# Patient Record
Sex: MALE | Race: WHITE | NOT HISPANIC OR LATINO | Employment: OTHER | URBAN - METROPOLITAN AREA
[De-identification: names, ages, dates, MRNs, and addresses within clinical notes are randomized per-mention and may not be internally consistent; named-entity substitution may affect disease eponyms.]

---

## 2017-10-22 ENCOUNTER — TELEPHONE (OUTPATIENT)
Dept: PULMONOLOGY | Facility: HOSPITAL | Age: 72
End: 2017-10-22

## 2017-10-27 RX ORDER — CLONAZEPAM 0.5 MG/1
TABLET ORAL
Qty: 60 TABLET | Refills: 0 | OUTPATIENT
Start: 2017-10-27

## 2017-11-15 ENCOUNTER — OFFICE VISIT (OUTPATIENT)
Dept: SLEEP MEDICINE | Facility: HOSPITAL | Age: 72
End: 2017-11-15
Attending: INTERNAL MEDICINE

## 2017-11-15 DIAGNOSIS — G47.31 CSA (CENTRAL SLEEP APNEA): ICD-10-CM

## 2017-11-15 DIAGNOSIS — G47.52 REM BEHAVIORAL DISORDER: ICD-10-CM

## 2017-11-15 DIAGNOSIS — G47.33 OSA TREATED WITH BIPAP: Primary | ICD-10-CM

## 2017-11-15 PROCEDURE — G0463 HOSPITAL OUTPT CLINIC VISIT: HCPCS

## 2017-11-15 PROCEDURE — 99214 OFFICE O/P EST MOD 30 MIN: CPT | Performed by: INTERNAL MEDICINE

## 2017-11-15 RX ORDER — CLONAZEPAM 0.5 MG/1
0.5 TABLET ORAL NIGHTLY
Qty: 60 TABLET | Refills: 5 | COMMUNITY
Start: 2017-11-15 | End: 2017-11-15 | Stop reason: SDUPTHER

## 2017-11-15 RX ORDER — CLONAZEPAM 0.5 MG/1
0.5 TABLET ORAL NIGHTLY
Qty: 60 TABLET | Refills: 5 | Status: SHIPPED | OUTPATIENT
Start: 2017-11-15 | End: 2017-11-21 | Stop reason: SDUPTHER

## 2017-12-02 PROBLEM — G47.33 OSA TREATED WITH BIPAP: Status: ACTIVE | Noted: 2017-12-02

## 2017-12-02 PROBLEM — G47.52 REM BEHAVIORAL DISORDER: Status: ACTIVE | Noted: 2017-12-02

## 2017-12-02 PROBLEM — G47.31 CSA (CENTRAL SLEEP APNEA): Status: ACTIVE | Noted: 2017-12-02

## 2017-12-05 RX ORDER — CLONAZEPAM 0.5 MG/1
TABLET ORAL
Qty: 60 TABLET | Refills: 0 | Status: SHIPPED | OUTPATIENT
Start: 2017-12-05 | End: 2018-06-05 | Stop reason: SDUPTHER

## 2018-05-16 ENCOUNTER — APPOINTMENT (OUTPATIENT)
Dept: SLEEP MEDICINE | Facility: HOSPITAL | Age: 73
End: 2018-05-16
Attending: INTERNAL MEDICINE

## 2018-06-05 RX ORDER — CLONAZEPAM 0.5 MG/1
TABLET ORAL
Qty: 60 TABLET | Refills: 2 | Status: SHIPPED | OUTPATIENT
Start: 2018-06-05 | End: 2018-09-08 | Stop reason: SDUPTHER

## 2018-06-28 ENCOUNTER — OFFICE VISIT (OUTPATIENT)
Dept: SLEEP MEDICINE | Facility: HOSPITAL | Age: 73
End: 2018-06-28
Attending: INTERNAL MEDICINE

## 2018-06-28 DIAGNOSIS — G47.31 CSA (CENTRAL SLEEP APNEA): ICD-10-CM

## 2018-06-28 DIAGNOSIS — G47.52 REM BEHAVIORAL DISORDER: ICD-10-CM

## 2018-06-28 DIAGNOSIS — G47.33 OSA TREATED WITH BIPAP: Primary | ICD-10-CM

## 2018-06-28 DIAGNOSIS — G47.14 HYPERSOMNIA DUE TO MEDICAL CONDITION: ICD-10-CM

## 2018-06-28 PROCEDURE — G0463 HOSPITAL OUTPT CLINIC VISIT: HCPCS

## 2018-06-28 PROCEDURE — 99214 OFFICE O/P EST MOD 30 MIN: CPT | Performed by: INTERNAL MEDICINE

## 2018-06-28 RX ORDER — MODAFINIL 200 MG/1
200 TABLET ORAL DAILY
Qty: 30 TABLET | Refills: 2 | Status: SHIPPED | OUTPATIENT
Start: 2018-06-28 | End: 2018-07-25 | Stop reason: SDUPTHER

## 2018-06-28 RX ORDER — MODAFINIL 200 MG/1
200 TABLET ORAL DAILY
Qty: 30 TABLET | Refills: 2 | Status: SHIPPED | OUTPATIENT
Start: 2018-06-28 | End: 2018-06-28

## 2018-07-01 PROBLEM — G47.14 HYPERSOMNIA DUE TO MEDICAL CONDITION: Status: ACTIVE | Noted: 2018-07-01

## 2018-07-16 ENCOUNTER — TELEPHONE (OUTPATIENT)
Dept: SLEEP MEDICINE | Facility: HOSPITAL | Age: 73
End: 2018-07-16

## 2018-07-25 RX ORDER — MODAFINIL 200 MG/1
200 TABLET ORAL DAILY
Qty: 30 TABLET | Refills: 2 | Status: ON HOLD | OUTPATIENT
Start: 2018-07-25 | End: 2022-08-11

## 2018-09-08 RX ORDER — CLONAZEPAM 0.5 MG/1
TABLET ORAL
Qty: 60 TABLET | Refills: 5 | Status: SHIPPED | OUTPATIENT
Start: 2018-09-08 | End: 2019-03-29

## 2018-09-27 ENCOUNTER — APPOINTMENT (OUTPATIENT)
Dept: SLEEP MEDICINE | Facility: HOSPITAL | Age: 73
End: 2018-09-27
Attending: INTERNAL MEDICINE

## 2018-12-03 ENCOUNTER — TELEPHONE (OUTPATIENT)
Dept: SLEEP MEDICINE | Facility: HOSPITAL | Age: 73
End: 2018-12-03

## 2018-12-03 NOTE — TELEPHONE ENCOUNTER
I received a fax from Dr Chris Messer stating that the patient would need to come off his clonazepam for REM behavior sleep disorder in order to get his oxycodone APAP 10/325 per [employer] PA. Pain pill with anti-anxiety meds does not meet the Service Benefit Plan criteria. In order to get his pain meds, it is ok for him to discontinue clonazepam gradually. However, if REM behavior becomes a problem, it will need to be addressed.

## 2019-02-02 ENCOUNTER — TELEPHONE (OUTPATIENT)
Dept: SLEEP MEDICINE | Facility: HOSPITAL | Age: 74
End: 2019-02-02

## 2019-02-02 NOTE — TELEPHONE ENCOUNTER
The patient had an ED visit in September 2018.  He went to a rehab facility.  There, he required oxygen at night.  I am being asked to write the oxygen order.  I will find out of oxygen saturation performed on PAP therapy or not?

## 2019-03-29 RX ORDER — CLONAZEPAM 0.5 MG/1
TABLET ORAL
Qty: 60 TABLET | Refills: 5 | Status: SHIPPED | OUTPATIENT
Start: 2019-03-29 | End: 2019-10-23 | Stop reason: SDUPTHER

## 2019-10-10 ENCOUNTER — TELEPHONE (OUTPATIENT)
Dept: SLEEP MEDICINE | Facility: HOSPITAL | Age: 74
End: 2019-10-10

## 2019-10-10 NOTE — TELEPHONE ENCOUNTER
Patient called stating he had been to ER recently and was told he needed to be on oxygen, patient called hear requesting Dr Fowler send an order for portable oxygen to DME. He has not been seen by Dr Fowler since 6/28/18. Scheduled appiontment 10/17/19

## 2019-10-17 ENCOUNTER — OFFICE VISIT (OUTPATIENT)
Dept: SLEEP MEDICINE | Facility: HOSPITAL | Age: 74
End: 2019-10-17

## 2019-10-17 VITALS — WEIGHT: 250 LBS | BODY MASS INDEX: 33.86 KG/M2 | HEIGHT: 72 IN

## 2019-10-17 DIAGNOSIS — G47.33 OSA TREATED WITH BIPAP: Primary | ICD-10-CM

## 2019-10-17 DIAGNOSIS — G47.52 REM BEHAVIORAL DISORDER: ICD-10-CM

## 2019-10-17 DIAGNOSIS — G47.31 CSA (CENTRAL SLEEP APNEA): ICD-10-CM

## 2019-10-17 DIAGNOSIS — G47.14 HYPERSOMNIA DUE TO MEDICAL CONDITION: ICD-10-CM

## 2019-10-17 PROCEDURE — G0463 HOSPITAL OUTPT CLINIC VISIT: HCPCS

## 2019-10-17 PROCEDURE — 99214 OFFICE O/P EST MOD 30 MIN: CPT | Performed by: INTERNAL MEDICINE

## 2019-10-17 NOTE — PROGRESS NOTES
"Follow Up Sleep Disorders Center Note     Chief Complaint:  HALIMA     Primary Care Physician: Eisenmenger, James Robert, MD    Interval History:   I last saw the patient in June 2018.  The patient had been hospitalized and in rehab.  The patient condition has declined.  The patient reports several weeks ago he was hospitalized and placed on oxygen 24/7.  When the oxygen was delivered, he was not given an adapter for the oxygen to be bled into his BiPAP auto SV.  He has been using oxygen only.  The patient goes to bed at 10 PM and awakens at 9:30 AM.  He will use the restroom.  He does have complaints of hypersomnolence and did not fill out his [place] Sleepiness Scale.    Review of Systems:    A complete review of systems was done and all were negative with the exception of nasal congestion and postnasal drip, GANNON, wheezing, occasional chest pain, cough, and ONEL    Social History:    Social History     Socioeconomic History   • Marital status:      Spouse name: Not on file   • Number of children: Not on file   • Years of education: Not on file   • Highest education level: Not on file   Tobacco Use   • Smoking status: Never Smoker   • Smokeless tobacco: Never Used   Substance and Sexual Activity   • Alcohol use: No   • Drug use: No       Allergies:  Neosporin [neomycin-bacitracin zn-polymyx]     Medication Review:  Reviewed.      Vital Signs:    Vitals:    10/17/19 1100   Weight: 113 kg (250 lb)   Height: 182.9 cm (72\")     Body mass index is 33.91 kg/m².    Physical Exam:    Constitutional:  Well developed 74 y.o. male that appears in no apparent distress.  Awake & oriented times 3.  Normal mood with normal recent and remote memory and normal judgement.  Eyes:  Conjunctivae normal.  Oropharynx:  moist mucous membranes without exudate and a large tongue and class III MP airway and posterior pharyngeal region not well seen  Neck: Obese trachea midline  Respiratory: Effort labored with ambulation  Musculoskeletal: " The patient is ambulating with a rolling walker     Results Review:  DME is [facility] and he uses a full facemask.  Downloads between 7/11 and 10/8/2019 compliance 84%.  Average usage is 5 hours and 51 minutes.  Average AHI is abnormal at 29.6 with a leak of 25 minutes.  BiPAP auto SV pressure is averaging EPAP 11.4 and average pressure support for 90% of the time EPAP pressure 12.8 and pressure support 7.6 and percent of night and periodic breathing 26% and average breath rate 12.6 and average minute ventilation 8.2 and BiPAP auto SV settings: Max pressure 20 and minimum EPAP 10 and maximum 15 minimum pressure support 0 and maximum pressure support 10 and breath rate auto.       Impression:   Obstructive sleep apnea as well as central sleep apnea, i.e. complex sleep apnea treated with BiPAP auto SV.  The patient does have a large leak with an abnormal AHI.  However, average tidal volume appears to be adequate.  26% of his night's is in periodic breathing.  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  Hypersomnolence secondary to the above  Chronic hypoxic respiratory failure treated with supplemental oxygen 3L NC 24/7    Plan:  Good sleep hygiene measures should be maintained.  Weight loss would be beneficial in this patient who is obese by BMI.  The patient is benefiting from the treatment being provided.     The patient will continue BiPAP auto SV.  I did give him an adapter to bleed and his oxygen to his BiPAP auto SV.  He is to start using it again.    The patient will call for any problems and will follow up in 3 months.      Drew Fowler MD  Sleep Medicine  10/17/19  11:27 AM

## 2019-10-23 RX ORDER — CLONAZEPAM 0.5 MG/1
TABLET ORAL
Qty: 60 TABLET | Refills: 5 | Status: ON HOLD | OUTPATIENT
Start: 2019-10-23 | End: 2022-08-11

## 2019-10-28 PROBLEM — R06.3 PERIODIC BREATHING: Status: ACTIVE | Noted: 2019-10-28

## 2020-01-16 ENCOUNTER — APPOINTMENT (OUTPATIENT)
Dept: SLEEP MEDICINE | Facility: HOSPITAL | Age: 75
End: 2020-01-16

## 2022-08-11 ENCOUNTER — APPOINTMENT (OUTPATIENT)
Dept: CARDIOLOGY | Facility: HOSPITAL | Age: 77
End: 2022-08-11

## 2022-08-11 ENCOUNTER — APPOINTMENT (OUTPATIENT)
Dept: GENERAL RADIOLOGY | Facility: HOSPITAL | Age: 77
End: 2022-08-11

## 2022-08-11 ENCOUNTER — APPOINTMENT (OUTPATIENT)
Dept: CT IMAGING | Facility: HOSPITAL | Age: 77
End: 2022-08-11

## 2022-08-11 ENCOUNTER — HOSPITAL ENCOUNTER (INPATIENT)
Facility: HOSPITAL | Age: 77
LOS: 8 days | Discharge: NURSING, REHAB OR LONG-TERM CARE | End: 2022-08-19
Attending: EMERGENCY MEDICINE | Admitting: HOSPITALIST

## 2022-08-11 DIAGNOSIS — N18.9 CHRONIC KIDNEY DISEASE, UNSPECIFIED CKD STAGE: ICD-10-CM

## 2022-08-11 DIAGNOSIS — E11.9 TYPE 2 DIABETES MELLITUS WITHOUT COMPLICATION, UNSPECIFIED WHETHER LONG TERM INSULIN USE: ICD-10-CM

## 2022-08-11 DIAGNOSIS — R77.8 ELEVATED TROPONIN LEVEL NOT DUE TO ACUTE CORONARY SYNDROME: ICD-10-CM

## 2022-08-11 DIAGNOSIS — L03.115 CELLULITIS OF RIGHT LOWER EXTREMITY: Primary | ICD-10-CM

## 2022-08-11 DIAGNOSIS — A41.9 SEPSIS, DUE TO UNSPECIFIED ORGANISM, UNSPECIFIED WHETHER ACUTE ORGAN DYSFUNCTION PRESENT: ICD-10-CM

## 2022-08-11 PROBLEM — R09.89 PULMONARY VASCULAR CONGESTION: Status: ACTIVE | Noted: 2022-08-11

## 2022-08-11 PROBLEM — R79.89 ELEVATED TROPONIN: Status: ACTIVE | Noted: 2022-08-11

## 2022-08-11 PROBLEM — N18.32 TYPE 2 DIABETES MELLITUS WITH STAGE 3B CHRONIC KIDNEY DISEASE: Status: ACTIVE | Noted: 2022-08-11

## 2022-08-11 PROBLEM — I50.31 DIASTOLIC CHF, ACUTE (HCC): Status: ACTIVE | Noted: 2022-08-11

## 2022-08-11 PROBLEM — I10 HTN (HYPERTENSION): Status: ACTIVE | Noted: 2022-08-11

## 2022-08-11 PROBLEM — E11.22 TYPE 2 DIABETES MELLITUS WITH STAGE 3B CHRONIC KIDNEY DISEASE (HCC): Status: ACTIVE | Noted: 2022-08-11

## 2022-08-11 PROBLEM — I45.10 RBBB: Status: ACTIVE | Noted: 2022-08-11

## 2022-08-11 LAB
ALBUMIN SERPL-MCNC: 4.1 G/DL (ref 3.5–5.2)
ALBUMIN/GLOB SERPL: 1.7 G/DL
ALP SERPL-CCNC: 61 U/L (ref 39–117)
ALT SERPL W P-5'-P-CCNC: 11 U/L (ref 1–41)
ANION GAP SERPL CALCULATED.3IONS-SCNC: 11 MMOL/L (ref 5–15)
AORTIC DIMENSIONLESS INDEX: 0.9 (DI)
ARTERIAL PATENCY WRIST A: ABNORMAL
ASCENDING AORTA: 3.2 CM
AST SERPL-CCNC: 18 U/L (ref 1–40)
ATMOSPHERIC PRESS: 749.4 MMHG
BACTERIA BLD CULT: ABNORMAL
BASE EXCESS BLDA CALC-SCNC: 1.9 MMOL/L (ref 0–2)
BASOPHILS # BLD AUTO: 0.02 10*3/MM3 (ref 0–0.2)
BASOPHILS NFR BLD AUTO: 0.2 % (ref 0–1.5)
BDY SITE: ABNORMAL
BH CV ECHO MEAS - ACS: 2.17 CM
BH CV ECHO MEAS - AO MAX PG: 11.1 MMHG
BH CV ECHO MEAS - AO MEAN PG: 5.2 MMHG
BH CV ECHO MEAS - AO ROOT DIAM: 3.6 CM
BH CV ECHO MEAS - AO V2 MAX: 167 CM/SEC
BH CV ECHO MEAS - AO V2 VTI: 31.8 CM
BH CV ECHO MEAS - AVA(I,D): 4.9 CM2
BH CV ECHO MEAS - EDV(CUBED): 153.6 ML
BH CV ECHO MEAS - EDV(MOD-SP2): 98 ML
BH CV ECHO MEAS - EDV(MOD-SP4): 109 ML
BH CV ECHO MEAS - EF(MOD-BP): 58.3 %
BH CV ECHO MEAS - EF(MOD-SP2): 62.2 %
BH CV ECHO MEAS - EF(MOD-SP4): 53.2 %
BH CV ECHO MEAS - ESV(CUBED): 78 ML
BH CV ECHO MEAS - ESV(MOD-SP2): 37 ML
BH CV ECHO MEAS - ESV(MOD-SP4): 51 ML
BH CV ECHO MEAS - FS: 20.2 %
BH CV ECHO MEAS - IVS/LVPW: 0.96 CM
BH CV ECHO MEAS - IVSD: 1.25 CM
BH CV ECHO MEAS - LV MASS(C)D: 283.5 GRAMS
BH CV ECHO MEAS - LV MAX PG: 6.7 MMHG
BH CV ECHO MEAS - LV MEAN PG: 3 MMHG
BH CV ECHO MEAS - LV V1 MAX: 129.8 CM/SEC
BH CV ECHO MEAS - LV V1 VTI: 27.6 CM
BH CV ECHO MEAS - LVIDD: 5.4 CM
BH CV ECHO MEAS - LVIDS: 4.3 CM
BH CV ECHO MEAS - LVOT AREA: 5.6 CM2
BH CV ECHO MEAS - LVOT DIAM: 2.7 CM
BH CV ECHO MEAS - LVPWD: 1.3 CM
BH CV ECHO MEAS - MV A DUR: 0.15 SEC
BH CV ECHO MEAS - MV A MAX VEL: 85.5 CM/SEC
BH CV ECHO MEAS - MV DEC SLOPE: 827.2 CM/SEC2
BH CV ECHO MEAS - MV DEC TIME: 0.16 MSEC
BH CV ECHO MEAS - MV E MAX VEL: 87.5 CM/SEC
BH CV ECHO MEAS - MV E/A: 1.02
BH CV ECHO MEAS - MV MAX PG: 5.9 MMHG
BH CV ECHO MEAS - MV MEAN PG: 2.7 MMHG
BH CV ECHO MEAS - MV P1/2T: 49 MSEC
BH CV ECHO MEAS - MV V2 VTI: 33.7 CM
BH CV ECHO MEAS - MVA(P1/2T): 4.5 CM2
BH CV ECHO MEAS - MVA(VTI): 4.6 CM2
BH CV ECHO MEAS - PA ACC TIME: 0.1 SEC
BH CV ECHO MEAS - PA PR(ACCEL): 33.8 MMHG
BH CV ECHO MEAS - PA V2 MAX: 81.4 CM/SEC
BH CV ECHO MEAS - PULM A REVS DUR: 0.14 SEC
BH CV ECHO MEAS - PULM A REVS VEL: 27.3 CM/SEC
BH CV ECHO MEAS - PULM DIAS VEL: 42.1 CM/SEC
BH CV ECHO MEAS - PULM S/D: 0.69
BH CV ECHO MEAS - PULM SYS VEL: 28.8 CM/SEC
BH CV ECHO MEAS - RV MAX PG: 2.2 MMHG
BH CV ECHO MEAS - RV V1 MAX: 74.1 CM/SEC
BH CV ECHO MEAS - RV V1 VTI: 17.2 CM
BH CV ECHO MEAS - SV(LVOT): 154.4 ML
BH CV ECHO MEAS - SV(MOD-SP2): 61 ML
BH CV ECHO MEAS - SV(MOD-SP4): 58 ML
BH CV ECHO MEAS - TAPSE (>1.6): 2.17 CM
BH CV LOWER VASCULAR LEFT COMMON FEMORAL AUGMENT: NORMAL
BH CV LOWER VASCULAR LEFT COMMON FEMORAL COMPETENT: NORMAL
BH CV LOWER VASCULAR LEFT COMMON FEMORAL COMPRESS: NORMAL
BH CV LOWER VASCULAR LEFT COMMON FEMORAL PHASIC: NORMAL
BH CV LOWER VASCULAR LEFT COMMON FEMORAL SPONT: NORMAL
BH CV LOWER VASCULAR LEFT DISTAL FEMORAL COMPRESS: NORMAL
BH CV LOWER VASCULAR LEFT GASTRONEMIUS COMPRESS: NORMAL
BH CV LOWER VASCULAR LEFT GREATER SAPH AK COMPRESS: NORMAL
BH CV LOWER VASCULAR LEFT GREATER SAPH BK COMPRESS: NORMAL
BH CV LOWER VASCULAR LEFT LESSER SAPH COMPRESS: NORMAL
BH CV LOWER VASCULAR LEFT MID FEMORAL AUGMENT: NORMAL
BH CV LOWER VASCULAR LEFT MID FEMORAL COMPETENT: NORMAL
BH CV LOWER VASCULAR LEFT MID FEMORAL COMPRESS: NORMAL
BH CV LOWER VASCULAR LEFT MID FEMORAL PHASIC: NORMAL
BH CV LOWER VASCULAR LEFT MID FEMORAL SPONT: NORMAL
BH CV LOWER VASCULAR LEFT PERONEAL COMPRESS: NORMAL
BH CV LOWER VASCULAR LEFT POPLITEAL AUGMENT: NORMAL
BH CV LOWER VASCULAR LEFT POPLITEAL COMPETENT: NORMAL
BH CV LOWER VASCULAR LEFT POPLITEAL COMPRESS: NORMAL
BH CV LOWER VASCULAR LEFT POPLITEAL PHASIC: NORMAL
BH CV LOWER VASCULAR LEFT POPLITEAL SPONT: NORMAL
BH CV LOWER VASCULAR LEFT POSTERIOR TIBIAL COMPRESS: NORMAL
BH CV LOWER VASCULAR LEFT PROFUNDA FEMORAL COMPRESS: NORMAL
BH CV LOWER VASCULAR LEFT PROXIMAL FEMORAL COMPRESS: NORMAL
BH CV LOWER VASCULAR LEFT SAPHENOFEMORAL JUNCTION COMPRESS: NORMAL
BH CV LOWER VASCULAR RIGHT COMMON FEMORAL AUGMENT: NORMAL
BH CV LOWER VASCULAR RIGHT COMMON FEMORAL COMPETENT: NORMAL
BH CV LOWER VASCULAR RIGHT COMMON FEMORAL COMPRESS: NORMAL
BH CV LOWER VASCULAR RIGHT COMMON FEMORAL PHASIC: NORMAL
BH CV LOWER VASCULAR RIGHT COMMON FEMORAL SPONT: NORMAL
BH CV LOWER VASCULAR RIGHT DISTAL FEMORAL COMPRESS: NORMAL
BH CV LOWER VASCULAR RIGHT GASTRONEMIUS COMPRESS: NORMAL
BH CV LOWER VASCULAR RIGHT GREATER SAPH AK COMPRESS: NORMAL
BH CV LOWER VASCULAR RIGHT GREATER SAPH BK COMPRESS: NORMAL
BH CV LOWER VASCULAR RIGHT LESSER SAPH COMPRESS: NORMAL
BH CV LOWER VASCULAR RIGHT MID FEMORAL AUGMENT: NORMAL
BH CV LOWER VASCULAR RIGHT MID FEMORAL COMPETENT: NORMAL
BH CV LOWER VASCULAR RIGHT MID FEMORAL COMPRESS: NORMAL
BH CV LOWER VASCULAR RIGHT MID FEMORAL PHASIC: NORMAL
BH CV LOWER VASCULAR RIGHT MID FEMORAL SPONT: NORMAL
BH CV LOWER VASCULAR RIGHT PERONEAL COMPRESS: NORMAL
BH CV LOWER VASCULAR RIGHT POPLITEAL AUGMENT: NORMAL
BH CV LOWER VASCULAR RIGHT POPLITEAL COMPETENT: NORMAL
BH CV LOWER VASCULAR RIGHT POPLITEAL COMPRESS: NORMAL
BH CV LOWER VASCULAR RIGHT POPLITEAL PHASIC: NORMAL
BH CV LOWER VASCULAR RIGHT POPLITEAL SPONT: NORMAL
BH CV LOWER VASCULAR RIGHT POSTERIOR TIBIAL COMPRESS: NORMAL
BH CV LOWER VASCULAR RIGHT PROFUNDA FEMORAL COMPRESS: NORMAL
BH CV LOWER VASCULAR RIGHT PROXIMAL FEMORAL COMPRESS: NORMAL
BH CV LOWER VASCULAR RIGHT SAPHENOFEMORAL JUNCTION COMPRESS: NORMAL
BILIRUB SERPL-MCNC: 0.4 MG/DL (ref 0–1.2)
BILIRUB UR QL STRIP: NEGATIVE
BUN SERPL-MCNC: 42 MG/DL (ref 8–23)
BUN/CREAT SERPL: 24 (ref 7–25)
CALCIUM SPEC-SCNC: 9.6 MG/DL (ref 8.6–10.5)
CHLORIDE SERPL-SCNC: 101 MMOL/L (ref 98–107)
CK SERPL-CCNC: 497 U/L (ref 20–200)
CLARITY UR: CLEAR
CO2 SERPL-SCNC: 28 MMOL/L (ref 22–29)
COLOR UR: YELLOW
CREAT SERPL-MCNC: 1.75 MG/DL (ref 0.76–1.27)
CRP SERPL-MCNC: 2.79 MG/DL (ref 0–0.5)
D-LACTATE SERPL-SCNC: 2.2 MMOL/L (ref 0.5–2)
DEPRECATED RDW RBC AUTO: 48.7 FL (ref 37–54)
EGFRCR SERPLBLD CKD-EPI 2021: 39.4 ML/MIN/1.73
EOSINOPHIL # BLD AUTO: 0.08 10*3/MM3 (ref 0–0.4)
EOSINOPHIL NFR BLD AUTO: 1 % (ref 0.3–6.2)
ERYTHROCYTE [DISTWIDTH] IN BLOOD BY AUTOMATED COUNT: 14.8 % (ref 12.3–15.4)
ERYTHROCYTE [SEDIMENTATION RATE] IN BLOOD: 24 MM/HR (ref 0–20)
GAS FLOW AIRWAY: 1 LPM
GLOBULIN UR ELPH-MCNC: 2.4 GM/DL
GLUCOSE BLDC GLUCOMTR-MCNC: 113 MG/DL (ref 70–130)
GLUCOSE BLDC GLUCOMTR-MCNC: 116 MG/DL (ref 70–130)
GLUCOSE BLDC GLUCOMTR-MCNC: 132 MG/DL (ref 70–130)
GLUCOSE SERPL-MCNC: 137 MG/DL (ref 65–99)
GLUCOSE UR STRIP-MCNC: NEGATIVE MG/DL
HBA1C MFR BLD: 6 % (ref 4.8–5.6)
HCO3 BLDA-SCNC: 28.6 MMOL/L (ref 22–28)
HCT VFR BLD AUTO: 36.2 % (ref 37.5–51)
HGB BLD-MCNC: 12.1 G/DL (ref 13–17.7)
HGB UR QL STRIP.AUTO: NEGATIVE
IMM GRANULOCYTES # BLD AUTO: 0.04 10*3/MM3 (ref 0–0.05)
IMM GRANULOCYTES NFR BLD AUTO: 0.5 % (ref 0–0.5)
KETONES UR QL STRIP: NEGATIVE
LEFT ATRIUM VOLUME INDEX: 28 ML/M2
LEUKOCYTE ESTERASE UR QL STRIP.AUTO: NEGATIVE
LYMPHOCYTES # BLD AUTO: 0.84 10*3/MM3 (ref 0.7–3.1)
LYMPHOCYTES NFR BLD AUTO: 10 % (ref 19.6–45.3)
MAXIMAL PREDICTED HEART RATE: 142 BPM
MAXIMAL PREDICTED HEART RATE: 142 BPM
MCH RBC QN AUTO: 30.2 PG (ref 26.6–33)
MCHC RBC AUTO-ENTMCNC: 33.4 G/DL (ref 31.5–35.7)
MCV RBC AUTO: 90.3 FL (ref 79–97)
MODALITY: ABNORMAL
MONOCYTES # BLD AUTO: 0.17 10*3/MM3 (ref 0.1–0.9)
MONOCYTES NFR BLD AUTO: 2 % (ref 5–12)
NEUTROPHILS NFR BLD AUTO: 7.25 10*3/MM3 (ref 1.7–7)
NEUTROPHILS NFR BLD AUTO: 86.3 % (ref 42.7–76)
NITRITE UR QL STRIP: NEGATIVE
NRBC BLD AUTO-RTO: 0 /100 WBC (ref 0–0.2)
NT-PROBNP SERPL-MCNC: 1298 PG/ML (ref 0–1800)
PCO2 BLDA: 54.5 MM HG (ref 35–45)
PH BLDA: 7.33 PH UNITS (ref 7.35–7.45)
PH UR STRIP.AUTO: 5.5 [PH] (ref 5–8)
PLATELET # BLD AUTO: 213 10*3/MM3 (ref 140–450)
PMV BLD AUTO: 9.5 FL (ref 6–12)
PO2 BLDA: 64.1 MM HG (ref 80–100)
POTASSIUM SERPL-SCNC: 5 MMOL/L (ref 3.5–5.2)
PROCALCITONIN SERPL-MCNC: 3.39 NG/ML (ref 0–0.25)
PROT SERPL-MCNC: 6.5 G/DL (ref 6–8.5)
PROT UR QL STRIP: NEGATIVE
QT INTERVAL: 405 MS
RBC # BLD AUTO: 4.01 10*6/MM3 (ref 4.14–5.8)
SAO2 % BLDCOA: 90 % (ref 92–99)
SARS-COV-2 RNA PNL SPEC NAA+PROBE: NOT DETECTED
SINUS: 3.3 CM
SODIUM SERPL-SCNC: 140 MMOL/L (ref 136–145)
SP GR UR STRIP: 1.02 (ref 1–1.03)
STJ: 2.7 CM
STRESS TARGET HR: 121 BPM
STRESS TARGET HR: 121 BPM
TOTAL RATE: 18 BREATHS/MINUTE
TROPONIN T SERPL-MCNC: 0.13 NG/ML (ref 0–0.03)
URATE SERPL-MCNC: 10 MG/DL (ref 3.4–7)
UROBILINOGEN UR QL STRIP: NORMAL
WBC NRBC COR # BLD: 8.4 10*3/MM3 (ref 3.4–10.8)

## 2022-08-11 PROCEDURE — 93306 TTE W/DOPPLER COMPLETE: CPT | Performed by: INTERNAL MEDICINE

## 2022-08-11 PROCEDURE — 36600 WITHDRAWAL OF ARTERIAL BLOOD: CPT

## 2022-08-11 PROCEDURE — 80053 COMPREHEN METABOLIC PANEL: CPT | Performed by: EMERGENCY MEDICINE

## 2022-08-11 PROCEDURE — 93010 ELECTROCARDIOGRAM REPORT: CPT | Performed by: INTERNAL MEDICINE

## 2022-08-11 PROCEDURE — 25010000002 ONDANSETRON PER 1 MG: Performed by: EMERGENCY MEDICINE

## 2022-08-11 PROCEDURE — 94660 CPAP INITIATION&MGMT: CPT

## 2022-08-11 PROCEDURE — 82803 BLOOD GASES ANY COMBINATION: CPT

## 2022-08-11 PROCEDURE — 93306 TTE W/DOPPLER COMPLETE: CPT

## 2022-08-11 PROCEDURE — 93005 ELECTROCARDIOGRAM TRACING: CPT | Performed by: EMERGENCY MEDICINE

## 2022-08-11 PROCEDURE — 25010000002 PIPERACILLIN SOD-TAZOBACTAM PER 1 G: Performed by: EMERGENCY MEDICINE

## 2022-08-11 PROCEDURE — 87635 SARS-COV-2 COVID-19 AMP PRB: CPT | Performed by: EMERGENCY MEDICINE

## 2022-08-11 PROCEDURE — 86140 C-REACTIVE PROTEIN: CPT | Performed by: EMERGENCY MEDICINE

## 2022-08-11 PROCEDURE — 84484 ASSAY OF TROPONIN QUANT: CPT | Performed by: EMERGENCY MEDICINE

## 2022-08-11 PROCEDURE — 25010000002 MORPHINE PER 10 MG: Performed by: EMERGENCY MEDICINE

## 2022-08-11 PROCEDURE — 87040 BLOOD CULTURE FOR BACTERIA: CPT | Performed by: EMERGENCY MEDICINE

## 2022-08-11 PROCEDURE — 73590 X-RAY EXAM OF LOWER LEG: CPT

## 2022-08-11 PROCEDURE — 82550 ASSAY OF CK (CPK): CPT | Performed by: EMERGENCY MEDICINE

## 2022-08-11 PROCEDURE — 81003 URINALYSIS AUTO W/O SCOPE: CPT | Performed by: HOSPITALIST

## 2022-08-11 PROCEDURE — 71045 X-RAY EXAM CHEST 1 VIEW: CPT

## 2022-08-11 PROCEDURE — 83036 HEMOGLOBIN GLYCOSYLATED A1C: CPT | Performed by: NURSE PRACTITIONER

## 2022-08-11 PROCEDURE — 85652 RBC SED RATE AUTOMATED: CPT | Performed by: EMERGENCY MEDICINE

## 2022-08-11 PROCEDURE — 25010000002 ENOXAPARIN PER 10 MG: Performed by: NURSE PRACTITIONER

## 2022-08-11 PROCEDURE — 87205 SMEAR GRAM STAIN: CPT | Performed by: NURSE PRACTITIONER

## 2022-08-11 PROCEDURE — 87077 CULTURE AEROBIC IDENTIFY: CPT | Performed by: EMERGENCY MEDICINE

## 2022-08-11 PROCEDURE — 84550 ASSAY OF BLOOD/URIC ACID: CPT | Performed by: NURSE PRACTITIONER

## 2022-08-11 PROCEDURE — 93970 EXTREMITY STUDY: CPT

## 2022-08-11 PROCEDURE — 99223 1ST HOSP IP/OBS HIGH 75: CPT | Performed by: STUDENT IN AN ORGANIZED HEALTH CARE EDUCATION/TRAINING PROGRAM

## 2022-08-11 PROCEDURE — 92610 EVALUATE SWALLOWING FUNCTION: CPT

## 2022-08-11 PROCEDURE — 25010000002 CEFAZOLIN IN DEXTROSE 2-4 GM/100ML-% SOLUTION: Performed by: STUDENT IN AN ORGANIZED HEALTH CARE EDUCATION/TRAINING PROGRAM

## 2022-08-11 PROCEDURE — 94799 UNLISTED PULMONARY SVC/PX: CPT

## 2022-08-11 PROCEDURE — 87150 DNA/RNA AMPLIFIED PROBE: CPT | Performed by: EMERGENCY MEDICINE

## 2022-08-11 PROCEDURE — 99285 EMERGENCY DEPT VISIT HI MDM: CPT

## 2022-08-11 PROCEDURE — 82962 GLUCOSE BLOOD TEST: CPT

## 2022-08-11 PROCEDURE — 87070 CULTURE OTHR SPECIMN AEROBIC: CPT | Performed by: NURSE PRACTITIONER

## 2022-08-11 PROCEDURE — 83880 ASSAY OF NATRIURETIC PEPTIDE: CPT | Performed by: EMERGENCY MEDICINE

## 2022-08-11 PROCEDURE — 25010000002 VANCOMYCIN 10 G RECONSTITUTED SOLUTION: Performed by: EMERGENCY MEDICINE

## 2022-08-11 PROCEDURE — 83605 ASSAY OF LACTIC ACID: CPT | Performed by: EMERGENCY MEDICINE

## 2022-08-11 PROCEDURE — 25010000002 PERFLUTREN (DEFINITY) 8.476 MG IN SODIUM CHLORIDE (PF) 0.9 % 10 ML INJECTION: Performed by: NURSE PRACTITIONER

## 2022-08-11 PROCEDURE — 85025 COMPLETE CBC W/AUTO DIFF WBC: CPT | Performed by: EMERGENCY MEDICINE

## 2022-08-11 PROCEDURE — 73700 CT LOWER EXTREMITY W/O DYE: CPT

## 2022-08-11 PROCEDURE — 84145 PROCALCITONIN (PCT): CPT | Performed by: EMERGENCY MEDICINE

## 2022-08-11 PROCEDURE — 87186 SC STD MICRODIL/AGAR DIL: CPT | Performed by: EMERGENCY MEDICINE

## 2022-08-11 PROCEDURE — 99221 1ST HOSP IP/OBS SF/LOW 40: CPT | Performed by: ORTHOPAEDIC SURGERY

## 2022-08-11 RX ORDER — ENOXAPARIN SODIUM 100 MG/ML
40 INJECTION SUBCUTANEOUS EVERY 24 HOURS
Status: DISCONTINUED | OUTPATIENT
Start: 2022-08-11 | End: 2022-08-19 | Stop reason: HOSPADM

## 2022-08-11 RX ORDER — ASPIRIN 81 MG/1
81 TABLET, CHEWABLE ORAL DAILY
COMMUNITY

## 2022-08-11 RX ORDER — NITROGLYCERIN 0.4 MG/1
0.4 TABLET SUBLINGUAL
Status: DISCONTINUED | OUTPATIENT
Start: 2022-08-11 | End: 2022-08-19 | Stop reason: HOSPADM

## 2022-08-11 RX ORDER — ROSUVASTATIN CALCIUM 10 MG/1
5 TABLET, COATED ORAL NIGHTLY
COMMUNITY

## 2022-08-11 RX ORDER — ACETAMINOPHEN 325 MG/1
650 TABLET ORAL NIGHTLY
Status: DISCONTINUED | OUTPATIENT
Start: 2022-08-11 | End: 2022-08-19 | Stop reason: HOSPADM

## 2022-08-11 RX ORDER — PHENOL 1.4 %
600 AEROSOL, SPRAY (ML) MUCOUS MEMBRANE 2 TIMES DAILY WITH MEALS
COMMUNITY
End: 2022-10-11 | Stop reason: HOSPADM

## 2022-08-11 RX ORDER — NICOTINE POLACRILEX 4 MG
15 LOZENGE BUCCAL
Status: DISCONTINUED | OUTPATIENT
Start: 2022-08-11 | End: 2022-08-19 | Stop reason: HOSPADM

## 2022-08-11 RX ORDER — FAMOTIDINE 20 MG/1
40 TABLET, FILM COATED ORAL
Status: DISCONTINUED | OUTPATIENT
Start: 2022-08-11 | End: 2022-08-19 | Stop reason: HOSPADM

## 2022-08-11 RX ORDER — SODIUM CHLORIDE 0.9 % (FLUSH) 0.9 %
10 SYRINGE (ML) INJECTION EVERY 12 HOURS SCHEDULED
Status: DISCONTINUED | OUTPATIENT
Start: 2022-08-11 | End: 2022-08-19 | Stop reason: HOSPADM

## 2022-08-11 RX ORDER — ACETAMINOPHEN 160 MG/5ML
650 SOLUTION ORAL EVERY 4 HOURS PRN
Status: DISCONTINUED | OUTPATIENT
Start: 2022-08-11 | End: 2022-08-19 | Stop reason: HOSPADM

## 2022-08-11 RX ORDER — POLYETHYLENE GLYCOL 3350 17 G/17G
17 POWDER, FOR SOLUTION ORAL DAILY PRN
Status: DISCONTINUED | OUTPATIENT
Start: 2022-08-11 | End: 2022-08-19 | Stop reason: HOSPADM

## 2022-08-11 RX ORDER — TAMSULOSIN HYDROCHLORIDE 0.4 MG/1
0.4 CAPSULE ORAL NIGHTLY
Status: DISCONTINUED | OUTPATIENT
Start: 2022-08-11 | End: 2022-08-19 | Stop reason: HOSPADM

## 2022-08-11 RX ORDER — POLYETHYLENE GLYCOL 3350 17 G/17G
17 POWDER, FOR SOLUTION ORAL DAILY PRN
COMMUNITY

## 2022-08-11 RX ORDER — SERTRALINE HYDROCHLORIDE 100 MG/1
50 TABLET, FILM COATED ORAL DAILY
COMMUNITY
End: 2022-10-11 | Stop reason: HOSPADM

## 2022-08-11 RX ORDER — ONDANSETRON 2 MG/ML
4 INJECTION INTRAMUSCULAR; INTRAVENOUS ONCE
Status: COMPLETED | OUTPATIENT
Start: 2022-08-11 | End: 2022-08-11

## 2022-08-11 RX ORDER — GABAPENTIN 300 MG/1
300 CAPSULE ORAL 3 TIMES DAILY
COMMUNITY
End: 2022-08-19 | Stop reason: HOSPADM

## 2022-08-11 RX ORDER — CEFAZOLIN SODIUM 2 G/100ML
2 INJECTION, SOLUTION INTRAVENOUS EVERY 8 HOURS
Status: DISCONTINUED | OUTPATIENT
Start: 2022-08-11 | End: 2022-08-12 | Stop reason: SDUPTHER

## 2022-08-11 RX ORDER — ONDANSETRON 2 MG/ML
4 INJECTION INTRAMUSCULAR; INTRAVENOUS EVERY 6 HOURS PRN
Status: DISCONTINUED | OUTPATIENT
Start: 2022-08-11 | End: 2022-08-19 | Stop reason: HOSPADM

## 2022-08-11 RX ORDER — SODIUM CHLORIDE 0.9 % (FLUSH) 0.9 %
10 SYRINGE (ML) INJECTION AS NEEDED
Status: DISCONTINUED | OUTPATIENT
Start: 2022-08-11 | End: 2022-08-19 | Stop reason: HOSPADM

## 2022-08-11 RX ORDER — ACETAMINOPHEN 500 MG
1000 TABLET ORAL ONCE
Status: COMPLETED | OUTPATIENT
Start: 2022-08-11 | End: 2022-08-11

## 2022-08-11 RX ORDER — OXYCODONE AND ACETAMINOPHEN 7.5; 325 MG/1; MG/1
1 TABLET ORAL EVERY 4 HOURS PRN
Status: ON HOLD | COMMUNITY
End: 2022-08-18 | Stop reason: SDUPTHER

## 2022-08-11 RX ORDER — ACETAMINOPHEN 650 MG/1
650 SUPPOSITORY RECTAL EVERY 4 HOURS PRN
Status: DISCONTINUED | OUTPATIENT
Start: 2022-08-11 | End: 2022-08-19 | Stop reason: HOSPADM

## 2022-08-11 RX ORDER — ACETAMINOPHEN 325 MG/1
650 TABLET ORAL NIGHTLY
COMMUNITY
End: 2022-10-11 | Stop reason: HOSPADM

## 2022-08-11 RX ORDER — ROSUVASTATIN CALCIUM 5 MG/1
5 TABLET, COATED ORAL NIGHTLY
Status: DISCONTINUED | OUTPATIENT
Start: 2022-08-11 | End: 2022-08-19 | Stop reason: HOSPADM

## 2022-08-11 RX ORDER — INSULIN GLARGINE 100 [IU]/ML
15 INJECTION, SOLUTION SUBCUTANEOUS DAILY
COMMUNITY
End: 2022-08-19 | Stop reason: HOSPADM

## 2022-08-11 RX ORDER — SODIUM CHLORIDE 9 MG/ML
125 INJECTION, SOLUTION INTRAVENOUS CONTINUOUS
Status: DISCONTINUED | OUTPATIENT
Start: 2022-08-11 | End: 2022-08-11

## 2022-08-11 RX ORDER — MORPHINE SULFATE 2 MG/ML
2 INJECTION, SOLUTION INTRAMUSCULAR; INTRAVENOUS
Status: DISPENSED | OUTPATIENT
Start: 2022-08-11 | End: 2022-08-18

## 2022-08-11 RX ORDER — SENNA PLUS 8.6 MG/1
1 TABLET ORAL DAILY
Status: DISCONTINUED | OUTPATIENT
Start: 2022-08-11 | End: 2022-08-19 | Stop reason: HOSPADM

## 2022-08-11 RX ORDER — NITROGLYCERIN 0.4 MG/1
0.4 TABLET SUBLINGUAL
COMMUNITY
End: 2022-10-11 | Stop reason: HOSPADM

## 2022-08-11 RX ORDER — GABAPENTIN 300 MG/1
300 CAPSULE ORAL 3 TIMES DAILY
Status: DISCONTINUED | OUTPATIENT
Start: 2022-08-11 | End: 2022-08-17

## 2022-08-11 RX ORDER — MORPHINE SULFATE 2 MG/ML
4 INJECTION, SOLUTION INTRAMUSCULAR; INTRAVENOUS ONCE
Status: COMPLETED | OUTPATIENT
Start: 2022-08-11 | End: 2022-08-11

## 2022-08-11 RX ORDER — OXYCODONE AND ACETAMINOPHEN 7.5; 325 MG/1; MG/1
1 TABLET ORAL EVERY 4 HOURS PRN
Status: DISCONTINUED | OUTPATIENT
Start: 2022-08-11 | End: 2022-08-19 | Stop reason: HOSPADM

## 2022-08-11 RX ORDER — DEXTROSE MONOHYDRATE 25 G/50ML
25 INJECTION, SOLUTION INTRAVENOUS
Status: DISCONTINUED | OUTPATIENT
Start: 2022-08-11 | End: 2022-08-19 | Stop reason: HOSPADM

## 2022-08-11 RX ORDER — NALOXONE HCL 0.4 MG/ML
0.4 VIAL (ML) INJECTION
Status: DISCONTINUED | OUTPATIENT
Start: 2022-08-11 | End: 2022-08-19 | Stop reason: HOSPADM

## 2022-08-11 RX ORDER — CARVEDILOL 6.25 MG/1
6.25 TABLET ORAL 2 TIMES DAILY WITH MEALS
Status: DISCONTINUED | OUTPATIENT
Start: 2022-08-11 | End: 2022-08-15

## 2022-08-11 RX ORDER — ASPIRIN 81 MG/1
81 TABLET, CHEWABLE ORAL DAILY
Status: DISCONTINUED | OUTPATIENT
Start: 2022-08-11 | End: 2022-08-19 | Stop reason: HOSPADM

## 2022-08-11 RX ORDER — CLINDAMYCIN PHOSPHATE 600 MG/50ML
600 INJECTION INTRAVENOUS ONCE
Status: COMPLETED | OUTPATIENT
Start: 2022-08-11 | End: 2022-08-11

## 2022-08-11 RX ORDER — SODIUM CHLORIDE 9 MG/ML
75 INJECTION, SOLUTION INTRAVENOUS CONTINUOUS
Status: DISCONTINUED | OUTPATIENT
Start: 2022-08-11 | End: 2022-08-11

## 2022-08-11 RX ORDER — ACETAMINOPHEN 325 MG/1
650 TABLET ORAL EVERY 4 HOURS PRN
Status: DISCONTINUED | OUTPATIENT
Start: 2022-08-11 | End: 2022-08-19 | Stop reason: HOSPADM

## 2022-08-11 RX ORDER — INSULIN LISPRO 100 [IU]/ML
0-14 INJECTION, SOLUTION INTRAVENOUS; SUBCUTANEOUS
Status: DISCONTINUED | OUTPATIENT
Start: 2022-08-11 | End: 2022-08-19 | Stop reason: HOSPADM

## 2022-08-11 RX ORDER — SENNA PLUS 8.6 MG/1
1 TABLET ORAL DAILY
COMMUNITY

## 2022-08-11 RX ORDER — FERROUS SULFATE 325(65) MG
325 TABLET ORAL
Status: DISCONTINUED | OUTPATIENT
Start: 2022-08-12 | End: 2022-08-19 | Stop reason: HOSPADM

## 2022-08-11 RX ORDER — CARVEDILOL 6.25 MG/1
6.25 TABLET ORAL 2 TIMES DAILY WITH MEALS
COMMUNITY
End: 2022-08-19 | Stop reason: HOSPADM

## 2022-08-11 RX ORDER — DOXYCYCLINE HYCLATE 50 MG/1
324 CAPSULE, GELATIN COATED ORAL
COMMUNITY
End: 2022-10-11 | Stop reason: HOSPADM

## 2022-08-11 RX ADMIN — ROSUVASTATIN CALCIUM 5 MG: 5 TABLET, FILM COATED ORAL at 22:22

## 2022-08-11 RX ADMIN — GABAPENTIN 300 MG: 300 CAPSULE ORAL at 17:12

## 2022-08-11 RX ADMIN — CALCIUM CARBONATE-VITAMIN D TAB 500 MG-200 UNIT 1 TABLET: 500-200 TAB at 22:21

## 2022-08-11 RX ADMIN — Medication 10 ML: at 11:16

## 2022-08-11 RX ADMIN — ACETAMINOPHEN 1000 MG: 500 TABLET, FILM COATED ORAL at 05:50

## 2022-08-11 RX ADMIN — FAMOTIDINE 40 MG: 20 TABLET ORAL at 17:11

## 2022-08-11 RX ADMIN — TAZOBACTAM SODIUM AND PIPERACILLIN SODIUM 3.38 G: 375; 3 INJECTION, SOLUTION INTRAVENOUS at 05:41

## 2022-08-11 RX ADMIN — SENNOSIDES 1 TABLET: 8.6 TABLET, FILM COATED ORAL at 17:11

## 2022-08-11 RX ADMIN — CEFAZOLIN SODIUM 2 G: 2 INJECTION, SOLUTION INTRAVENOUS at 17:11

## 2022-08-11 RX ADMIN — SODIUM CHLORIDE 125 ML/HR: 9 INJECTION, SOLUTION INTRAVENOUS at 11:16

## 2022-08-11 RX ADMIN — TAMSULOSIN HYDROCHLORIDE 0.4 MG: 0.4 CAPSULE ORAL at 22:20

## 2022-08-11 RX ADMIN — CLINDAMYCIN PHOSPHATE 600 MG: 600 INJECTION, SOLUTION INTRAVENOUS at 06:12

## 2022-08-11 RX ADMIN — SODIUM CHLORIDE 75 ML/HR: 9 INJECTION, SOLUTION INTRAVENOUS at 13:53

## 2022-08-11 RX ADMIN — VANCOMYCIN HYDROCHLORIDE 2500 MG: 500 INJECTION, POWDER, LYOPHILIZED, FOR SOLUTION INTRAVENOUS at 06:50

## 2022-08-11 RX ADMIN — PERFLUTREN 2 ML: 6.52 INJECTION, SUSPENSION INTRAVENOUS at 15:39

## 2022-08-11 RX ADMIN — MORPHINE SULFATE 4 MG: 2 INJECTION, SOLUTION INTRAMUSCULAR; INTRAVENOUS at 06:13

## 2022-08-11 RX ADMIN — GABAPENTIN 300 MG: 300 CAPSULE ORAL at 22:20

## 2022-08-11 RX ADMIN — ENOXAPARIN SODIUM 40 MG: 100 INJECTION SUBCUTANEOUS at 13:35

## 2022-08-11 RX ADMIN — ACETAMINOPHEN 650 MG: 325 TABLET, FILM COATED ORAL at 22:20

## 2022-08-11 RX ADMIN — ONDANSETRON 4 MG: 2 INJECTION INTRAMUSCULAR; INTRAVENOUS at 06:12

## 2022-08-11 RX ADMIN — CARVEDILOL 6.25 MG: 6.25 TABLET, FILM COATED ORAL at 17:11

## 2022-08-11 RX ADMIN — Medication 10 ML: at 22:21

## 2022-08-11 RX ADMIN — SODIUM CHLORIDE, POTASSIUM CHLORIDE, SODIUM LACTATE AND CALCIUM CHLORIDE 3720 ML: 600; 310; 30; 20 INJECTION, SOLUTION INTRAVENOUS at 05:25

## 2022-08-11 RX ADMIN — ASPIRIN 81 MG: 81 TABLET, CHEWABLE ORAL at 17:11

## 2022-08-11 RX ADMIN — SERTRALINE 75 MG: 25 TABLET, FILM COATED ORAL at 17:11

## 2022-08-11 NOTE — THERAPY EVALUATION
"Acute Care - Speech Language Pathology   Swallow Initial Evaluation [facility]     Patient Name: Yakov Dubon  : 1945  MRN: [id number]  Today's Date: 2022               Admit Date: 2022    Visit Dx:     ICD-10-CM ICD-9-CM   1. Cellulitis of right lower extremity  L03.115 682.6   2. Elevated troponin level not due to acute coronary syndrome  R77.8 790.6   3. Sepsis, due to unspecified organism, unspecified whether acute organ dysfunction present ([facility])  A41.9 038.9     995.91   4. Type 2 diabetes mellitus without complication, unspecified whether long term insulin use ([facility])  E11.9 250.00   5. 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 condition   • Periodic breathing   • Cellulitis of right lower extremity   • HTN (hypertension)   • Type 2 diabetes mellitus with stage 3b chronic kidney disease ([facility])   • Elevated troponin   • RBBB   • Pulmonary vascular congestion     Past Medical History:   Diagnosis Date   • Anxiety    • Back pain    • Depression    • Diabetes mellitus ([facility])     \"BORDERLINE\"   • High cholesterol    • Hypertension    • Kidney disease, chronic, stage III (GFR 30-59 ml/min) ([facility])    • Reflux esophagitis    • Sleep apnea      Past Surgical History:   Procedure Laterality Date   • APPENDECTOMY     • CARPAL TUNNEL RELEASE     • CATARACT EXTRACTION     • HAMMER TOE REPAIR     • LUMBAR SPINE SURGERY     • PAIN PUMP INSERTION/REVISION Right 2016    Procedure: RT PAIN PUMP REPLACEMENT;  Surgeon: Chris Messer MD;  Location: [facility];  Service:    • TOTAL KNEE ARTHROPLASTY Left        SLP Recommendation and Plan  SLP Swallowing Diagnosis: oral dysphagia, functional pharyngeal phase (22)  SLP Diet Recommendation: puree, thin liquids (advance as toelrating) (22)  Recommended " Precautions and Strategies: upright posture during/after eating, small bites of food and sips of liquid, alternate between small bites of food and sips of liquid, other (see comments) (slow rate, intermittent supervision) (08/11/22 1345)  SLP Rec. for Method of Medication Administration: meds crushed, meds whole, with thin liquids, with pudding or applesauce, as tolerated (08/11/22 1345)     Monitor for Signs of Aspiration: yes, notify SLP if any concerns (08/11/22 1345)     Swallow Criteria for Skilled Therapeutic Interventions Met: no problems identified which require skilled intervention (08/11/22 1345)  Anticipated Discharge Disposition (SLP): skilled nursing facility (08/11/22 1345)  Rehab Potential/Prognosis, Swallowing: good, to achieve stated therapy goals (08/11/22 1345)  Therapy Frequency (Swallow): PRN (08/11/22 1345)  Predicted Duration Therapy Intervention (Days): until discharge (08/11/22 1345)                                  Plan of Care Reviewed With: patient  Outcome Evaluation: Clinical swallow eval completed. Recommend puree and thin, advance as tolerating and patient requesting/agreeable. Meds whole with thin/puree. Patient reports only eating smooth textures since recent tooth extractions. Patient declined trials of soft solids at this time. SLP to s/o, please re-consult as warranted.      SWALLOW EVALUATION (last 72 hours)     SLP Adult Swallow Evaluation     Row Name 08/11/22 1345                   Rehab Evaluation    Document Type evaluation  -OC        Subjective Information no complaints  -OC        Patient Observations alert;cooperative;agree to therapy  -OC        Patient Effort good  -OC        Symptoms Noted During/After Treatment none  -OC                  General Information    Patient Profile Reviewed yes  -OC        Pertinent History Of Current Problem Patient admitted with cellulitis of the right lower extremity. Patient reports recent tooth extraction. 3 upper and 3 lower teeth  extracted.  -OC        Current Method of Nutrition regular textures;thin liquids  -OC        Precautions/Limitations, Vision WFL  -OC        Precautions/Limitations, Hearing WFL  -OC        Prior Level of Function-Communication unknown  -OC        Prior Level of Function-Swallowing other (see comments)  reports unable to masticate solids s/p teeth extractions  -OC        Plans/Goals Discussed with patient;agreed upon  -OC        Barriers to Rehab none identified  -OC        Patient's Goals for Discharge patient did not state  -OC                  Pain    Additional Documentation Pain Scale: Numbers Pre/Post-Treatment (Group)  -OC                  Pain Scale: Numbers Pre/Post-Treatment    Pretreatment Pain Rating 0/10 - no pain  -OC        Posttreatment Pain Rating 0/10 - no pain  -OC                  Oral Motor Structure and Function    Dentition Assessment missing teeth;other (see comments)  no dentures/partials  -OC        Secretion Management WNL/WFL  -OC        Mucosal Quality moist, healthy  -OC        Volitional Swallow delayed  -OC        Volitional Cough WFL  -OC                  Oral Musculature and Cranial Nerve Assessment    Oral Motor General Assessment WFL  -OC                  Clinical Swallow Eval    Clinical Swallow Evaluation Summary Patient demonstrated no overt s/s aspiration with ice chips, thin via cup/straw, and puree. Patient declined trials of mechanical soft, reports inability to masticate since tooth extraction. Patient on phone 2x during evaluation.  -OC                  SLP Evaluation Clinical Impression    SLP Swallowing Diagnosis oral dysphagia;functional pharyngeal phase  -OC        Functional Impact no impact on function  -OC        Rehab Potential/Prognosis, Swallowing good, to achieve stated therapy goals  -OC        Swallow Criteria for Skilled Therapeutic Interventions Met no problems identified which require skilled intervention  -OC                  Recommendations    Therapy  Frequency (Swallow) PRN  -OC        Predicted Duration Therapy Intervention (Days) until discharge  -OC        SLP Diet Recommendation puree;thin liquids  advance as toelrating  -OC        Recommended Precautions and Strategies upright posture during/after eating;small bites of food and sips of liquid;alternate between small bites of food and sips of liquid;other (see comments)  slow rate, intermittent supervision  -OC        Oral Care Recommendations Oral Care BID/PRN  -OC        SLP Rec. for Method of Medication Administration meds crushed;meds whole;with thin liquids;with pudding or applesauce;as tolerated  -OC        Monitor for Signs of Aspiration yes;notify SLP if any concerns  -OC        Anticipated Discharge Disposition (SLP) [facility] nursing [facility]  -OC              User Key  (r) = Recorded By, (t) = Taken By, (c) = Cosigned By    Initials Name Effective Dates    Leora Whitley MA,REGULO-SLP 06/16/21 -                 EDUCATION  The patient has been educated in the following areas:   Dysphagia (Swallowing Impairment).              Time Calculation:    Time Calculation- SLP     Row Name 08/11/22 1502             Time Calculation- SLP    SLP Start Time 1345  -OC      SLP Received On 08/11/22  -OC              Untimed Charges    SLP Eval/Re-eval  ST Eval Oral Pharyng Swallow - 61441  -OC      [id number] Eval Oral Pharyng Swallow Minutes 60  -OC              Total Minutes    Untimed Charges Total Minutes 60  -OC       Total Minutes 60  -OC            User Key  (r) = Recorded By, (t) = Taken By, (c) = Cosigned By    Initials Name Provider Type    OC Leora Daugherty MA,REGULO-SLP Speech and Language Pathologist                Therapy Charges for Today     Code Description Service Date Service Provider Modifiers Qty    [id number]  ST EVAL ORAL PHARYNG SWALLOW 4 8/11/2022 Leora Daugherty MA,REGULO-SLP GN 1               Leora Daugherty MA, CCC-SLP  8/11/2022

## 2022-08-11 NOTE — PLAN OF CARE
Goal Outcome Evaluation:  Plan of Care Reviewed With: patient           Outcome Evaluation: Clinical swallow eval completed. Recommend puree and thin, advance as tolerating and patient requesting/agreeable. Meds whole with thin/puree. Patient reports only eating smooth textures since recent tooth extractions. Patient declined trials of soft solids at this time. SLP to s/o, please re-consult as warranted.    Patient was not wearing a face mask during this therapy encounter. Therapist used appropriate personal protective equipment including mask, eye protection and gloves.  Mask used was standard procedure mask. Appropriate PPE was worn during the entire therapy session. Hand hygiene was completed before and after therapy session. Patient is not in enhanced droplet precautions.

## 2022-08-11 NOTE — PROGRESS NOTES
[facility] Clinical Pharmacy Services: Enoxaparin Consult    Yakov BARRERA Jagdish has a pharmacy consult to dose prophylactic enoxaparin per Bernie Robbins's request.     Indication: VTE Prophylaxis  Home Anticoagulation: none     Relevant clinical data and objective history reviewed:  77 y.o. male   124 kg (272 lb 14.9 oz)   Body mass index is 37.02 kg/m².   Results from last 7 days   Lab Units 08/11/22  0524   PLATELETS 10*3/mm3 213     CrcL ~ 60    Assessment/Plan    Will start patient on 40mg subcutaneous every 24 hours, adjusted for renal function. Consult order will be discontinued but pharmacy will continue to follow.     David Drummond PharmD  Clinical Pharmacist

## 2022-08-11 NOTE — ED PROVIDER NOTES
EMERGENCY DEPARTMENT ENCOUNTER    Room Number:  13/13  Date seen:  8/11/2022  PCP: Chencho Sterling MD  Historian: Patient, EMS      HPI:  Chief Complaint: Fall, hypotension  A complete HPI/ROS/PMH/PSH/SH/FH are unobtainable due to: Patient poor historian  Context: Yakov Dubon is a 77 y.o. male who presents to the ED for evaluation of a fall at nursing facility.  It is unclear when the patient fell.  Upon EMS arrival, they report the patient was hypotensive with systolic blood pressure in the 80s.  Subsequent blood pressure demonstrated systolic in the 50s.  They also noted that his right leg was very red and warm and according to the nursing facility that was just noticed this morning.  Patient is complaining of pain in his right leg.  Patient is not a great historian.  Review of his chart reveals a history of diabetes, high cholesterol, hypertension, CKD.            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/02/2017   • Hypersomnia due to medical condition 07/01/2018   • Periodic breathing 10/28/2019     Resolved Ambulatory Problems     Diagnosis Date Noted   • Mechanical complication of nervous system device, implant, and graft 04/18/2016     Past Medical History:   Diagnosis Date   • Anxiety    • Back pain    • Depression    • Diabetes mellitus (CMS/[facility])    • High cholesterol    • Hypertension    • Kidney disease, chronic, stage III (GFR 30-59 ml/min) (CMS/HCC)    • Reflux esophagitis    • Sleep apnea          PAST SURGICAL HISTORY  Past Surgical History:   Procedure Laterality Date   • APPENDECTOMY     • CARPAL TUNNEL RELEASE     • CATARACT EXTRACTION     • HAMMER TOE REPAIR     • LUMBAR SPINE SURGERY     • PAIN PUMP INSERTION/REVISION Right 4/18/2016    Procedure: RT PAIN PUMP REPLACEMENT;  Surgeon: Chris Messer MD;  Location: [facility] OR;   Service:    • TOTAL KNEE ARTHROPLASTY Left          FAMILY HISTORY  No family history on file.      SOCIAL HISTORY  Social History     Socioeconomic History   • Marital status:    Tobacco Use   • Smoking status: Never Smoker   • Smokeless tobacco: Never Used   Substance and Sexual Activity   • Alcohol use: No   • Drug use: No         ALLERGIES  Bacitracin, Gentamycin [gentamicin], Neosporin [neomycin-bacitracin zn-polymyx], Nsaids, and Tobramycin        REVIEW OF SYSTEMS  Review of Systems   Review of all 14 systems is negative other than stated in the HPI above.      PHYSICAL EXAM  ED Triage Vitals   Temp Heart Rate Resp BP SpO2   08/11/22 0516 08/11/22 0522 08/11/22 0554 08/11/22 0531 08/11/22 0521   99.4 °F (37.4 °C) 84 15 128/71 91 %      Temp src Heart Rate Source Patient Position BP Location FiO2 (%)   08/11/22 0519 -- -- -- --   Tympanic             GENERAL: Awake and alert, appears to be in mild distress  HENT: nares patent  EYES: no scleral icterus, EOMI  CV: regular rhythm, normal rate, no murmur  RESPIRATORY: normal effort, lungs clear auscultation bilaterally, nasal cannula oxygen in place  ABDOMEN: soft, nondistended, nontender throughout  MUSCULOSKELETAL: Left lower extremity is wrapped in an Ace wrap below the knee but without left lower extremity tenderness and no erythema or warmth of the left lower extremity.  The right lower extremity was also wrapped in Ace wrap.  There was significant erythema of the right leg that spares the right foot.  There are several small bullae present on the anterior right leg, some of which are intact and others have opened and are draining clear fluid.  There is exquisite tenderness of the right anterior leg.  There is also tenderness of the right proximal thigh, proximal to the area of erythema.  There is no crepitus noted the right lower extremity soft tissues.  There is mild swelling of the right lower extremity.  NEURO: alert, moves all extremities, follows  commands  PSYCH:  calm, cooperative  SKIN: warm, dry, bright red well demarcated erythema of the right lower extremity from the knee down to the ankle but sparing the foot.  The erythematous region is warm to touch.  There are several small bullae present on the anterior right leg.  Negative Nikolsky sign.    Vital signs and nursing notes reviewed.          LAB RESULTS  Recent Results (from the past 24 hour(s))   Lactic Acid, Plasma    Collection Time: 08/11/22  5:24 AM    Specimen: Blood   Result Value Ref Range    Lactate 2.2 (C) 0.5 - 2.0 mmol/L   Comprehensive Metabolic Panel    Collection Time: 08/11/22  5:24 AM    Specimen: Blood   Result Value Ref Range    Glucose 137 (H) 65 - 99 mg/dL    BUN 42 (H) 8 - 23 mg/dL    Creatinine 1.75 (H) 0.76 - 1.27 mg/dL    Sodium 140 136 - 145 mmol/L    Potassium 5.0 3.5 - 5.2 mmol/L    Chloride 101 98 - 107 mmol/L    CO2 28.0 22.0 - 29.0 mmol/L    Calcium 9.6 8.6 - 10.5 mg/dL    Total Protein 6.5 6.0 - 8.5 g/dL    Albumin 4.10 3.50 - 5.20 g/dL    ALT (SGPT) 11 1 - 41 U/L    AST (SGOT) 18 1 - 40 U/L    Alkaline Phosphatase 61 39 - 117 U/L    Total Bilirubin 0.4 0.0 - 1.2 mg/dL    Globulin 2.4 gm/dL    A/G Ratio 1.7 g/dL    BUN/Creatinine Ratio 24.0 7.0 - 25.0    Anion Gap 11.0 5.0 - 15.0 mmol/L    eGFR 39.4 (L) >60.0 mL/min/1.73   BNP    Collection Time: 08/11/22  5:24 AM    Specimen: Blood   Result Value Ref Range    proBNP 1,298.0 0.0 - 1,800.0 pg/mL   Troponin    Collection Time: 08/11/22  5:24 AM    Specimen: Blood   Result Value Ref Range    Troponin T 0.131 (C) 0.000 - 0.030 ng/mL   Procalcitonin    Collection Time: 08/11/22  5:24 AM    Specimen: Blood   Result Value Ref Range    Procalcitonin 3.39 (H) 0.00 - 0.25 ng/mL   Sedimentation Rate    Collection Time: 08/11/22  5:24 AM    Specimen: Blood   Result Value Ref Range    Sed Rate 24 (H) 0 - 20 mm/hr   C-reactive Protein    Collection Time: 08/11/22  5:24 AM    Specimen: Blood   Result Value Ref Range    C-Reactive  Protein 2.79 (H) 0.00 - 0.50 mg/dL   CK    Collection Time: 08/11/22  5:24 AM    Specimen: Blood   Result Value Ref Range    Creatine Kinase 497 (H) 20 - 200 U/L   CBC Auto Differential    Collection Time: 08/11/22  5:24 AM    Specimen: Blood   Result Value Ref Range    WBC 8.40 3.40 - 10.80 10*3/mm3    RBC 4.01 (L) 4.14 - 5.80 10*6/mm3    Hemoglobin 12.1 (L) 13.0 - 17.7 g/dL    Hematocrit 36.2 (L) 37.5 - 51.0 %    MCV 90.3 79.0 - 97.0 fL    MCH 30.2 26.6 - 33.0 pg    MCHC 33.4 31.5 - 35.7 g/dL    RDW 14.8 12.3 - 15.4 %    RDW-SD 48.7 37.0 - 54.0 fl    MPV 9.5 6.0 - 12.0 fL    Platelets 213 140 - 450 10*3/mm3    Neutrophil % 86.3 (H) 42.7 - 76.0 %    Lymphocyte % 10.0 (L) 19.6 - 45.3 %    Monocyte % 2.0 (L) 5.0 - 12.0 %    Eosinophil % 1.0 0.3 - 6.2 %    Basophil % 0.2 0.0 - 1.5 %    Immature Grans % 0.5 0.0 - 0.5 %    Neutrophils, Absolute 7.25 (H) 1.70 - 7.00 10*3/mm3    Lymphocytes, Absolute 0.84 0.70 - 3.10 10*3/mm3    Monocytes, Absolute 0.17 0.10 - 0.90 10*3/mm3    Eosinophils, Absolute 0.08 0.00 - 0.40 10*3/mm3    Basophils, Absolute 0.02 0.00 - 0.20 10*3/mm3    Immature Grans, Absolute 0.04 0.00 - 0.05 10*3/mm3    nRBC 0.0 0.0 - 0.2 /100 WBC   COVID-19, SONNY IN-HOUSE CEPHEID/YAIMA NP SWAB IN TRANSPORT MEDIA 8-12 HR TAT - Swab, Nasopharynx    Collection Time: 08/11/22  5:43 AM    Specimen: Nasopharynx; Swab   Result Value Ref Range    COVID19 Not Detected Not Detected - Ref. Range   ECG 12 Lead    Collection Time: 08/11/22  6:08 AM   Result Value Ref Range    QT Interval 405 ms       Ordered the above labs and reviewed the results.        RADIOLOGY  XR Tibia Fibula 2 View Right    Result Date: 8/11/2022  2 VIEWS RIGHT TIBIA AND FIBULA  HISTORY: Right leg swelling  COMPARISON: None available.  FINDINGS: No acute osseous abnormalities are seen. The patient has advanced degenerative changes involving the right knee. There is diffuse edema of the right calf, as well as a skin blister which is seen along the  anteromedial aspect of the proximal calf. No definite soft tissue gas is seen. There is a suprapatellar effusion, poorly assessed.       1. Diffuse edema of the right calf. 2. No aggressive osseous abnormalities are seen.  This report was finalized on 8/11/2022 6:01 AM by Dr. Karin Weathers M.D.      XR Chest 1 View    Result Date: 8/11/2022  SINGLE VIEW OF THE CHEST  HISTORY: Sepsis  COMPARISON: None available.  FINDINGS: Cardiomegaly is present. There is tortuosity and ectasia of the thoracic aorta. There is vascular congestion. There is nonspecific bibasilar consolidation. Bilateral pleural effusions are suspected.       1. Cardiomegaly with vascular congestion. 2. Nonspecific bibasilar consolidation.  This report was finalized on 8/11/2022 5:59 AM by Dr. Karin Weathers M.D.        Ordered the above noted radiological studies. Reviewed by me in PACS.            PROCEDURES  Critical Care  Performed by: Stefan Crowley MD  Authorized by: Stefan Crowley MD     Critical care provider statement:     Critical care time (minutes):  30    Critical care time was exclusive of:  Separately billable procedures and treating other patients    Critical care was necessary to treat or prevent imminent or life-threatening deterioration of the following conditions:  Sepsis    Critical care was time spent personally by me on the following activities:  Discussions with consultants, evaluation of patient's response to treatment, examination of patient, obtaining history from patient or surrogate, ordering and review of radiographic studies, ordering and review of laboratory studies, ordering and performing treatments and interventions, re-evaluation of patient's condition and development of treatment plan with patient or surrogate                  MEDICATIONS GIVEN IN ER  Medications   sodium chloride 0.9 % flush 10 mL (has no administration in time range)   vancomycin 2500 mg/500 mL 0.9% NS IVPB ([facility]) (2,500 mg  Intravenous New Bag 8/11/22 0650)   piperacillin-tazobactam (ZOSYN) 3.375 g in iso-osmotic dextrose 50 ml (premix) (0 g Intravenous Stopped 8/11/22 0612)   clindamycin (CLEOCIN) 600 mg in dextrose 5% 50 mL IVPB (premix) (0 mg Intravenous Stopped 8/11/22 0648)   lactated ringers bolus 3,720 mL (3,720 mL Intravenous New Bag 8/11/22 0525)   acetaminophen (TYLENOL) tablet 1,000 mg (1,000 mg Oral Given 8/11/22 0550)   morphine injection 4 mg (4 mg Intravenous Given 8/11/22 0613)   ondansetron (ZOFRAN) injection 4 mg (4 mg Intravenous Given 8/11/22 0612)                   MEDICAL DECISION MAKING, PROGRESS, and CONSULTS    All labs have been independently reviewed by me.  All radiology studies have been reviewed by me and discussed with radiologist dictating the report.   EKG's independently viewed and interpreted by me.  Discussion below represents my analysis of pertinent findings related to patient's condition, differential diagnosis, treatment plan and final disposition.      Differential diagnosis includes but is not limited to:  Cellulitis  Erysipelas  Necrotizing skin or soft tissue infection  Osteomyelitis  Sepsis  DVT      ED Course as of 08/11/22 0709   Thu Aug 11, 2022   0523 Patient presents after reported fall.  His right lower extremity is extremely erythematous, tender to touch, with some bullae present, some of which have opened.  He also has tenderness of the right proximal thigh without erythema present.  Patient was hypotensive with EMS.  He is febrile to 102.  I have ordered broad-spectrum antibiotics including vancomycin, Zosyn, clindamycin.  I am concerned for necrotizing skin and soft tissue infection or necrotizing fasciitis of the right lower extremity. [JR]   0556 WBC: 8.40 [JR]   0612 Lactate(!!): 2.2 [JR]   0612 Sed Rate(!): 24 [JR]   0612 Hemoglobin(!): 12.1 [JR]   0617 Creatinine(!): 1.75  Chronic kidney disease [JR]   0617 C-Reactive Protein(!): 2.79 [JR]   0617 Creatine Kinase(!): 497 [JR]    0618 Procalcitonin(!): 3.39 [JR]   0622 EKG          EKG time: 608  Rhythm/Rate: Sinus rhythm, 74  P waves and [place]: Normal  QRS, axis: Nonspecific IVCD  ST and T waves: T wave inversion lead III    Interpreted Contemporaneously by me, independently viewed  Similar compared to prior 4/11/2016       [JR]   0634 Discussed with Dr. Rodrigue Gonzalez, pulmonary critical care medicine.  He do not feel that patient needed the intensive care unit at this time based on his vital signs.  They are available if the patient should decompensate. [JR]   0635 Troponin T(!!): 0.131 [JR]   0635 Elevated troponin is likely secondary to his illness rather than type I NSTEMI. [JR]   0651 Discussed with CECILE Mejia for A, who agrees to admit on behalf of Dr. Sanches. [JR]   0656 COVID19: Not Detected [JR]   0703 Orthopedic surgeon on-call has not yet called back.  Patient has a ready bed and I have transition care to the hospitalist team.  He remains hemodynamically stable at this time.  CT of the lower extremities to assess for soft tissue gas or fascial thickening is still pending at this time. [JR]      ED Course User Index  [JR] Stefan Crowley MD              I wore an N95 mask, face shield, and gloves during this patient encounter.  Patient also wearing a surgical mask.  Hand hygeine performed before and after seeing the patient.    DIAGNOSIS  Final diagnoses:   Cellulitis of right lower extremity   Elevated troponin level not due to acute coronary syndrome   Sepsis, due to unspecified organism, unspecified whether acute organ dysfunction present (HCC)   Type 2 diabetes mellitus without complication, unspecified whether long term insulin use (HCC)   Chronic kidney disease, unspecified CKD stage         DISPOSITION  ADMIT            Latest Documented Vital Signs:  As of 07:09 EDT  BP- 142/54 HR- 78 Temp- 99.2 °F (37.3 °C) (Tympanic) O2 sat- 96%        --    Please note that portions of this were completed with a voice  recognition program.          Stefan Crowley MD  08/11/22 0711

## 2022-08-11 NOTE — H&P
"    Patient Name:  Yakov Dubon  YOB: 1945  MRN:  [id number]  Admit Date:  8/11/2022  Patient Care Team:  Chencho Sterling MD as PCP - General (Internal Medicine)  Reasor, Chris Polo MD as Consulting Physician (Pain Medicine)      Subjective   History Present Illness     Chief Complaint   Patient presents with   • Hypotension   • Fall       Mr. Dubon is a 77 y.o. with a history of PSA, CKD, DM2, HTN, HLD, obesity, pain pump that presents with right lower extremity swelling redness and pain from nursing facility.  Patient is a poor historian and can contribute very little to history.  He denies fever, chills, chest pain shortness of breath nausea vomiting or diarrhea.  According to EMS he was hypotensive and had a fall at the nursing facility.  Upon arrival to the ER his blood pressure was stable. He has a right lower quadrant pain pump with Dilaudid last filled 7/27.        History of Present Illness  Review of Systems   Unable to perform ROS: Dementia   Constitutional: Positive for activity change and fatigue.   Respiratory: Negative for shortness of breath.    Cardiovascular: Positive for leg swelling. Negative for chest pain.   Gastrointestinal: Negative for abdominal pain.   Skin: Positive for color change and rash.        Personal History     Past Medical History:   Diagnosis Date   • Anxiety    • Back pain    • Depression    • Diabetes mellitus (CMS/HCC)     \"BORDERLINE\"   • High cholesterol    • Hypertension    • Kidney disease, chronic, stage III (GFR 30-59 ml/min) (CMS/HCC)    • Reflux esophagitis    • Sleep apnea      Past Surgical History:   Procedure Laterality Date   • APPENDECTOMY     • CARPAL TUNNEL RELEASE     • CATARACT EXTRACTION     • HAMMER TOE REPAIR     • LUMBAR SPINE SURGERY     • PAIN PUMP INSERTION/REVISION Right 4/18/2016    Procedure: RT PAIN PUMP REPLACEMENT;  Surgeon: Chris Messer MD;  Location: [facility] OR;  Service:    • TOTAL KNEE ARTHROPLASTY Left  "     No family history on file.  Social History     Tobacco Use   • Smoking status: Never Smoker   • Smokeless tobacco: Never Used   Substance Use Topics   • Alcohol use: No   • Drug use: No     No current facility-administered medications on file prior to encounter.     Current Outpatient Medications on File Prior to Encounter   Medication Sig Dispense Refill   • acetaminophen (TYLENOL) 325 MG tablet Take 650 mg by mouth Every Night.     • aspirin 81 MG chewable tablet Chew 81 mg Daily.     • calcium carbonate (OS-SERJIO) 600 MG tablet Take 600 mg by mouth 2 (Two) Times a Day With Meals.     • carvedilol (COREG) 6.25 MG tablet Take 6.25 mg by mouth 2 (Two) Times a Day With Meals.     • ferrous gluconate (FERGON) 324 MG tablet Take 324 mg by mouth Daily With Breakfast.     • gabapentin (NEURONTIN) 300 MG capsule Take 300 mg by mouth 3 (Three) Times a Day.     • HYDROmorphone HCl (DILAUDID-HP IJ) Inject  as directed. PAIN PUMP     • insulin glargine (LANTUS, SEMGLEE) 100 UNIT/ML injection Inject 15 Units under the skin into the appropriate area as directed Daily.     • Multiple Vitamins-Minerals (OCUVITE PO) Take  by mouth daily.     • oxyCODONE-acetaminophen (PERCOCET) 7.5-325 MG per tablet Take 1 tablet by mouth Every 4 (Four) Hours As Needed.     • polyethylene glycol (MIRALAX) 17 g packet Take 17 g by mouth Daily As Needed.     • rosuvastatin (CRESTOR) 10 MG tablet Take 5 mg by mouth Every Night.     • senna (senna) 8.6 MG tablet Take 1 tablet by mouth Daily.     • sertraline (ZOLOFT) 100 MG tablet Take 75 mg by mouth Daily.     • tamsulosin (FLOMAX) 0.4 MG capsule 24 hr capsule Take 1 capsule by mouth every night.     • [DISCONTINUED] clonazePAM (KlonoPIN) 0.5 MG tablet Take 1 or 2 tablets by mouth at bedtime for REM Behavior Disorder. 60 tablet 5   • nitroglycerin (NITROSTAT) 0.4 MG SL tablet Place 0.4 mg under the tongue Every 5 (Five) Minutes As Needed for Chest Pain. Take no more than 3 doses in 15 minutes.      • [DISCONTINUED] amoxicillin (AMOXIL) 500 MG capsule Take 1,000 mg by mouth as needed (DENTAL PROCEDURES).     • [DISCONTINUED] atorvastatin (LIPITOR) 80 MG tablet Take 80 mg by mouth daily.     • [DISCONTINUED] Bisacodyl (GENTLE LAXATIVE PO) Take 5 mg by mouth as needed.     • [DISCONTINUED] citalopram (CeleXA) 40 MG tablet Take 40 mg by mouth every night.     • [DISCONTINUED] Coenzyme Q10 (CO Q 10 PO) Take 100 mg by mouth daily.     • [DISCONTINUED] cyclobenzaprine (FLEXERIL) 10 MG tablet Take 10 mg by mouth 3 (three) times a day as needed for muscle spasms.     • [DISCONTINUED] lisinopril (PRINIVIL,ZESTRIL) 10 MG tablet Take 10 mg by mouth daily.     • [DISCONTINUED] modafinil (PROVIGIL) 200 MG tablet Take 1 tablet by mouth Daily. 30 tablet 2   • [DISCONTINUED] omeprazole (PriLOSEC) 40 MG capsule Take 40 mg by mouth every night.     • [DISCONTINUED] Testosterone 20.25 MG/ACT (1.62%) gel Place  on the skin. INSTRUCTED TO BRING IN CONTAINER TO VERIFY DOSAGE       Allergies   Allergen Reactions   • Bacitracin Unknown - High Severity   • Gentamycin [Gentamicin] Unknown - High Severity   • Neosporin [Neomycin-Bacitracin Zn-Polymyx] Other (See Comments)     ALLERGY TESTED   • Nsaids Unknown - High Severity   • Tobramycin Unknown - High Severity       Objective    Objective     Vital Signs  Temp:  [98.3 °F (36.8 °C)-102 °F (38.9 °C)] 98.3 °F (36.8 °C)  Heart Rate:  [61-84] 61  Resp:  [15-18] 18  BP: (125-173)/(54-94) 148/74  SpO2:  [91 %-99 %] 92 %  on  Flow (L/min):  [3] 3;   Device (Oxygen Therapy): nasal cannula  Body mass index is 37.02 kg/m².    Physical Exam  Vitals and nursing note reviewed.   Constitutional:       General: He is not in acute distress.     Appearance: He is well-developed. He is obese. He is ill-appearing.   HENT:      Head: Normocephalic and atraumatic.   Eyes:      Conjunctiva/sclera: Conjunctivae normal.   Neck:      Trachea: No tracheal deviation.   Cardiovascular:      Rate and Rhythm:  Normal rate and regular rhythm.   Pulmonary:      Effort: Pulmonary effort is normal. No respiratory distress.      Breath sounds: Normal breath sounds. No wheezing.      Comments: diminished    Abdominal:      General: Bowel sounds are normal. There is no distension.      Palpations: Abdomen is soft. There is no mass.      Tenderness: There is no abdominal tenderness. There is no guarding or rebound.      Comments: Soft round protuberant    Musculoskeletal:         General: Normal range of motion.      Cervical back: Normal range of motion.      Right lower leg: Edema present.      Left lower leg: Edema present.   Skin:     General: Skin is warm and dry.      Comments: RLE severe erythema up to medial thigh with warmth tenderness and clear fluid drainage       Neurological:      General: No focal deficit present.      Mental Status: He is alert. Mental status is at baseline. He is disoriented.   Psychiatric:         Mood and Affect: Mood normal.         Results Review:  I reviewed the patient's new clinical results.  I reviewed the patient's new imaging results and agree with the interpretation.  I reviewed the patient's other test results and agree with the interpretation  I personally viewed and interpreted the patient's EKG/Telemetry data  Discussed with ED provider.    Lab Results (last 24 hours)     Procedure Component Value Units Date/Time    Lactic Acid, Plasma [[id number]]  (Abnormal) Collected: 08/11/22 0524    Specimen: Blood Updated: 08/11/22 0610     Lactate 2.2 mmol/L     Blood Culture - Blood, Arm, Right [[id number]] Collected: 08/11/22 0524    Specimen: Blood from Arm, Right Updated: 08/11/22 0541    CBC & Differential [[id number]]  (Abnormal) Collected: 08/11/22 0524    Specimen: Blood Updated: 08/11/22 0553    Narrative:      The following orders were created for panel order CBC & Differential.  Procedure                               Abnormality         Status                     ---------                                -----------         ------                     CBC Auto Differential[[id number]]        Abnormal            Final result                 Please view results for these tests on the individual orders.    Comprehensive Metabolic Panel [[id number]]  (Abnormal) Collected: 08/11/22 0524    Specimen: Blood Updated: 08/11/22 0613     Glucose 137 mg/dL      BUN 42 mg/dL      Creatinine 1.75 mg/dL      Sodium 140 mmol/L      Potassium 5.0 mmol/L      Chloride 101 mmol/L      CO2 28.0 mmol/L      Calcium 9.6 mg/dL      Total Protein 6.5 g/dL      Albumin 4.10 g/dL      ALT (SGPT) 11 U/L      AST (SGOT) 18 U/L      Alkaline Phosphatase 61 U/L      Total Bilirubin 0.4 mg/dL      Globulin 2.4 gm/dL      A/G Ratio 1.7 g/dL      BUN/Creatinine Ratio 24.0     Anion Gap 11.0 mmol/L      eGFR 39.4 mL/min/1.73      Comment: National Kidney Foundation and American Society of Nephrology (ASN) Task Force recommended calculation based on the Chronic Kidney Disease Epidemiology Collaboration (CKD-EPI) equation refit without adjustment for race.       Narrative:      GFR Normal >60  Chronic Kidney Disease <60  Kidney Failure <15      BNP [[id number]]  (Normal) Collected: 08/11/22 0524    Specimen: Blood Updated: 08/11/22 0618     proBNP 1,298.0 pg/mL     Narrative:      Among patients with dyspnea, NT-proBNP is highly sensitive for the detection of acute congestive heart failure. In addition NT-proBNP of <300 pg/ml effectively rules out acute congestive heart failure with 99% negative predictive value.    Results may be falsely decreased if patient taking Biotin.      Troponin [[id number]]  (Abnormal) Collected: 08/11/22 0524    Specimen: Blood Updated: 08/11/22 0635     Troponin T 0.131 ng/mL     Narrative:      Troponin T Reference Range:  <= 0.03 ng/mL-   Negative for AMI  >0.03 ng/mL-     Abnormal for myocardial necrosis.  Clinicians would have to utilize clinical acumen, EKG, Troponin and serial changes to determine if it  "is an Acute Myocardial Infarction or myocardial injury due to an underlying chronic condition.       Results may be falsely decreased if patient taking Biotin.      Procalcitonin [[id number]]  (Abnormal) Collected: 08/11/22 0524    Specimen: Blood Updated: 08/11/22 0618     Procalcitonin 3.39 ng/mL     Narrative:      As a Marker for Sepsis (Non-Neonates):    1. <0.5 ng/mL represents a low risk of severe sepsis and/or septic shock.  2. >2 ng/mL represents a high risk of severe sepsis and/or septic shock.    As a Marker for Lower Respiratory Tract Infections that require antibiotic therapy:    PCT on Admission    Antibiotic Therapy       6-12 Hrs later    >0.5                Strongly Recommended  >0.25 - <0.5        Recommended   0.1 - 0.25          Discouraged              Remeasure/reassess PCT  <0.1                Strongly Discouraged     Remeasure/reassess PCT    As 28 day mortality risk marker: \"Change in Procalcitonin Result\" (>80% or <=80%) if Day 0 (or Day 1) and Day 4 values are available. Refer to http://www.[email or web address]-pct-calculator.com    Change in PCT <=80%  A decrease of PCT levels below or equal to 80% defines a positive change in PCT test result representing a higher risk for 28-day all-cause mortality of patients diagnosed with severe sepsis for septic shock.    Change in PCT >80%  A decrease of PCT levels of more than 80% defines a negative change in PCT result representing a lower risk for 28-day all-cause mortality of patients diagnosed with severe sepsis or septic shock.       Sedimentation Rate [[id number]]  (Abnormal) Collected: 08/11/22 0524    Specimen: Blood Updated: 08/11/22 0610     Sed Rate 24 mm/hr     C-reactive Protein [[id number]]  (Abnormal) Collected: 08/11/22 0524    Specimen: Blood Updated: 08/11/22 0613     C-Reactive Protein 2.79 mg/dL     CK [[id number]]  (Abnormal) Collected: 08/11/22 0524    Specimen: Blood Updated: 08/11/22 0613     Creatine Kinase 497 U/L     CBC Auto Differential " [[id number]]  (Abnormal) Collected: 08/11/22 0524    Specimen: Blood Updated: 08/11/22 0553     WBC 8.40 10*3/mm3      RBC 4.01 10*6/mm3      Hemoglobin 12.1 g/dL      Hematocrit 36.2 %      MCV 90.3 fL      MCH 30.2 pg      MCHC 33.4 g/dL      RDW 14.8 %      RDW-SD 48.7 fl      MPV 9.5 fL      Platelets 213 10*3/mm3      Neutrophil % 86.3 %      Lymphocyte % 10.0 %      Monocyte % 2.0 %      Eosinophil % 1.0 %      Basophil % 0.2 %      Immature Grans % 0.5 %      Neutrophils, Absolute 7.25 10*3/mm3      Lymphocytes, Absolute 0.84 10*3/mm3      Monocytes, Absolute 0.17 10*3/mm3      Eosinophils, Absolute 0.08 10*3/mm3      Basophils, Absolute 0.02 10*3/mm3      Immature Grans, Absolute 0.04 10*3/mm3      nRBC 0.0 /100 WBC     Hemoglobin A1c [[id number]]  (Abnormal) Collected: 08/11/22 0524    Specimen: Blood Updated: 08/11/22 1120     Hemoglobin A1C 6.00 %     Narrative:      Hemoglobin A1C Ranges:    Increased Risk for Diabetes  5.7% to 6.4%  Diabetes                     >= 6.5%  Diabetic Goal                < 7.0%    Uric Acid [[id number]] Collected: 08/11/22 0524    Specimen: Blood Updated: 08/11/22 1333    Blood Culture - Blood, Arm, Left [[id number]] Collected: 08/11/22 0539    Specimen: Blood from Arm, Left Updated: 08/11/22 0548    COVID PRE-OP / PRE-PROCEDURE SCREENING ORDER (NO ISOLATION) - Swab, Nasopharynx [[id number]]  (Normal) Collected: 08/11/22 0543    Specimen: Swab from Nasopharynx Updated: 08/11/22 0624    Narrative:      The following orders were created for panel order COVID PRE-OP / PRE-PROCEDURE SCREENING ORDER (NO ISOLATION) - Swab, Nasopharynx.  Procedure                               Abnormality         Status                     ---------                               -----------         ------                     COVID-19, SONNY IN-HOUSE...[[id number]]  Normal              Final result                 Please view results for these tests on the individual orders.    COVID-19, SONNY IN-HOUSE  CEPHEID/YAIMA NP SWAB IN TRANSPORT MEDIA 8-12 HR TAT - Swab, Nasopharynx [[id number]]  (Normal) Collected: 08/11/22 0543    Specimen: Swab from Nasopharynx Updated: 08/11/22 0624     COVID19 Not Detected    Narrative:      Fact sheet for providers: https://www.fda.gov/media/306252/download    Fact sheet for patients: https://www.fda.gov/media/166335/download    Test performed by PCR.    POC Glucose Once [[id number]]  (Normal) Collected: 08/11/22 1122    Specimen: Blood Updated: 08/11/22 1127     Glucose 116 mg/dL      Comment: Meter: [id number] : [id number] Scott SEYMOUR       Wound Culture - Wound, Leg, Right [[id number]] Collected: 08/11/22 1126    Specimen: Wound from Leg, Right Updated: 08/11/22 1126    Urinalysis With Culture If Indicated - Urine, Clean Catch [[id number]]  (Normal) Collected: 08/11/22 1131    Specimen: Urine, Clean Catch Updated: 08/11/22 1210     Color, UA Yellow     Appearance, UA Clear     pH, UA 5.5     Specific Gravity, UA 1.020     Glucose, UA Negative     Ketones, UA Negative     Bilirubin, UA Negative     Blood, UA Negative     Protein, UA Negative     Leuk Esterase, UA Negative     Nitrite, UA Negative     Urobilinogen, UA 0.2 E.U./dL    Narrative:      In absence of clinical symptoms, the presence of pyuria, bacteria, and/or nitrites on the urinalysis result does not correlate with infection.  Urine microscopic not indicated.          Imaging Results (Last 24 Hours)     Procedure Component Value Units Date/Time    CT Lower Extremity Bilateral Without Contrast [[id number]] Collected: 08/11/22 0856     Updated: 08/11/22 0857    Narrative:      BILATERAL LOWER EXTREMITY CT WITHOUT CONTRAST     HISTORY: Right leg cellulitis. Evaluate for abscess or soft tissue gas.     TECHNIQUE: Axial CT of both lower legs from above the knees to the feet  along with multiplanar reformatted images is provided. No IV contrast  was used. No previous imaging for correlation.     Radiation dose reduction  techniques were utilized, including automated  exposure control and exposure modulation based on body size.     FINDINGS: There are several blisters at the level of the dermis along  the proximal aspect of the right lower leg anteriorly. Diffuse  subcutaneous edema is observed in the leg, most pronounced around the  ankle and over the dorsum of the foot. There is no soft tissue gas or  focal fluid collection. Musculature is diffusely atrophic. No joint  effusion. There is arthritic change in the foot and at the knee.     On the left, there is subcutaneous edema along the distal lower leg,  ankle and over the dorsum of the foot which is asymmetrically less  pronounced than that observed on the right. The musculature is diffusely  atrophic as at the opposite leg. There is no focal fluid collection or  joint effusion. Arthritic changes are observed in the foot and ankle.       Impression:      Nonspecific soft tissue edema in both lower legs  asymmetrically more prominent on the right. There are several blisters  at the level of the dermis of the right lower leg proximally. However,  no soft tissue gas or abscess collection is present.       XR Tibia Fibula 2 View Right [[id number]] Collected: 08/11/22 0559     Updated: 08/11/22 0604    Narrative:      2 VIEWS RIGHT TIBIA AND FIBULA     HISTORY: Right leg swelling     COMPARISON: None available.     FINDINGS:  No acute osseous abnormalities are seen. The patient has advanced  degenerative changes involving the right knee. There is diffuse edema of  the right calf, as well as a skin blister which is seen along the  anteromedial aspect of the proximal calf. No definite soft tissue gas is  seen. There is a suprapatellar effusion, poorly assessed.       Impression:         1. Diffuse edema of the right calf.  2. No aggressive osseous abnormalities are seen.     This report was finalized on 8/11/2022 6:01 AM by Dr. Karin Weathers M.D.       XR Chest 1 View [[id number]]  Collected: 08/11/22 0558     Updated: 08/11/22 0602    Narrative:      SINGLE VIEW OF THE CHEST     HISTORY: Sepsis     COMPARISON: None available.     FINDINGS:  Cardiomegaly is present. There is tortuosity and ectasia of the thoracic  aorta. There is vascular congestion. There is nonspecific bibasilar  consolidation. Bilateral pleural effusions are suspected.       Impression:         1. Cardiomegaly with vascular congestion.  2. Nonspecific bibasilar consolidation.     This report was finalized on 8/11/2022 5:59 AM by Dr. Karin Weathers M.D.                 ECG 12 Lead   Preliminary Result   HEART RATE= 74  bpm   RR Interval= 884  ms   NE Interval= 192  ms   P Horizontal Axis= 11  deg   P Front Axis= -12  deg   QRSD Interval= 120  ms   QT Interval= 405  ms   QRS Axis= -32  deg   T Wave Axis= 0  deg   - ABNORMAL ECG -   Sinus rhythm   Ventricular premature complex   IVCD, consider RBBB   Electronically Signed By:    Date and Time of Study: 2022-08-11 06:08:32           Assessment/Plan     Active Hospital Problems    Diagnosis  POA   • **Cellulitis of right lower extremity [L03.115]  Yes   • HTN (hypertension) [I10]  Yes   • Type 2 diabetes mellitus with stage 3b chronic kidney disease (HCC) [E11.22, N18.32]  Yes   • Elevated troponin [R77.8]  Yes   • RBBB [I45.10]  Yes   • Pulmonary vascular congestion [R09.89]  Yes   • CSA (central sleep apnea) [G47.31]  Yes   • HALIMA (obstructive sleep apnea) [G47.33]  Yes      Resolved Hospital Problems   No resolved problems to display.       Mr. Dubon is a 77 y.o. admitted with sepsis and RLE cellulitis.   Bullae present some open, concern for necrotizing infection, erysipelas, Osteomyelitis.  CT noncontrasted bilateral with soft tissue edema blisters of RLE but no abscess collection.      RLE cellulitis   Consult ID for assistance. Check BLE duplex.  He was given clindamycin, Zosyn and vancomycin in the ER. Await wound culture and blood cultures.  Repeat lactic pending.   Continue IV fluids.  Supportive care with analgesics and antiemetics.     CKD3b   Creatinine 2021 was 1.6 and currently 1.75. close to baseline so monitor.     Acute on chronic CHF   Vascular congestion on CXR and volume overloaded on exam. Echocardiogram [facility] difficult study 2019 reviewed. Check Echocardiogram. No diuretics listed on home meds. Add iv lasix 40mg x 1    DM2   Correctional insulin, check A1c, hold lantus with decreased po intake and resume based on glucose trends    · I discussed the patient's findings and my recommendations with patient, nursing staff and ED provider.    VTE Prophylaxis - Lovenox 40 mg SC daily.  Code Status - Full code.       CECILE Guzman  [facility]  08/11/22  13:41 EDT

## 2022-08-11 NOTE — CONSULTS
Orthopedic Consult      Patient: Yakov Dubon    Date of Admission: 8/11/2022  5:14 AM    YOB: 1945    Medical Record Number: [id number]    Consulting Physician: Joshua Taylor MD    Chief Complaints: Right leg cellulitis    History of Present Illness: 77 y.o. male admitted to [facility] to services of Joshua Taylor MD with right leg cellulitis.  The patient is a poor historian.  He cannot tell me when it started or provide any valuable information with respect to the history of present illness.  The medical history is obtained through the medical record.    Allergies:   Allergies   Allergen Reactions   • Bacitracin Unknown - High Severity   • Gentamycin [Gentamicin] Unknown - High Severity   • Neosporin [Neomycin-Bacitracin Zn-Polymyx] Other (See Comments)     ALLERGY TESTED   • Nsaids Unknown - High Severity   • Tobramycin Unknown - High Severity       Home Medications:    Current Facility-Administered Medications:   •  acetaminophen (TYLENOL) tablet 650 mg, 650 mg, Oral, Q4H PRN **OR** acetaminophen (TYLENOL) 160 MG/5ML solution 650 mg, 650 mg, Oral, Q4H PRN **OR** acetaminophen (TYLENOL) suppository 650 mg, 650 mg, Rectal, Q4H PRN, Bernie Robbins APRN  •  acetaminophen (TYLENOL) tablet 650 mg, 650 mg, Oral, Nightly, Bernie Robbins APRN  •  aspirin chewable tablet 81 mg, 81 mg, Oral, Daily, Bernie Robbins APRN, 81 mg at 08/11/22 1711  •  calcium-vitamin D 500-200 MG-UNIT tablet 1 tablet, 1 tablet, Oral, BID, Bernie Robbins APRN  •  carvedilol (COREG) tablet 6.25 mg, 6.25 mg, Oral, BID With Meals, Bernie Robbins APRN, 6.25 mg at 08/11/22 1711  •  ceFAZolin in dextrose (ANCEF) IVPB solution 2 g, 2 g, Intravenous, Q8H, Paul Alfonso, DO, 2 g at 08/11/22 1711  •  dextrose (D50W) (25 g/50 mL) IV injection 25 g, 25 g, Intravenous, Q15 Min PRN, Robbins, Bernie W, APRN  •  dextrose (GLUTOSE) oral gel 15 g, 15 g, Oral, Q15 Min PRN, Bernie Robbins,  APRN  •  Enoxaparin Sodium (LOVENOX) syringe 40 mg, 40 mg, Subcutaneous, Q24H, Bernie Robbins APRN, 40 mg at 08/11/22 1335  •  famotidine (PEPCID) tablet 40 mg, 40 mg, Oral, BID AC, Bernie Robbins APRN, 40 mg at 08/11/22 1711  •  [START ON 8/12/2022] ferrous sulfate tablet 325 mg, 325 mg, Oral, Daily With Breakfast, Bernie Robbins APRN  •  gabapentin (NEURONTIN) capsule 300 mg, 300 mg, Oral, TID, Bernie Robbins APRN, 300 mg at 08/11/22 1712  •  glucagon (human recombinant) (GLUCAGEN DIAGNOSTIC) injection 1 mg, 1 mg, Intramuscular, Q15 Min PRN, Bernie Robbins APRN  •  insulin lispro (ADMELOG) injection 0-14 Units, 0-14 Units, Subcutaneous, TID AC, Bernie Robbins APRN  •  morphine injection 2 mg, 2 mg, Intravenous, Q3H PRN **AND** naloxone (NARCAN) injection 0.4 mg, 0.4 mg, Intravenous, Q5 Min PRN, Bernie Robbins APRKELIN  •  nitroglycerin (NITROSTAT) SL tablet 0.4 mg, 0.4 mg, Sublingual, Q5 Min PRN, Bernie Robbins, APRN  •  ondansetron (ZOFRAN) injection 4 mg, 4 mg, Intravenous, Q6H PRN, Bernie Robbins APRN  •  oxyCODONE-acetaminophen (PERCOCET) 7.5-325 MG per tablet 1 tablet, 1 tablet, Oral, Q4H PRN, Bernie Robbins, APRN  •  Patient Supplied Pain Pump, , Intrathecal, Continuous, Joshua Taylor MD, Currently Infusing at 08/11/22 1712  •  polyethylene glycol (MIRALAX) packet 17 g, 17 g, Oral, Daily PRN, Bernie Robbins, APRN  •  rosuvastatin (CRESTOR) tablet 5 mg, 5 mg, Oral, Nightly, Bernie Robbins APRKELIN  •  senna (SENOKOT) tablet 1 tablet, 1 tablet, Oral, Daily, Bernie Robbins APRN, 1 tablet at 08/11/22 1711  •  sertraline (ZOLOFT) tablet 75 mg, 75 mg, Oral, Daily, Bernie Robbins APRN, 75 mg at 08/11/22 1711  •  [COMPLETED] Insert peripheral IV, , , Once **AND** sodium chloride 0.9 % flush 10 mL, 10 mL, Intravenous, PRN, Stefan Crowley MD  •  sodium chloride 0.9 % flush 10 mL, 10 mL, Intravenous, Q12H, Bernie Robbins, APRN, 10  "mL at 08/11/22 1116  •  sodium chloride 0.9 % flush 10 mL, 10 mL, Intravenous, PRN, Bernie Robbins, CECILE  •  tamsulosin (FLOMAX) 24 hr capsule 0.4 mg, 0.4 mg, Oral, Nightly, Bernie Robbins, CECILE    Current Medications:  Scheduled Meds:acetaminophen, 650 mg, Oral, Nightly  aspirin, 81 mg, Oral, Daily  calcium-vitamin D, 1 tablet, Oral, BID  carvedilol, 6.25 mg, Oral, BID With Meals  ceFAZolin, 2 g, Intravenous, Q8H  enoxaparin, 40 mg, Subcutaneous, Q24H  famotidine, 40 mg, Oral, BID AC  [START ON 8/12/2022] ferrous sulfate, 325 mg, Oral, Daily With Breakfast  gabapentin, 300 mg, Oral, TID  insulin lispro, 0-14 Units, Subcutaneous, TID AC  rosuvastatin, 5 mg, Oral, Nightly  senna, 1 tablet, Oral, Daily  sertraline, 75 mg, Oral, Daily  sodium chloride, 10 mL, Intravenous, Q12H  tamsulosin, 0.4 mg, Oral, Nightly      Continuous Infusions:pain,       PRN Meds:.•  acetaminophen **OR** acetaminophen **OR** acetaminophen  •  dextrose  •  dextrose  •  glucagon (human recombinant)  •  Morphine **AND** naloxone  •  nitroglycerin  •  ondansetron  •  oxyCODONE-acetaminophen  •  polyethylene glycol  •  [COMPLETED] Insert peripheral IV **AND** sodium chloride  •  sodium chloride    Past Medical History:   Diagnosis Date   • Anxiety    • Back pain    • Depression    • Diabetes mellitus ([facility])     \"BORDERLINE\"   • High cholesterol    • Hypertension    • Kidney disease, chronic, stage III (GFR 30-59 ml/min) (Formerly [facility])    • Reflux esophagitis    • Sleep apnea        Past Surgical History:   Procedure Laterality Date   • APPENDECTOMY     • CARPAL TUNNEL RELEASE     • CATARACT EXTRACTION     • HAMMER TOE REPAIR     • LUMBAR SPINE SURGERY     • PAIN PUMP INSERTION/REVISION Right 4/18/2016    Procedure: RT PAIN PUMP REPLACEMENT;  Surgeon: Chris Messer MD;  Location: [facility] OR;  Service:    • TOTAL KNEE ARTHROPLASTY Left        Social History     Occupational History   • Not on file   Tobacco Use   • Smoking status: Never " "Smoker   • Smokeless tobacco: Never Used   Substance and Sexual Activity   • Alcohol use: No   • Drug use: No   • Sexual activity: Not on file      Social History     Social History Narrative   • Not on file       History reviewed. No pertinent family history.    Review of Systems:     Unable to obtain due to altered mental status    Physical Exam: 77 y.o. male    Vitals:    08/11/22 0706 08/11/22 0834 08/11/22 1250 08/11/22 1538   BP:  144/68 148/74 148/74   BP Location:  Right arm Left arm    Patient Position:  Lying Lying    Pulse: 78 78 61    Resp:  18 18    Temp:  98.4 °F (36.9 °C) 98.3 °F (36.8 °C)    TempSrc:  Oral Oral    SpO2: 96% 91% 92%    Weight:    124 kg (273 lb 5.9 oz)   Height:    182 cm (71.65\")     General:  Awake but somnolent. No acute distress.  He is confused and unable to provide any valuable information with respect to the history present illness.    Head/Neck:  Normocephalic, atraumatic.  Conjunctivae and sclerae clear.  Hearing is adequate for the exam.  Neck is supple with normal ROM.    Psych: Affect blunted.    CV:  Regular rate and rhythm.  Hemodynamically stable.    Lungs:  Good chest expansion, breathing unlabored.    Abdomen:  Soft.  Nontender, nondistended.    Extremities: Right lower extremity:  He has erythema and increased warmth extending from just below his knee to the dorsum of his hindfoot.  He has several large blisters along the anterior aspect of the skin of his calf.  His calf is edematous and firm.  He is diffusely tender along the calf.  I do not feel any fluid collections or abscesses.  He is nontender at the knee.  No knee effusion.  Soft tissues around his ankle are swollen but his ankle is only mildly tender.  He can plantarflex and dorsiflex his ankle and toes weakly and this is well-tolerated.  He has diminished sensation in his foot which is sounds like is his baseline.  He has brisk capillary refill.    Left lower extremity: He has bandages on his left calf.  " These were not removed.  His left calf is less swollen than the right.  He can weakly plantarflex and dorsiflex ankle and toes and it does not seem to cause him any pain.  He has brisk capillary refill in his toes.    Diagnostic Tests:    Admission on 08/11/2022   Component Date Value Ref Range Status   • Lactate 08/11/2022 2.2 (A) 0.5 - 2.0 mmol/L Final   • Glucose 08/11/2022 137 (A) 65 - 99 mg/dL Final   • BUN 08/11/2022 42 (A) 8 - 23 mg/dL Final   • Creatinine 08/11/2022 1.75 (A) 0.76 - 1.27 mg/dL Final   • Sodium 08/11/2022 140  136 - 145 mmol/L Final   • Potassium 08/11/2022 5.0  3.5 - 5.2 mmol/L Final   • Chloride 08/11/2022 101  98 - 107 mmol/L Final   • CO2 08/11/2022 28.0  22.0 - 29.0 mmol/L Final   • Calcium 08/11/2022 9.6  8.6 - 10.5 mg/dL Final   • Total Protein 08/11/2022 6.5  6.0 - 8.5 g/dL Final   • Albumin 08/11/2022 4.10  3.50 - 5.20 g/dL Final   • ALT (SGPT) 08/11/2022 11  1 - 41 U/L Final   • AST (SGOT) 08/11/2022 18  1 - 40 U/L Final   • Alkaline Phosphatase 08/11/2022 61  39 - 117 U/L Final   • Total Bilirubin 08/11/2022 0.4  0.0 - 1.2 mg/dL Final   • Globulin 08/11/2022 2.4  gm/dL Final   • A/G Ratio 08/11/2022 1.7  g/dL Final   • BUN/Creatinine Ratio 08/11/2022 24.0  7.0 - 25.0 Final   • Anion Gap 08/11/2022 11.0  5.0 - 15.0 mmol/L Final   • eGFR 08/11/2022 39.4 (A) >60.0 mL/min/1.73 Final    National Kidney Foundation and American Society of Nephrology (ASN) Task Force recommended calculation based on the Chronic Kidney Disease Epidemiology Collaboration (CKD-EPI) equation refit without adjustment for race.   • proBNP 08/11/2022 1,298.0  0.0 - 1,800.0 pg/mL Final   • Troponin T 08/11/2022 0.131 (A) 0.000 - 0.030 ng/mL Final   • Procalcitonin 08/11/2022 3.39 (A) 0.00 - 0.25 ng/mL Final   • Sed Rate 08/11/2022 24 (A) 0 - 20 mm/hr Final   • C-Reactive Protein 08/11/2022 2.79 (A) 0.00 - 0.50 mg/dL Final   • Creatine Kinase 08/11/2022 497 (A) 20 - 200 U/L Final   • QT Interval 08/11/2022 405  ms  Preliminary   • WBC 08/11/2022 8.40  3.40 - 10.80 10*3/mm3 Final   • RBC 08/11/2022 4.01 (A) 4.14 - 5.80 10*6/mm3 Final   • Hemoglobin 08/11/2022 12.1 (A) 13.0 - 17.7 g/dL Final   • Hematocrit 08/11/2022 36.2 (A) 37.5 - 51.0 % Final   • MCV 08/11/2022 90.3  79.0 - 97.0 fL Final   • MCH 08/11/2022 30.2  26.6 - 33.0 pg Final   • MCHC 08/11/2022 33.4  31.5 - 35.7 g/dL Final   • RDW 08/11/2022 14.8  12.3 - 15.4 % Final   • RDW-SD 08/11/2022 48.7  37.0 - 54.0 fl Final   • MPV 08/11/2022 9.5  6.0 - 12.0 fL Final   • Platelets 08/11/2022 213  140 - 450 10*3/mm3 Final   • Neutrophil % 08/11/2022 86.3 (A) 42.7 - 76.0 % Final   • Lymphocyte % 08/11/2022 10.0 (A) 19.6 - 45.3 % Final   • Monocyte % 08/11/2022 2.0 (A) 5.0 - 12.0 % Final   • Eosinophil % 08/11/2022 1.0  0.3 - 6.2 % Final   • Basophil % 08/11/2022 0.2  0.0 - 1.5 % Final   • Immature Grans % 08/11/2022 0.5  0.0 - 0.5 % Final   • Neutrophils, Absolute 08/11/2022 7.25 (A) 1.70 - 7.00 10*3/mm3 Final   • Lymphocytes, Absolute 08/11/2022 0.84  0.70 - 3.10 10*3/mm3 Final   • Monocytes, Absolute 08/11/2022 0.17  0.10 - 0.90 10*3/mm3 Final   • Eosinophils, Absolute 08/11/2022 0.08  0.00 - 0.40 10*3/mm3 Final   • Basophils, Absolute 08/11/2022 0.02  0.00 - 0.20 10*3/mm3 Final   • Immature Grans, Absolute 08/11/2022 0.04  0.00 - 0.05 10*3/mm3 Final   • nRBC 08/11/2022 0.0  0.0 - 0.2 /100 WBC Final   • COVID19 08/11/2022 Not Detected  Not Detected - Ref. Range Final   • Color, UA 08/11/2022 Yellow  Yellow, Straw Final   • Appearance, UA 08/11/2022 Clear  Clear Final   • pH, UA 08/11/2022 5.5  5.0 - 8.0 Final   • Specific Gravity, UA 08/11/2022 1.020  1.005 - 1.030 Final   • Glucose, UA 08/11/2022 Negative  Negative Final   • Ketones, UA 08/11/2022 Negative  Negative Final   • Bilirubin, UA 08/11/2022 Negative  Negative Final   • Blood, UA 08/11/2022 Negative  Negative Final   • Protein, UA 08/11/2022 Negative  Negative Final   • Leuk Esterase, UA 08/11/2022 Negative  Negative  Final   • Nitrite, UA 08/11/2022 Negative  Negative Final   • Urobilinogen, UA 08/11/2022 0.2 E.U./dL  0.2 - 1.0 E.U./dL Final   • Gram Stain 08/11/2022 No WBCs or organisms seen   Preliminary   • Hemoglobin A1C 08/11/2022 6.00 (A) 4.80 - 5.60 % Final   • Glucose 08/11/2022 116  70 - 130 mg/dL Final    Meter: [id number] : [id number] Scott Colon DAWN   • Target HR (85%) 08/11/2022 121  bpm Final   • Max. Pred. HR (100%) 08/11/2022 142  bpm Final   • Sinus 08/11/2022 3.3  cm Final   • STJ 08/11/2022 2.7  cm Final   • Ascending aorta 08/11/2022 3.2  cm Final   • Dimensionless Index 08/11/2022 0.90  (DI) Final   • LA ESV Index (BP) 08/11/2022 28.0  ml/m2 Final   • ACS 08/11/2022 2.17  cm Final   • Ao root diam 08/11/2022 3.6  cm Final   • Ao pk hiro 08/11/2022 167.0  cm/sec Final   • Ao V2 VTI 08/11/2022 31.8  cm Final   • JOANA(I,D) 08/11/2022 4.9  cm2 Final   • EDV(cubed) 08/11/2022 153.6  ml Final   • EDV(MOD-sp2) 08/11/2022 98.0  ml Final   • EDV(MOD-sp4) 08/11/2022 109.0  ml Final   • EF(MOD-bp) 08/11/2022 58.3  % Final   • EF(MOD-sp2) 08/11/2022 62.2  % Final   • EF(MOD-sp4) 08/11/2022 53.2  % Final   • ESV(cubed) 08/11/2022 78.0  ml Final   • ESV(MOD-sp2) 08/11/2022 37.0  ml Final   • ESV(MOD-sp4) 08/11/2022 51.0  ml Final   • IVS/LVPW 08/11/2022 0.96  cm Final   • LV mass(C)d 08/11/2022 283.5  grams Final   • LV V1 max PG 08/11/2022 6.7  mmHg Final   • LV V1 mean PG 08/11/2022 3.0  mmHg Final   • LV V1 max 08/11/2022 129.8  cm/sec Final   • LVPWd 08/11/2022 1.30  cm Final   • MV dec slope 08/11/2022 827.2  cm/sec2 Final   • MV dec time 08/11/2022 0.16  msec Final   • MV P1/2t 08/11/2022 49.0  msec Final   • MV V2 VTI 08/11/2022 33.7  cm Final   • MVA(VTI) 08/11/2022 4.6  cm2 Final   • PA acc time 08/11/2022 0.10  sec Final   • PA pr(Accel) 08/11/2022 33.8  mmHg Final   • PA V2 max 08/11/2022 81.4  cm/sec Final   • Pulm A Revs Hiro 08/11/2022 27.3  cm/sec Final   • RV V1 max PG 08/11/2022 2.20  mmHg Final   • RV  V1 max 08/11/2022 74.1  cm/sec Final   • RV V1 VTI 08/11/2022 17.2  cm Final   • SV(LVOT) 08/11/2022 154.4  ml Final   • SV(MOD-sp2) 08/11/2022 61.0  ml Final   • SV(MOD-sp4) 08/11/2022 58.0  ml Final   • Ao max PG 08/11/2022 11.1  mmHg Final   • Ao mean PG 08/11/2022 5.2  mmHg Final   • FS 08/11/2022 20.2  % Final   • IVSd 08/11/2022 1.25  cm Final   • LV V1 VTI 08/11/2022 27.6  cm Final   • LVIDd 08/11/2022 5.4  cm Final   • LVIDs 08/11/2022 4.3  cm Final   • LVOT area 08/11/2022 5.6  cm2 Final   • LVOT diam 08/11/2022 2.7  cm Final   • MV E/A 08/11/2022 1.02   Final   • MV max PG 08/11/2022 5.9  mmHg Final   • MV mean PG 08/11/2022 2.7  mmHg Final   • MVA(P1/2t) 08/11/2022 4.5  cm2 Final   • Pulm S/D 08/11/2022 0.69   Final   • MV A dur 08/11/2022 0.15  sec Final   • MV A max hiro 08/11/2022 85.5  cm/sec Final   • MV E max hiro 08/11/2022 87.5  cm/sec Final   • Pulm A Revs Dur 08/11/2022 0.14  sec Final   • Pulm Burroughs Hiro 08/11/2022 42.1  cm/sec Final   • Pulm Sys Hiro 08/11/2022 28.8  cm/sec Final   • TAPSE (>1.6) 08/11/2022 2.17  cm Final   • Uric Acid 08/11/2022 10.0 (A) 3.4 - 7.0 mg/dL Final   • Glucose 08/11/2022 113  70 - 130 mg/dL Final    Meter: [id number] : [id number] Mray SEYMOUR     Lab Results (last 24 hours)     Procedure Component Value Units Date/Time    POC Glucose Once [[id number]]  (Normal) Collected: 08/11/22 1633    Specimen: Blood Updated: 08/11/22 1634     Glucose 113 mg/dL      Comment: Meter: [id number] : [id number] Delmycarmen LopezJessica NA       Wound Culture - Wound, Leg, Right [[id number]] Collected: 08/11/22 1126    Specimen: Wound from Leg, Right Updated: 08/11/22 1355     Gram Stain No WBCs or organisms seen    Uric Acid [[id number]]  (Abnormal) Collected: 08/11/22 0524    Specimen: Blood Updated: 08/11/22 1352     Uric Acid 10.0 mg/dL     Urinalysis With Culture If Indicated - Urine, Clean Catch [[id number]]  (Normal) Collected: 08/11/22 1131    Specimen: Urine, Clean Catch Updated:  08/11/22 1210     Color, UA Yellow     Appearance, UA Clear     pH, UA 5.5     Specific Gravity, UA 1.020     Glucose, UA Negative     Ketones, UA Negative     Bilirubin, UA Negative     Blood, UA Negative     Protein, UA Negative     Leuk Esterase, UA Negative     Nitrite, UA Negative     Urobilinogen, UA 0.2 E.U./dL    Narrative:      In absence of clinical symptoms, the presence of pyuria, bacteria, and/or nitrites on the urinalysis result does not correlate with infection.  Urine microscopic not indicated.    POC Glucose Once [[id number]]  (Normal) Collected: 08/11/22 1122    Specimen: Blood Updated: 08/11/22 1127     Glucose 116 mg/dL      Comment: Meter: [id number] : [id number] [facility]       Hemoglobin A1c [[id number]]  (Abnormal) Collected: 08/11/22 0524    Specimen: Blood Updated: 08/11/22 1120     Hemoglobin A1C 6.00 %     Narrative:      Hemoglobin A1C Ranges:    Increased Risk for Diabetes  5.7% to 6.4%  Diabetes                     >= 6.5%  Diabetic Goal                < 7.0%    Troponin [[id number]]  (Abnormal) Collected: 08/11/22 0524    Specimen: Blood Updated: 08/11/22 0635     Troponin T 0.131 ng/mL     Narrative:      Troponin T Reference Range:  <= 0.03 ng/mL-   Negative for AMI  >0.03 ng/mL-     Abnormal for myocardial necrosis.  Clinicians would have to utilize clinical acumen, EKG, Troponin and serial changes to determine if it is an Acute Myocardial Infarction or myocardial injury due to an underlying chronic condition.       Results may be falsely decreased if patient taking Biotin.      COVID PRE-OP / PRE-PROCEDURE SCREENING ORDER (NO ISOLATION) - Swab, Nasopharynx [[id number]]  (Normal) Collected: 08/11/22 0543    Specimen: Swab from Nasopharynx Updated: 08/11/22 0624    Narrative:      The following orders were created for panel order COVID PRE-OP / PRE-PROCEDURE SCREENING ORDER (NO ISOLATION) - Swab, Nasopharynx.  Procedure                               Abnormality         Status  "                    ---------                               -----------         ------                     COVID-19,[facility]HOU[facility]...[[id number]]  Normal              Final result                 Please view results for these tests on the individual orders.    COVID-19,[facility] CEPHEID/YAIMA NP SWAB IN TRANSPORT MEDIA 8-12 HR TAT - Swab, Nasopharynx [[id number]]  (Normal) Collected: 08/11/22 0543    Specimen: Swab from Nasopharynx Updated: 08/11/22 0624     COVID19 Not Detected    Narrative:      Fact sheet for providers: https://www.fda.gov/media/845561/download    Fact sheet for patients: https://www.fda.gov/media/940596/download    Test performed by PCR.    BNP [[id number]]  (Normal) Collected: 08/11/22 0524    Specimen: Blood Updated: 08/11/22 0618     proBNP 1,298.0 pg/mL     Narrative:      Among patients with dyspnea, NT-proBNP is highly sensitive for the detection of acute congestive heart failure. In addition NT-proBNP of <300 pg/ml effectively rules out acute congestive heart failure with 99% negative predictive value.    Results may be falsely decreased if patient taking Biotin.      Procalcitonin [[id number]]  (Abnormal) Collected: 08/11/22 0524    Specimen: Blood Updated: 08/11/22 0618     Procalcitonin 3.39 ng/mL     Narrative:      As a Marker for Sepsis (Non-Neonates):    1. <0.5 ng/mL represents a low risk of severe sepsis and/or septic shock.  2. >2 ng/mL represents a high risk of severe sepsis and/or septic shock.    As a Marker for Lower Respiratory Tract Infections that require antibiotic therapy:    PCT on Admission    Antibiotic Therapy       6-12 Hrs later    >0.5                Strongly Recommended  >0.25 - <0.5        Recommended   0.1 - 0.25          Discouraged              Remeasure/reassess PCT  <0.1                Strongly Discouraged     Remeasure/reassess PCT    As 28 day mortality risk marker: \"Change in Procalcitonin Result\" (>80% or <=80%) if Day 0 (or Day 1) and Day 4 values " are available. Refer to http://www.[facility]-pct-calculator.com    Change in PCT <=80%  A decrease of PCT levels below or equal to 80% defines a positive change in PCT test result representing a higher risk for 28-day all-cause mortality of patients diagnosed with severe sepsis for septic shock.    Change in PCT >80%  A decrease of PCT levels of more than 80% defines a negative change in PCT result representing a lower risk for 28-day all-cause mortality of patients diagnosed with severe sepsis or septic shock.       Comprehensive Metabolic Panel [[id number]]  (Abnormal) Collected: 08/11/22 0524    Specimen: Blood Updated: 08/11/22 0613     Glucose 137 mg/dL      BUN 42 mg/dL      Creatinine 1.75 mg/dL      Sodium 140 mmol/L      Potassium 5.0 mmol/L      Chloride 101 mmol/L      CO2 28.0 mmol/L      Calcium 9.6 mg/dL      Total Protein 6.5 g/dL      Albumin 4.10 g/dL      ALT (SGPT) 11 U/L      AST (SGOT) 18 U/L      Alkaline Phosphatase 61 U/L      Total Bilirubin 0.4 mg/dL      Globulin 2.4 gm/dL      A/G Ratio 1.7 g/dL      BUN/Creatinine Ratio 24.0     Anion Gap 11.0 mmol/L      eGFR 39.4 mL/min/1.73      Comment: National Kidney Foundation and American Society of Nephrology (ASN) Task Force recommended calculation based on the Chronic Kidney Disease Epidemiology Collaboration (CKD-EPI) equation refit without adjustment for race.       Narrative:      GFR Normal >60  Chronic Kidney Disease <60  Kidney Failure <15      CK [[id number]]  (Abnormal) Collected: 08/11/22 0524    Specimen: Blood Updated: 08/11/22 0613     Creatine Kinase 497 U/L     C-reactive Protein [[id number]]  (Abnormal) Collected: 08/11/22 0524    Specimen: Blood Updated: 08/11/22 0613     C-Reactive Protein 2.79 mg/dL     Lactic Acid, Plasma [[id number]]  (Abnormal) Collected: 08/11/22 0524    Specimen: Blood Updated: 08/11/22 0610     Lactate 2.2 mmol/L     Sedimentation Rate [[id number]]  (Abnormal) Collected: 08/11/22 0524    Specimen: Blood  Updated: 08/11/22 0610     Sed Rate 24 mm/hr     CBC & Differential [[id number]]  (Abnormal) Collected: 08/11/22 0524    Specimen: Blood Updated: 08/11/22 0553    Narrative:      The following orders were created for panel order CBC & Differential.  Procedure                               Abnormality         Status                     ---------                               -----------         ------                     CBC Auto Differential[[id number]]        Abnormal            Final result                 Please view results for these tests on the individual orders.    CBC Auto Differential [[id number]]  (Abnormal) Collected: 08/11/22 0524    Specimen: Blood Updated: 08/11/22 0553     WBC 8.40 10*3/mm3      RBC 4.01 10*6/mm3      Hemoglobin 12.1 g/dL      Hematocrit 36.2 %      MCV 90.3 fL      MCH 30.2 pg      MCHC 33.4 g/dL      RDW 14.8 %      RDW-SD 48.7 fl      MPV 9.5 fL      Platelets 213 10*3/mm3      Neutrophil % 86.3 %      Lymphocyte % 10.0 %      Monocyte % 2.0 %      Eosinophil % 1.0 %      Basophil % 0.2 %      Immature Grans % 0.5 %      Neutrophils, Absolute 7.25 10*3/mm3      Lymphocytes, Absolute 0.84 10*3/mm3      Monocytes, Absolute 0.17 10*3/mm3      Eosinophils, Absolute 0.08 10*3/mm3      Basophils, Absolute 0.02 10*3/mm3      Immature Grans, Absolute 0.04 10*3/mm3      nRBC 0.0 /100 WBC     Blood Culture - Blood, Arm, Left [[id number]] Collected: 08/11/22 0539    Specimen: Blood from Arm, Left Updated: 08/11/22 0548    Blood Culture - Blood, Arm, Right [[id number]] Collected: 08/11/22 0524    Specimen: Blood from Arm, Right Updated: 08/11/22 0541        Imaging: AP and lateral views of the right tibia and fibula are reviewed on the [employer]aging system along with the associated report.  He has severe arthritis in his knee.  No acute abnormalities in the leg.  No lesions or masses.    CT bilateral lower extremities are reviewed along with the report.  No abscess or drainable fluid  collection.    Assessment: Right lower extremity cellulitis    Plan: He does not appear to have any drainable abscess or septic arthritis.  No surgical intervention warranted at this time.  His antibiotics are being managed by infectious disease.  I will sign off for now.  Please call if anything changes.    Date: 8/11/2022    Sanchez Byrnes MD    CC: Joshua Taylor MD

## 2022-08-11 NOTE — NURSING NOTE
Wound/ostomy - was asked to see patient by patient's nurse. Patient presented with cellulitis and sepsis to E following a fall. Leg is quite erythematous and edematous, there is 1 intact fluid filled bullae and 1 unroofed bullae below knee. Leg is to be evaluated for thrombus, ID has evaluated, per the notes no provider is requesting any type of compression be applied at this time. For now management should include to keep bullae (roofed and unroofed) protected and moist, this can be achieved with vaseline gauze and a light wrapping with kerlix to manage any weeping. Leg needs to be assessable for ongoing assesment. If compression is needed this could be accomplished with kerlix and ace wraps.

## 2022-08-11 NOTE — PLAN OF CARE
Goal Outcome Evaluation:        Patient is Aox2-3. Very sleepy but can be easily aroused. Patient does have a poa who I have been talking to names Mitzy. Patient currently has a pca pump that is embedded into skin in RLQ. Patient LL ext is red and warm consistent with cellulitis. Patient denies any pain, but with movement patient does complain of back pain. Pt does have bradycardic events when sleeping.

## 2022-08-11 NOTE — CONSULTS
"Referring Provider: Riaz Culver*  Reason for Consultation: Cellulitis  Chief Complaint   Patient presents with   • Hypotension   • Fall         Subjective   History of present illness: Patient is a 77-year-old male with a past medical history of diabetes and CKD who presents after reported fall at his nursing facility.  ID consulted for right lower extremity cellulitis.    Patient is a poor historian.  Does not remember what brought him to the hospital.  States he does have pain in his right lower extremity.  He states that is been going on \"a long time\".  He denies any fevers or chills.  Denies any nausea, vomiting, or diarrhea.    He was brought in by EMS after being found to be hypotensive after a fall at his nursing facility. On presentation patient was noted to have a fever of 102 with no leukocytosis.  Procalcitonin was elevated at 3.3 and lactate was also elevated at 2.2.  Inflammatory markers were up and he was started on empiric coverage for possible necrotizing infection of the right lower extremity.  Imaging showed soft tissue swelling but no evidence of abscess or deeper seeded infection.    Past Medical History:   Diagnosis Date   • Anxiety    • Back pain    • Depression    • Diabetes mellitus (CMS/HCC)     \"BORDERLINE\"   • High cholesterol    • Hypertension    • Kidney disease, chronic, stage III (GFR 30-59 ml/min) (CMS/HCC)    • Reflux esophagitis    • Sleep apnea        Past Surgical History:   Procedure Laterality Date   • APPENDECTOMY     • CARPAL TUNNEL RELEASE     • CATARACT EXTRACTION     • HAMMER TOE REPAIR     • LUMBAR SPINE SURGERY     • PAIN PUMP INSERTION/REVISION Right 4/18/2016    Procedure: RT PAIN PUMP REPLACEMENT;  Surgeon: Chris Messer MD;  Location: [facility];  Service:    • TOTAL KNEE ARTHROPLASTY Left        family history is not on file.     reports that he has never smoked. He has never used smokeless tobacco. He reports that he does not drink alcohol and " does not use drugs.     Allergies   Allergen Reactions   • Bacitracin Unknown - High Severity   • Gentamycin [Gentamicin] Unknown - High Severity   • Neosporin [Neomycin-Bacitracin Zn-Polymyx] Other (See Comments)     ALLERGY TESTED   • Nsaids Unknown - High Severity   • Tobramycin Unknown - High Severity       Medication:  Antibiotics:  Anti-Infectives (From admission, onward)    Ordered     Dose/Rate Route Frequency Start Stop    08/11/22 0520  vancomycin 2500 mg/500 mL 0.9% NS IVPB ([facility])        Ordering Provider: Stefan Crowley MD    20 mg/kg × 124 kg Intravenous Once 08/11/22 0522 08/11/22 0650    08/11/22 0520  piperacillin-tazobactam (ZOSYN) 3.375 g in iso-osmotic dextrose 50 ml (premix)        Ordering Provider: Stefan Crowley MD    3.375 g  over 30 Minutes Intravenous Once 08/11/22 0522 08/11/22 0612    08/11/22 0520  clindamycin (CLEOCIN) 600 mg in dextrose 5% 50 mL IVPB (premix)        Ordering Provider: Stefan Crowley MD    600 mg Intravenous Once 08/11/22 0522 08/11/22 0648          Review of Systems  Pertinent items are noted in HPI, all other systems reviewed and negative    Objective     Physical Exam:   Vital Signs   Temp:  [98.4 °F (36.9 °C)-102 °F (38.9 °C)] 98.4 °F (36.9 °C)  Heart Rate:  [68-84] 78  Resp:  [15-18] 18  BP: (125-173)/(54-94) 144/68    GENERAL: Patient is arousable however only intermittently answers questions.  Appears somewhat lethargic.  HEENT: Oropharynx is clear. Hearing is grossly normal.   EYES: PERRL. No conjunctival injection. No lid lag.   LYMPHATICS: No lymphadenopathy of the neck or inguinal regions.   HEART: Regular rate a nd regular rhythm.  Bilateral lower extremity edema however right worse than left.  LUNGS: Clear to auscultation anteriorly with normal respiratory effort.   GI: Soft, nontender, nondistended. No appreciable organomegaly.   SKIN: Significant erythema of the right lower extremity from the ankle to below the knee and some  extension up into the right medial thigh.  Both are present with some clear drainage.  Neuro: A/O x0.  Sensation intact in the lower extremities.  Moving all 4 extremities.     Results Review:   I reviewed the patient's new clinical results.  I reviewed the patient's new imaging results and agree with the interpretation.  I reviewed the patient's other test results and agree with the interpretation    Lab Results   Component Value Date    WBC 8.40 08/11/2022    HGB 12.1 (L) 08/11/2022    HCT 36.2 (L) 08/11/2022    MCV 90.3 08/11/2022     08/11/2022       No results found for: VANCOPEAK, VANCOTROUGH, VANCORANDOM    Lab Results   Component Value Date    GLUCOSE 137 (H) 08/11/2022    BUN 42 (H) 08/11/2022    CREATININE 1.75 (H) 08/11/2022    EGFRIFNONA 45 (L) 04/11/2016    BCR 24.0 08/11/2022    CO2 28.0 08/11/2022    CALCIUM 9.6 08/11/2022    ALBUMIN 4.10 08/11/2022    LABIL2 1.2 12/30/2019    AST 18 08/11/2022    ALT 11 08/11/2022         CrCl cannot be calculated (Unknown ideal weight.).      Microbiology:  8/11 blood cultures in process  8/11 COVID-negative    Radiology:  8/11 CT of the bilateral lower extremity report reviewed with nonspecific soft tissue edema in both lower extremities with more prominence on the right.  No soft tissue gas or abscess collections present however identify bulla on the right lower leg.    8/11 chest x-ray reviewed by me with evidence of cardiomegaly and bibasilar consolidation that is indeterminant.  Vascular congestion.    8/11 right lower extremity x-ray report reviewed with diffuse edema of the calf however no osseous abnormalities.    Assessment   #Sepsis   #Right lower extremity cellulitis  #Right lower extremity venous stasis  #Mechanical fall  #CKD     Patient is exam is consistent with a pretty severe cellulitis which could have been related to his mechanical fall.  This point it seems little unclear as the patient is unable to provide very useful history.  There is  no purulent drainage at this point likely suspect strep as the causative organism given the acuity and doubt a deeper necrotizing infection given the lack of evidence on imaging.  Plan to transition patient to IV cefazolin 2 g every 8 hours for now and follow-up his clinical improvement.  Blood cultures and wound culture are pending and will follow these up.  Agree with obtaining lower extremity DVT scan as well to evaluate for thrombus.    Thank you for this consult.  We will continue to follow along and tailor antibiotics as the patient's clinical course evolves.

## 2022-08-11 NOTE — CONSULTS
CONSULT NOTE    Patient Identification:  Yakov Dubon  77 y.o.  male  1945  [id number]            Requesting physician: Dr Solomon Taylor    Reason for Consultation:  Acute hypoxemic and hypercap resp failure pulmonary edema    CC: altered from nh, le infection    History of Present Illness:  Patient is a 77-year-old male with a previous medical history of diabetes sleep apnea CKD hypertension hyperlipidemia who presented emergency room from nursing home with hypotension and altered mental status.  Given IV fluids and blood pressures remained very solid since admission.  Patient was placed on 3 L nasal cannula.  He has been febrile since admission he is slow to answer questions and subsequently an ABG was checked which shows mild acute on chronic hypercapnic respiratory failure.  The patient does have a history of obstructive sleep apnea however he says he is never used CPAP at night that he is aware of.  He says just oxygen.  However upon review of his outpatient sleep studies it appears as though he has been treated with a BiPAP auto SV with an average tidal volume of 566 however had a persistent significantly elevated AHI.  He apparently then got a new machine however did not get an adapter delivered and has been only using oxygen.  He denies any chest pain denies any cough however he does cough occasionally when in the room.  He denies any hemoptysis.  He denies any shortness of breath.  He says his legs hurt.  He says both legs hurt but the right more than the left.  He is unable to tell me how long symptoms have been going on or what makes them better or worse.  He does feel as when you touch his legs it feels worse.  History is very limited as patient is a poor historian.    His lower extremity has been evaluated with a CT scan that shows no subcutaneous gas.  He has been evaluated by orthopedic surgery as well as infectious disease.    Review of Systems:  As per HPI otherwise 12 system review  "of systems performed and all else negative as able as patient is a very poor historian    Past Medical History:   Diagnosis Date   • Anxiety    • Back pain    • Depression    • Diabetes mellitus (HCC)     \"BORDERLINE\"   • High cholesterol    • Hypertension    • Kidney disease, chronic, stage III (GFR 30-59 ml/min) (HCC)    • Reflux esophagitis    • Sleep apnea        Past Surgical History:   Procedure Laterality Date   • APPENDECTOMY     • CARPAL TUNNEL RELEASE     • CATARACT EXTRACTION     • HAMMER TOE REPAIR     • LUMBAR SPINE SURGERY     • PAIN PUMP INSERTION/REVISION Right 4/18/2016    Procedure: RT PAIN PUMP REPLACEMENT;  Surgeon: Chris Messer MD;  Location: [facility] OR;  Service:    • TOTAL KNEE ARTHROPLASTY Left         Medications Prior to Admission   Medication Sig Dispense Refill Last Dose   • acetaminophen (TYLENOL) 325 MG tablet Take 650 mg by mouth Every Night.   8/10/2022 at Unknown time   • aspirin 81 MG chewable tablet Chew 81 mg Daily.   8/10/2022 at Unknown time   • calcium carbonate (OS-SERJIO) 600 MG tablet Take 600 mg by mouth 2 (Two) Times a Day With Meals.   8/10/2022 at Unknown time   • carvedilol (COREG) 6.25 MG tablet Take 6.25 mg by mouth 2 (Two) Times a Day With Meals.   8/10/2022 at Unknown time   • ferrous gluconate (FERGON) 324 MG tablet Take 324 mg by mouth Daily With Breakfast.   8/10/2022 at Unknown time   • gabapentin (NEURONTIN) 300 MG capsule Take 300 mg by mouth 3 (Three) Times a Day.   8/10/2022 at Unknown time   • HYDROmorphone HCl (DILAUDID-HP IJ) Inject  as directed. PAIN PUMP   8/11/2022 at Unknown time   • insulin glargine (LANTUS, SEMGLEE) 100 UNIT/ML injection Inject 15 Units under the skin into the appropriate area as directed Daily.   8/10/2022 at Unknown time   • Multiple Vitamins-Minerals (OCUVITE PO) Take  by mouth daily.   8/10/2022 at Unknown time   • oxyCODONE-acetaminophen (PERCOCET) 7.5-325 MG per tablet Take 1 tablet by mouth Every 4 (Four) Hours As " "Needed.   8/10/2022 at Unknown time   • polyethylene glycol (MIRALAX) 17 g packet Take 17 g by mouth Daily As Needed.   Past Week at Unknown time   • rosuvastatin (CRESTOR) 10 MG tablet Take 5 mg by mouth Every Night.   8/10/2022 at Unknown time   • senna (senna) 8.6 MG tablet Take 1 tablet by mouth Daily.   8/10/2022 at Unknown time   • sertraline (ZOLOFT) 100 MG tablet Take 75 mg by mouth Daily.   8/10/2022 at Unknown time   • tamsulosin (FLOMAX) 0.4 MG capsule 24 hr capsule Take 1 capsule by mouth every night.   8/10/2022 at Unknown time   • nitroglycerin (NITROSTAT) 0.4 MG SL tablet Place 0.4 mg under the tongue Every 5 (Five) Minutes As Needed for Chest Pain. Take no more than 3 doses in 15 minutes.   Unknown at Unknown time       Allergies   Allergen Reactions   • Bacitracin Unknown - High Severity   • Gentamycin [Gentamicin] Unknown - High Severity   • Neosporin [Neomycin-Bacitracin Zn-Polymyx] Other (See Comments)     ALLERGY TESTED   • Nsaids Unknown - High Severity   • Tobramycin Unknown - High Severity       Social History     Socioeconomic History   • Marital status:    Tobacco Use   • Smoking status: Never Smoker   • Smokeless tobacco: Never Used   Substance and Sexual Activity   • Alcohol use: No   • Drug use: No       History reviewed. No pertinent family history.    Physical Exam:  /74   Pulse 61   Temp 98.3 °F (36.8 °C) (Oral)   Resp 18   Ht 182 cm (71.65\")   Wt 124 kg (273 lb 5.9 oz)   SpO2 92%   BMI 37.43 kg/m²   Body mass index is 37.43 kg/m².   General appearance: Nontoxic however ill-appearing, conversant   Eyes: anicteric sclerae, moist conjunctivae  HENT: Atraumatic; oropharynx clear with moist mucous membranes   Neck: Trachea midline; large neck circumference  Lungs: Bilateral air entry crackles at bases, with normal respiratory effort and no intercostal retractions  CV: RRR, no rub  Abdomen: Truncal obesity nonrigid bowel sounds positive  Extremities: Bilateral pitting " edema right-sided extremely erythematous from knee down with area of bullae.  Skin: As above  Psych: Patient is alert somewhat drowsy however answers questions appropriately  Neuro speech intact moves extremities    LABS:  Results from last 7 days   Lab Units 08/11/22  0524   WBC 10*3/mm3 8.40   HEMOGLOBIN g/dL 12.1*   PLATELETS 10*3/mm3 213     Results from last 7 days   Lab Units 08/11/22  0524   SODIUM mmol/L 140   POTASSIUM mmol/L 5.0   CHLORIDE mmol/L 101   CO2 mmol/L 28.0   BUN mg/dL 42*   CREATININE mg/dL 1.75*   GLUCOSE mg/dL 137*   CALCIUM mg/dL 9.6   Estimated Creatinine Clearance: 47.1 mL/min (A) (by C-G formula based on SCr of 1.75 mg/dL (H)).    Imaging: I personally visualized the images of scans/x-rays performed within last 3 days.  Imaging Results (Most Recent)     Procedure Component Value Units Date/Time    CT Lower Extremity Bilateral Without Contrast [[id number]] Collected: 08/11/22 0856     Updated: 08/11/22 1442    Narrative:      BILATERAL LOWER EXTREMITY CT WITHOUT CONTRAST     HISTORY: Right leg cellulitis. Evaluate for abscess or soft tissue gas.     TECHNIQUE: Axial CT of both lower legs from above the knees to the feet  along with multiplanar reformatted images is provided. No IV contrast  was used. No previous imaging for correlation.     Radiation dose reduction techniques were utilized, including automated  exposure control and exposure modulation based on body size.     FINDINGS: There are several blisters at the level of the dermis along  the proximal aspect of the right lower leg anteriorly. Diffuse  subcutaneous edema is observed in the leg, most pronounced around the  ankle and over the dorsum of the foot. There is no soft tissue gas or  focal fluid collection. Musculature is diffusely atrophic. No joint  effusion. There is arthritic change in the foot and at the knee.     On the left, there is subcutaneous edema along the distal lower leg,  ankle and over the dorsum of the foot  which is asymmetrically less  pronounced than that observed on the right. The musculature is diffusely  atrophic as at the opposite leg. There is no focal fluid collection or  joint effusion. Arthritic changes are observed in the foot and ankle.       Impression:      Nonspecific soft tissue edema in both lower legs  asymmetrically more prominent on the right. There are several blisters  at the level of the dermis of the right lower leg proximally. However,  no soft tissue gas or abscess collection is present.     This report was finalized on 8/11/2022 2:38 PM by Dr. Joshua Mendoza M.D.       XR Tibia Fibula 2 View Right [[id number]] Collected: 08/11/22 0559     Updated: 08/11/22 0604    Narrative:      2 VIEWS RIGHT TIBIA AND FIBULA     HISTORY: Right leg swelling     COMPARISON: None available.     FINDINGS:  No acute osseous abnormalities are seen. The patient has advanced  degenerative changes involving the right knee. There is diffuse edema of  the right calf, as well as a skin blister which is seen along the  anteromedial aspect of the proximal calf. No definite soft tissue gas is  seen. There is a suprapatellar effusion, poorly assessed.       Impression:         1. Diffuse edema of the right calf.  2. No aggressive osseous abnormalities are seen.     This report was finalized on 8/11/2022 6:01 AM by Dr. Karin Weathers M.D.       XR Chest 1 View [[id number]] Collected: 08/11/22 0558     Updated: 08/11/22 0602    Narrative:      SINGLE VIEW OF THE CHEST     HISTORY: Sepsis     COMPARISON: None available.     FINDINGS:  Cardiomegaly is present. There is tortuosity and ectasia of the thoracic  aorta. There is vascular congestion. There is nonspecific bibasilar  consolidation. Bilateral pleural effusions are suspected.       Impression:         1. Cardiomegaly with vascular congestion.  2. Nonspecific bibasilar consolidation.     This report was finalized on 8/11/2022 5:59 AM by Dr. Karin Weathers M.D.          TTE 8/11/2022  · Left ventricular wall thickness is consistent with mild concentric hypertrophy.  · Calculated left ventricular EF = 58.3% Estimated left ventricular EF was in agreement with the calculated left ventricular EF. Left ventricular systolic function is normal.  · Left ventricular diastolic function was normal.       Assessment / Recommendations:  Acute on chronic hypercapnic respiratory failure mild  Acute hypoxemic respiratory failure  Obstructive sleep apnea and central sleep apnea and REM behavior disorder  CKD  Lower extremity cellulitis    From a pulmonary and sleep perspective  Needs BiPAP.  This will help treat acute on chronic hypercapnic neck respiratory failure as well as his central and obstructive sleep apnea.  He needs to be sent home on a BiPAP or have his BiPAP resumed at the nursing home facility.    Continue diuresis as blood pressure allows his kidney function looks to be at his baseline or even a little bit lower.    Otherwise medical care per primary and consultants      Total critical care time was 34 minutes, excluding any separately billable procedure time.  Time did not overlap with any other provider.      Rodrigue Gonzalez MD  [facility]  08/11/22  19:23 EDT

## 2022-08-11 NOTE — NURSING NOTE
Contacted [facility] and spoke with nurse who stated patient does have a pain pump on RL abdomin that is embedded into skin. The pump was refilled by pain clinic on 7/27.

## 2022-08-11 NOTE — ED TRIAGE NOTES
Pt to ER from [facility] via [facility] (incident # [id number]) with c/o fall.    Per report pt had an uniwtnessed fall. Pt found on floor covered in own excrement. NH staff unable to provide information.     EMS found pt to be in afib and initially normotensive but pt then became hypotensive with pressure 80s systolic.    Pt arrives with redness and weeping to E. Per report this appeared overnight. EMS states one blister popped prior to their arrival.      Pt masked in triage, staff in appropriate ppe.

## 2022-08-11 NOTE — ED NOTES
.  Nursing report ED to floor  Yakov Dubon  77 y.o.  male    HPI :   Chief Complaint   Patient presents with   • Hypotension   • Fall       Admitting doctor:   Riaz Sanches MD    Admitting diagnosis:   The primary encounter diagnosis was Cellulitis of right lower extremity. Diagnoses of Elevated troponin level not due to acute coronary syndrome, Sepsis, due to unspecified organism, unspecified whether acute organ dysfunction present (HCC), Type 2 diabetes mellitus without complication, unspecified whether long term insulin use (HCC), and Chronic kidney disease, unspecified CKD stage were also pertinent to this visit.    Code status:   Current Code Status     Date Active Code Status Order ID Comments User Context       Not on file    Advance Care Planning Activity          Allergies:   Bacitracin, Gentamycin [gentamicin], Neosporin [neomycin-bacitracin zn-polymyx], Nsaids, and Tobramycin    Intake and Output    Intake/Output Summary (Last 24 hours) at 8/11/2022 0719  Last data filed at 8/11/2022 0648  Gross per 24 hour   Intake 100 ml   Output --   Net 100 ml       Weight:       08/11/22  0522   Weight: 124 kg (272 lb 14.9 oz)       Most recent vitals:   Vitals:    08/11/22 0631 08/11/22 0701 08/11/22 0705 08/11/22 0706   BP: 125/62 142/54     Pulse: 68 75 76 78   Resp:       Temp:       TempSrc:       SpO2: 99% 97% 96% 96%   Weight:           Active LDAs/IV Access:   Lines, Drains & Airways     Active LDAs     Name Placement date Placement time Site Days    Peripheral IV 08/11/22 0517 Right Antecubital 08/11/22  0517  Antecubital  less than 1    Peripheral IV 08/11/22 0649 Right Hand 08/11/22  0649  Hand  less than 1    Pump Device --  --  --  --                Labs (abnormal labs have a star):   Labs Reviewed   LACTIC ACID, PLASMA - Abnormal; Notable for the following components:       Result Value    Lactate 2.2 (*)     All other components within normal limits   COMPREHENSIVE METABOLIC PANEL -  "Abnormal; Notable for the following components:    Glucose 137 (*)     BUN 42 (*)     Creatinine 1.75 (*)     eGFR 39.4 (*)     All other components within normal limits    Narrative:     GFR Normal >60  Chronic Kidney Disease <60  Kidney Failure <15     TROPONIN (IN-HOUSE) - Abnormal; Notable for the following components:    Troponin T 0.131 (*)     All other components within normal limits    Narrative:     Troponin T Reference Range:  <= 0.03 ng/mL-   Negative for AMI  >0.03 ng/mL-     Abnormal for myocardial necrosis.  Clinicians would have to utilize clinical acumen, EKG, Troponin and serial changes to determine if it is an Acute Myocardial Infarction or myocardial injury due to an underlying chronic condition.       Results may be falsely decreased if patient taking Biotin.     PROCALCITONIN - Abnormal; Notable for the following components:    Procalcitonin 3.39 (*)     All other components within normal limits    Narrative:     As a Marker for Sepsis (Non-Neonates):    1. <0.5 ng/mL represents a low risk of severe sepsis and/or septic shock.  2. >2 ng/mL represents a high risk of severe sepsis and/or septic shock.    As a Marker for Lower Respiratory Tract Infections that require antibiotic therapy:    PCT on Admission    Antibiotic Therapy       6-12 Hrs later    >0.5                Strongly Recommended  >0.25 - <0.5        Recommended   0.1 - 0.25          Discouraged              Remeasure/reassess PCT  <0.1                Strongly Discouraged     Remeasure/reassess PCT    As 28 day mortality risk marker: \"Change in Procalcitonin Result\" (>80% or <=80%) if Day 0 (or Day 1) and Day 4 values are available. Refer to http://www.[facility]-pct-calculator.com    Change in PCT <=80%  A decrease of PCT levels below or equal to 80% defines a positive change in PCT test result representing a higher risk for 28-day all-cause mortality of patients diagnosed with severe sepsis for septic shock.    Change in PCT >80%  A " decrease of PCT levels of more than 80% defines a negative change in PCT result representing a lower risk for 28-day all-cause mortality of patients diagnosed with severe sepsis or septic shock.      SEDIMENTATION RATE - Abnormal; Notable for the following components:    Sed Rate 24 (*)     All other components within normal limits   C-REACTIVE PROTEIN - Abnormal; Notable for the following components:    C-Reactive Protein 2.79 (*)     All other components within normal limits   CK - Abnormal; Notable for the following components:    Creatine Kinase 497 (*)     All other components within normal limits   CBC WITH AUTO DIFFERENTIAL - Abnormal; Notable for the following components:    RBC 4.01 (*)     Hemoglobin 12.1 (*)     Hematocrit 36.2 (*)     Neutrophil % 86.3 (*)     Lymphocyte % 10.0 (*)     Monocyte % 2.0 (*)     Neutrophils, Absolute 7.25 (*)     All other components within normal limits   COVID-NIXON Sargent IN-HOUSE CEPHEID/YAIMA, NP SWAB IN TRANSPORT MEDIA 8-12 HR TAT - Normal    Narrative:     Fact sheet for providers: https://www.fda.gov/media/342109/download    Fact sheet for patients: https://www.fda.gov/media/079629/download    Test performed by PCR.   BNP (IN-HOUSE) - Normal    Narrative:     Among patients with dyspnea, NT-proBNP is highly sensitive for the detection of acute congestive heart failure. In addition NT-proBNP of <300 pg/ml effectively rules out acute congestive heart failure with 99% negative predictive value.    Results may be falsely decreased if patient taking Biotin.     COVID PRE-OP / PRE-PROCEDURE SCREENING ORDER (NO ISOLATION)    Narrative:     The following orders were created for panel order COVID PRE-OP / PRE-PROCEDURE SCREENING ORDER (NO ISOLATION) - Swab, Nasopharynx.  Procedure                               Abnormality         Status                     ---------                               -----------         ------                     AMMY-NIXON Sargent IN-HOUSE...[[id number]]   Normal              Final result                 Please view results for these tests on the individual orders.   BLOOD CULTURE   BLOOD CULTURE   LACTIC ACID, REFLEX   CBC AND DIFFERENTIAL    Narrative:     The following orders were created for panel order CBC & Differential.  Procedure                               Abnormality         Status                     ---------                               -----------         ------                     CBC Auto Differential[[id number]]        Abnormal            Final result                 Please view results for these tests on the individual orders.       EKG:   ECG 12 Lead   Preliminary Result   HEART RATE= 74  bpm   RR Interval= 884  ms   NH Interval= 192  ms   P Horizontal Axis= 11  deg   P Front Axis= -12  deg   QRSD Interval= 120  ms   QT Interval= 405  ms   QRS Axis= -32  deg   T Wave Axis= 0  deg   - ABNORMAL ECG -   Sinus rhythm   Ventricular premature complex   IVCD, consider RBBB   Electronically Signed By:    Date and Time of Study: 2022-08-11 06:08:32          Meds given in ED:   Medications   sodium chloride 0.9 % flush 10 mL (has no administration in time range)   vancomycin 2500 mg/500 mL 0.9% NS IVPB ([facility]) (2,500 mg Intravenous New Bag 8/11/22 0650)   piperacillin-tazobactam (ZOSYN) 3.375 g in iso-osmotic dextrose 50 ml (premix) (0 g Intravenous Stopped 8/11/22 0612)   clindamycin (CLEOCIN) 600 mg in dextrose 5% 50 mL IVPB (premix) (0 mg Intravenous Stopped 8/11/22 0648)   lactated ringers bolus 3,720 mL (3,720 mL Intravenous New Bag 8/11/22 0525)   acetaminophen (TYLENOL) tablet 1,000 mg (1,000 mg Oral Given 8/11/22 0550)   morphine injection 4 mg (4 mg Intravenous Given 8/11/22 0613)   ondansetron (ZOFRAN) injection 4 mg (4 mg Intravenous Given 8/11/22 0612)       Imaging results:  XR Tibia Fibula 2 View Right    Result Date: 8/11/2022   1. Diffuse edema of the right calf. 2. No aggressive osseous abnormalities are seen.  This report was finalized on  8/11/2022 6:01 AM by Dr. Karin Weathers M.D.      XR Chest 1 View    Result Date: 8/11/2022   1. Cardiomegaly with vascular congestion. 2. Nonspecific bibasilar consolidation.  This report was finalized on 8/11/2022 5:59 AM by Dr. Karin Weathers M.D.        Ambulatory status:   - bed rest    Social issues:   Social History     Socioeconomic History   • Marital status:    Tobacco Use   • Smoking status: Never Smoker   • Smokeless tobacco: Never Used   Substance and Sexual Activity   • Alcohol use: No   • Drug use: No

## 2022-08-12 ENCOUNTER — APPOINTMENT (OUTPATIENT)
Dept: ULTRASOUND IMAGING | Facility: HOSPITAL | Age: 77
End: 2022-08-12

## 2022-08-12 PROBLEM — R79.89 ELEVATED LFTS: Status: ACTIVE | Noted: 2022-08-12

## 2022-08-12 PROBLEM — J96.22 ACUTE ON CHRONIC RESPIRATORY FAILURE WITH HYPERCAPNIA (HCC): Status: ACTIVE | Noted: 2022-08-12

## 2022-08-12 PROBLEM — J96.01 ACUTE RESPIRATORY FAILURE WITH HYPOXIA (HCC): Status: ACTIVE | Noted: 2022-08-12

## 2022-08-12 LAB
ALBUMIN SERPL-MCNC: 3.2 G/DL (ref 3.5–5.2)
ALBUMIN/GLOB SERPL: 1.2 G/DL
ALP SERPL-CCNC: 93 U/L (ref 39–117)
ALT SERPL W P-5'-P-CCNC: 150 U/L (ref 1–41)
ANION GAP SERPL CALCULATED.3IONS-SCNC: 9.4 MMOL/L (ref 5–15)
ARTERIAL PATENCY WRIST A: POSITIVE
ARTERIAL PATENCY WRIST A: POSITIVE
AST SERPL-CCNC: 170 U/L (ref 1–40)
ATMOSPHERIC PRESS: 751.3 MMHG
ATMOSPHERIC PRESS: 752.8 MMHG
BASE EXCESS BLDA CALC-SCNC: 1.2 MMOL/L (ref 0–2)
BASE EXCESS BLDA CALC-SCNC: 1.3 MMOL/L (ref 0–2)
BDY SITE: ABNORMAL
BDY SITE: ABNORMAL
BILIRUB SERPL-MCNC: 0.8 MG/DL (ref 0–1.2)
BUN SERPL-MCNC: 40 MG/DL (ref 8–23)
BUN/CREAT SERPL: 22 (ref 7–25)
CALCIUM SPEC-SCNC: 8.9 MG/DL (ref 8.6–10.5)
CHLORIDE SERPL-SCNC: 103 MMOL/L (ref 98–107)
CO2 SERPL-SCNC: 28.6 MMOL/L (ref 22–29)
CREAT SERPL-MCNC: 1.82 MG/DL (ref 0.76–1.27)
CRP SERPL-MCNC: 25.73 MG/DL (ref 0–0.5)
D-LACTATE SERPL-SCNC: 2.1 MMOL/L (ref 0.5–2)
DEPRECATED RDW RBC AUTO: 49.3 FL (ref 37–54)
EGFRCR SERPLBLD CKD-EPI 2021: 37.6 ML/MIN/1.73
ERYTHROCYTE [DISTWIDTH] IN BLOOD BY AUTOMATED COUNT: 14.7 % (ref 12.3–15.4)
GAS FLOW AIRWAY: 2 LPM
GLOBULIN UR ELPH-MCNC: 2.6 GM/DL
GLUCOSE BLDC GLUCOMTR-MCNC: 100 MG/DL (ref 70–130)
GLUCOSE BLDC GLUCOMTR-MCNC: 126 MG/DL (ref 70–130)
GLUCOSE BLDC GLUCOMTR-MCNC: 166 MG/DL (ref 70–130)
GLUCOSE BLDC GLUCOMTR-MCNC: 177 MG/DL (ref 70–130)
GLUCOSE SERPL-MCNC: 110 MG/DL (ref 65–99)
HAV IGM SERPL QL IA: NORMAL
HBV CORE IGM SERPL QL IA: NORMAL
HBV SURFACE AG SERPL QL IA: NORMAL
HCO3 BLDA-SCNC: 27.8 MMOL/L (ref 22–28)
HCO3 BLDA-SCNC: 28.9 MMOL/L (ref 22–28)
HCT VFR BLD AUTO: 33.6 % (ref 37.5–51)
HCV AB SER DONR QL: NORMAL
HGB BLD-MCNC: 11 G/DL (ref 13–17.7)
INHALED O2 CONCENTRATION: 25 %
MAGNESIUM SERPL-MCNC: 2.1 MG/DL (ref 1.6–2.4)
MCH RBC QN AUTO: 30.1 PG (ref 26.6–33)
MCHC RBC AUTO-ENTMCNC: 32.7 G/DL (ref 31.5–35.7)
MCV RBC AUTO: 92.1 FL (ref 79–97)
MODALITY: ABNORMAL
MODALITY: ABNORMAL
O2 A-A PPRESDIFF RESPIRATORY: 0.6 MMHG
PCO2 BLDA: 51.5 MM HG (ref 35–45)
PCO2 BLDA: 55.8 MM HG (ref 35–45)
PH BLDA: 7.32 PH UNITS (ref 7.35–7.45)
PH BLDA: 7.34 PH UNITS (ref 7.35–7.45)
PLATELET # BLD AUTO: 181 10*3/MM3 (ref 140–450)
PMV BLD AUTO: 9.6 FL (ref 6–12)
PO2 BLDA: 68.8 MM HG (ref 80–100)
PO2 BLDA: 76.9 MM HG (ref 80–100)
POTASSIUM SERPL-SCNC: 5.3 MMOL/L (ref 3.5–5.2)
PROCALCITONIN SERPL-MCNC: 15 NG/ML (ref 0–0.25)
PROT SERPL-MCNC: 5.8 G/DL (ref 6–8.5)
QT INTERVAL: 415 MS
RBC # BLD AUTO: 3.65 10*6/MM3 (ref 4.14–5.8)
SAO2 % BLDCOA: 91.5 % (ref 92–99)
SAO2 % BLDCOA: 94.1 % (ref 92–99)
SET MECH RESP RATE: 16
SODIUM SERPL-SCNC: 141 MMOL/L (ref 136–145)
TOTAL RATE: 16 BREATHS/MINUTE
TOTAL RATE: 18 BREATHS/MINUTE
TROPONIN T SERPL-MCNC: 0.15 NG/ML (ref 0–0.03)
VT ON VENT VENT: 674 ML
WBC NRBC COR # BLD: 8.21 10*3/MM3 (ref 3.4–10.8)

## 2022-08-12 PROCEDURE — 80074 ACUTE HEPATITIS PANEL: CPT | Performed by: HOSPITALIST

## 2022-08-12 PROCEDURE — 83735 ASSAY OF MAGNESIUM: CPT | Performed by: HOSPITALIST

## 2022-08-12 PROCEDURE — 94660 CPAP INITIATION&MGMT: CPT

## 2022-08-12 PROCEDURE — 25010000002 CEFEPIME PER 500 MG: Performed by: INTERNAL MEDICINE

## 2022-08-12 PROCEDURE — 94760 N-INVAS EAR/PLS OXIMETRY 1: CPT

## 2022-08-12 PROCEDURE — 25010000002 ENOXAPARIN PER 10 MG: Performed by: NURSE PRACTITIONER

## 2022-08-12 PROCEDURE — 94799 UNLISTED PULMONARY SVC/PX: CPT

## 2022-08-12 PROCEDURE — 36600 WITHDRAWAL OF ARTERIAL BLOOD: CPT

## 2022-08-12 PROCEDURE — 76705 ECHO EXAM OF ABDOMEN: CPT

## 2022-08-12 PROCEDURE — 84145 PROCALCITONIN (PCT): CPT | Performed by: HOSPITALIST

## 2022-08-12 PROCEDURE — 87040 BLOOD CULTURE FOR BACTERIA: CPT | Performed by: STUDENT IN AN ORGANIZED HEALTH CARE EDUCATION/TRAINING PROGRAM

## 2022-08-12 PROCEDURE — 83605 ASSAY OF LACTIC ACID: CPT | Performed by: HOSPITALIST

## 2022-08-12 PROCEDURE — 99232 SBSQ HOSP IP/OBS MODERATE 35: CPT | Performed by: STUDENT IN AN ORGANIZED HEALTH CARE EDUCATION/TRAINING PROGRAM

## 2022-08-12 PROCEDURE — 93010 ELECTROCARDIOGRAM REPORT: CPT | Performed by: INTERNAL MEDICINE

## 2022-08-12 PROCEDURE — 99222 1ST HOSP IP/OBS MODERATE 55: CPT | Performed by: INTERNAL MEDICINE

## 2022-08-12 PROCEDURE — 25010000002 CEFAZOLIN IN DEXTROSE 2-4 GM/100ML-% SOLUTION: Performed by: STUDENT IN AN ORGANIZED HEALTH CARE EDUCATION/TRAINING PROGRAM

## 2022-08-12 PROCEDURE — 80053 COMPREHEN METABOLIC PANEL: CPT | Performed by: HOSPITALIST

## 2022-08-12 PROCEDURE — 84484 ASSAY OF TROPONIN QUANT: CPT | Performed by: HOSPITALIST

## 2022-08-12 PROCEDURE — 82962 GLUCOSE BLOOD TEST: CPT

## 2022-08-12 PROCEDURE — 63710000001 INSULIN LISPRO (HUMAN) PER 5 UNITS: Performed by: NURSE PRACTITIONER

## 2022-08-12 PROCEDURE — 85027 COMPLETE CBC AUTOMATED: CPT | Performed by: NURSE PRACTITIONER

## 2022-08-12 PROCEDURE — 93005 ELECTROCARDIOGRAM TRACING: CPT | Performed by: INTERNAL MEDICINE

## 2022-08-12 PROCEDURE — 86140 C-REACTIVE PROTEIN: CPT | Performed by: HOSPITALIST

## 2022-08-12 PROCEDURE — 82803 BLOOD GASES ANY COMBINATION: CPT

## 2022-08-12 RX ADMIN — SENNOSIDES 1 TABLET: 8.6 TABLET, FILM COATED ORAL at 10:02

## 2022-08-12 RX ADMIN — CEFAZOLIN SODIUM 2 G: 2 INJECTION, SOLUTION INTRAVENOUS at 00:12

## 2022-08-12 RX ADMIN — OXYCODONE HYDROCHLORIDE AND ACETAMINOPHEN 1 TABLET: 7.5; 325 TABLET ORAL at 18:50

## 2022-08-12 RX ADMIN — CALCIUM CARBONATE-VITAMIN D TAB 500 MG-200 UNIT 1 TABLET: 500-200 TAB at 10:02

## 2022-08-12 RX ADMIN — CALCIUM CARBONATE-VITAMIN D TAB 500 MG-200 UNIT 1 TABLET: 500-200 TAB at 21:37

## 2022-08-12 RX ADMIN — GABAPENTIN 300 MG: 300 CAPSULE ORAL at 21:38

## 2022-08-12 RX ADMIN — SERTRALINE 75 MG: 25 TABLET, FILM COATED ORAL at 10:02

## 2022-08-12 RX ADMIN — CEFEPIME 2 G: 2 INJECTION, POWDER, FOR SOLUTION INTRAVENOUS at 01:25

## 2022-08-12 RX ADMIN — ROSUVASTATIN CALCIUM 5 MG: 5 TABLET, FILM COATED ORAL at 21:37

## 2022-08-12 RX ADMIN — FERROUS SULFATE TAB 325 MG (65 MG ELEMENTAL FE) 325 MG: 325 (65 FE) TAB at 10:01

## 2022-08-12 RX ADMIN — Medication 10 ML: at 10:03

## 2022-08-12 RX ADMIN — CARVEDILOL 6.25 MG: 6.25 TABLET, FILM COATED ORAL at 17:25

## 2022-08-12 RX ADMIN — INSULIN LISPRO 3 UNITS: 100 INJECTION, SOLUTION INTRAVENOUS; SUBCUTANEOUS at 13:34

## 2022-08-12 RX ADMIN — FAMOTIDINE 40 MG: 20 TABLET ORAL at 17:25

## 2022-08-12 RX ADMIN — ACETAMINOPHEN 650 MG: 325 TABLET, FILM COATED ORAL at 21:38

## 2022-08-12 RX ADMIN — GABAPENTIN 300 MG: 300 CAPSULE ORAL at 10:02

## 2022-08-12 RX ADMIN — Medication 10 ML: at 21:00

## 2022-08-12 RX ADMIN — ENOXAPARIN SODIUM 40 MG: 100 INJECTION SUBCUTANEOUS at 14:47

## 2022-08-12 RX ADMIN — TAMSULOSIN HYDROCHLORIDE 0.4 MG: 0.4 CAPSULE ORAL at 21:37

## 2022-08-12 RX ADMIN — GABAPENTIN 300 MG: 300 CAPSULE ORAL at 17:26

## 2022-08-12 RX ADMIN — ASPIRIN 81 MG: 81 TABLET, CHEWABLE ORAL at 10:01

## 2022-08-12 RX ADMIN — CARVEDILOL 6.25 MG: 6.25 TABLET, FILM COATED ORAL at 10:01

## 2022-08-12 RX ADMIN — CEFEPIME 2 G: 2 INJECTION, POWDER, FOR SOLUTION INTRAVENOUS at 21:38

## 2022-08-12 RX ADMIN — CEFEPIME 2 G: 2 INJECTION, POWDER, FOR SOLUTION INTRAVENOUS at 10:02

## 2022-08-12 RX ADMIN — FAMOTIDINE 40 MG: 20 TABLET ORAL at 10:02

## 2022-08-12 NOTE — CONSULTS
Patient Name: Yakov Dubon  :1945  77 y.o.    Date of Admission: 2022  Date of Consultation:  22  Encounter Provider: Sanjeev Tong III, MD  Place of Service: [facility] CARDIOLOGY  Referring Provider: Riaz Culver*  Patient Care Team:  Chencho Sterling MD as PCP - General (Internal Medicine)  Reasor, Chris Polo MD as Consulting Physician (Pain Medicine)      Chief complaint: RLE cellulitis    Reason for Consult: elevated troponin, peripheral edema, pulmonary edema     History of Present Illness:     Mr Dubon is a 77 year old male with history of hypertension, hyperlipidemia, diabetes, chronic kidney disease and chronic back (with Dilaudid pain pump). He has chronic diastolic heart failure with an echocardiogram performed at [facility] in  noting overall normal LV, and was also found to have early noncritical coronary artery disease on cardiac catheterization when admitted to [facility] in 2019.    He presented to [facility] ED 22 from his nursing facility with reports of right lower extremity swelling and redness, as well as a mechanical fall. He was reported to be febrile and hypotensive as well.     Currently says he did not feel good.  He says he feels really weak.  He denies any chest pain or chest discomfort.  He has not had any chest pain or chest discomfort during this hospitalization or prior to this hospitalization in the last couple of weeks.  He does not recall having any at any time other than a few years ago when he had the evaluation at [facility].    Workup was notable for severe cellulitis and soft tissue edema, treated with IV antibiotics. CRP 2.79. Lactate 2.2. Procal 3.39.    CXR noted cardiomegaly with vascular congestion.     Troponin 0.131/0.147 with creatinine 1.82. proBNP 1,298.      Echocardiogram completed upon arrival demonstrated normal LV with EF 58.3%, as well as mild concentric LVH.  Left  "ventricular diastolic function was normal, left atrial dimensions were normal, left atrial pressure appears to be normal.  No pulmonary hypertension.    Echocardiogram 8/11/22  · Left ventricular wall thickness is consistent with mild concentric hypertrophy.  · Calculated left ventricular EF = 58.3% Estimated left ventricular EF was in agreement with the calculated left ventricular EF. Left ventricular systolic function is normal.  · Left ventricular diastolic function was normal.    Previous Cardiac Testing:    Echocardiogram 12/30/19   Summary    Technically difficult echocardiogram. Poor apical window    The left ventricle is normal in size and wall thickness    Overall normal left ventricular systolic function. Cannot exclude wall    motion abnormalities    The aortic valve is trileaflet. The leaflets are thin with normal excursion.    There is no aortic stenosis or regurgitation present.    The mitral valve was adequately visualized only in the parasternal view.    Mitral valve appears normal in structure with trace regurgitation    The tricuspid valve was not adequately visualized     Cardiac Catheterization 3/14/19  Diagnostic Summary/Impression   1. Normal left ventricular systolic function with mild mitral regurgitation by   echocardiogram   2. Early noncritical coronary artery disease   LMCA: Luminal irregularity of 10% distal stenosis with no significant stenoses     LAD: 10% ostial stenosis with 20% stenosis in its proximal and mid third with   no critical stenoses.     LCx: 10% stenosis in the proximal portion of the posterior lateral marginal   branch with no critical stenoses of the circumflex system.   Large caliber codominant vessel     RCA: Codominant vessel with 10% stenosis in its proximal third with no   critical stenoses         Past Medical History:   Diagnosis Date   • Anxiety    • Back pain    • Depression    • Diabetes mellitus (HCC)     \"BORDERLINE\"   • High cholesterol    • Hypertension  "   • Kidney disease, chronic, stage III (GFR 30-59 ml/min) ([facility])    • Reflux esophagitis    • Sleep apnea        Past Surgical History:   Procedure Laterality Date   • APPENDECTOMY     • CARPAL TUNNEL RELEASE     • CATARACT EXTRACTION     • HAMMER TOE REPAIR     • LUMBAR SPINE SURGERY     • PAIN PUMP INSERTION/REVISION Right 4/18/2016    Procedure: RT PAIN PUMP REPLACEMENT;  Surgeon: Chris Messer MD;  Location: [facility];  Service:    • TOTAL KNEE ARTHROPLASTY Left          Prior to Admission medications    Medication Sig Start Date End Date Taking? Authorizing Provider   acetaminophen (TYLENOL) 325 MG tablet Take 650 mg by mouth Every Night.   Yes Liam Barrientos MD   aspirin 81 MG chewable tablet Chew 81 mg Daily.   Yes Liam Barrientos MD   calcium carbonate (OS-SERJIO) 600 MG tablet Take 600 mg by mouth 2 (Two) Times a Day With Meals.   Yes Liam Barrientos MD   carvedilol (COREG) 6.25 MG tablet Take 6.25 mg by mouth 2 (Two) Times a Day With Meals.   Yes Liam Barrientos MD   ferrous gluconate (FERGON) 324 MG tablet Take 324 mg by mouth Daily With Breakfast.   Yes Liam Barrientos MD   gabapentin (NEURONTIN) 300 MG capsule Take 300 mg by mouth 3 (Three) Times a Day.   Yes Liam Barrientos MD   HYDROmorphone HCl (DILAUDID-HP IJ) Inject  as directed. PAIN PUMP   Yes Liam Barrientos MD   insulin glargine (LANTUS, SEMGLEE) 100 UNIT/ML injection Inject 15 Units under the skin into the appropriate area as directed Daily.   Yes Liam Barrientos MD   Multiple Vitamins-Minerals (OCUVITE PO) Take  by mouth daily.   Yes Liam Barrientos MD   oxyCODONE-acetaminophen (PERCOCET) 7.5-325 MG per tablet Take 1 tablet by mouth Every 4 (Four) Hours As Needed.   Yes Liam Barrientos MD   polyethylene glycol (MIRALAX) 17 g packet Take 17 g by mouth Daily As Needed.   Yes Liam Barrientos MD   rosuvastatin (CRESTOR) 10 MG tablet Take 5 mg by mouth Every Night.   Yes  ProviderLiam MD   senna (senna) 8.6 MG tablet Take 1 tablet by mouth Daily.   Yes Liam Barrientos MD   sertraline (ZOLOFT) 100 MG tablet Take 75 mg by mouth Daily.   Yes Liam Barrientos MD   tamsulosin (FLOMAX) 0.4 MG capsule 24 hr capsule Take 1 capsule by mouth every night.   Yes Liam Barrientos MD   nitroglycerin (NITROSTAT) 0.4 MG SL tablet Place 0.4 mg under the tongue Every 5 (Five) Minutes As Needed for Chest Pain. Take no more than 3 doses in 15 minutes.    ProviderLiam MD       Allergies   Allergen Reactions   • Bacitracin Unknown - High Severity   • Gentamycin [Gentamicin] Unknown - High Severity   • Neosporin [Neomycin-Bacitracin Zn-Polymyx] Other (See Comments)     ALLERGY TESTED   • Nsaids Unknown - High Severity   • Tobramycin Unknown - High Severity       Social History     Socioeconomic History   • Marital status:    Tobacco Use   • Smoking status: Never Smoker   • Smokeless tobacco: Never Used   Substance and Sexual Activity   • Alcohol use: No   • Drug use: No       History reviewed. No pertinent family history.    REVIEW OF SYSTEMS:   All systems reviewed.  Pertinent positives identified in HPI.  All other systems are negative.      Objective:     Vitals:    08/12/22 0259 08/12/22 0306 08/12/22 0500 08/12/22 0726   BP:    151/64   BP Location:    Right arm   Patient Position:    Lying   Pulse:    57   Resp:       Temp:    97.4 °F (36.3 °C)   TempSrc:       SpO2: (!) 89% 93%  95%   Weight:   132 kg (291 lb 3.2 oz)    Height:         Body mass index is 39.88 kg/m².    Physical Exam:  General Appearance:    Ill appearing   Head:    Normocephalic, without obvious abnormality   Eyes:            Lids and lashes normal, conjunctivae and sclerae normal, no icterus, no pallor, corneas clear   Ears:    Ears appear intact with no abnormalities noted   Throat:   No oral lesions, oral mucosa moist   Neck:   No adenopathy, supple, trachea midline, no thyromegaly, no  carotid bruit, no JVD   Back:     No kyphosis present, no erythema or scars, no tenderness to palpation    Lungs:     Coarse BS,respirations regular, even and unlabored    Heart:    Regular rhythm and normal rate, normal S1 and S2, no murmur, no gallop, no rub, no click   Chest Wall:    No abnormalities observed   Abdomen:     Normal bowel sounds, no masses, no organomegaly, soft        non-tender, non-distended, no guarding   Extremities:   3+ edema, cellulitis/redness   Pulses:  Bilateral carotids brisk   Skin:  Psychiatric:   No bleeding or rash    Alert          Lab Review:     Results from last 7 days   Lab Units 08/12/22  0524   SODIUM mmol/L 141   POTASSIUM mmol/L 5.3*   CHLORIDE mmol/L 103   CO2 mmol/L 28.6   BUN mg/dL 40*   CREATININE mg/dL 1.82*   CALCIUM mg/dL 8.9   BILIRUBIN mg/dL 0.8   ALK PHOS U/L 93   ALT (SGPT) U/L 150*   AST (SGOT) U/L 170*   GLUCOSE mg/dL 110*     Results from last 7 days   Lab Units 08/12/22  0524 08/11/22  0524   CK TOTAL U/L  --  497*   TROPONIN T ng/mL 0.147* 0.131*     Results from last 7 days   Lab Units 08/12/22  0524   WBC 10*3/mm3 8.21   HEMOGLOBIN g/dL 11.0*   HEMATOCRIT % 33.6*   PLATELETS 10*3/mm3 181         Results from last 7 days   Lab Units 08/12/22  0524   MAGNESIUM mg/dL 2.1                 EKG 8/11/22    Previous EKG 4/11/16      I personally viewed and interpreted the patient's EKG/Telemetry data.      Current Facility-Administered Medications:   •  acetaminophen (TYLENOL) tablet 650 mg, 650 mg, Oral, Q4H PRN **OR** acetaminophen (TYLENOL) 160 MG/5ML solution 650 mg, 650 mg, Oral, Q4H PRN **OR** acetaminophen (TYLENOL) suppository 650 mg, 650 mg, Rectal, Q4H PRN, Bernie Robbins APRN  •  acetaminophen (TYLENOL) tablet 650 mg, 650 mg, Oral, Nightly, Bernie Robbins APRN, 650 mg at 08/11/22 2220  •  aspirin chewable tablet 81 mg, 81 mg, Oral, Daily, Bernie Robbins, CECILE, 81 mg at 08/11/22 1711  •  calcium-vitamin D 500-200 MG-UNIT tablet 1 tablet,  1 tablet, Oral, BID, Bernie Robbins APRN, 1 tablet at 08/11/22 2221  •  carvedilol (COREG) tablet 6.25 mg, 6.25 mg, Oral, BID With Meals, Bernie Robbins APRKELIN, 6.25 mg at 08/11/22 1711  •  cefepime 2 gm IVPB in 100 ml NS (VTB), 2 g, Intravenous, Q12H, Lenny Rosa MD  •  dextrose (D50W) (25 g/50 mL) IV injection 25 g, 25 g, Intravenous, Q15 Min PRN, Bernie Robbins APRN  •  dextrose (GLUTOSE) oral gel 15 g, 15 g, Oral, Q15 Min PRN, Bernie Robbins APRN  •  Enoxaparin Sodium (LOVENOX) syringe 40 mg, 40 mg, Subcutaneous, Q24H, Bernie Robbins APRN, 40 mg at 08/11/22 1335  •  famotidine (PEPCID) tablet 40 mg, 40 mg, Oral, BID AC, Bernie Robbins APRN, 40 mg at 08/11/22 1711  •  ferrous sulfate tablet 325 mg, 325 mg, Oral, Daily With Breakfast, Bernie Robbins APRN  •  gabapentin (NEURONTIN) capsule 300 mg, 300 mg, Oral, TID, Bernie Robbins APRN, 300 mg at 08/11/22 2220  •  glucagon (human recombinant) (GLUCAGEN DIAGNOSTIC) injection 1 mg, 1 mg, Intramuscular, Q15 Min PRN, Bernie Robbins APRN  •  insulin lispro (ADMELOG) injection 0-14 Units, 0-14 Units, Subcutaneous, TID AC, Bernie Robbins APRN  •  morphine injection 2 mg, 2 mg, Intravenous, Q3H PRN **AND** naloxone (NARCAN) injection 0.4 mg, 0.4 mg, Intravenous, Q5 Min PRN, Bernie Robbins APRN  •  nitroglycerin (NITROSTAT) SL tablet 0.4 mg, 0.4 mg, Sublingual, Q5 Min PRN, Benrie Robbins APRKELIN  •  ondansetron (ZOFRAN) injection 4 mg, 4 mg, Intravenous, Q6H PRN, Bernie Robbins, APRN  •  oxyCODONE-acetaminophen (PERCOCET) 7.5-325 MG per tablet 1 tablet, 1 tablet, Oral, Q4H PRN, Bernie Robbins, APRN  •  Patient Supplied Pain Pump, , Intrathecal, Continuous, Joshua Taylor MD, Currently Infusing at 08/11/22 3612  •  polyethylene glycol (MIRALAX) packet 17 g, 17 g, Oral, Daily PRN, Bernie Robbins, APRN  •  rosuvastatin (CRESTOR) tablet 5 mg, 5 mg, Oral, Nightly, Bernie Robbins  SHARON, APRN, 5 mg at 08/11/22 2222  •  senna (SENOKOT) tablet 1 tablet, 1 tablet, Oral, Daily, Bernie Robbins APRN, 1 tablet at 08/11/22 1711  •  sertraline (ZOLOFT) tablet 75 mg, 75 mg, Oral, Daily, Bernie Robbins APRN, 75 mg at 08/11/22 1711  •  [COMPLETED] Insert peripheral IV, , , Once **AND** sodium chloride 0.9 % flush 10 mL, 10 mL, Intravenous, PRN, Stefan Crowley MD  •  sodium chloride 0.9 % flush 10 mL, 10 mL, Intravenous, Q12H, Bernie Robbins APRN, 10 mL at 08/11/22 2221  •  sodium chloride 0.9 % flush 10 mL, 10 mL, Intravenous, PRN, Bernie Robbins APRN  •  tamsulosin (FLOMAX) 24 hr capsule 0.4 mg, 0.4 mg, Oral, Nightly, Bernie Robbins APRN, 0.4 mg at 08/11/22 2220    Assessment and Plan:       Active Hospital Problems    Diagnosis  POA   • **Cellulitis of right lower extremity [L03.115]  Yes   • HTN (hypertension) [I10]  Yes   • Type 2 diabetes mellitus with stage 3b chronic kidney disease (HCC) [E11.22, N18.32]  Yes   • Elevated troponin [R77.8]  Yes   • RBBB [I45.10]  Yes   • Pulmonary vascular congestion [R09.89]  Yes   • Diastolic CHF, acute (HCC) [I50.31]  Yes   • CSA (central sleep apnea) [G47.31]  Yes   • HALIMA (obstructive sleep apnea) [G47.33]  Yes      Resolved Hospital Problems   No resolved problems to display.     1.  Volume overload- concerning for acute heart failure, although NT proBNP level is normal for age, normal LV with EF 58%. Left ventricular diastolic function was normal, left atrial dimensions were normal, left atrial pressure appears to be normal.  No pulmonary hypertension.  Agree with continued diuresis, diuretics per nephrology  2.  Right lower extremity cellulitis with venous stasis- has had CT scan and venous doppler  3.  Acute on chronic respiratory failure associate with COPD and Obstructive sleep apnea  4.  Morbid obesity complicating all aspects of care  5.  Coronary arteriosclerosis-troponin above reference but no suggestion of acute  coronary syndrome.  EKG shows no acute changes, echocardiogram shows robust LV function with no wall motion abnormalities.  I will recheck an EKG today to make sure that there is no evolution.    Sanjeev Tong III, MD  08/12/22  09:34 EDT

## 2022-08-12 NOTE — PLAN OF CARE
Goal Outcome Evaluation:        Patient is Aox2-3. Patient does have moments of confusion. Leg is still weeping. Patient does complain of back and leg pain.

## 2022-08-12 NOTE — PROGRESS NOTES
"    Name: Yakov Dubon ADMIT: 2022   : 1945  PCP: Chencho Sterling MD    MRN: [id number] LOS: 1 days   AGE/SEX: 77 y.o. male  ROOM: [facility]UST     Subjective   Subjective   Feeling much better today. Awake and alert, conversational. Ate his entire lunch. No N/V/D. Does report a little \"soreness\" RUQ that is new. No F/C/NS. No SOA or CP. Voiding fine.  Main complaint is of pain in RLE--improved though since yesterday.    Review of Systems   as above    Objective   Objective   Vital Signs  Temp:  [97.4 °F (36.3 °C)-98.4 °F (36.9 °C)] 97.4 °F (36.3 °C)  Heart Rate:  [49-57] 57  Resp:  [18] 18  BP: (100-151)/(54-74) 151/64  SpO2:  [89 %-97 %] 95 %  on  Flow (L/min):  [3-4] 3;   Device (Oxygen Therapy): NPPV/NIV  Body mass index is 39.88 kg/m².   I/O last 3 completed shifts:  In: 540 [P.O.:240; I.V.:200; IV Piggyback:100]  Out: 400 [Urine:400]  No intake/output data recorded.    Physical Exam  Vitals and nursing note reviewed.   Constitutional:       General: He is not in acute distress.     Appearance: He is obese. He is ill-appearing (chronically). He is not toxic-appearing or diaphoretic.   HENT:      Head: Normocephalic.      Nose: Nose normal.      Mouth/Throat:      Mouth: Mucous membranes are moist.      Pharynx: Oropharynx is clear.   Eyes:      General: No scleral icterus.        Right eye: No discharge.         Left eye: No discharge.      Extraocular Movements: Extraocular movements intact.      Conjunctiva/sclera: Conjunctivae normal.   Cardiovascular:      Rate and Rhythm: Normal rate and regular rhythm.      Pulses: Normal pulses.   Pulmonary:      Effort: Pulmonary effort is normal. No respiratory distress.      Breath sounds: Normal breath sounds. No wheezing or rales.      Comments: Anteriorly   Abdominal:      General: Bowel sounds are normal. There is no distension.      Palpations: Abdomen is soft.      Tenderness: There is abdominal tenderness (mild in RUQ). There is no guarding or " rebound.   Musculoskeletal:         General: Swelling (BLEs all the way up to abdomen) present.      Cervical back: Neck supple. No rigidity.   Lymphadenopathy:      Cervical: No cervical adenopathy.   Skin:     General: Skin is warm and dry.      Capillary Refill: Capillary refill takes 2 to 3 seconds.      Coloration: Skin is not jaundiced.      Comments: RLE erythema very slightly improved, has not extended beyond marker lines  Bulla on right anterior thigh has sloughed   Neurological:      General: No focal deficit present.      Mental Status: He is alert and oriented to person, place, and time.      Cranial Nerves: No cranial nerve deficit.      Coordination: Coordination normal.      Comments: Much more awake and alert today   Psychiatric:         Mood and Affect: Mood normal.         Behavior: Behavior normal.         Thought Content: Thought content normal.      Comments: Very pleasant and appropriate         Results Review     I reviewed the patient's new clinical results.  Results from last 7 days   Lab Units 08/12/22  0524 08/11/22 0524   WBC 10*3/mm3 8.21 8.40   HEMOGLOBIN g/dL 11.0* 12.1*   PLATELETS 10*3/mm3 181 213     Results from last 7 days   Lab Units 08/12/22  0524 08/11/22  0524   SODIUM mmol/L 141 140   POTASSIUM mmol/L 5.3* 5.0   CHLORIDE mmol/L 103 101   CO2 mmol/L 28.6 28.0   BUN mg/dL 40* 42*   CREATININE mg/dL 1.82* 1.75*   GLUCOSE mg/dL 110* 137*   EGFR mL/min/1.73 37.6* 39.4*     Results from last 7 days   Lab Units 08/12/22  0524 08/11/22  0524   ALBUMIN g/dL 3.20* 4.10   BILIRUBIN mg/dL 0.8 0.4   ALK PHOS U/L 93 61   AST (SGOT) U/L 170* 18   ALT (SGPT) U/L 150* 11     Results from last 7 days   Lab Units 08/12/22  0524 08/11/22  0524   CALCIUM mg/dL 8.9 9.6   ALBUMIN g/dL 3.20* 4.10   MAGNESIUM mg/dL 2.1  --      Results from last 7 days   Lab Units 08/12/22  0524 08/11/22  0524   PROCALCITONIN ng/mL 15.00* 3.39*   LACTATE mmol/L 2.1* 2.2*     Hemoglobin A1C   Date/Time Value Ref  Range Status   08/11/2022 0524 6.00 (H) 4.80 - 5.60 % Final     Glucose   Date/Time Value Ref Range Status   08/12/2022 1135 166 (H) 70 - 130 mg/dL Final     Comment:     Meter: [id number] : [id number] Rafita Nath NA   08/12/2022 0620 100 70 - 130 mg/dL Final     Comment:     Meter: [id number] : [id number] Felicitas Ariza NA   08/11/2022 2133 132 (H) 70 - 130 mg/dL Final     Comment:     Meter: [id number] : [id number] Felicitas Ariza NA   08/11/2022 1633 113 70 - 130 mg/dL Final     Comment:     Meter: [id number] : [id number] Mary Lopez NA   08/11/2022 1122 116 70 - 130 mg/dL Final     Comment:     Meter: [id number] : [id number] Scott Colon NA       XR Tibia Fibula 2 View Right    Result Date: 8/11/2022   1. Diffuse edema of the right calf. 2. No aggressive osseous abnormalities are seen.  This report was finalized on 8/11/2022 6:01 AM by Dr. Karin Weathers M.D.      XR Chest 1 View    Result Date: 8/11/2022   1. Cardiomegaly with vascular congestion. 2. Nonspecific bibasilar consolidation.  This report was finalized on 8/11/2022 5:59 AM by Dr. Karin Weathers M.D.      CT Lower Extremity Bilateral Without Contrast    Result Date: 8/11/2022  Nonspecific soft tissue edema in both lower legs asymmetrically more prominent on the right. There are several blisters at the level of the dermis of the right lower leg proximally. However, no soft tissue gas or abscess collection is present.  This report was finalized on 8/11/2022 2:38 PM by Dr. Joshua Mendoza M.D.      Scheduled Medications  acetaminophen, 650 mg, Oral, Nightly  aspirin, 81 mg, Oral, Daily  calcium-vitamin D, 1 tablet, Oral, BID  carvedilol, 6.25 mg, Oral, BID With Meals  cefepime, 2 g, Intravenous, Q12H  enoxaparin, 40 mg, Subcutaneous, Q24H  famotidine, 40 mg, Oral, BID AC  ferrous sulfate, 325 mg, Oral, Daily With Breakfast  gabapentin, 300 mg, Oral, TID  insulin lispro, 0-14 Units, Subcutaneous, TID AC  rosuvastatin, 5 mg,  Oral, Nightly  senna, 1 tablet, Oral, Daily  sertraline, 75 mg, Oral, Daily  sodium chloride, 10 mL, Intravenous, Q12H  tamsulosin, 0.4 mg, Oral, Nightly    Infusions  pain,     Diet  Diet Pureed; Thin; Cardiac       Assessment/Plan     Active Hospital Problems    Diagnosis  POA   • **Cellulitis of right lower extremity [L03.115]  Yes   • Elevated LFTs [R79.89]  No   • Acute respiratory failure with hypoxia (HCC) [J96.01]  Yes   • Acute on chronic respiratory failure with hypercapnia (HCC) [J96.22]  Yes   • HTN (hypertension) [I10]  Yes   • Type 2 diabetes mellitus with stage 3b chronic kidney disease (HCC) [E11.22, N18.32]  Yes   • Elevated troponin [R77.8]  Yes   • RBBB [I45.10]  Yes   • Pulmonary vascular congestion [R09.89]  Yes   • Diastolic CHF, acute (HCC) [I50.31]  Yes   • CSA (central sleep apnea) [G47.31]  Yes   • HALIMA (obstructive sleep apnea) [G47.33]  Yes      Resolved Hospital Problems   No resolved problems to display.       76yo gentleman who presented from nursing home with lethargy, hypotension, and severe RLE redness/swelling/pain and was admitted to our service with sepsis due to RLE cellulitis.    Sepsis due to RLE cellulitis  Appreciate ID attention to pt  Blood culture growing Enterobacter, wound culture negative so far  IV Cefazolin changed to Cefepime  Repeat blood cultures  No hypotension or tachycardia, afebrile >24h  CRP and PCT up sharply today  Wound RN following  Venous duplex BLEs negative for DVT  CT BLEs negative for deep infection  Ortho has seen and signed off--no interventions indicated    Volume overload  Quite volume overloaded with peripheral edema, vasc congestion on CXR, and hypoxia  Uncertain CHF history  LVSF okay on Echo here and BNP fine  Diuresis per Renal, appreciate their attention to pt  Trend daily weights and I/Os    Elevated Trop  Card has seen, no ACS  EKG okay and no WMA on Echo  Suspect due to CKD    CKD3b  Cr around baseline  Mild hyperkalemia should improve  with diuresis  Defer to Renal    Acute on chronic hypercapnic resp failure, acute hypoxic resp failure, HALIMA  Appreciate Pulm attention to pt  BiPAP ordered, pt using overnight (not compliant with PAP at facility prior to admission)    Lethargy  Uncertain what his mental status is at baseline but seems much improved today  Has Dilaudid pain pump, no longer hypotensive but if somnolence/hypoxia recur will ask his pain mgmt MD to see for possible adjustment    DM2  A1c 6.0  Lantus on hold for now  Continue SSI  Sugars acceptable today    Elevated LFTs  Uncertain etiology--possibly congestive  No N/V/anorexia, does have some soreness in RUQ  Check viral hep panel and liver US, trend LFTs      · Lovenox 40 mg SC daily for DVT prophylaxis.  · Full code.  · Discussed with patient and nursing staff.  · Anticipate discharge back to SNU facility, timing yet to be determined.      Joshua Taylor MD  [facility]  08/12/22  13:07 EDT

## 2022-08-12 NOTE — SIGNIFICANT NOTE
08/12/22 0940   OTHER   Discipline occupational therapist   Rehab Time/Intention   Session Not Performed unable to evaluate, medical status change  (Dr. Reddy (pulmonologist) leaving pt room and states  to hold therapy today due to concern over cellulitis R LE.  RN aware.)   Recommendation   OT - Next Appointment 08/13/22

## 2022-08-12 NOTE — PROGRESS NOTES
[facility]  [phone]  Dr. Chapincito Reddy    Subjective:  LOS: 1    Chief Complaint: Hypoxia    Patient is very lethargic when I walked into the room.  His leg looks pretty bad.  He is on low-flow oxygen.  States he feels poorly.    Objective   Vital Signs past 24hrs    Temp range: Temp (24hrs), Av.1 °F (36.7 °C), Min:97.4 °F (36.3 °C), Max:98.4 °F (36.9 °C)    BP range: BP: (100-151)/(54-74) 151/64  Pulse range: Heart Rate:  [49-61] 57  Resp rate range: Resp:  [18] 18    Device (Oxygen Therapy): NPPV/NIVFlow (L/min):  [4] 4  Oxygen range:SpO2:  [89 %-97 %] 95 %      132 kg (291 lb 3.2 oz); Body mass index is 39.88 kg/m².    Intake/Output Summary (Last 24 hours) at 2022 0834  Last data filed at 2022 0552  Gross per 24 hour   Intake 440 ml   Output 400 ml   Net 40 ml       Physical Exam  Constitutional:       Appearance: He is obese.   Cardiovascular:      Rate and Rhythm: Normal rate and regular rhythm.      Heart sounds: No murmur heard.  Pulmonary:      Breath sounds: Decreased breath sounds present.   Abdominal:      General: Bowel sounds are normal.      Palpations: Abdomen is soft. There is no mass.      Tenderness: There is no abdominal tenderness.   Musculoskeletal:         General: Swelling (R>L) present.   Skin:     Findings: Erythema (RLE) present.      Comments: RLE is warm even beyond marked cellulitis   Neurological:      Mental Status: He is lethargic.       Results Review:    I have reviewed the laboratory and imaging data since the last note by [facility] physician.  My annotations are noted in assessment and plan.    Medication Review:  I have reviewed the current MAR.  My annotations are noted in assessment and plan.    pain,       Plan   PCCM Problems  Enterobacter cloacae septicemia  Lower extremity cellulitis  Acute hypoxemic respiratory failure  Obstructive sleep apnea and central sleep apnea  Narcotic pain meds  CARLOS with CKD  Elevated liver enzymes  Mechanical  fall  DM2      THESE ARE NEW MEDICAL PROBLEMS TO ME.    Plan of Treatment    Severe sepsis.  On antibiotics per ID.  No interventions per Ortho.    Continue supplemental oxygen.    Needs to resume use of ASV device.  Will order V 60 for now.  Current settings should be adequate.  Adjust backup rate to 8.    Already lethargic.  Avoid narcotic pain medications to the extent possible.    Elevated liver enzymes likely ischemic hepatitis due to sepsis.  Continue to monitor per primary team.        Chapincito Reddy MD  08/12/22  08:34 EDT    While in the room and during my examination of the patient I wore gloves, gown, mask, eye protection and followed enhanced droplet/contact isolation protocol and precautions.  I washed my hands before and after this patient encounter.    Part of this note may be an electronic transcription/translation of spoken language to printed text using the Dragon Dictation System.

## 2022-08-12 NOTE — PLAN OF CARE
Goal Outcome Evaluation:   VSS stable, bradycardia noted when sleeping. Patient remained asleep most of shift, compliant with keeping Bipap on this shift, cellulitis prominent on RLE with blisters, weeping, tight and dark red in color. Areas marked, no signs of spreading noted. Patient asleep with no requests for fluids. Offered multiple times. No s/s of distress noted. Will continue to monitor

## 2022-08-12 NOTE — CONSULTS
"  [facility] Consult Note      Patient Name: Yakov Dubon  : 1945  MRN: [id number]  Primary Care Physician:  Chencho Sterling MD  Referring Physician: Riaz Culver*  Date of admission: 2022    Subjective     Reason for Consult:  CKD    HPI:   Yakov Dubon is a 77 y.o. male with a prior history of hypertension, diabetes mellitus type 2, chronic kidney disease stage III who currently resides at nursing home.  Patient was brought to the hospital after mechanical fall.  He was found to be hypotensive and spiking fever of 102.  Patient was started on IV fluid and IV antibiotic for cellulitis of his right lower extremity.  Labs showed creatinine of 1.8 mg/dL with potassium 5.3 so we were consult help with management of kidney dysfunction and volume overload.  Patient currently on BiPAP.  Patient is a poor historian history was taken from the chart and medical staff.    Review of Systems:   Unable to obtain due to medical condition    Personal History     Past Medical History:   Diagnosis Date   • Anxiety    • Back pain    • Depression    • Diabetes mellitus (HCC)     \"BORDERLINE\"   • High cholesterol    • Hypertension    • Kidney disease, chronic, stage III (GFR 30-59 ml/min) (HCC)    • Reflux esophagitis    • Sleep apnea        Past Surgical History:   Procedure Laterality Date   • APPENDECTOMY     • CARPAL TUNNEL RELEASE     • CATARACT EXTRACTION     • HAMMER TOE REPAIR     • LUMBAR SPINE SURGERY     • PAIN PUMP INSERTION/REVISION Right 2016    Procedure: RT PAIN PUMP REPLACEMENT;  Surgeon: Chris Messer MD;  Location: [facility];  Service:    • TOTAL KNEE ARTHROPLASTY Left        Family History: family history is not on file.    Social History:  reports that he has never smoked. He has never used smokeless tobacco. He reports that he does not drink alcohol and does not use drugs.    Home Medications:  Prior to Admission medications    Medication " Sig Start Date End Date Taking? Authorizing Provider   acetaminophen (TYLENOL) 325 MG tablet Take 650 mg by mouth Every Night.   Yes Liam Barrientos MD   aspirin 81 MG chewable tablet Chew 81 mg Daily.   Yes Liam Barrientos MD   calcium carbonate (OS-SERJIO) 600 MG tablet Take 600 mg by mouth 2 (Two) Times a Day With Meals.   Yes Liam Barrientos MD   carvedilol (COREG) 6.25 MG tablet Take 6.25 mg by mouth 2 (Two) Times a Day With Meals.   Yes Liam Barrientos MD   ferrous gluconate (FERGON) 324 MG tablet Take 324 mg by mouth Daily With Breakfast.   Yes Liam Barrientos MD   gabapentin (NEURONTIN) 300 MG capsule Take 300 mg by mouth 3 (Three) Times a Day.   Yes Liam Barrientos MD   HYDROmorphone HCl (DILAUDID-HP IJ) Inject  as directed. PAIN PUMP   Yes Liam Barrientos MD   insulin glargine (LANTUS, SEMGLEE) 100 UNIT/ML injection Inject 15 Units under the skin into the appropriate area as directed Daily.   Yes Liam Barrientos MD   Multiple Vitamins-Minerals (OCUVITE PO) Take  by mouth daily.   Yes Liam Barrientos MD   oxyCODONE-acetaminophen (PERCOCET) 7.5-325 MG per tablet Take 1 tablet by mouth Every 4 (Four) Hours As Needed.   Yes Liam Barrientos MD   polyethylene glycol (MIRALAX) 17 g packet Take 17 g by mouth Daily As Needed.   Yes Liam Barrientos MD   rosuvastatin (CRESTOR) 10 MG tablet Take 5 mg by mouth Every Night.   Yes Liam Barrientos MD   senna (senna) 8.6 MG tablet Take 1 tablet by mouth Daily.   Yes Liam Barrientos MD   sertraline (ZOLOFT) 100 MG tablet Take 75 mg by mouth Daily.   Yes Liam Barrientos MD   tamsulosin (FLOMAX) 0.4 MG capsule 24 hr capsule Take 1 capsule by mouth every night.   Yes Liam Barrientos MD   nitroglycerin (NITROSTAT) 0.4 MG SL tablet Place 0.4 mg under the tongue Every 5 (Five) Minutes As Needed for Chest Pain. Take no more than 3 doses in 15 minutes.    Liam Barrientos MD        Allergies:  Allergies   Allergen Reactions   • Bacitracin Unknown - High Severity   • Gentamycin [Gentamicin] Unknown - High Severity   • Neosporin [Neomycin-Bacitracin Zn-Polymyx] Other (See Comments)     ALLERGY TESTED   • Nsaids Unknown - High Severity   • Tobramycin Unknown - High Severity       Objective     Vitals:   Temp:  [97.4 °F (36.3 °C)-98.4 °F (36.9 °C)] 97.4 °F (36.3 °C)  Heart Rate:  [49-78] 57  Resp:  [18] 18  BP: (100-151)/(54-74) 151/64  Flow (L/min):  [4] 4    Intake/Output Summary (Last 24 hours) at 8/12/2022 0827  Last data filed at 8/12/2022 0552  Gross per 24 hour   Intake 440 ml   Output 400 ml   Net 40 ml       Physical Exam:    General Appearance: Awake in no acute distress  Skin: warm and dry  HEENT: oral mucosa normal, nonicteric sclera  Neck: supple, no JVD  Lungs: CTA  Heart: RRR, normal S1 and S2  Abdomen: soft, nontender, nondistended  : no palpable bladder  Extremities: + edema, cyanosis or clubbing  Neuro: normal speech and mental status     Scheduled Meds:     acetaminophen, 650 mg, Oral, Nightly  aspirin, 81 mg, Oral, Daily  calcium-vitamin D, 1 tablet, Oral, BID  carvedilol, 6.25 mg, Oral, BID With Meals  cefepime, 2 g, Intravenous, Q12H  enoxaparin, 40 mg, Subcutaneous, Q24H  famotidine, 40 mg, Oral, BID AC  ferrous sulfate, 325 mg, Oral, Daily With Breakfast  gabapentin, 300 mg, Oral, TID  insulin lispro, 0-14 Units, Subcutaneous, TID AC  rosuvastatin, 5 mg, Oral, Nightly  senna, 1 tablet, Oral, Daily  sertraline, 75 mg, Oral, Daily  sodium chloride, 10 mL, Intravenous, Q12H  tamsulosin, 0.4 mg, Oral, Nightly      IV Meds:   pain,         Results Reviewed:   I have personally reviewed the results from the time of this admission to 8/12/2022 08:27 EDT     Lab Results   Component Value Date    GLUCOSE 110 (H) 08/12/2022    CALCIUM 8.9 08/12/2022     08/12/2022    K 5.3 (H) 08/12/2022    CO2 28.6 08/12/2022     08/12/2022    BUN 40 (H) 08/12/2022    CREATININE  1.82 (H) 08/12/2022    EGFRIFNONA 45 (L) 04/11/2016    BCR 22.0 08/12/2022    ANIONGAP 9.4 08/12/2022      Lab Results   Component Value Date    MG 2.1 08/12/2022    ALBUMIN 3.20 (L) 08/12/2022           Assessment / Plan     ASSESSMENT:    1.  Chronic kidney disease stage IIIb with baseline creatinine around 1.7 mg/dL.  Kidney function appears to be stable at baseline.  Volume status is generous.  We will start patient on Lasix 80 mg IV 3 times daily.  We will adjust all medications for creatinine clearance around 45 mL/min/m².  Avoid nephrotoxic medications and check bladder scan at to rule out urinary retention    2.  Mild hyperkalemia: Likely due to kidney dysfunction acidosis and will need to rule out retention as a cause of hyperkalemia  3.  Acute on chronic Respiratory failure associated with COPD and obstructive sleep apnea  4.  Right lower extremity cellulitis with significant venous stasis  5.  Status post mechanical fall  6.  Diabetes mellitus type 2  7.  Hypertension      PLAN:    1.  Start patient on Lasix 80 mg IV every 8 hours  2.  Check bladder scan and postvoid residual  3.  Adjust all medications for creatinine clearance around 45 mL/min/m²  4.  Avoid nephrotoxic medications  5.  Surveillance labs    Thank you for involving us in the care of Yakov BARRERA Jagdish.  Please feel free to call with any questions.    Harrison Zhang MD  08/12/22  08:27 EDT    [facility]  [phone] will

## 2022-08-12 NOTE — SIGNIFICANT NOTE
08/12/22 0954   OTHER   Discipline physical therapist   Rehab Time/Intention   Session Not Performed other (see comments)  (MD present on PT arrival and requesting hold PT/OT today - concerned about severity of RLE cellulitis. PT will f/u tomorrow as appropriate.)   Recommendation   PT - Next Appointment 08/13/22

## 2022-08-12 NOTE — PROGRESS NOTES
LOS: 1 day     Chief Complaint: Cellulitis    Interval History: Patient reports he has continued pain in the right lower extremity this morning.  Still remains slightly confused.  Moving to a new mattress this morning.  Fever curve within normal limits this morning.  No leukocytosis.  Blood cultures now with Enterobacter.    Vital Signs  Temp:  [97.4 °F (36.3 °C)-98.4 °F (36.9 °C)] 97.4 °F (36.3 °C)  Heart Rate:  [49-61] 57  Resp:  [18] 18  BP: (100-151)/(54-74) 151/64    Physical Exam:  General: In no acute distress  HEENT: Oropharynx clear, moist mucous membranes  Cardiovascular: RRR, normal S1 and S2, no M/R/G  Respiratory: Clear to ascultation bilaterally, no wheezing  GI: Soft, NT/ND, + bowel sounds bilaterally, no masses  Skin: Slightly improved erythema of the right lower extremity with ruptured bulla.  No purulence.  Extremities: Bilateral lower extremity edema with right worse than left.  Access: Peripheral IV.    Antibiotics:  Anti-Infectives (From admission, onward)    Ordered     Dose/Rate Route Frequency Start Stop    08/12/22 0022  cefepime 2 gm IVPB in 100 ml NS (VTB)        Ordering Provider: Lenny Rosa MD    2 g  over 4 Hours Intravenous Every 12 Hours 08/12/22 0915 08/19/22 0914    08/12/22 0022  cefepime 2 gm IVPB in 100 ml NS (VTB)        Ordering Provider: Lenny Rosa MD    2 g  over 30 Minutes Intravenous Once 08/12/22 0115 08/12/22 0155    08/11/22 0520  vancomycin 2500 mg/500 mL 0.9% NS IVPB ([facility])        Ordering Provider: Stefan Crowley MD    20 mg/kg × 124 kg Intravenous Once 08/11/22 0522 08/11/22 0650    08/11/22 0520  piperacillin-tazobactam (ZOSYN) 3.375 g in iso-osmotic dextrose 50 ml (premix)        Ordering Provider: Stefan Crowley MD    3.375 g  over 30 Minutes Intravenous Once 08/11/22 0522 08/11/22 0612    08/11/22 0520  clindamycin (CLEOCIN) 600 mg in dextrose 5% 50 mL IVPB (premix)        Ordering Provider: Stefan Crowley,  MD    600 mg Intravenous Once 08/11/22 0522 08/11/22 0648           Results Review:     I reviewed the patient's new clinical results.    Lab Results   Component Value Date    WBC 8.21 08/12/2022    HGB 11.0 (L) 08/12/2022    HCT 33.6 (L) 08/12/2022    MCV 92.1 08/12/2022     08/12/2022     Lab Results   Component Value Date    GLUCOSE 110 (H) 08/12/2022    BUN 40 (H) 08/12/2022    CREATININE 1.82 (H) 08/12/2022    EGFRIFNONA 45 (L) 04/11/2016    BCR 22.0 08/12/2022    CO2 28.6 08/12/2022    CALCIUM 8.9 08/12/2022    ALBUMIN 3.20 (L) 08/12/2022    LABIL2 1.2 12/30/2019     (H) 08/12/2022     (H) 08/12/2022       Microbiology:  8/11 blood cultures 1 of 2 positive for Enterobacter  8/11 COVID-negative    Assessment    #Enterobacter septicemia  #Right lower extremity cellulitis  #Right lower extremity venous stasis  #Mechanical fall  #CKD    Blood cultures have now resulted with Enterobacter and will transition antibiotic therapy to IV cefepime 2 g every 12 hours (renally adjusted).  Suspected source at this point would be the right lower extremity cellulitis since it is quite impressive.  Plan to repeat blood cultures again today to document clearance.  He is overall had some improvement in the lower extremity cellulitis.    ID will follow.

## 2022-08-13 LAB
ALBUMIN SERPL-MCNC: 2.4 G/DL (ref 3.5–5.2)
ALBUMIN/GLOB SERPL: 0.9 G/DL
ALP SERPL-CCNC: 89 U/L (ref 39–117)
ALT SERPL W P-5'-P-CCNC: 55 U/L (ref 1–41)
ANION GAP SERPL CALCULATED.3IONS-SCNC: 8.2 MMOL/L (ref 5–15)
AST SERPL-CCNC: 52 U/L (ref 1–40)
BACTERIA SPEC AEROBE CULT: ABNORMAL
BASOPHILS # BLD AUTO: 0.01 10*3/MM3 (ref 0–0.2)
BASOPHILS NFR BLD AUTO: 0.1 % (ref 0–1.5)
BILIRUB SERPL-MCNC: 0.4 MG/DL (ref 0–1.2)
BUN SERPL-MCNC: 37 MG/DL (ref 8–23)
BUN/CREAT SERPL: 25.5 (ref 7–25)
CALCIUM SPEC-SCNC: 8.6 MG/DL (ref 8.6–10.5)
CHLORIDE SERPL-SCNC: 105 MMOL/L (ref 98–107)
CO2 SERPL-SCNC: 25.8 MMOL/L (ref 22–29)
CREAT SERPL-MCNC: 1.45 MG/DL (ref 0.76–1.27)
CRP SERPL-MCNC: 26.8 MG/DL (ref 0–0.5)
D-LACTATE SERPL-SCNC: 1.1 MMOL/L (ref 0.5–2)
DEPRECATED RDW RBC AUTO: 49.2 FL (ref 37–54)
EGFRCR SERPLBLD CKD-EPI 2021: 49.3 ML/MIN/1.73
EOSINOPHIL # BLD AUTO: 0.04 10*3/MM3 (ref 0–0.4)
EOSINOPHIL NFR BLD AUTO: 0.5 % (ref 0.3–6.2)
ERYTHROCYTE [DISTWIDTH] IN BLOOD BY AUTOMATED COUNT: 14.5 % (ref 12.3–15.4)
FERRITIN SERPL-MCNC: 407 NG/ML (ref 30–400)
GLOBULIN UR ELPH-MCNC: 2.7 GM/DL
GLUCOSE BLDC GLUCOMTR-MCNC: 124 MG/DL (ref 70–130)
GLUCOSE BLDC GLUCOMTR-MCNC: 144 MG/DL (ref 70–130)
GLUCOSE BLDC GLUCOMTR-MCNC: 158 MG/DL (ref 70–130)
GLUCOSE BLDC GLUCOMTR-MCNC: 189 MG/DL (ref 70–130)
GLUCOSE SERPL-MCNC: 124 MG/DL (ref 65–99)
GRAM STN SPEC: ABNORMAL
GRAM STN SPEC: ABNORMAL
HCT VFR BLD AUTO: 30 % (ref 37.5–51)
HGB BLD-MCNC: 9.7 G/DL (ref 13–17.7)
IMM GRANULOCYTES # BLD AUTO: 0.03 10*3/MM3 (ref 0–0.05)
IMM GRANULOCYTES NFR BLD AUTO: 0.4 % (ref 0–0.5)
IRON 24H UR-MRATE: 21 MCG/DL (ref 59–158)
IRON SATN MFR SERPL: 12 % (ref 20–50)
ISOLATED FROM: ABNORMAL
LYMPHOCYTES # BLD AUTO: 0.47 10*3/MM3 (ref 0.7–3.1)
LYMPHOCYTES NFR BLD AUTO: 5.7 % (ref 19.6–45.3)
MAGNESIUM SERPL-MCNC: 2.2 MG/DL (ref 1.6–2.4)
MCH RBC QN AUTO: 30 PG (ref 26.6–33)
MCHC RBC AUTO-ENTMCNC: 32.3 G/DL (ref 31.5–35.7)
MCV RBC AUTO: 92.9 FL (ref 79–97)
MONOCYTES # BLD AUTO: 0.31 10*3/MM3 (ref 0.1–0.9)
MONOCYTES NFR BLD AUTO: 3.7 % (ref 5–12)
NEUTROPHILS NFR BLD AUTO: 7.41 10*3/MM3 (ref 1.7–7)
NEUTROPHILS NFR BLD AUTO: 89.6 % (ref 42.7–76)
NRBC BLD AUTO-RTO: 0 /100 WBC (ref 0–0.2)
PLATELET # BLD AUTO: 124 10*3/MM3 (ref 140–450)
PMV BLD AUTO: 10.2 FL (ref 6–12)
POTASSIUM SERPL-SCNC: 5.1 MMOL/L (ref 3.5–5.2)
PROCALCITONIN SERPL-MCNC: 6.95 NG/ML (ref 0–0.25)
PROT SERPL-MCNC: 5.1 G/DL (ref 6–8.5)
RBC # BLD AUTO: 3.23 10*6/MM3 (ref 4.14–5.8)
SODIUM SERPL-SCNC: 139 MMOL/L (ref 136–145)
TIBC SERPL-MCNC: 171 MCG/DL (ref 298–536)
TRANSFERRIN SERPL-MCNC: 115 MG/DL (ref 200–360)
TROPONIN T SERPL-MCNC: 0.08 NG/ML (ref 0–0.03)
WBC NRBC COR # BLD: 8.27 10*3/MM3 (ref 3.4–10.8)

## 2022-08-13 PROCEDURE — 25010000002 MORPHINE PER 10 MG: Performed by: NURSE PRACTITIONER

## 2022-08-13 PROCEDURE — 84145 PROCALCITONIN (PCT): CPT | Performed by: HOSPITALIST

## 2022-08-13 PROCEDURE — 85025 COMPLETE CBC W/AUTO DIFF WBC: CPT | Performed by: HOSPITALIST

## 2022-08-13 PROCEDURE — 83605 ASSAY OF LACTIC ACID: CPT | Performed by: HOSPITALIST

## 2022-08-13 PROCEDURE — 99222 1ST HOSP IP/OBS MODERATE 55: CPT | Performed by: INTERNAL MEDICINE

## 2022-08-13 PROCEDURE — 63710000001 INSULIN LISPRO (HUMAN) PER 5 UNITS: Performed by: NURSE PRACTITIONER

## 2022-08-13 PROCEDURE — 86140 C-REACTIVE PROTEIN: CPT | Performed by: HOSPITALIST

## 2022-08-13 PROCEDURE — 25010000002 ENOXAPARIN PER 10 MG: Performed by: NURSE PRACTITIONER

## 2022-08-13 PROCEDURE — 97110 THERAPEUTIC EXERCISES: CPT

## 2022-08-13 PROCEDURE — 84466 ASSAY OF TRANSFERRIN: CPT | Performed by: INTERNAL MEDICINE

## 2022-08-13 PROCEDURE — 99232 SBSQ HOSP IP/OBS MODERATE 35: CPT | Performed by: INTERNAL MEDICINE

## 2022-08-13 PROCEDURE — 83735 ASSAY OF MAGNESIUM: CPT | Performed by: HOSPITALIST

## 2022-08-13 PROCEDURE — 82728 ASSAY OF FERRITIN: CPT | Performed by: INTERNAL MEDICINE

## 2022-08-13 PROCEDURE — 99232 SBSQ HOSP IP/OBS MODERATE 35: CPT | Performed by: NURSE PRACTITIONER

## 2022-08-13 PROCEDURE — 83540 ASSAY OF IRON: CPT | Performed by: INTERNAL MEDICINE

## 2022-08-13 PROCEDURE — 82962 GLUCOSE BLOOD TEST: CPT

## 2022-08-13 PROCEDURE — 97162 PT EVAL MOD COMPLEX 30 MIN: CPT

## 2022-08-13 PROCEDURE — 25010000002 CEFEPIME PER 500 MG: Performed by: INTERNAL MEDICINE

## 2022-08-13 PROCEDURE — 84484 ASSAY OF TROPONIN QUANT: CPT | Performed by: NURSE PRACTITIONER

## 2022-08-13 PROCEDURE — 80053 COMPREHEN METABOLIC PANEL: CPT | Performed by: HOSPITALIST

## 2022-08-13 RX ADMIN — CEFEPIME 2 G: 2 INJECTION, POWDER, FOR SOLUTION INTRAVENOUS at 21:37

## 2022-08-13 RX ADMIN — CARVEDILOL 6.25 MG: 6.25 TABLET, FILM COATED ORAL at 08:53

## 2022-08-13 RX ADMIN — FAMOTIDINE 40 MG: 20 TABLET ORAL at 08:53

## 2022-08-13 RX ADMIN — ASPIRIN 81 MG: 81 TABLET, CHEWABLE ORAL at 08:53

## 2022-08-13 RX ADMIN — GABAPENTIN 300 MG: 300 CAPSULE ORAL at 08:52

## 2022-08-13 RX ADMIN — GABAPENTIN 300 MG: 300 CAPSULE ORAL at 21:36

## 2022-08-13 RX ADMIN — SERTRALINE 75 MG: 25 TABLET, FILM COATED ORAL at 08:53

## 2022-08-13 RX ADMIN — SENNOSIDES 1 TABLET: 8.6 TABLET, FILM COATED ORAL at 08:53

## 2022-08-13 RX ADMIN — CALCIUM CARBONATE-VITAMIN D TAB 500 MG-200 UNIT 1 TABLET: 500-200 TAB at 08:53

## 2022-08-13 RX ADMIN — ENOXAPARIN SODIUM 40 MG: 100 INJECTION SUBCUTANEOUS at 12:17

## 2022-08-13 RX ADMIN — ACETAMINOPHEN 650 MG: 325 TABLET, FILM COATED ORAL at 21:36

## 2022-08-13 RX ADMIN — INSULIN LISPRO 3 UNITS: 100 INJECTION, SOLUTION INTRAVENOUS; SUBCUTANEOUS at 12:17

## 2022-08-13 RX ADMIN — FERROUS SULFATE TAB 325 MG (65 MG ELEMENTAL FE) 325 MG: 325 (65 FE) TAB at 08:53

## 2022-08-13 RX ADMIN — CALCIUM CARBONATE-VITAMIN D TAB 500 MG-200 UNIT 1 TABLET: 500-200 TAB at 21:36

## 2022-08-13 RX ADMIN — Medication 10 ML: at 08:53

## 2022-08-13 RX ADMIN — FAMOTIDINE 40 MG: 20 TABLET ORAL at 17:45

## 2022-08-13 RX ADMIN — MORPHINE SULFATE 2 MG: 2 INJECTION, SOLUTION INTRAMUSCULAR; INTRAVENOUS at 09:09

## 2022-08-13 RX ADMIN — CEFEPIME 2 G: 2 INJECTION, POWDER, FOR SOLUTION INTRAVENOUS at 08:52

## 2022-08-13 RX ADMIN — Medication 10 ML: at 21:45

## 2022-08-13 RX ADMIN — OXYCODONE HYDROCHLORIDE AND ACETAMINOPHEN 1 TABLET: 7.5; 325 TABLET ORAL at 21:36

## 2022-08-13 RX ADMIN — ROSUVASTATIN CALCIUM 5 MG: 5 TABLET, FILM COATED ORAL at 21:36

## 2022-08-13 RX ADMIN — INSULIN LISPRO 3 UNITS: 100 INJECTION, SOLUTION INTRAVENOUS; SUBCUTANEOUS at 17:45

## 2022-08-13 RX ADMIN — TAMSULOSIN HYDROCHLORIDE 0.4 MG: 0.4 CAPSULE ORAL at 21:36

## 2022-08-13 RX ADMIN — GABAPENTIN 300 MG: 300 CAPSULE ORAL at 15:39

## 2022-08-13 RX ADMIN — CARVEDILOL 6.25 MG: 6.25 TABLET, FILM COATED ORAL at 17:45

## 2022-08-13 NOTE — CONSULTS
"[facility], [place], operated by [facility]  Initial Inpatient Consult Note    Referring Provider: Dr. HUDSON Taylor    Reason for Consultation: Abnormal liver function tests with an abnormal liver ultrasound suggesting possibly biliary obstruction?    Subjective     History of present illness:    77 y.o. male with obesity, DM, HTN, CKD, sleep apnea who was admitted 8/11/2022 with swelling in the legs to be from cellulitis and he has been found to have Enterobacter sepsis.  The patient denies abdominal pain or chest pain.  His liver function tests today show a total bilirubin of 0.4, alk phos of 89, ALT of 55, AST of 52, albumin of 2.4.  Patient had an ultrasound of the right upper quadrant on 8/12/2022 that showed:  IMPRESSION:     Cholelithiasis and biliary sludge with common bile duct and intrahepatic  biliary ductal dilatation. There is poor visualization of the distal  common bile duct. Findings raise concern for choledocholithiasis or  other distal obstructing process. Recommend further evaluation with ERCP  or contrast-enhanced MRCP.     This report was finalized on 8/12/2022 9:00 PM by Dr. Ping Bone M.D.           Patient denies any jaundice or hepatitis.  There is no family history of liver disease.  He has never had an EGD or a colonoscopy.    Past Medical History:  Past Medical History:   Diagnosis Date   • Anxiety    • Back pain    • Depression    • Diabetes mellitus (HCC)     \"BORDERLINE\"   • High cholesterol    • Hypertension    • Kidney disease, chronic, stage III (GFR 30-59 ml/min) (HCC)    • Reflux esophagitis    • Sleep apnea      Past Surgical History:  Past Surgical History:   Procedure Laterality Date   • APPENDECTOMY     • CARPAL TUNNEL RELEASE     • CATARACT EXTRACTION     • HAMMER TOE REPAIR     • LUMBAR SPINE SURGERY     • PAIN PUMP INSERTION/REVISION Right 4/18/2016    Procedure: RT PAIN PUMP REPLACEMENT;  Surgeon: Chris Messer MD;  Location: [facility];  Service:    • TOTAL KNEE " ARTHROPLASTY Left       Social History:   Social History     Tobacco Use   • Smoking status: Never Smoker   • Smokeless tobacco: Never Used   Substance Use Topics   • Alcohol use: No      Family History:  History reviewed. No pertinent family history.    Home Meds:  Medications Prior to Admission   Medication Sig Dispense Refill Last Dose   • acetaminophen (TYLENOL) 325 MG tablet Take 650 mg by mouth Every Night.   8/10/2022 at Unknown time   • aspirin 81 MG chewable tablet Chew 81 mg Daily.   8/10/2022 at Unknown time   • calcium carbonate (OS-SERJIO) 600 MG tablet Take 600 mg by mouth 2 (Two) Times a Day With Meals.   8/10/2022 at Unknown time   • carvedilol (COREG) 6.25 MG tablet Take 6.25 mg by mouth 2 (Two) Times a Day With Meals.   8/10/2022 at Unknown time   • ferrous gluconate (FERGON) 324 MG tablet Take 324 mg by mouth Daily With Breakfast.   8/10/2022 at Unknown time   • gabapentin (NEURONTIN) 300 MG capsule Take 300 mg by mouth 3 (Three) Times a Day.   8/10/2022 at Unknown time   • HYDROmorphone HCl (DILAUDID-HP IJ) Inject  as directed. PAIN PUMP   8/11/2022 at Unknown time   • insulin glargine (LANTUS, SEMGLEE) 100 UNIT/ML injection Inject 15 Units under the skin into the appropriate area as directed Daily.   8/10/2022 at Unknown time   • Multiple Vitamins-Minerals (OCUVITE PO) Take  by mouth daily.   8/10/2022 at Unknown time   • oxyCODONE-acetaminophen (PERCOCET) 7.5-325 MG per tablet Take 1 tablet by mouth Every 4 (Four) Hours As Needed.   8/10/2022 at Unknown time   • polyethylene glycol (MIRALAX) 17 g packet Take 17 g by mouth Daily As Needed.   Past Week at Unknown time   • rosuvastatin (CRESTOR) 10 MG tablet Take 5 mg by mouth Every Night.   8/10/2022 at Unknown time   • senna (senna) 8.6 MG tablet Take 1 tablet by mouth Daily.   8/10/2022 at Unknown time   • sertraline (ZOLOFT) 100 MG tablet Take 75 mg by mouth Daily.   8/10/2022 at Unknown time   • tamsulosin (FLOMAX) 0.4 MG capsule 24 hr capsule  Take 1 capsule by mouth every night.   8/10/2022 at Unknown time   • nitroglycerin (NITROSTAT) 0.4 MG SL tablet Place 0.4 mg under the tongue Every 5 (Five) Minutes As Needed for Chest Pain. Take no more than 3 doses in 15 minutes.   Unknown at Unknown time     Current Meds:   acetaminophen, 650 mg, Oral, Nightly  aspirin, 81 mg, Oral, Daily  calcium-vitamin D, 1 tablet, Oral, BID  carvedilol, 6.25 mg, Oral, BID With Meals  cefepime, 2 g, Intravenous, Q12H  enoxaparin, 40 mg, Subcutaneous, Q24H  famotidine, 40 mg, Oral, BID AC  ferrous sulfate, 325 mg, Oral, Daily With Breakfast  gabapentin, 300 mg, Oral, TID  insulin lispro, 0-14 Units, Subcutaneous, TID AC  rosuvastatin, 5 mg, Oral, Nightly  senna, 1 tablet, Oral, Daily  sertraline, 75 mg, Oral, Daily  sodium chloride, 10 mL, Intravenous, Q12H  tamsulosin, 0.4 mg, Oral, Nightly      Allergies:  Allergies   Allergen Reactions   • Bacitracin Unknown - High Severity   • Gentamycin [Gentamicin] Unknown - High Severity   • Neosporin [Neomycin-Bacitracin Zn-Polymyx] Other (See Comments)     ALLERGY TESTED   • Nsaids Unknown - High Severity   • Tobramycin Unknown - High Severity     Review of Systems  The following systems were reviewed and negative;  constitution, ENT, respiratory, cardiovascular and musculoskeletal     Objective     Vital Signs  Temp:  [98 °F (36.7 °C)-99.5 °F (37.5 °C)] 98.7 °F (37.1 °C)  Heart Rate:  [54-63] 58  Resp:  [16-20] 20  BP: (130-160)/(48-66) 131/56  Physical Exam:  General Appearance:    Alert, cooperative, in no acute distress   Head:    Normocephalic, without obvious abnormality, atraumatic   Eyes:            Lids and lashes normal, conjunctivae and sclerae normal, no   icterus   Throat:   No oral lesions, no thrush, oral mucosa moist   Neck:   No adenopathy, supple, trachea midline, no thyromegaly, no   carotid bruit, no JVD   Lungs:     Clear to auscultation,respirations regular, even and                   unlabored    Heart:     Regular rhythm and normal rate, normal S1 and S2, no            murmur, no gallop, no rub, no click   Chest Wall:    No abnormalities observed   Abdomen:     Normal bowel sounds, no masses, no organomegaly, soft        nontender, nondistended, no guarding, no rebound                 tenderness   Rectal:     Deferred   Extremities:  He has red skin with drainage of clear liquid.  This is worse on the right leg and less on the left.   Skin:   No bleeding, bruising or rash   Lymph nodes:   No palpable adenopathy   Psychiatric:  Judgement and insight: normal   Orientation to person place and time: normal   Mood and affect: normal   Results Review:   I reviewed the patient's new clinical results.    Results from last 7 days   Lab Units 08/13/22 0720 08/12/22 0524 08/11/22 0524   WBC 10*3/mm3 8.27 8.21 8.40   HEMOGLOBIN g/dL 9.7* 11.0* 12.1*   HEMATOCRIT % 30.0* 33.6* 36.2*   PLATELETS 10*3/mm3 124* 181 213     Results from last 7 days   Lab Units 08/13/22 0720 08/12/22 0524 08/11/22 0524   SODIUM mmol/L 139 141 140   POTASSIUM mmol/L 5.1 5.3* 5.0   CHLORIDE mmol/L 105 103 101   CO2 mmol/L 25.8 28.6 28.0   BUN mg/dL 37* 40* 42*   CREATININE mg/dL 1.45* 1.82* 1.75*   CALCIUM mg/dL 8.6 8.9 9.6   BILIRUBIN mg/dL 0.4 0.8 0.4   ALK PHOS U/L 89 93 61   ALT (SGPT) U/L 55* 150* 11   AST (SGOT) U/L 52* 170* 18   GLUCOSE mg/dL 124* 110* 137*         No results found for: LIPASE    Radiology:  US Liver   Final Result       Cholelithiasis and biliary sludge with common bile duct and intrahepatic   biliary ductal dilatation. There is poor visualization of the distal   common bile duct. Findings raise concern for choledocholithiasis or   other distal obstructing process. Recommend further evaluation with ERCP   or contrast-enhanced MRCP.       This report was finalized on 8/12/2022 9:00 PM by Dr. Ping Bone M.D.          CT Lower Extremity Bilateral Without Contrast   Final Result   Nonspecific soft tissue edema in both lower  legs   asymmetrically more prominent on the right. There are several blisters   at the level of the dermis of the right lower leg proximally. However,   no soft tissue gas or abscess collection is present.       This report was finalized on 8/11/2022 2:38 PM by Dr. Joshua Mendoza M.D.          XR Chest 1 View   Final Result       1. Cardiomegaly with vascular congestion.   2. Nonspecific bibasilar consolidation.       This report was finalized on 8/11/2022 5:59 AM by Dr. Karin Weathers M.D.          XR Tibia Fibula 2 View Right   Final Result       1. Diffuse edema of the right calf.   2. No aggressive osseous abnormalities are seen.       This report was finalized on 8/11/2022 6:01 AM by Dr. Karin Weathers M.D.              Assessment & Plan   Assessment:    1.  77 y.o. male with obesity, DM, HTN, CKD, sleep apnea   2.  Patient has right leg cellulitis with Enterobacter sepsis.  He is being followed by ID and is being treated with IV antibiotics.  3.  He has mildly elevated liver function tests and an abnormal ultrasound of the upper quadrant showing gallstones with mildly dilated bile duct.      Plan:   1.  I will check a hepatitis profile.  2.  I will get an MRCP without IV contrast to better evaluate his bile ducts and pancreas.    I discussed the patient's findings and my recommendations with patient and nursing staff.         Robinson Colón M.D.  [facility]  [address] [place] [address]  Office: [phone]

## 2022-08-13 NOTE — NURSING NOTE
Spoke with Alice at the pain clinic with Dr. Messer, page out to get make and model of pain pump to make sure it is MRI compatible      1740 call back from Dr. Messer, pain pump is medtronic synchromed 2 model #8637-20 last filled on 7/26, dose is 35 mg/ml pt is receiving 11.52 mg per day of diluadid, per MD it is MRI compatible and needs to be refilled on 9/18 , will make pt NPO for MRI in the morning

## 2022-08-13 NOTE — PROGRESS NOTES
Hospital Follow Up    LOS:  LOS: 2 days   Patient Name: Yakov Dubon  Age/Sex: 77 y.o. male  : 1945  MRN: [id number]    Day of Service: 22   Length of Stay: 2  Encounter Provider: CECILE Nolasco  Place of Service: [facility] CARDIOLOGY  Patient Care Team:  Chencho Sterling MD as PCP - General (Internal Medicine)  Reasor, Chris Polo MD as Consulting Physician (Pain Medicine)    Subjective:     Chief Complaint: Shortness of air    Interval History: Breathing little better today    Objective:     Objective:  Temp:  [97.5 °F (36.4 °C)-99.5 °F (37.5 °C)] 99.5 °F (37.5 °C)  Heart Rate:  [54-63] 60  Resp:  [16-20] 20  BP: (125-160)/(48-93) 131/56     Intake/Output Summary (Last 24 hours) at 2022 1355  Last data filed at 2022 1200  Gross per 24 hour   Intake 280 ml   Output 250 ml   Net 30 ml     Body mass index is 39.85 kg/m².      22  1538 22  0500 22  0515   Weight: 124 kg (273 lb 5.9 oz) 132 kg (291 lb 3.2 oz) 132 kg (291 lb)     Weight change: 7.997 kg (17 lb 10.1 oz)    Physical Exam:   General Appearance:    Awake alert and oriented in no acute distress.   Color:  Skin:  Neuro:  HEENT:    Lungs:     Pink  Warm and dry  No focal, motor or sensory deficits  Neck supple, pupils equal, round and reactive. No JVD, No Bruit  Diminished in the bases to auscultation,respirations regular, even and unlabored    Heart:    Regular rate and rhythm, S1 and S2, no murmur, no gallop, no rub. No edema, DP/PT pulses are 2+   Chest Wall:    No abnormalities observed   Abdomen:     Normal bowel sounds, no masses, no organomegaly, soft        non-tender, non-distended, no guarding, no ascites noted   Extremities:   Moves all extremities well, no edema, no cyanosis, no redness       Lab Review:   Results from last 7 days   Lab Units 22  0720 22  0524   SODIUM mmol/L 139 141   POTASSIUM mmol/L 5.1 5.3*   CHLORIDE mmol/L 105 103   CO2  mmol/L 25.8 28.6   BUN mg/dL 37* 40*   CREATININE mg/dL 1.45* 1.82*   GLUCOSE mg/dL 124* 110*   CALCIUM mg/dL 8.6 8.9   AST (SGOT) U/L 52* 170*   ALT (SGPT) U/L 55* 150*     Results from last 7 days   Lab Units 08/13/22  0720 08/12/22  0524 08/11/22  0524   CK TOTAL U/L  --   --  497*   TROPONIN T ng/mL 0.084* 0.147* 0.131*     Results from last 7 days   Lab Units 08/13/22  0720 08/12/22  0524   WBC 10*3/mm3 8.27 8.21   HEMOGLOBIN g/dL 9.7* 11.0*   HEMATOCRIT % 30.0* 33.6*   PLATELETS 10*3/mm3 124* 181         Results from last 7 days   Lab Units 08/13/22  0720 08/12/22  0524   MAGNESIUM mg/dL 2.2 2.1           Invalid input(s): LDLCALC  Results from last 7 days   Lab Units 08/11/22  0524   PROBNP pg/mL 1,298.0         I reviewed the patient's new clinical results.  I personally viewed and interpreted the patient's EKG  Current Medications:   Scheduled Meds:acetaminophen, 650 mg, Oral, Nightly  aspirin, 81 mg, Oral, Daily  calcium-vitamin D, 1 tablet, Oral, BID  carvedilol, 6.25 mg, Oral, BID With Meals  cefepime, 2 g, Intravenous, Q12H  enoxaparin, 40 mg, Subcutaneous, Q24H  famotidine, 40 mg, Oral, BID AC  ferrous sulfate, 325 mg, Oral, Daily With Breakfast  gabapentin, 300 mg, Oral, TID  insulin lispro, 0-14 Units, Subcutaneous, TID AC  rosuvastatin, 5 mg, Oral, Nightly  senna, 1 tablet, Oral, Daily  sertraline, 75 mg, Oral, Daily  sodium chloride, 10 mL, Intravenous, Q12H  tamsulosin, 0.4 mg, Oral, Nightly      Continuous Infusions:pain,         Allergies:  Allergies   Allergen Reactions   • Bacitracin Unknown - High Severity   • Gentamycin [Gentamicin] Unknown - High Severity   • Neosporin [Neomycin-Bacitracin Zn-Polymyx] Other (See Comments)     ALLERGY TESTED   • Nsaids Unknown - High Severity   • Tobramycin Unknown - High Severity       Assessment:       Cellulitis of right lower extremity    HALIMA (obstructive sleep apnea)    CSA (central sleep apnea)    HTN (hypertension)    Type 2 diabetes mellitus with  stage 3b chronic kidney disease (HCC)    Elevated troponin    RBBB    Pulmonary vascular congestion    Diastolic CHF, acute (HCC)    Elevated LFTs    Acute respiratory failure with hypoxia (HCC)    Acute on chronic respiratory failure with hypercapnia (HCC)      1.  Flat elevation in troponin  2.  Acute on chronic hypercapnic respiratory failure with hypoxia  3.  Right lower extremity cellulitis  4.  Acute on chronic diastolic heart failure  5.  Morbid obesity  6.  History of nonobstructive CAD on cardiac cath 2019  7.  Chronic kidney disease  Plan:     Nephrology managing diuresis.  With some lower extremity edema left lower extremity and wraps.  Appears comfortable but some decreased air entry on exam.  No symptoms of chest pain.  EKG reviewed from yesterday and no significant evolution has interventricular conduction delay. will follow.        CECILE Nolasco  08/13/22  13:55 EDT  Electronically signed by CECILE Nolasco, 08/13/22, 1:55 PM EDT.

## 2022-08-13 NOTE — PROGRESS NOTES
Name: Yakov Dubon ADMIT: 2022   : 1945  PCP: Chencho Sterling MD    MRN: [id number] LOS: 2 days   AGE/SEX: 77 y.o. male  ROOM: [facility]     Subjective   Subjective   Feeling better again today. Awake and alert, conversational. Tolerating diet. No N/V/D/abd pain. No F/C/NS. No SOA or CP. Voiding fine.  Main complaint is of pain in RLE--improved again though since yesterday.    Review of Systems     as above    Objective   Objective   Vital Signs  Temp:  [98 °F (36.7 °C)-99.5 °F (37.5 °C)] 98.7 °F (37.1 °C)  Heart Rate:  [54-63] 58  Resp:  [16-20] 20  BP: (130-160)/(48-66) 131/56  SpO2:  [90 %-100 %] 93 %  on  Flow (L/min):  [1-3] 2;   Device (Oxygen Therapy): nasal cannula  Body mass index is 39.85 kg/m².   I/O last 3 completed shifts:  In: 720 [P.O.:420; I.V.:200; IV Piggyback:100]  Out: 400 [Urine:400]  I/O this shift:  In: -   Out: 250 [Urine:250]    Physical Exam  Vitals and nursing note reviewed.   Constitutional:       General: He is not in acute distress.     Appearance: He is obese. He is ill-appearing (chronically). He is not toxic-appearing or diaphoretic.   HENT:      Head: Normocephalic.      Nose: Nose normal.      Mouth/Throat:      Mouth: Mucous membranes are moist.      Pharynx: Oropharynx is clear.   Eyes:      General: No scleral icterus.        Right eye: No discharge.         Left eye: No discharge.      Extraocular Movements: Extraocular movements intact.      Conjunctiva/sclera: Conjunctivae normal.   Cardiovascular:      Rate and Rhythm: Normal rate and regular rhythm.      Pulses: Normal pulses.   Pulmonary:      Effort: Pulmonary effort is normal. No respiratory distress.      Breath sounds: Normal breath sounds. No wheezing or rales.      Comments: Anteriorly   Abdominal:      General: Bowel sounds are normal. There is no distension.      Palpations: Abdomen is soft.      Tenderness: There is abdominal tenderness (mild in RUQ). There is no guarding or rebound.    Musculoskeletal:         General: Swelling (BLEs all the way up to abdomen--improved today) present.      Cervical back: Neck supple. No rigidity.   Lymphadenopathy:      Cervical: No cervical adenopathy.   Skin:     General: Skin is warm and dry.      Capillary Refill: Capillary refill takes less than 2 seconds.      Coloration: Skin is not jaundiced.      Comments: RLE erythema slightly improved, has not extended beyond marker lines  Bullae on right anterior thigh have sloughed   Neurological:      General: No focal deficit present.      Mental Status: He is alert and oriented to person, place, and time. Mental status is at baseline.      Cranial Nerves: No cranial nerve deficit.      Coordination: Coordination normal.   Psychiatric:         Mood and Affect: Mood normal.         Behavior: Behavior normal.         Thought Content: Thought content normal.      Comments: Very pleasant and appropriate         Results Review     I reviewed the patient's new clinical results.  Results from last 7 days   Lab Units 08/13/22 0720 08/12/22 0524 08/11/22 0524   WBC 10*3/mm3 8.27 8.21 8.40   HEMOGLOBIN g/dL 9.7* 11.0* 12.1*   PLATELETS 10*3/mm3 124* 181 213     Results from last 7 days   Lab Units 08/13/22 0720 08/12/22 0524 08/11/22  0524   SODIUM mmol/L 139 141 140   POTASSIUM mmol/L 5.1 5.3* 5.0   CHLORIDE mmol/L 105 103 101   CO2 mmol/L 25.8 28.6 28.0   BUN mg/dL 37* 40* 42*   CREATININE mg/dL 1.45* 1.82* 1.75*   GLUCOSE mg/dL 124* 110* 137*   EGFR mL/min/1.73 49.3* 37.6* 39.4*     Results from last 7 days   Lab Units 08/13/22 0720 08/12/22 0524 08/11/22  0524   ALBUMIN g/dL 2.40* 3.20* 4.10   BILIRUBIN mg/dL 0.4 0.8 0.4   ALK PHOS U/L 89 93 61   AST (SGOT) U/L 52* 170* 18   ALT (SGPT) U/L 55* 150* 11     Results from last 7 days   Lab Units 08/13/22 0720 08/12/22 0524 08/11/22  0524   CALCIUM mg/dL 8.6 8.9 9.6   ALBUMIN g/dL 2.40* 3.20* 4.10   MAGNESIUM mg/dL 2.2 2.1  --      Results from last 7 days   Lab  Units 08/13/22  0720 08/12/22  0524 08/11/22  0524   PROCALCITONIN ng/mL 6.95* 15.00* 3.39*   LACTATE mmol/L 1.1 2.1* 2.2*     Hemoglobin A1C   Date/Time Value Ref Range Status   08/11/2022 0524 6.00 (H) 4.80 - 5.60 % Final     Glucose   Date/Time Value Ref Range Status   08/13/2022 1116 189 (H) 70 - 130 mg/dL Final     Comment:     Meter: [id number] : [id number] Sabina Barton NA   08/13/2022 0621 124 70 - 130 mg/dL Final     Comment:     Meter: [id number] : [id number] Adriana Lechuga NA   08/12/2022 2151 177 (H) 70 - 130 mg/dL Final     Comment:     Meter: [id number] : [id number] Adriana Lechuga NA   08/12/2022 1627 126 70 - 130 mg/dL Final     Comment:     Meter: [id number] : [id number] Dulce Maria Silva NA   08/12/2022 1135 166 (H) 70 - 130 mg/dL Final     Comment:     Meter: [id number] : [id number] Rafita Nath NA   08/12/2022 0620 100 70 - 130 mg/dL Final     Comment:     Meter: [id number] : [id number] Felicitas Ariza NA   08/11/2022 2133 132 (H) 70 - 130 mg/dL Final     Comment:     Meter: [id number] : [id number] Felicitas Ariza NA       US Liver    Result Date: 8/12/2022   Cholelithiasis and biliary sludge with common bile duct and intrahepatic biliary ductal dilatation. There is poor visualization of the distal common bile duct. Findings raise concern for choledocholithiasis or other distal obstructing process. Recommend further evaluation with ERCP or contrast-enhanced MRCP.  This report was finalized on 8/12/2022 9:00 PM by Dr. Ping Bone M.D.      Scheduled Medications  acetaminophen, 650 mg, Oral, Nightly  aspirin, 81 mg, Oral, Daily  calcium-vitamin D, 1 tablet, Oral, BID  carvedilol, 6.25 mg, Oral, BID With Meals  cefepime, 2 g, Intravenous, Q12H  enoxaparin, 40 mg, Subcutaneous, Q24H  famotidine, 40 mg, Oral, BID AC  ferrous sulfate, 325 mg, Oral, Daily With Breakfast  gabapentin, 300 mg, Oral, TID  insulin lispro, 0-14 Units, Subcutaneous, TID AC  rosuvastatin, 5 mg, Oral,  Nightly  senna, 1 tablet, Oral, Daily  sertraline, 75 mg, Oral, Daily  sodium chloride, 10 mL, Intravenous, Q12H  tamsulosin, 0.4 mg, Oral, Nightly    Infusions  pain,     Diet  Diet Pureed; Thin; Cardiac       Assessment/Plan     Active Hospital Problems    Diagnosis  POA   • **Cellulitis of right lower extremity [L03.115]  Yes   • Elevated LFTs [R79.89]  No   • Acute respiratory failure with hypoxia (HCC) [J96.01]  Yes   • Acute on chronic respiratory failure with hypercapnia (HCC) [J96.22]  Yes   • HTN (hypertension) [I10]  Yes   • Type 2 diabetes mellitus with stage 3b chronic kidney disease (HCC) [E11.22, N18.32]  Yes   • Elevated troponin [R77.8]  Yes   • RBBB [I45.10]  Yes   • Pulmonary vascular congestion [R09.89]  Yes   • Diastolic CHF, acute (HCC) [I50.31]  Yes   • CSA (central sleep apnea) [G47.31]  Yes   • HALIMA (obstructive sleep apnea) [G47.33]  Yes      Resolved Hospital Problems   No resolved problems to display.       76yo gentleman who presented from nursing home with lethargy, hypotension, and severe RLE redness/swelling/pain and was admitted to our service with sepsis due to RLE cellulitis.    Sepsis due to RLE cellulitis  Appreciate ID attention to pt  Blood culture growing pan-sensitive Enterobacter, wound culture negative so far  IV Cefazolin changed to Cefepime now  Repeat blood cultures NGTD  No hypotension or tachycardia, afebrile  CRP and PCT up sharply yesterday, PCT down today  Wound RN following  Venous duplex BLEs negative for DVT  CT BLEs negative for deep infection  Ortho has seen and signed off--no interventions indicated    Volume overload  Quite volume overloaded on admission with peripheral edema, vasc congestion on CXR, and hypoxia  Uncertain CHF history  LVSF okay on Echo here and BNP fine  Volume status seems much improved today, appreciate Renal attention to pt  Trending daily weights and I/Os    Elevated Trop  Card has seen, no ACS  EKG okay and no WMA on Echo  Suspect due to  CKD    CKD3b  Cr around baseline or better  Mild hyperkalemia resolved  Defer to Renal    Anemia NOS  Suspect chronic anemia due to CKD  Checking iron panel    Acute on chronic hypercapnic resp failure, acute hypoxic resp failure, HALIMA  Appreciate Pulm attention to pt  BiPAP ordered, pt using overnight (not compliant with PAP at facility prior to admission)    Lethargy  Uncertain what his mental status is at baseline but seems much improved today  Has Dilaudid pain pump, no longer hypotensive but if somnolence/hypoxia recur will ask his pain mgmt MD to see for possible adjustment    DM2  A1c 6.0  Lantus on hold for now  Continue SSI  Sugars acceptable today    Elevated LFTs  Uncertain etiology--suspect congestive/ischemic  No N/V/anorexia, does have some soreness in RUQ  Viral hep panel negative  Liver US showed cholelithiasis and biliary sludge with common bile duct and intrahepatic biliary ductal dilatation, asked GI to see today, they plan MRCP  Trend LFTs, they are much improved today      · Lovenox 40 mg SC daily for DVT prophylaxis.  · Full code.  · Discussed with patient and nursing staff. D/w Dr. Flores.  · Anticipate discharge back to SNU facility, timing yet to be determined.      Joshua Taylor MD  [facility]  08/13/22  15:32 EDT

## 2022-08-13 NOTE — PROGRESS NOTES
[facility]  [phone]  Dr. Chapincito Reddy    Subjective:  LOS: 2    Chief Complaint: Hypoxia    Awake and alert today.  Definitely looks better than yesterday.  His leg continues to cause some pain.    Objective   Vital Signs past 24hrs    Temp range: Temp (24hrs), Av.3 °F (36.8 °C), Min:97.5 °F (36.4 °C), Max:99.5 °F (37.5 °C)    BP range: BP: (125-160)/(48-93) 160/66  Pulse range: Heart Rate:  [54-63] 63  Resp rate range: Resp:  [16-17] 16    Device (Oxygen Therapy): nasal cannulaFlow (L/min):  [1-3] 2  Oxygen range:SpO2:  [90 %-100 %] 94 %      132 kg (291 lb); Body mass index is 39.85 kg/m².    Intake/Output Summary (Last 24 hours) at 2022 1059  Last data filed at 2022 0400  Gross per 24 hour   Intake 280 ml   Output --   Net 280 ml       Physical Exam  Constitutional:       Appearance: He is obese.   Cardiovascular:      Rate and Rhythm: Normal rate and regular rhythm.      Heart sounds: No murmur heard.  Pulmonary:      Breath sounds: Decreased breath sounds present.   Abdominal:      General: Bowel sounds are normal.      Palpations: Abdomen is soft. There is no mass.      Tenderness: There is no abdominal tenderness.   Musculoskeletal:         General: Swelling (R>L) present.   Skin:     Findings: Erythema (RLE) present.   Neurological:      Mental Status: He is alert.       Results Review:    I have reviewed the laboratory and imaging data since the last note by [facility] physician.  My annotations are noted in assessment and plan.    Medication Review:  I have reviewed the current MAR.  My annotations are noted in assessment and plan.    pain,       Plan   PCCM Problems  Enterobacter cloacae septicemia  Lower extremity cellulitis  Acute hypoxemic respiratory failure  Obstructive sleep apnea and central sleep apnea  Narcotic pain meds  CARLOS with CKD  Elevated liver enzymes  Mechanical fall  DM2        Plan of Treatment    Severe sepsis.  On antibiotics per ID.  No interventions per  Ortho.    Continue supplemental oxygen.    AVAPS settings were adjusted.  Recommended he resume use of ASV device on discharge.  If persistent evidence of hypercapnia can consider a trilogy machine on discharge which may be easier to get.    Limit narcotic pain medications to the extent possible.    Elevated liver enzymes likely ischemic hepatitis due to sepsis.  Continue to monitor per primary team.    CARLOS better.    Chapincito Reddy MD  08/13/22  10:59 EDT    While in the room and during my examination of the patient I wore gloves, gown, mask, eye protection and followed enhanced droplet/contact isolation protocol and precautions.  I washed my hands before and after this patient encounter.    Part of this note may be an electronic transcription/translation of spoken language to printed text using the Dragon Dictation System.

## 2022-08-13 NOTE — THERAPY EVALUATION
"Patient Name: Yakov Dubon  : 1945    MRN: [id number]                              Today's Date: 2022       Admit Date: 2022    Visit Dx:     ICD-10-CM ICD-9-CM   1. Cellulitis of right lower extremity  L03.115 682.6   2. Elevated troponin level not due to acute coronary syndrome  R77.8 790.6   3. Sepsis, due to unspecified organism, unspecified whether acute organ dysfunction present ([facility])  A41.9 038.9     995.91   4. Type 2 diabetes mellitus without complication, unspecified whether long term insulin use ([facility])  E11.9 250.00   5. 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 condition   • Periodic breathing   • Cellulitis of right lower extremity   • HTN (hypertension)   • Type 2 diabetes mellitus with stage 3b chronic kidney disease ([facility])   • Elevated troponin   • RBBB   • Pulmonary vascular congestion   • Diastolic CHF, acute ([facility])   • Elevated LFTs   • Acute respiratory failure with hypoxia ([facility])   • Acute on chronic respiratory failure with hypercapnia ([facility])     Past Medical History:   Diagnosis Date   • Anxiety    • Back pain    • Depression    • Diabetes mellitus ([facility])     \"BORDERLINE\"   • High cholesterol    • Hypertension    • Kidney disease, chronic, stage III (GFR 30-59 ml/min) ([facility])    • Reflux esophagitis    • Sleep apnea      Past Surgical History:   Procedure Laterality Date   • APPENDECTOMY     • CARPAL TUNNEL RELEASE     • CATARACT EXTRACTION     • HAMMER TOE REPAIR     • LUMBAR SPINE SURGERY     • PAIN PUMP INSERTION/REVISION Right 2016    Procedure: RT PAIN PUMP REPLACEMENT;  Surgeon: Chris Messer MD;  Location: [facility];  Service:    • TOTAL KNEE ARTHROPLASTY Left       General Information     Row Name 22 1047          Physical Therapy Time and Intention    Document Type " evaluation  Pt. admitted with Right L.E. Cellulitis  -MS     Mode of Treatment physical therapy;individual therapy  -MS     Row Name 08/13/22 1047          General Information    Patient Profile Reviewed yes  -MS     Prior Level of Function --  Pt. reports using a Rwx for ambulation just prior to admission  -MS     Existing Precautions/Restrictions fall   Exit alarm;  Dec. skin integrity;  Redness/swelling in RLE (Knee to toes)  -MS     Barriers to Rehab none identified  -MS     Row Name 08/13/22 1047          Cognition    Orientation Status (Cognition) oriented to;person;place  -MS           User Key  (r) = Recorded By, (t) = Taken By, (c) = Cosigned By    Initials Name Provider Type    MS OlivasSinghRiaz, PT Physical Therapist               Mobility     Row Name 08/13/22 1048          Bed Mobility    Bed Mobility supine-sit;sit-supine  -MS     Supine-Sit [place] (Bed Mobility) maximum assist (25% patient effort)  -MS     Comment, (Bed Mobility) Pt. performed long sitting x 5 reps (Max. assist x 1, with HHA x1) for core strengthening.   Held on OOB activities this date due to RLE swelling/redness/blistering, and skin tearing. (Nursing aware)   -MS           User Key  (r) = Recorded By, (t) = Taken By, (c) = Cosigned By    Initials Name Provider Type    MS SinghRiaz, PT Physical Therapist               Obj/Interventions     Row Name 08/13/22 1041          Range of Motion Comprehensive    Comment, General Range of Motion Right Shld (Imp. 50%); LUE/LE (WFL's);  Did not assess RLE due to nature of pt.'s skin (redness, blisters, skin tearing)  -MS     Row Name 08/13/22 1049          Strength Comprehensive (MMT)    Comment, General Manual Muscle Testing (MMT) Assessment Right Shld (3-/5); LUE/LE (3/5)  -MS     Row Name 08/13/22 1049          Motor Skills    Therapeutic Exercise --  BUE/LLE ther. ex. program x 10 reps completed (Ankle pumps, Heel Slides, Hip Abduction, SLRs, Shld Flexion, Elbow Flex/Ext)   -MS           User Key  (r) = Recorded By, (t) = Taken By, (c) = Cosigned By    Initials Name Provider Type    Riaz Saab LAZARO, PT Physical Therapist               Goals/Plan     Row Name 08/13/22 1051          Bed Mobility Goal 1 (PT)    Activity/Assistive Device (Bed Mobility Goal 1, PT) bed mobility activities, all  -MS     [place] Level/Cues Needed (Bed Mobility Goal 1, PT) minimum assist (75% or more patient effort)  -MS     Time Frame (Bed Mobility Goal 1, PT) long term goal (LTG);1 week  -MS     Row Name 08/13/22 1051          Transfer Goal 1 (PT)    Activity/Assistive Device (Transfer Goal 1, PT) transfers, all;walker, rolling  -MS     [place] Level/Cues Needed (Transfer Goal 1, PT) minimum assist (75% or more patient effort)  -MS     Time Frame (Transfer Goal 1, PT) long term goal (LTG);1 week  -MS     Row Name 08/13/22 1051          Gait Training Goal 1 (PT)    Activity/Assistive Device (Gait Training Goal 1, PT) gait (walking locomotion);walker, rolling  -MS     [place] Level (Gait Training Goal 1, PT) minimum assist (75% or more patient effort)  -MS     Distance (Gait Training Goal 1, PT) 20 feet  -MS     Time Frame (Gait Training Goal 1, PT) long term goal (LTG);1 week  -MS     Row Name 08/13/22 1051          Therapy Assessment/Plan (PT)    Planned Therapy Interventions (PT) balance training;bed mobility training;gait training;home exercise program;patient/family education;postural re-education;transfer training;strengthening  -MS           User Key  (r) = Recorded By, (t) = Taken By, (c) = Cosigned By    Initials Name Provider Type    Riaz Saab LAZARO, PT Physical Therapist               Clinical Impression     Row Name 08/13/22 1050          Pain    Pretreatment Pain Rating 8/10  -MS     Posttreatment Pain Rating 8/10  -MS     Pain Location - Side/Orientation Right  -MS     Pain Location - groin  -MS     Pre/Posttreatment Pain Comment Right groin and Anterior thigh pain per pt.  report  -MS     Pain Intervention(s) Medication (See MAR);Elevated;Nursing Notified;Repositioned  -MS     Row Name 08/13/22 1050          Plan of Care Review    Plan of Care Reviewed With patient  -MS     Row Name 08/13/22 1050          Therapy Assessment/Plan (PT)    Rehab Potential (PT) good, to achieve stated therapy goals  -MS     Criteria for Skilled Interventions Met (PT) skilled treatment is necessary  -MS     Therapy Frequency (PT) 6 times/wk  -MS     Row Name 08/13/22 1050          Positioning and Restraints    Pre-Treatment Position in bed  -MS     Post Treatment Position bed  -MS     In Bed notified nsg;supine;call light within reach;encouraged to call for assist;exit alarm on;with nsg;RLE elevated  All lines intact. V.S.S.  -MS           User Key  (r) = Recorded By, (t) = Taken By, (c) = Cosigned By    Initials Name Provider Type    Riaz Saab PT Physical Therapist               Outcome Measures     Row Name 08/13/22 1052          How much help from another person do you currently need...    Turning from your back to your side while in flat bed without using bedrails? 2  -MS     Moving from lying on back to sitting on the side of a flat bed without bedrails? 2  -MS     Moving to and from a bed to a chair (including a wheelchair)? 2  -MS     Standing up from a chair using your arms (e.g., wheelchair, bedside chair)? 2  -MS     Climbing 3-5 steps with a railing? 2  -MS     To walk in hospital room? 2  -MS     AM-PAC 6 Clicks Score (PT) 12  -MS     Highest level of mobility 4 --> Transferred to chair/commode  -MS     Row Name 08/13/22 1052          Functional Assessment    Outcome Measure Options AM-PAC 6 Clicks Basic Mobility (PT)  -MS           User Key  (r) = Recorded By, (t) = Taken By, (c) = Cosigned By    Initials Name Provider Type    Riaz Saab PT Physical Therapist                             Physical Therapy Education                 Title: PT OT SLP Therapies (In Progress)      Topic: Physical Therapy (In Progress)     Point: Mobility training (Done)     Learning Progress Summary           Patient Acceptance, E,D, VU,NR by MS at 8/13/2022 1052                   Point: Home exercise program (Done)     Learning Progress Summary           Patient Acceptance, E,D, VU,NR by MS at 8/13/2022 1052                   Point: Body mechanics (Not Started)     Learner Progress:  Not documented in this visit.          Point: Precautions (Not Started)     Learner Progress:  Not documented in this visit.                      User Key     Initials Effective Dates Name Provider Type Discipline    MS 06/16/21 -  Riaz Singh, PT Physical Therapist PT              PT Recommendation and Plan  Planned Therapy Interventions (PT): balance training, bed mobility training, gait training, home exercise program, patient/family education, postural re-education, transfer training, strengthening  Plan of Care Reviewed With: patient  Outcome Evaluation: Pt. is a 77 year old Male admitted to the hospital with Right L.E. cellulitis.  Pt. reports that prior to admission he was ambulating with use of a Rwx.  Pt. currently presents with decreased strength, decreased balance, decreased ROM, and decreased tolerance to functional activity. This AM, pt. requires Max. assist x1 (HHA x1) with performing long sitting in bed x 5 reps for core strengthening. Held on OOB activities due to nature pt.'s RLE swelling, blistering, skin tears, and redness (Nursing aware). BUE/LLE ther. ex. program x 10 reps completed for general strengthening.  Pt. will benefit from skilled inpt. P.T. to address his functional deficits and to assist pt. in regaining his maximum level of independence with functional mobility.     Time Calculation:    PT Charges     Row Name 08/13/22 1056             Time Calculation    Start Time 0900  -MS      Stop Time 0914  -MS      Time Calculation (min) 14 min  -MS      PT Received On 08/13/22  -MS      PT - Next  Appointment 08/15/22  -MS      PT Goal Re-Cert Due Date 08/20/22  -MS              Time Calculation- PT    Total Timed Code Minutes- PT 13 minute(s)  -MS            User Key  (r) = Recorded By, (t) = Taken By, (c) = Cosigned By    Initials Name Provider Type    Riaz Saab, PT Physical Therapist              Therapy Charges for Today     Code Description Service Date Service Provider Modifiers Qty    00004007264 HC PT EVAL MOD COMPLEXITY 2 8/13/2022 Riaz Singh, PT GP 1    49050806614 HC PT THER PROC EA 15 MIN 8/13/2022 Riaz Singh, PT GP 1          PT G-Codes  Outcome Measure Options: AM-PAC 6 Clicks Basic Mobility (PT)  AM-PAC 6 Clicks Score (PT): 12    Riaz Singh, PT  8/13/2022

## 2022-08-13 NOTE — PLAN OF CARE
Goal Outcome Evaluation:      VSS stable, continues to be NSB rhythm. Refusing to wear Bipap stating it is uncomfortable and to tight. RT educated pt with no success. This shift patient is becoming less independent with ADL's. Encouraged to move and shift weight.  No s/s of distress noted at this time. Willcontinue to monitor

## 2022-08-13 NOTE — PROGRESS NOTES
[facility] Progress Note      Patient Name: Yakov Dubon  : 1945  MRN: [id number]  Primary Care Physician:  Chencho Sterling MD  Date of admission: 2022    Subjective     Interval History:   Follow-up acute kidney injury on chronic kidney disease    Patient is feeling much better today denies any chest pain or shortness of air, no orthopnea or PND, no nausea or vomiting, no abdominal pain, no dysuria or gross hematuria.    Review of Systems:   As noted above    Objective     Vitals:   Temp:  [97.5 °F (36.4 °C)-99.5 °F (37.5 °C)] 99.5 °F (37.5 °C)  Heart Rate:  [54-63] 63  Resp:  [16-17] 16  BP: (125-160)/(48-93) 160/66  Flow (L/min):  [1-3] 2    Intake/Output Summary (Last 24 hours) at 2022 1222  Last data filed at 2022 1200  Gross per 24 hour   Intake 280 ml   Output 250 ml   Net 30 ml       Physical Exam:    General Appearance: alert, awake, chronically ill, no acute distress   Skin: warm and dry  HEENT: oral mucosa normal, nonicteric sclera  Neck: supple, no JVD  Lungs: CTA, unlabored breathing effort   Heart: RRR, normal S1 and S2, no rub  Abdomen: soft, nontender, nondistended, normoactive bowel  : no palpable bladder  Extremities: Lower extremities are wrapped and he has 1+ pedal edema  Neuro: normal speech and mental status     Scheduled Meds:     acetaminophen, 650 mg, Oral, Nightly  aspirin, 81 mg, Oral, Daily  calcium-vitamin D, 1 tablet, Oral, BID  carvedilol, 6.25 mg, Oral, BID With Meals  cefepime, 2 g, Intravenous, Q12H  enoxaparin, 40 mg, Subcutaneous, Q24H  famotidine, 40 mg, Oral, BID AC  ferrous sulfate, 325 mg, Oral, Daily With Breakfast  gabapentin, 300 mg, Oral, TID  insulin lispro, 0-14 Units, Subcutaneous, TID AC  rosuvastatin, 5 mg, Oral, Nightly  senna, 1 tablet, Oral, Daily  sertraline, 75 mg, Oral, Daily  sodium chloride, 10 mL, Intravenous, Q12H  tamsulosin, 0.4 mg, Oral, Nightly      IV Meds:   pain,         Results Reviewed:    I have personally reviewed the results from the time of this admission to 8/13/2022 12:22 EDT     Results from last 7 days   Lab Units 08/13/22  0720 08/12/22  0524 08/11/22  0524   SODIUM mmol/L 139 141 140   POTASSIUM mmol/L 5.1 5.3* 5.0   CHLORIDE mmol/L 105 103 101   CO2 mmol/L 25.8 28.6 28.0   BUN mg/dL 37* 40* 42*   CREATININE mg/dL 1.45* 1.82* 1.75*   CALCIUM mg/dL 8.6 8.9 9.6   BILIRUBIN mg/dL 0.4 0.8 0.4   ALK PHOS U/L 89 93 61   ALT (SGPT) U/L 55* 150* 11   AST (SGOT) U/L 52* 170* 18   GLUCOSE mg/dL 124* 110* 137*       Estimated Creatinine Clearance: 58.7 mL/min (A) (by C-G formula based on SCr of 1.45 mg/dL (H)).    Results from last 7 days   Lab Units 08/13/22  0720 08/12/22  0524   MAGNESIUM mg/dL 2.2 2.1       Results from last 7 days   Lab Units 08/11/22  0524   URIC ACID mg/dL 10.0*       Results from last 7 days   Lab Units 08/13/22  0720 08/12/22  0524 08/11/22  0524   WBC 10*3/mm3 8.27 8.21 8.40   HEMOGLOBIN g/dL 9.7* 11.0* 12.1*   PLATELETS 10*3/mm3 124* 181 213             Assessment / Plan     ASSESSMENT:  1.  Chronic kidney disease stage IIIb creatinine was 1.75 on admission today is 1.45, volume status improved, and electrolyte within acceptable  2.  Mild hypokalemia improved potassium today 5.1  3.  Acute on chronic respiratory failure associated with COPD and obstructive sleep apnea appears to be very stable  4.  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  Hypertension associate with chronic kidney  6.  Anemia and thrombocytopenia hemoglobin 9.7 and platelet 124,000.  He is already on oral iron supplement    PLAN:  -Continue the same treatment  -Check iron stores  -Surveillance labs    Thank you for involving us in the care of Yakov Dubon.  Please feel free to call with any questions.    Matheus Flores MD  08/13/22  12:22 EDT    [facility]  [phone]      Much of this encounter note is an electronic transcription/translation of spoken  language to printed text. The electronic translation of spoken language may permit erroneous, or at times, nonsensical words or phrases to be inadvertently transcribed; Although I have reviewed the note for such errors, some may still exist.

## 2022-08-13 NOTE — PLAN OF CARE
Goal Outcome Evaluation:  Plan of Care Reviewed With: patient           Outcome Evaluation: Pt. is a 77 year old Male admitted to the hospital with Right L.E. cellulitis.  Pt. reports that prior to admission he was ambulating with use of a Rwx.  Pt. currently presents with decreased strength, decreased balance, decreased ROM, and decreased tolerance to functional activity. This AM, pt. requires Max. assist x1 (HHA x1) with performing long sitting in bed x 5 reps for core strengthening. Held on OOB activities due to nature pt.'s RLE swelling, blistering, skin tears, and redness (Nursing aware). BUE/LLE ther. ex. program x 10 reps completed for general strengthening.  Pt. will benefit from skilled inpt. P.T. to address his functional deficits and to assist pt. in regaining his maximum level of independence with functional mobility.    Patient was wearing a face mask during this therapy encounter. Therapist used appropriate personal protective equipment including eye protection, mask, and gloves.  Mask used was standard procedure mask. Appropriate PPE was worn during the entire therapy session. Hand hygiene was completed before and after therapy session. Patient is not in enhanced droplet precautions.

## 2022-08-13 NOTE — SIGNIFICANT NOTE
Pt initially agreed to wear the bipap. Then after about ten minutes he decided to not wear the bipap.

## 2022-08-14 ENCOUNTER — APPOINTMENT (OUTPATIENT)
Dept: MRI IMAGING | Facility: HOSPITAL | Age: 77
End: 2022-08-14

## 2022-08-14 PROBLEM — D63.8 ANEMIA OF CHRONIC DISEASE: Chronic | Status: ACTIVE | Noted: 2022-08-14

## 2022-08-14 PROBLEM — A41.50 SEPSIS DUE TO GRAM NEGATIVE BACTERIA (HCC): Status: ACTIVE | Noted: 2022-08-14

## 2022-08-14 LAB
ALBUMIN SERPL-MCNC: 2.6 G/DL (ref 3.5–5.2)
ALBUMIN/GLOB SERPL: 0.9 G/DL
ALP SERPL-CCNC: 111 U/L (ref 39–117)
ALT SERPL W P-5'-P-CCNC: 43 U/L (ref 1–41)
ANION GAP SERPL CALCULATED.3IONS-SCNC: 7.2 MMOL/L (ref 5–15)
AST SERPL-CCNC: 32 U/L (ref 1–40)
BACTERIA SPEC AEROBE CULT: NORMAL
BASOPHILS # BLD AUTO: 0.02 10*3/MM3 (ref 0–0.2)
BASOPHILS NFR BLD AUTO: 0.2 % (ref 0–1.5)
BILIRUB SERPL-MCNC: 0.3 MG/DL (ref 0–1.2)
BUN SERPL-MCNC: 33 MG/DL (ref 8–23)
BUN/CREAT SERPL: 26.4 (ref 7–25)
CALCIUM SPEC-SCNC: 9 MG/DL (ref 8.6–10.5)
CHLORIDE SERPL-SCNC: 104 MMOL/L (ref 98–107)
CO2 SERPL-SCNC: 29.8 MMOL/L (ref 22–29)
CREAT SERPL-MCNC: 1.25 MG/DL (ref 0.76–1.27)
DEPRECATED RDW RBC AUTO: 49.7 FL (ref 37–54)
EGFRCR SERPLBLD CKD-EPI 2021: 58.9 ML/MIN/1.73
EOSINOPHIL # BLD AUTO: 0.02 10*3/MM3 (ref 0–0.4)
EOSINOPHIL NFR BLD AUTO: 0.2 % (ref 0.3–6.2)
ERYTHROCYTE [DISTWIDTH] IN BLOOD BY AUTOMATED COUNT: 14.5 % (ref 12.3–15.4)
GLOBULIN UR ELPH-MCNC: 3 GM/DL
GLUCOSE BLDC GLUCOMTR-MCNC: 123 MG/DL (ref 70–130)
GLUCOSE BLDC GLUCOMTR-MCNC: 136 MG/DL (ref 70–130)
GLUCOSE SERPL-MCNC: 120 MG/DL (ref 65–99)
GRAM STN SPEC: NORMAL
HCT VFR BLD AUTO: 30.3 % (ref 37.5–51)
HGB BLD-MCNC: 9.7 G/DL (ref 13–17.7)
IMM GRANULOCYTES # BLD AUTO: 0.06 10*3/MM3 (ref 0–0.05)
IMM GRANULOCYTES NFR BLD AUTO: 0.6 % (ref 0–0.5)
LIPASE SERPL-CCNC: 14 U/L (ref 13–60)
LYMPHOCYTES # BLD AUTO: 0.51 10*3/MM3 (ref 0.7–3.1)
LYMPHOCYTES NFR BLD AUTO: 4.9 % (ref 19.6–45.3)
MAGNESIUM SERPL-MCNC: 2.1 MG/DL (ref 1.6–2.4)
MCH RBC QN AUTO: 29.8 PG (ref 26.6–33)
MCHC RBC AUTO-ENTMCNC: 32 G/DL (ref 31.5–35.7)
MCV RBC AUTO: 93.2 FL (ref 79–97)
MONOCYTES # BLD AUTO: 0.33 10*3/MM3 (ref 0.1–0.9)
MONOCYTES NFR BLD AUTO: 3.2 % (ref 5–12)
NEUTROPHILS NFR BLD AUTO: 9.51 10*3/MM3 (ref 1.7–7)
NEUTROPHILS NFR BLD AUTO: 90.9 % (ref 42.7–76)
NRBC BLD AUTO-RTO: 0 /100 WBC (ref 0–0.2)
PHOSPHATE SERPL-MCNC: 3.1 MG/DL (ref 2.5–4.5)
PLATELET # BLD AUTO: 152 10*3/MM3 (ref 140–450)
PMV BLD AUTO: 10.2 FL (ref 6–12)
POTASSIUM SERPL-SCNC: 5.1 MMOL/L (ref 3.5–5.2)
PROT SERPL-MCNC: 5.6 G/DL (ref 6–8.5)
RBC # BLD AUTO: 3.25 10*6/MM3 (ref 4.14–5.8)
SODIUM SERPL-SCNC: 141 MMOL/L (ref 136–145)
URATE SERPL-MCNC: 7.4 MG/DL (ref 3.4–7)
WBC NRBC COR # BLD: 10.45 10*3/MM3 (ref 3.4–10.8)

## 2022-08-14 PROCEDURE — 99232 SBSQ HOSP IP/OBS MODERATE 35: CPT | Performed by: NURSE PRACTITIONER

## 2022-08-14 PROCEDURE — 94799 UNLISTED PULMONARY SVC/PX: CPT

## 2022-08-14 PROCEDURE — 82962 GLUCOSE BLOOD TEST: CPT

## 2022-08-14 PROCEDURE — 83690 ASSAY OF LIPASE: CPT | Performed by: INTERNAL MEDICINE

## 2022-08-14 PROCEDURE — 83735 ASSAY OF MAGNESIUM: CPT | Performed by: HOSPITALIST

## 2022-08-14 PROCEDURE — 84100 ASSAY OF PHOSPHORUS: CPT | Performed by: INTERNAL MEDICINE

## 2022-08-14 PROCEDURE — 99232 SBSQ HOSP IP/OBS MODERATE 35: CPT | Performed by: INTERNAL MEDICINE

## 2022-08-14 PROCEDURE — 25010000002 CEFEPIME PER 500 MG: Performed by: INTERNAL MEDICINE

## 2022-08-14 PROCEDURE — 80053 COMPREHEN METABOLIC PANEL: CPT | Performed by: INTERNAL MEDICINE

## 2022-08-14 PROCEDURE — 94761 N-INVAS EAR/PLS OXIMETRY MLT: CPT

## 2022-08-14 PROCEDURE — 84550 ASSAY OF BLOOD/URIC ACID: CPT | Performed by: INTERNAL MEDICINE

## 2022-08-14 PROCEDURE — 25010000002 ENOXAPARIN PER 10 MG: Performed by: NURSE PRACTITIONER

## 2022-08-14 PROCEDURE — 85025 COMPLETE CBC W/AUTO DIFF WBC: CPT | Performed by: INTERNAL MEDICINE

## 2022-08-14 PROCEDURE — 94660 CPAP INITIATION&MGMT: CPT

## 2022-08-14 RX ADMIN — CARVEDILOL 6.25 MG: 6.25 TABLET, FILM COATED ORAL at 18:14

## 2022-08-14 RX ADMIN — ACETAMINOPHEN 650 MG: 325 TABLET, FILM COATED ORAL at 20:18

## 2022-08-14 RX ADMIN — CEFEPIME 2 G: 2 INJECTION, POWDER, FOR SOLUTION INTRAVENOUS at 20:18

## 2022-08-14 RX ADMIN — SENNOSIDES 1 TABLET: 8.6 TABLET, FILM COATED ORAL at 09:41

## 2022-08-14 RX ADMIN — GABAPENTIN 300 MG: 300 CAPSULE ORAL at 18:13

## 2022-08-14 RX ADMIN — CALCIUM CARBONATE-VITAMIN D TAB 500 MG-200 UNIT 1 TABLET: 500-200 TAB at 20:18

## 2022-08-14 RX ADMIN — Medication 10 ML: at 20:18

## 2022-08-14 RX ADMIN — FAMOTIDINE 40 MG: 20 TABLET ORAL at 18:14

## 2022-08-14 RX ADMIN — TAMSULOSIN HYDROCHLORIDE 0.4 MG: 0.4 CAPSULE ORAL at 20:18

## 2022-08-14 RX ADMIN — Medication 10 ML: at 09:42

## 2022-08-14 RX ADMIN — FAMOTIDINE 40 MG: 20 TABLET ORAL at 09:41

## 2022-08-14 RX ADMIN — GABAPENTIN 300 MG: 300 CAPSULE ORAL at 09:41

## 2022-08-14 RX ADMIN — CEFEPIME 2 G: 2 INJECTION, POWDER, FOR SOLUTION INTRAVENOUS at 09:49

## 2022-08-14 RX ADMIN — SERTRALINE 75 MG: 25 TABLET, FILM COATED ORAL at 09:41

## 2022-08-14 RX ADMIN — GABAPENTIN 300 MG: 300 CAPSULE ORAL at 20:18

## 2022-08-14 RX ADMIN — ENOXAPARIN SODIUM 40 MG: 100 INJECTION SUBCUTANEOUS at 14:12

## 2022-08-14 RX ADMIN — CALCIUM CARBONATE-VITAMIN D TAB 500 MG-200 UNIT 1 TABLET: 500-200 TAB at 14:12

## 2022-08-14 RX ADMIN — ROSUVASTATIN CALCIUM 5 MG: 5 TABLET, FILM COATED ORAL at 20:18

## 2022-08-14 RX ADMIN — ASPIRIN 81 MG: 81 TABLET, CHEWABLE ORAL at 09:41

## 2022-08-14 RX ADMIN — CARVEDILOL 6.25 MG: 6.25 TABLET, FILM COATED ORAL at 09:41

## 2022-08-14 RX ADMIN — FERROUS SULFATE TAB 325 MG (65 MG ELEMENTAL FE) 325 MG: 325 (65 FE) TAB at 09:41

## 2022-08-14 NOTE — PROGRESS NOTES
Name: Yakov Dubon ADMIT: 2022   : 1945  PCP: Chencho Sterling MD    MRN: [id number] LOS: 3 days   AGE/SEX: 77 y.o. male  ROOM: [facility]     Subjective   Subjective   Feeling okay today. Awake and alert, conversational. Tolerating diet. No N/V/D/abd pain. No F/C/NS. No SOA or CP. Voiding fine. RLE pain is improving.    Review of Systems     as above    Objective   Objective   Vital Signs  Temp:  [97.8 °F (36.6 °C)-98.9 °F (37.2 °C)] 98 °F (36.7 °C)  Heart Rate:  [50-58] 50  Resp:  [18-20] 18  BP: (141-170)/(55-70) 170/66  SpO2:  [93 %-99 %] 99 %  on  Flow (L/min):  [2] 2;   Device (Oxygen Therapy): nasal cannula  Body mass index is 39.85 kg/m².   I/O last 3 completed shifts:  In: 450 [P.O.:250; IV Piggyback:200]  Out: 750 [Urine:750]  No intake/output data recorded.    Physical Exam  Vitals and nursing note reviewed.   Constitutional:       General: He is not in acute distress.     Appearance: He is obese. He is ill-appearing (chronically). He is not toxic-appearing or diaphoretic.   HENT:      Head: Normocephalic.      Nose: Nose normal.      Mouth/Throat:      Mouth: Mucous membranes are moist.      Pharynx: Oropharynx is clear.   Eyes:      General: No scleral icterus.        Right eye: No discharge.         Left eye: No discharge.      Extraocular Movements: Extraocular movements intact.      Conjunctiva/sclera: Conjunctivae normal.   Cardiovascular:      Rate and Rhythm: Normal rate and regular rhythm.      Pulses: Normal pulses.   Pulmonary:      Effort: Pulmonary effort is normal. No respiratory distress.      Breath sounds: Normal breath sounds. No wheezing or rales.      Comments: Anteriorly   Abdominal:      General: Bowel sounds are normal. There is no distension.      Palpations: Abdomen is soft.      Tenderness: There is no abdominal tenderness.   Musculoskeletal:         General: Swelling (BLEs all the way up to abdomen--improved today) present.      Cervical back: Neck supple.  No rigidity.   Lymphadenopathy:      Cervical: No cervical adenopathy.   Skin:     General: Skin is warm and dry.      Capillary Refill: Capillary refill takes less than 2 seconds.      Coloration: Skin is not jaundiced.      Comments: RLE erythema slightly improved, has not extended beyond marker lines  Bullae on right anterior thigh have sloughed   Neurological:      General: No focal deficit present.      Mental Status: He is alert and oriented to person, place, and time. Mental status is at baseline.      Cranial Nerves: No cranial nerve deficit.      Coordination: Coordination normal.   Psychiatric:         Mood and Affect: Mood normal.         Behavior: Behavior normal.         Thought Content: Thought content normal.      Comments: Very pleasant and appropriate         Results Review     I reviewed the patient's new clinical results.  Results from last 7 days   Lab Units 08/14/22  0905 08/13/22 0720 08/12/22 0524 08/11/22  0524   WBC 10*3/mm3 10.45 8.27 8.21 8.40   HEMOGLOBIN g/dL 9.7* 9.7* 11.0* 12.1*   PLATELETS 10*3/mm3 152 124* 181 213     Results from last 7 days   Lab Units 08/14/22  0905 08/13/22 0720 08/12/22 0524 08/11/22  0524   SODIUM mmol/L 141 139 141 140   POTASSIUM mmol/L 5.1 5.1 5.3* 5.0   CHLORIDE mmol/L 104 105 103 101   CO2 mmol/L 29.8* 25.8 28.6 28.0   BUN mg/dL 33* 37* 40* 42*   CREATININE mg/dL 1.25 1.45* 1.82* 1.75*   GLUCOSE mg/dL 120* 124* 110* 137*   EGFR mL/min/1.73 58.9* 49.3* 37.6* 39.4*     Results from last 7 days   Lab Units 08/14/22  0905 08/13/22 0720 08/12/22 0524 08/11/22  0524   ALBUMIN g/dL 2.60* 2.40* 3.20* 4.10   BILIRUBIN mg/dL 0.3 0.4 0.8 0.4   ALK PHOS U/L 111 89 93 61   AST (SGOT) U/L 32 52* 170* 18   ALT (SGPT) U/L 43* 55* 150* 11     Results from last 7 days   Lab Units 08/14/22  0905 08/13/22  0720 08/12/22  0524 08/11/22  0524   CALCIUM mg/dL 9.0 8.6 8.9 9.6   ALBUMIN g/dL 2.60* 2.40* 3.20* 4.10   MAGNESIUM mg/dL 2.1 2.2 2.1  --    PHOSPHORUS mg/dL 3.1  --    --   --      Results from last 7 days   Lab Units 08/13/22  0720 08/12/22  0524 08/11/22  0524   PROCALCITONIN ng/mL 6.95* 15.00* 3.39*   LACTATE mmol/L 1.1 2.1* 2.2*     Glucose   Date/Time Value Ref Range Status   08/14/2022 0632 136 (H) 70 - 130 mg/dL Final     Comment:     Meter: [id number] : [id number] Adriana Alexandrea NA   08/13/2022 2121 144 (H) 70 - 130 mg/dL Final     Comment:     Meter: [id number] : [id number] Adriana Alexandrea NA   08/13/2022 1612 158 (H) 70 - 130 mg/dL Final     Comment:     Meter: [id number] : [id number] Ruba BOLAÑOSA   08/13/2022 1116 189 (H) 70 - 130 mg/dL Final     Comment:     Meter: [id number] : [id number] Sabina Barton NA   08/13/2022 0621 124 70 - 130 mg/dL Final     Comment:     Meter: [id number] : [id number] Adriana Alexandrea NA   08/12/2022 2151 177 (H) 70 - 130 mg/dL Final     Comment:     Meter: [id number] : [id number] Adriana Alexandrea NA   08/12/2022 1627 126 70 - 130 mg/dL Final     Comment:     Meter: [id number] : [id number] Dulce Maria SEYMOUR       US Liver    Result Date: 8/12/2022   Cholelithiasis and biliary sludge with common bile duct and intrahepatic biliary ductal dilatation. There is poor visualization of the distal common bile duct. Findings raise concern for choledocholithiasis or other distal obstructing process. Recommend further evaluation with ERCP or contrast-enhanced MRCP.  This report was finalized on 8/12/2022 9:00 PM by Dr. Ping Bone M.D.      Scheduled Medications  acetaminophen, 650 mg, Oral, Nightly  aspirin, 81 mg, Oral, Daily  calcium-vitamin D, 1 tablet, Oral, BID  carvedilol, 6.25 mg, Oral, BID With Meals  cefepime, 2 g, Intravenous, Q12H  enoxaparin, 40 mg, Subcutaneous, Q24H  famotidine, 40 mg, Oral, BID AC  ferrous sulfate, 325 mg, Oral, Daily With Breakfast  gabapentin, 300 mg, Oral, TID  insulin lispro, 0-14 Units, Subcutaneous, TID AC  rosuvastatin, 5 mg, Oral, Nightly  senna, 1 tablet, Oral, Daily  sertraline,  75 mg, Oral, Daily  sodium chloride, 10 mL, Intravenous, Q12H  tamsulosin, 0.4 mg, Oral, Nightly    Infusions  pain,     Diet  Diet Pureed; Thin; Cardiac       Assessment/Plan     Active Hospital Problems    Diagnosis  POA   • **Cellulitis of right lower extremity [L03.115]  Yes   • Anemia of chronic disease [D63.8]  Yes   • Elevated LFTs [R79.89]  No   • Acute respiratory failure with hypoxia (HCC) [J96.01]  Yes   • Acute on chronic respiratory failure with hypercapnia (HCC) [J96.22]  Yes   • HTN (hypertension) [I10]  Yes   • Type 2 diabetes mellitus with stage 3b chronic kidney disease (HCC) [E11.22, N18.32]  Yes   • Elevated troponin [R77.8]  Yes   • RBBB [I45.10]  Yes   • Pulmonary vascular congestion [R09.89]  Yes   • Diastolic CHF, acute (HCC) [I50.31]  Yes   • CSA (central sleep apnea) [G47.31]  Yes   • HALIMA (obstructive sleep apnea) [G47.33]  Yes      Resolved Hospital Problems   No resolved problems to display.       76yo gentleman who presented from nursing home with lethargy, hypotension, and severe RLE redness/swelling/pain and was admitted to our service with sepsis due to RLE cellulitis.    Sepsis due to RLE cellulitis  Appreciate ID attention to pt  Blood culture growing pan-sensitive Enterobacter, wound culture negative so far  IV Cefazolin changed to Cefepime now  Repeat blood cultures NGTD  No hypotension or tachycardia, afebrile  Wound RN following  Venous duplex BLEs negative for DVT  CT BLEs negative for deep infection  Ortho has seen and signed off--no interventions indicated    Volume overload  Quite volume overloaded on admission with peripheral edema, vasc congestion on CXR, and hypoxia  Uncertain CHF history  LVSF okay on Echo here and BNP fine  Volume status seems much improved today, appreciate Renal attention to pt  Trending daily weights and I/Os    Elevated Trop  Card has seen, no ACS  EKG okay and no WMA on Echo  Suspect due to CKD    CKD3b  Cr around baseline or better  Mild  hyperkalemia resolved  Defer to Renal    Anemia of chronic disease  With component of iron deficiency  Continue oral iron    Acute on chronic hypercapnic resp failure, acute hypoxic resp failure, HALIMA  Appreciate Pulm attention to pt  BiPAP ordered, pt using overnight (not compliant with PAP at facility prior to admission)    Lethargy  Uncertain what his mental status is at baseline but seems much improved today  Has Dilaudid pain pump, no longer hypotensive but if somnolence/hypoxia recur will ask his pain mgmt MD to see for possible adjustment    DM2  A1c 6.0  Lantus on hold for now  Continue SSI  Sugars remain acceptable today    Elevated LFTs  Uncertain etiology--suspect congestive/ischemic  No N/V/anorexia, does have some soreness in RUQ  Viral hep panel negative  Liver US showed cholelithiasis and biliary sludge with common bile duct and intrahepatic biliary ductal dilatation, asked GI to see, they plan MRCP  Trending LFTs, they are much improved again today      · Lovenox 40 mg SC daily for DVT prophylaxis.  · Full code.  · Discussed with patient and nursing staff.   · Anticipate discharge back to SNU facility, timing yet to be determined.      Joshua Taylor MD  [facility]  08/14/22  13:53 EDT

## 2022-08-14 NOTE — PROGRESS NOTES
[facility]  [phone]  Dr. Chapincito Reddy    Subjective:  LOS: 3    Chief Complaint: Hypoxia    Awake alert.  Complaining of right groin pain today.    Objective   Vital Signs past 24hrs    Temp range: Temp (24hrs), Av.4 °F (36.9 °C), Min:97.8 °F (36.6 °C), Max:98.9 °F (37.2 °C)    BP range: BP: (131-170)/(55-70) 170/66  Pulse range: Heart Rate:  [50-60] 50  Resp rate range: Resp:  [18-20] 18    Device (Oxygen Therapy): nasal cannulaFlow (L/min):  [2] 2  Oxygen range:SpO2:  [92 %-99 %] 99 %      132 kg (291 lb); Body mass index is 39.85 kg/m².    Intake/Output Summary (Last 24 hours) at 2022 1213  Last data filed at 2022 0000  Gross per 24 hour   Intake 170 ml   Output 500 ml   Net -330 ml       Physical Exam  Constitutional:       Appearance: He is obese.   Cardiovascular:      Rate and Rhythm: Normal rate and regular rhythm.      Heart sounds: No murmur heard.  Pulmonary:      Breath sounds: Decreased breath sounds present.   Abdominal:      General: Bowel sounds are normal.      Palpations: Abdomen is soft. There is no mass.      Tenderness: There is no abdominal tenderness.   Musculoskeletal:         General: Swelling (R>L) present.   Skin:     Findings: Erythema (RLE) present.   Neurological:      Mental Status: He is alert.       Results Review:    I have reviewed the laboratory and imaging data since the last note by [facility] physician.  My annotations are noted in assessment and plan.    Medication Review:  I have reviewed the current MAR.  My annotations are noted in assessment and plan.    pain,       Plan   PCCM Problems  Enterobacter cloacae septicemia  Lower extremity cellulitis  Acute hypoxemic respiratory failure  Obstructive sleep apnea and central sleep apnea  Narcotic pain meds  CARLOS with CKD  Elevated liver enzymes  Mechanical fall  DM2        Plan of Treatment    Severe sepsis.  On antibiotics per ID.     Continue supplemental oxygen.    AVAPS settings were adjusted.   Recommended he resume use of ASV device on discharge.  If persistent evidence of hypercapnia can consider a trilogy machine on discharge which may be easier to get.    Limit narcotic pain medications to the extent possible.    Elevated liver enzymes likely due to sepsis and now improved    CARLOS better.    Chapincito Reddy MD  08/14/22  12:13 EDT    While in the room and during my examination of the patient I wore gloves, gown, mask, eye protection and followed enhanced droplet/contact isolation protocol and precautions.  I washed my hands before and after this patient encounter.    Part of this note may be an electronic transcription/translation of spoken language to printed text using the Dragon Dictation System.

## 2022-08-14 NOTE — PLAN OF CARE
Goal Outcome Evaluation:      VSS stable at this time, continues to be bradycardic with no s/s of distress or discomfort noted or observed. Patient continues to use brief versus urinal. Non compliant with independently turning or moving extremities. Refuses wearing Bipap this shift. O2 levels remain consistant. Will continue to monitor

## 2022-08-14 NOTE — PROGRESS NOTES
Hospital Follow Up    LOS:  LOS: 3 days   Patient Name: Yakov Dubon  Age/Sex: 77 y.o. male  : 1945  MRN: [id number]    Day of Service: 22   Length of Stay: 3  Encounter Provider: CECILE Nolasco  Place of Service: [facility] CARDIOLOGY  Patient Care Team:  Chencho Sterling MD as PCP - General (Internal Medicine)  Reasor, Chris Polo MD as Consulting Physician (Pain Medicine)    Subjective:     Chief Complaint: shortness of air    Interval History: complains of pain in his RLE    Objective:     Objective:  Temp:  [97.8 °F (36.6 °C)-98.9 °F (37.2 °C)] 98 °F (36.7 °C)  Heart Rate:  [50-60] 50  Resp:  [18-20] 18  BP: (131-170)/(55-70) 170/66     Intake/Output Summary (Last 24 hours) at 2022 1130  Last data filed at 2022 0000  Gross per 24 hour   Intake 170 ml   Output 750 ml   Net -580 ml     Body mass index is 39.85 kg/m².      22  0500 22  0515 22  0617   Weight: 132 kg (291 lb 3.2 oz) 132 kg (291 lb) 132 kg (291 lb)     Weight change: 0 kg (0 lb)    Physical Exam:   General Appearance:    Awake alert and oriented in no acute distress.   Color:  Skin:  Neuro:  HEENT:    Lungs:     Pink  Warm and dry  No focal, motor or sensory deficits  Neck supple, pupils equal, round and reactive. No JVD, No Bruit  Clear to auscultation,respirations regular, even and                  unlabored    Heart:    Regular rate and rhythm, S1 and S2, no murmur, no gallop, no rub. No edema, DP/PT pulses are 2+   Chest Wall:    No abnormalities observed   Abdomen:     Normal bowel sounds, no masses, no organomegaly, soft        non-tender, non-distended, no guarding, no ascites noted   Extremities:   RLE with significant erythema       Lab Review:   Results from last 7 days   Lab Units 22  0905 22  0720   SODIUM mmol/L 141 139   POTASSIUM mmol/L 5.1 5.1   CHLORIDE mmol/L 104 105   CO2 mmol/L 29.8* 25.8   BUN mg/dL 33* 37*   CREATININE mg/dL  1.25 1.45*   GLUCOSE mg/dL 120* 124*   CALCIUM mg/dL 9.0 8.6   AST (SGOT) U/L 32 52*   ALT (SGPT) U/L 43* 55*     Results from last 7 days   Lab Units 08/13/22  0720 08/12/22  0524 08/11/22  0524   CK TOTAL U/L  --   --  497*   TROPONIN T ng/mL 0.084* 0.147* 0.131*     Results from last 7 days   Lab Units 08/14/22  0905 08/13/22  0720   WBC 10*3/mm3 10.45 8.27   HEMOGLOBIN g/dL 9.7* 9.7*   HEMATOCRIT % 30.3* 30.0*   PLATELETS 10*3/mm3 152 124*         Results from last 7 days   Lab Units 08/14/22  0905 08/13/22  0720   MAGNESIUM mg/dL 2.1 2.2           Invalid input(s): LDLCALC  Results from last 7 days   Lab Units 08/11/22  0524   PROBNP pg/mL 1,298.0         I reviewed the patient's new clinical results.  I personally viewed and interpreted the patient's EKG  Current Medications:   Scheduled Meds:acetaminophen, 650 mg, Oral, Nightly  aspirin, 81 mg, Oral, Daily  calcium-vitamin D, 1 tablet, Oral, BID  carvedilol, 6.25 mg, Oral, BID With Meals  cefepime, 2 g, Intravenous, Q12H  enoxaparin, 40 mg, Subcutaneous, Q24H  famotidine, 40 mg, Oral, BID AC  ferrous sulfate, 325 mg, Oral, Daily With Breakfast  gabapentin, 300 mg, Oral, TID  insulin lispro, 0-14 Units, Subcutaneous, TID AC  rosuvastatin, 5 mg, Oral, Nightly  senna, 1 tablet, Oral, Daily  sertraline, 75 mg, Oral, Daily  sodium chloride, 10 mL, Intravenous, Q12H  tamsulosin, 0.4 mg, Oral, Nightly      Continuous Infusions:pain,         Allergies:  Allergies   Allergen Reactions   • Bacitracin Unknown - High Severity   • Gentamycin [Gentamicin] Unknown - High Severity   • Neosporin [Neomycin-Bacitracin Zn-Polymyx] Other (See Comments)     ALLERGY TESTED   • Nsaids Unknown - High Severity   • Tobramycin Unknown - High Severity       Assessment:       Cellulitis of right lower extremity    HALIMA (obstructive sleep apnea)    CSA (central sleep apnea)    HTN (hypertension)    Type 2 diabetes mellitus with stage 3b chronic kidney disease (HCC)    Elevated troponin     RBBB    Pulmonary vascular congestion    Diastolic CHF, acute (HCC)    Elevated LFTs    Acute respiratory failure with hypoxia (HCC)    Acute on chronic respiratory failure with hypercapnia (HCC)      1.  Flat elevation in troponin  2.  Acute on chronic hypercapnic respiratory failure with hypoxia  3.  Right lower extremity cellulitis  4.  Acute on chronic diastolic heart failure  5.  Morbid obesity  6.  History of nonobstructive CAD on cardiac cath 2019  7.  Chronic kidney disease  Plan:       Cr better today. Renal is following and managing diuretics. Getting MRI of the abdomen today. Encouraged compliance with bipap at night. Nursing staff confused about intermittent rhythm change. He maintains sinus but seems to flip his axis or has an intermittent bundle. Would continue to observe    CECILE Nolasco  08/14/22  11:30 EDT  Electronically signed by CECILE Nolasco, 08/14/22, 11:30 AM EDT.

## 2022-08-14 NOTE — PROGRESS NOTES
[facility] Progress Note      Patient Name: Yakov Dubon  : 1945  MRN: [id number]  Primary Care Physician:  Chencho Sterling MD  Date of admission: 2022    Subjective     Interval History:   Follow-up acute kidney injury on chronic kidney disease    Patient is feeling much better today denies any chest pain or shortness of air, no orthopnea or PND, no nausea or vomiting, no abdominal pain, no dysuria or gross hematuria.  He is complaining of pain of his right thigh    Review of Systems:   As noted above    Objective     Vitals:   Temp:  [97.8 °F (36.6 °C)-98.9 °F (37.2 °C)] 98 °F (36.7 °C)  Heart Rate:  [50-60] 50  Resp:  [18-20] 18  BP: (131-170)/(55-70) 170/66  Flow (L/min):  [2] 2    Intake/Output Summary (Last 24 hours) at 2022 1121  Last data filed at 2022 0000  Gross per 24 hour   Intake 170 ml   Output 750 ml   Net -580 ml       Physical Exam:    General Appearance: alert, awake, chronically ill, no acute distress   Skin: warm and dry, significant erythema of the right lower extremity with ulceration on the lateral aspect of the shin the erythema appears to be very stable  HEENT: oral mucosa normal, nonicteric sclera  Neck: supple, no JVD  Lungs: CTA, unlabored breathing effort   Heart: RRR, normal S1 and S2, no rub  Abdomen: soft, nontender, nondistended, normoactive bowel  : no palpable bladder  Extremities: 1+ lower extremity edema no significant erythema of the right lower extremity from the knee all the way to the foot does not seem to be expanding.  Neuro: normal speech and mental status     Scheduled Meds:     acetaminophen, 650 mg, Oral, Nightly  aspirin, 81 mg, Oral, Daily  calcium-vitamin D, 1 tablet, Oral, BID  carvedilol, 6.25 mg, Oral, BID With Meals  cefepime, 2 g, Intravenous, Q12H  enoxaparin, 40 mg, Subcutaneous, Q24H  famotidine, 40 mg, Oral, BID AC  ferrous sulfate, 325 mg, Oral, Daily With Breakfast  gabapentin, 300 mg, Oral,  TID  insulin lispro, 0-14 Units, Subcutaneous, TID AC  rosuvastatin, 5 mg, Oral, Nightly  senna, 1 tablet, Oral, Daily  sertraline, 75 mg, Oral, Daily  sodium chloride, 10 mL, Intravenous, Q12H  tamsulosin, 0.4 mg, Oral, Nightly      IV Meds:   pain,         Results Reviewed:   I have personally reviewed the results from the time of this admission to 8/14/2022 11:21 EDT     Results from last 7 days   Lab Units 08/14/22  0905 08/13/22 0720 08/12/22  0524   SODIUM mmol/L 141 139 141   POTASSIUM mmol/L 5.1 5.1 5.3*   CHLORIDE mmol/L 104 105 103   CO2 mmol/L 29.8* 25.8 28.6   BUN mg/dL 33* 37* 40*   CREATININE mg/dL 1.25 1.45* 1.82*   CALCIUM mg/dL 9.0 8.6 8.9   BILIRUBIN mg/dL 0.3 0.4 0.8   ALK PHOS U/L 111 89 93   ALT (SGPT) U/L 43* 55* 150*   AST (SGOT) U/L 32 52* 170*   GLUCOSE mg/dL 120* 124* 110*       Estimated Creatinine Clearance: 68.1 mL/min (by C-G formula based on SCr of 1.25 mg/dL).    Results from last 7 days   Lab Units 08/14/22  0905 08/13/22  0720 08/12/22  0524   MAGNESIUM mg/dL 2.1 2.2 2.1   PHOSPHORUS mg/dL 3.1  --   --        Results from last 7 days   Lab Units 08/14/22  0905 08/11/22  0524   URIC ACID mg/dL 7.4* 10.0*       Results from last 7 days   Lab Units 08/14/22  0905 08/13/22  0720 08/12/22  0524 08/11/22  0524   WBC 10*3/mm3 10.45 8.27 8.21 8.40   HEMOGLOBIN g/dL 9.7* 9.7* 11.0* 12.1*   PLATELETS 10*3/mm3 152 124* 181 213             Assessment / Plan     ASSESSMENT:  1.  Chronic kidney disease stage IIIb creatinine was 1.75 on admission today is 1.25, volume status improved, and electrolyte within acceptable  2.  Mild hypokalemia improved potassium today 5.1  3.  Acute on chronic respiratory failure associated with COPD and obstructive sleep apnea appears to be very stable  4.  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  Hypertension associate with chronic kidney  6.  Anemia and thrombocytopenia hemoglobin 9.7 and platelet 152,000.  He is already on oral iron supplement, he  has TIBC 171 and iron saturation 12% he has anemia of chronic disease related to his inflammation and a component of iron deficiency    PLAN:  -Continue the same treatment  -Surveillance labs    Thank you for involving us in the care of Yakov Dubon.  Please feel free to call with any questions.    Matheus Flores MD  08/14/22  11:21 EDT    [facility]  [phone]      Much of this encounter note is an electronic transcription/translation of spoken language to printed text. The electronic translation of spoken language may permit erroneous, or at times, nonsensical words or phrases to be inadvertently transcribed; Although I have reviewed the note for such errors, some may still exist.            Z Plasty Text: The lesion was extirpated to the level of the fat with a #15 scalpel blade.  Given the location of the defect, shape of the defect and the proximity to free margins a Z-plasty was deemed most appropriate for repair.  Using a sterile surgical marker, the appropriate transposition arms of the Z-plasty were drawn incorporating the defect and placing the expected incisions within the relaxed skin tension lines where possible.    The area thus outlined was incised deep to adipose tissue with a #15 scalpel blade.  The skin margins were undermined to an appropriate distance in all directions utilizing iris scissors.  The opposing transposition arms were then transposed into place in opposite direction and anchored with interrupted buried subcutaneous sutures.

## 2022-08-14 NOTE — PROGRESS NOTES
ID note for cellulitis    Subjective:   Right lower extremity continues to be painful now with thigh. No abdominal pain. No f/c/ns  Tolerating abx without rash or diarrhea    Objective:   Temp:  [97.8 °F (36.6 °C)-98.9 °F (37.2 °C)] 98 °F (36.7 °C)  Heart Rate:  [50-60] 50  Resp:  [18-20] 18  BP: (131-170)/(55-70) 170/66  No acute distress, significant confluent erythema the right lower extremity with decrease edema.  Ruptured bulla but no purulence he is mildly confused and tends to confabulate.    8/11 blood cultures 1/2 Enterobacter  8/12 blood cultures 2/2 ngtd          Assessment and plan  Enterobacter septicemia  Right lower extremity cellulitis  Chronic kidney disease stage IIIb    Continue cefepime 2 g IV every 12 hours as dosed for renal dysfunction.   Leg seems to be improving albeit slowly in keeping with very severe cellulitis  Getting MRCP to r/o choledocholithiasis or obstruction   Anticipate discharge on oral antibiotics once improving.  Repeat blood cultures so far no growth to date.

## 2022-08-14 NOTE — PLAN OF CARE
Goal Outcome Evaluation:        Patient is AOX3. Patient does have redness, blisters and edema on the Llq. Patient complains of upper quadrant pain and has been addressed. Patient is pleasant and answers most questions accurately. Talked to AVI menjivar in which she asked for updates.

## 2022-08-14 NOTE — PROGRESS NOTES
[facility]  Inpatient Progress Note    Reason for Follow Up:  Elevated liver enzymes, abnormal liver ultrasound    Subjective     Interval History:   Transaminases improving.  Complaining of upper leg pain.  MRI pending.  No abdominal pain or nausea    Current Facility-Administered Medications:   •  acetaminophen (TYLENOL) tablet 650 mg, 650 mg, Oral, Q4H PRN **OR** acetaminophen (TYLENOL) 160 MG/5ML solution 650 mg, 650 mg, Oral, Q4H PRN **OR** acetaminophen (TYLENOL) suppository 650 mg, 650 mg, Rectal, Q4H PRN, Bernie Robbins APRN  •  acetaminophen (TYLENOL) tablet 650 mg, 650 mg, Oral, Nightly, Bernie Robbins APRN, 650 mg at 08/13/22 2136  •  aspirin chewable tablet 81 mg, 81 mg, Oral, Daily, Bernie Robbins APREKLIN, 81 mg at 08/14/22 0941  •  calcium-vitamin D 500-200 MG-UNIT tablet 1 tablet, 1 tablet, Oral, BID, Bernie Robbins APRN, 1 tablet at 08/13/22 2136  •  carvedilol (COREG) tablet 6.25 mg, 6.25 mg, Oral, BID With Meals, Bernie Robbins APRN, 6.25 mg at 08/14/22 0941  •  cefepime 2 gm IVPB in 100 ml NS (VTB), 2 g, Intravenous, Q12H, Lenny Rosa MD, 2 g at 08/14/22 0949  •  dextrose (D50W) (25 g/50 mL) IV injection 25 g, 25 g, Intravenous, Q15 Min PRN, Bernie Robbins APRKELIN  •  dextrose (GLUTOSE) oral gel 15 g, 15 g, Oral, Q15 Min PRN, Bernie Robbins APRKELIN  •  Enoxaparin Sodium (LOVENOX) syringe 40 mg, 40 mg, Subcutaneous, Q24H, Bernie Robbins APRN, 40 mg at 08/13/22 1217  •  famotidine (PEPCID) tablet 40 mg, 40 mg, Oral, BID AC, Bernie Robbins APRN, 40 mg at 08/14/22 0941  •  ferrous sulfate tablet 325 mg, 325 mg, Oral, Daily With Breakfast, Bernie Robbins APRN, 325 mg at 08/14/22 0941  •  gabapentin (NEURONTIN) capsule 300 mg, 300 mg, Oral, TID, Bernie Robbins APRN, 300 mg at 08/14/22 0941  •  glucagon (human recombinant) (GLUCAGEN DIAGNOSTIC) injection 1 mg, 1 mg, Intramuscular, Q15 Min PRN, Rosendo  CECILE Abel  •  insulin lispro (ADMELOG) injection 0-14 Units, 0-14 Units, Subcutaneous, TID AC, Bernie Robbins APRN, 3 Units at 08/13/22 1745  •  morphine injection 2 mg, 2 mg, Intravenous, Q3H PRN, 2 mg at 08/13/22 0909 **AND** naloxone (NARCAN) injection 0.4 mg, 0.4 mg, Intravenous, Q5 Min PRN, Bernie Robbins APRN  •  nitroglycerin (NITROSTAT) SL tablet 0.4 mg, 0.4 mg, Sublingual, Q5 Min PRN, Bernie Robbins APRN  •  ondansetron (ZOFRAN) injection 4 mg, 4 mg, Intravenous, Q6H PRN, Bernie Robbins APRN  •  oxyCODONE-acetaminophen (PERCOCET) 7.5-325 MG per tablet 1 tablet, 1 tablet, Oral, Q4H PRN, Bernie Robbins APRN, 1 tablet at 08/13/22 2136  •  Patient Supplied Pain Pump, , Intrathecal, Continuous, Joshua Taylor MD, Currently Infusing at 08/11/22 1712  •  polyethylene glycol (MIRALAX) packet 17 g, 17 g, Oral, Daily PRN, Bernie Robbins APRN  •  rosuvastatin (CRESTOR) tablet 5 mg, 5 mg, Oral, Nightly, Bernie Robbins APRN, 5 mg at 08/13/22 2136  •  senna (SENOKOT) tablet 1 tablet, 1 tablet, Oral, Daily, Bernie Robbins APRN, 1 tablet at 08/14/22 0941  •  sertraline (ZOLOFT) tablet 75 mg, 75 mg, Oral, Daily, Bernie Robbins APRN, 75 mg at 08/14/22 0941  •  [COMPLETED] Insert peripheral IV, , , Once **AND** sodium chloride 0.9 % flush 10 mL, 10 mL, Intravenous, PRN, Stefan Crowley MD  •  sodium chloride 0.9 % flush 10 mL, 10 mL, Intravenous, Q12H, Bernie Robbins APRN, 10 mL at 08/14/22 0942  •  sodium chloride 0.9 % flush 10 mL, 10 mL, Intravenous, PRN, Bernie Robbins APRN  •  tamsulosin (FLOMAX) 24 hr capsule 0.4 mg, 0.4 mg, Oral, Nightly, Bernie Robbins APRN, 0.4 mg at 08/13/22 2773  Review of Systems:   All systems reviewed and negative except for: Musculoskeletal: Upper leg pain of right leg      Objective     Vital Signs  Temp:  [97.8 °F (36.6 °C)-98.9 °F (37.2 °C)] 98 °F (36.7 °C)  Heart Rate:  [50-60] 50  Resp:  [18-20] 18  BP:  (131-170)/(55-70) 170/66  Body mass index is 39.85 kg/m².    Intake/Output Summary (Last 24 hours) at 8/14/2022 1035  Last data filed at 8/14/2022 0000  Gross per 24 hour   Intake 170 ml   Output 750 ml   Net -580 ml     No intake/output data recorded.     Physical Exam:   General: patient awake, alert and cooperative   Eyes: Normal lids and lashes, no scleral icterus   Neck: supple, normal ROM   Skin: warm and dry, not jaundiced   Cardiovascular: regular rhythm and rate, no murmurs auscultated   Pulm: clear to auscultation bilaterally, regular and unlabored   Abdomen: soft, obese, nontender, nondistended; normal bowel sounds   Rectal: deferred   Extremities: Severe inflammation and desquamation of right lower extremity to just below knee   Psychiatric: Normal mood and behavior; memory intact     Results Review:     I reviewed the patient's new clinical results.    Results from last 7 days   Lab Units 08/14/22  0905 08/13/22  0720 08/12/22  0524   WBC 10*3/mm3 10.45 8.27 8.21   HEMOGLOBIN g/dL 9.7* 9.7* 11.0*   HEMATOCRIT % 30.3* 30.0* 33.6*   PLATELETS 10*3/mm3 152 124* 181     Results from last 7 days   Lab Units 08/14/22  0905 08/13/22  0720 08/12/22  0524   SODIUM mmol/L 141 139 141   POTASSIUM mmol/L 5.1 5.1 5.3*   CHLORIDE mmol/L 104 105 103   CO2 mmol/L 29.8* 25.8 28.6   BUN mg/dL 33* 37* 40*   CREATININE mg/dL 1.25 1.45* 1.82*   CALCIUM mg/dL 9.0 8.6 8.9   BILIRUBIN mg/dL 0.3 0.4 0.8   ALK PHOS U/L 111 89 93   ALT (SGPT) U/L 43* 55* 150*   AST (SGOT) U/L 32 52* 170*   GLUCOSE mg/dL 120* 124* 110*         Lab Results   Lab Value Date/Time    LIPASE 14 08/14/2022 0905       Radiology:  US Liver   Final Result       Cholelithiasis and biliary sludge with common bile duct and intrahepatic   biliary ductal dilatation. There is poor visualization of the distal   common bile duct. Findings raise concern for choledocholithiasis or   other distal obstructing process. Recommend further evaluation with ERCP   or  contrast-enhanced MRCP.       This report was finalized on 8/12/2022 9:00 PM by Dr. Ping Bone M.D.          CT Lower Extremity Bilateral Without Contrast   Final Result   Nonspecific soft tissue edema in both lower legs   asymmetrically more prominent on the right. There are several blisters   at the level of the dermis of the right lower leg proximally. However,   no soft tissue gas or abscess collection is present.       This report was finalized on 8/11/2022 2:38 PM by Dr. Joshua Mendoza M.D.          XR Chest 1 View   Final Result       1. Cardiomegaly with vascular congestion.   2. Nonspecific bibasilar consolidation.       This report was finalized on 8/11/2022 5:59 AM by Dr. Karin Weathers M.D.          XR Tibia Fibula 2 View Right   Final Result       1. Diffuse edema of the right calf.   2. No aggressive osseous abnormalities are seen.       This report was finalized on 8/11/2022 6:01 AM by Dr. Karin Weathers M.D.          MRI abdomen wo contrast mrcp    (Results Pending)       Assessment & Plan     Patient Active Problem List   Diagnosis   • HALIMA (obstructive sleep apnea)   • CSA (central sleep apnea)   • REM behavioral disorder   • Hypersomnia due to medical condition   • Periodic breathing   • Cellulitis of right lower extremity   • HTN (hypertension)   • Type 2 diabetes mellitus with stage 3b chronic kidney disease (HCC)   • Elevated troponin   • RBBB   • Pulmonary vascular congestion   • Diastolic CHF, acute (HCC)   • Elevated LFTs   • Acute respiratory failure with hypoxia (HCC)   • Acute on chronic respiratory failure with hypercapnia (HCC)       Impression  1. Elevated liver enzymes    2. Abnormal RUQ US    3. Enterobacter sepsis from cellulitis    Plan  Await MRCP to rule out choledocholithiasis  Follow CMP  Antibiotics as per ID    I discussed the patients findings and my recommendations with patient.    All necessary PPE, including face mask and eye protection, were worn during  this encounter.  Hand sanitization was performed both before and after the patient interaction.    Over 25 minutes was spent reviewing the patient's chart and records, in discussion with the patient, in examination of the patient, and in discussion with members of the patient's medical team.    Karina Suggs MD

## 2022-08-15 LAB
ALBUMIN SERPL-MCNC: 2.5 G/DL (ref 3.5–5.2)
ALP SERPL-CCNC: 97 U/L (ref 39–117)
ALT SERPL W P-5'-P-CCNC: 32 U/L (ref 1–41)
ANION GAP SERPL CALCULATED.3IONS-SCNC: 6.4 MMOL/L (ref 5–15)
AST SERPL-CCNC: 18 U/L (ref 1–40)
BILIRUB CONJ SERPL-MCNC: <0.2 MG/DL (ref 0–0.3)
BILIRUB INDIRECT SERPL-MCNC: ABNORMAL MG/DL
BILIRUB SERPL-MCNC: 0.2 MG/DL (ref 0–1.2)
BUN SERPL-MCNC: 32 MG/DL (ref 8–23)
BUN/CREAT SERPL: 27.8 (ref 7–25)
CALCIUM SPEC-SCNC: 9 MG/DL (ref 8.6–10.5)
CHLORIDE SERPL-SCNC: 104 MMOL/L (ref 98–107)
CO2 SERPL-SCNC: 28.6 MMOL/L (ref 22–29)
CREAT SERPL-MCNC: 1.15 MG/DL (ref 0.76–1.27)
DEPRECATED RDW RBC AUTO: 49.5 FL (ref 37–54)
EGFRCR SERPLBLD CKD-EPI 2021: 65.1 ML/MIN/1.73
ERYTHROCYTE [DISTWIDTH] IN BLOOD BY AUTOMATED COUNT: 14.5 % (ref 12.3–15.4)
GLUCOSE BLDC GLUCOMTR-MCNC: 110 MG/DL (ref 70–130)
GLUCOSE BLDC GLUCOMTR-MCNC: 117 MG/DL (ref 70–130)
GLUCOSE BLDC GLUCOMTR-MCNC: 136 MG/DL (ref 70–130)
GLUCOSE BLDC GLUCOMTR-MCNC: 161 MG/DL (ref 70–130)
GLUCOSE SERPL-MCNC: 115 MG/DL (ref 65–99)
HCT VFR BLD AUTO: 31.9 % (ref 37.5–51)
HGB BLD-MCNC: 10.1 G/DL (ref 13–17.7)
MAGNESIUM SERPL-MCNC: 2 MG/DL (ref 1.6–2.4)
MCH RBC QN AUTO: 29.7 PG (ref 26.6–33)
MCHC RBC AUTO-ENTMCNC: 31.7 G/DL (ref 31.5–35.7)
MCV RBC AUTO: 93.8 FL (ref 79–97)
PHOSPHATE SERPL-MCNC: 3.3 MG/DL (ref 2.5–4.5)
PLATELET # BLD AUTO: 167 10*3/MM3 (ref 140–450)
PMV BLD AUTO: 9.6 FL (ref 6–12)
POTASSIUM SERPL-SCNC: 5.3 MMOL/L (ref 3.5–5.2)
PROT SERPL-MCNC: 5.6 G/DL (ref 6–8.5)
RBC # BLD AUTO: 3.4 10*6/MM3 (ref 4.14–5.8)
SODIUM SERPL-SCNC: 139 MMOL/L (ref 136–145)
URATE SERPL-MCNC: 6.8 MG/DL (ref 3.4–7)
WBC NRBC COR # BLD: 7.55 10*3/MM3 (ref 3.4–10.8)

## 2022-08-15 PROCEDURE — 99232 SBSQ HOSP IP/OBS MODERATE 35: CPT | Performed by: INTERNAL MEDICINE

## 2022-08-15 PROCEDURE — 97530 THERAPEUTIC ACTIVITIES: CPT

## 2022-08-15 PROCEDURE — 25010000002 ENOXAPARIN PER 10 MG: Performed by: NURSE PRACTITIONER

## 2022-08-15 PROCEDURE — 85027 COMPLETE CBC AUTOMATED: CPT | Performed by: HOSPITALIST

## 2022-08-15 PROCEDURE — 80076 HEPATIC FUNCTION PANEL: CPT | Performed by: HOSPITALIST

## 2022-08-15 PROCEDURE — 99232 SBSQ HOSP IP/OBS MODERATE 35: CPT | Performed by: PHYSICIAN ASSISTANT

## 2022-08-15 PROCEDURE — 82962 GLUCOSE BLOOD TEST: CPT

## 2022-08-15 PROCEDURE — 83735 ASSAY OF MAGNESIUM: CPT | Performed by: HOSPITALIST

## 2022-08-15 PROCEDURE — 80048 BASIC METABOLIC PNL TOTAL CA: CPT | Performed by: INTERNAL MEDICINE

## 2022-08-15 PROCEDURE — 63710000001 INSULIN LISPRO (HUMAN) PER 5 UNITS: Performed by: NURSE PRACTITIONER

## 2022-08-15 PROCEDURE — 84550 ASSAY OF BLOOD/URIC ACID: CPT | Performed by: INTERNAL MEDICINE

## 2022-08-15 PROCEDURE — 84100 ASSAY OF PHOSPHORUS: CPT | Performed by: INTERNAL MEDICINE

## 2022-08-15 PROCEDURE — 97166 OT EVAL MOD COMPLEX 45 MIN: CPT

## 2022-08-15 PROCEDURE — 25010000002 CEFEPIME PER 500 MG: Performed by: INTERNAL MEDICINE

## 2022-08-15 RX ORDER — FUROSEMIDE 40 MG/1
40 TABLET ORAL DAILY
Status: DISCONTINUED | OUTPATIENT
Start: 2022-08-16 | End: 2022-08-19 | Stop reason: HOSPADM

## 2022-08-15 RX ORDER — CARVEDILOL 12.5 MG/1
12.5 TABLET ORAL 2 TIMES DAILY WITH MEALS
Status: DISCONTINUED | OUTPATIENT
Start: 2022-08-15 | End: 2022-08-19 | Stop reason: HOSPADM

## 2022-08-15 RX ADMIN — CEFEPIME 2 G: 2 INJECTION, POWDER, FOR SOLUTION INTRAVENOUS at 09:52

## 2022-08-15 RX ADMIN — GABAPENTIN 300 MG: 300 CAPSULE ORAL at 09:53

## 2022-08-15 RX ADMIN — ENOXAPARIN SODIUM 40 MG: 100 INJECTION SUBCUTANEOUS at 14:58

## 2022-08-15 RX ADMIN — CEFEPIME 2 G: 2 INJECTION, POWDER, FOR SOLUTION INTRAVENOUS at 23:33

## 2022-08-15 RX ADMIN — FERROUS SULFATE TAB 325 MG (65 MG ELEMENTAL FE) 325 MG: 325 (65 FE) TAB at 09:51

## 2022-08-15 RX ADMIN — FAMOTIDINE 40 MG: 20 TABLET ORAL at 18:13

## 2022-08-15 RX ADMIN — Medication 10 ML: at 20:47

## 2022-08-15 RX ADMIN — GABAPENTIN 300 MG: 300 CAPSULE ORAL at 18:13

## 2022-08-15 RX ADMIN — CALCIUM CARBONATE-VITAMIN D TAB 500 MG-200 UNIT 1 TABLET: 500-200 TAB at 20:47

## 2022-08-15 RX ADMIN — CALCIUM CARBONATE-VITAMIN D TAB 500 MG-200 UNIT 1 TABLET: 500-200 TAB at 09:52

## 2022-08-15 RX ADMIN — CARVEDILOL 6.25 MG: 6.25 TABLET, FILM COATED ORAL at 09:52

## 2022-08-15 RX ADMIN — ASPIRIN 81 MG: 81 TABLET, CHEWABLE ORAL at 09:51

## 2022-08-15 RX ADMIN — Medication 10 ML: at 09:53

## 2022-08-15 RX ADMIN — ROSUVASTATIN CALCIUM 5 MG: 5 TABLET, FILM COATED ORAL at 20:47

## 2022-08-15 RX ADMIN — INSULIN LISPRO 3 UNITS: 100 INJECTION, SOLUTION INTRAVENOUS; SUBCUTANEOUS at 18:13

## 2022-08-15 RX ADMIN — FAMOTIDINE 40 MG: 20 TABLET ORAL at 09:52

## 2022-08-15 RX ADMIN — ACETAMINOPHEN 650 MG: 325 TABLET, FILM COATED ORAL at 20:47

## 2022-08-15 RX ADMIN — SERTRALINE 75 MG: 25 TABLET, FILM COATED ORAL at 09:51

## 2022-08-15 RX ADMIN — GABAPENTIN 300 MG: 300 CAPSULE ORAL at 20:47

## 2022-08-15 RX ADMIN — TAMSULOSIN HYDROCHLORIDE 0.4 MG: 0.4 CAPSULE ORAL at 20:47

## 2022-08-15 RX ADMIN — CEFEPIME 2 G: 2 INJECTION, POWDER, FOR SOLUTION INTRAVENOUS at 18:11

## 2022-08-15 RX ADMIN — CARVEDILOL 12.5 MG: 12.5 TABLET, FILM COATED ORAL at 18:13

## 2022-08-15 RX ADMIN — SENNOSIDES 1 TABLET: 8.6 TABLET, FILM COATED ORAL at 09:51

## 2022-08-15 NOTE — PROGRESS NOTES
Name: Yakov Dubon ADMIT: 2022   : 1945  PCP: Chencho Sterling MD    MRN: [id number] LOS: 4 days   AGE/SEX: 77 y.o. male  ROOM: [facility]   Subjective   Chief Complaint   Patient presents with   • Hypotension   • Fall     Dyspnea fair  On oxygen  On IV ABX  Denies abd pain    ROS  No f/c  No n/v  No cp/palp  No soa/cough    Objective   Vital Signs  Temp:  [98 °F (36.7 °C)-98.1 °F (36.7 °C)] 98 °F (36.7 °C)  Heart Rate:  [48-64] 54  Resp:  [18] 18  BP: (163-194)/(67-85) 189/85  SpO2:  [92 %-99 %] 92 %  on  Flow (L/min):  [1] 1;   Device (Oxygen Therapy): nasal cannula  Body mass index is 38.07 kg/m².    Physical Exam  Constitutional:       Appearance: He is obese. He is ill-appearing (chronically).   HENT:      Head: Normocephalic and atraumatic.   Eyes:      General: No scleral icterus.  Cardiovascular:      Rate and Rhythm: Normal rate and regular rhythm.      Heart sounds: Normal heart sounds.   Pulmonary:      Effort: Pulmonary effort is normal. No respiratory distress.      Breath sounds: Normal breath sounds.   Abdominal:      General: There is no distension.      Palpations: Abdomen is soft.      Tenderness: There is no abdominal tenderness.   Musculoskeletal:      Cervical back: Neck supple.      Right lower leg: Edema present.      Left lower leg: Edema present.   Skin:     Findings: Erythema (RLE) present.   Neurological:      Mental Status: He is alert.   Psychiatric:         Behavior: Behavior normal.         Results Review:       I reviewed the patient's new clinical results.  Results from last 7 days   Lab Units 08/15/22  0622  0905 22  0722  0524   WBC 10*3/mm3 7.55 10.45 8.27 8.21   HEMOGLOBIN g/dL 10.1* 9.7* 9.7* 11.0*   PLATELETS 10*3/mm3 167 152 124* 181     Results from last 7 days   Lab Units 08/15/22  0623 22  0905 22  0720 22  0524   SODIUM mmol/L 139 141 139 141   POTASSIUM mmol/L 5.3* 5.1 5.1 5.3*   CHLORIDE mmol/L 104 104 105  103   CO2 mmol/L 28.6 29.8* 25.8 28.6   BUN mg/dL 32* 33* 37* 40*   CREATININE mg/dL 1.15 1.25 1.45* 1.82*   GLUCOSE mg/dL 115* 120* 124* 110*   Estimated Creatinine Clearance: 72.3 mL/min (by C-G formula based on SCr of 1.15 mg/dL).  Results from last 7 days   Lab Units 08/15/22  0623 08/14/22  0905 08/13/22  0720 08/12/22  0524   ALBUMIN g/dL 2.50* 2.60* 2.40* 3.20*   BILIRUBIN mg/dL 0.2 0.3 0.4 0.8   ALK PHOS U/L 97 111 89 93   AST (SGOT) U/L 18 32 52* 170*   ALT (SGPT) U/L 32 43* 55* 150*     Results from last 7 days   Lab Units 08/15/22  0623 08/14/22  0905 08/13/22  0720 08/12/22  0524   CALCIUM mg/dL 9.0 9.0 8.6 8.9   ALBUMIN g/dL 2.50* 2.60* 2.40* 3.20*   MAGNESIUM mg/dL 2.0 2.1 2.2 2.1   PHOSPHORUS mg/dL 3.3 3.1  --   --      Results from last 7 days   Lab Units 08/13/22  0720 08/12/22  0524 08/11/22  0524   PROCALCITONIN ng/mL 6.95* 15.00* 3.39*   LACTATE mmol/L 1.1 2.1* 2.2*       Coag     HbA1C   Lab Results   Component Value Date    HGBA1C 6.00 (H) 08/11/2022    HGBA1C 7.8 (H) 02/01/2019    HGBA1C 9.2 (H) 12/01/2018     Infection   Results from last 7 days   Lab Units 08/13/22  0720 08/12/22  1418 08/12/22  1346 08/12/22  0524 08/11/22  1126 08/11/22  0539 08/11/22  0524   BLOODCX   --  No growth at 2 days No growth at 2 days  --   --  No growth at 4 days Enterobacter cloacae complex*   WOUNDCX   --   --   --   --  No growth at 3 days  --   --    BCIDPCR   --   --   --   --   --   --  Enterobacter cloacae complex. Identification by BCID2 PCR.*   PROCALCITONIN ng/mL 6.95*  --   --  15.00*  --   --  3.39*     Radiology(recent) No radiology results for the last day  Troponin T   Date Value Ref Range Status   08/13/2022 0.084 (C) 0.000 - 0.030 ng/mL Final     No components found for: TSH;2    acetaminophen, 650 mg, Oral, Nightly  aspirin, 81 mg, Oral, Daily  calcium-vitamin D, 1 tablet, Oral, BID  carvedilol, 12.5 mg, Oral, BID With Meals  cefepime, 2 g, Intravenous, Q8H  enoxaparin, 40 mg, Subcutaneous,  Q24H  famotidine, 40 mg, Oral, BID AC  ferrous sulfate, 325 mg, Oral, Daily With Breakfast  gabapentin, 300 mg, Oral, TID  insulin lispro, 0-14 Units, Subcutaneous, TID AC  rosuvastatin, 5 mg, Oral, Nightly  senna, 1 tablet, Oral, Daily  sertraline, 75 mg, Oral, Daily  sodium chloride, 10 mL, Intravenous, Q12H  tamsulosin, 0.4 mg, Oral, Nightly      pain,     Diet Pureed; Thin; Cardiac      Assessment & Plan      Active Hospital Problems    Diagnosis  POA   • **Cellulitis of right lower extremity [L03.115]  Yes   • Anemia of chronic disease [D63.8]  Yes   • Sepsis due to Gram negative bacteria (HCC) [A41.50]  Yes   • Elevated LFTs [R79.89]  No   • Acute respiratory failure with hypoxia (HCC) [J96.01]  Yes   • Acute on chronic respiratory failure with hypercapnia (HCC) [J96.22]  Yes   • HTN (hypertension) [I10]  Yes   • Type 2 diabetes mellitus with stage 3b chronic kidney disease (HCC) [E11.22, N18.32]  Yes   • Elevated troponin [R77.8]  Yes   • RBBB [I45.10]  Yes   • Pulmonary vascular congestion [R09.89]  Yes   • Diastolic CHF, acute (HCC) [I50.31]  Yes   • CSA (central sleep apnea) [G47.31]  Yes   • HALIMA (obstructive sleep apnea) [G47.33]  Yes      Resolved Hospital Problems   No resolved problems to display.         78yo gentleman who presented from nursing home with lethargy, hypotension, and severe RLE redness/swelling/pain and was admitted to our service with sepsis due to RLE cellulitis.     Sepsis due to RLE cellulitis    IV Cefepime currently  Wound RN following  Venous duplex BLEs negative for DVT  CT BLEs negative for deep infection  Ortho has seen and signed off--no interventions indicated     Volume overload  Quite volume overloaded on admission with peripheral edema, vasc congestion on CXR, and hypoxia  Uncertain CHF history  LVSF okay on Echo here and BNP fine  Trending daily weights and I/Os    HTN  Agents for HTN  Monitor renal fx  Adjustment today by Cardiology     Elevated Trop  Card has seen, no  ACS  EKG okay and no WMA on Echo  Suspect due to CKD     CKD3b  Cr around baseline or better  Mild hyperkalemia   Defer to Renal     Anemia of chronic disease  With component of iron deficiency  Continue oral iron     Acute on chronic hypercapnic resp failure, acute hypoxic resp failure, HALIMA  Appreciate Pulm attention to pt  BiPAP ordered, pt using overnight (not compliant with PAP at facility prior to admission)     Lethargy  Seems better     DM2  A1c 6.0  Lantus on hold for now  Continue SSI  Sugars remain acceptable today     Elevated LFTs  Uncertain etiology--suspect congestive/ischemic  No N/V/anorexia, does have some soreness in RUQ  Viral hep panel negative  plan MRCP  Trending LFTs        · Lovenox 40 mg SC daily for DVT prophylaxis.  · Discussed with patient and nursing staff.   Anticipate discharge back to SNU facility, potentially tomorrow if MRCP ok and if stable          wKasi Ferreira MD  [facility]  08/15/22  13:19 EDT

## 2022-08-15 NOTE — PLAN OF CARE
Problem: Adult Inpatient Plan of Care  Goal: Plan of Care Review  Outcome: Ongoing, Progressing   Goal Outcome Evaluation:  VSS. Pt alert and oriented x4 with periodic confusion. 2L nasal cannula. Rt lower leg red and warm with blisters. Bed alarm on. Pt turned. Pt rested throughout the night with no issues or complaints.

## 2022-08-15 NOTE — NURSING NOTE
08/15/22 1425   Wound 08/15/22 Right lower leg Blisters   Placement Date: 08/15/22   Present on Hospital Admission: Yes  Side: Right  Orientation: lower  Location: leg  Primary Wound Type: Blisters   Base   (cellulitis to RLE, blisters present (roofed and unroofed and denudation to anterior and medial aspect))   Drainage Characteristics/Odor serous   Drainage Amount scant   Care, Wound cleansed with;sterile normal saline   Dressing Care dressing applied  (vaseline gauze to blisters and denuded areas, ABD pad to posterior calf, gentle wrap with kerlix)     Wound/ostomy - new consult received for patient regarding his RLE. I saw this patient on Friday, the leg is slightly  improved, redness has not moved beyond the area marked. He has scattered areas of roofed and unroofed blisters with some denudation. Anterior leg just below knee there are 2 unroofed blisters with some partial thickness skin loss (6x6 cm unroofed blister with 3x3 cm of skin loss; 3x2 cm denuded terminated blister), both appear dry, no weeping. Medial calf there is an unroofed blister 15x2 cm scant weeping, skin is intact but loose and friable; intact blister distal medial leg 3x2 cm). All areas of skin damage protected with vaseline gauze, ABD placed for absorption of weeping, gentle wrap with kerlix to secure and hold in place. This can be done every other day and as needed if the wraps have to removed for assessment of cellulitis. When patient presented to hospital he had on ace and kerlix to LLE from facility. If edema needs to be managed that can be done with application of kerlix/ace. Leg is tender but patient tolerated treatment.     Patient is on a low air loss mattress. The seat portion is low on air, patient sits with HOB very elevated and weight of body concentrates to this area. We flattened bed, max inflated and pulled patient up in the bed. The mattress was improved and placed back to comfort level. Will check bed tomorrow and if it  continues to have lower air on the seat portion we will order a bariatric low air loss mattress. Possibly the standard does not support his weight with his HOB so elevated.

## 2022-08-15 NOTE — PROGRESS NOTES
Hospital Follow Up    LOS:  LOS: 4 days   Patient Name: Yakov Dubon  Age/Sex: 77 y.o. male  : 1945  MRN: [id number]    Day of Service: 08/15/22   Length of Stay: 4  Encounter Provider: Cosme Reed MD  Place of Service: [facility] CARD[facility]OLOG[facility]  Patient Care Team:  Chencho Sterling MD as PCP - General (Internal Medicine)  Reasor, Chris Polo MD as Consulting Physician (Pain Medicine)    Subjective:     Chief Complaint: Dyspnea    Interval History: Patient says about the same may be breathing a little bit better.  Blood pressure is significantly higher.  I reviewed rhythm strips appears he is going in and out of a rate related bundle branch.    Objective:     Objective:  Temp:  [98 °F (36.7 °C)-98.1 °F (36.7 °C)] 98 °F (36.7 °C)  Heart Rate:  [48-64] 61  Resp:  [18] 18  BP: (163-194)/(67-83) 184/82     Intake/Output Summary (Last 24 hours) at 8/15/2022 1106  Last data filed at 8/15/2022 0951  Gross per 24 hour   Intake 100 ml   Output 300 ml   Net -200 ml     Body mass index is 38.07 kg/m².      22  0515 22  0617 08/15/22  0600   Weight: 132 kg (291 lb) 132 kg (291 lb) 126 kg (278 lb)     Weight change: -5.897 kg (-13 lb)      Physical Exam:   General : Alert, cooperative, in no acute distress.  Neuro: Alert,cooperative and oriented.  Lungs: CTAB. Normal respiratory effort and rate.  CV: Regular rate and rhythm, normal S1 and S2, no murmurs, gallops or rubs.  ABD: Soft, nontender, nondistended. Positive bowel sounds.  Extr: Right leg with extensive erythema and mild edema.    Lab Review:   Results from last 7 days   Lab Units 08/15/22  0623 22  0905   SODIUM mmol/L 139 141   POTASSIUM mmol/L 5.3* 5.1   CHLORIDE mmol/L 104 104   CO2 mmol/L 28.6 29.8*   BUN mg/dL 32* 33*   CREATININE mg/dL 1.15 1.25   GLUCOSE mg/dL 115* 120*   CALCIUM mg/dL 9.0 9.0   AST (SGOT) U/L 18 32   ALT (SGPT) U/L 32 43*     Results from last 7 days   Lab Units  08/13/22  0720 08/12/22  0524 08/11/22  0524   CK TOTAL U/L  --   --  497*   TROPONIN T ng/mL 0.084* 0.147* 0.131*     Results from last 7 days   Lab Units 08/15/22  0623 08/14/22  0905   WBC 10*3/mm3 7.55 10.45   HEMOGLOBIN g/dL 10.1* 9.7*   HEMATOCRIT % 31.9* 30.3*   PLATELETS 10*3/mm3 167 152         Results from last 7 days   Lab Units 08/15/22  0623 08/14/22  0905   MAGNESIUM mg/dL 2.0 2.1           Invalid input(s): LDLCALC  Results from last 7 days   Lab Units 08/11/22  0524   PROBNP pg/mL 1,298.0           Current Medications:   Scheduled Meds:acetaminophen, 650 mg, Oral, Nightly  aspirin, 81 mg, Oral, Daily  calcium-vitamin D, 1 tablet, Oral, BID  carvedilol, 6.25 mg, Oral, BID With Meals  cefepime, 2 g, Intravenous, Q8H  enoxaparin, 40 mg, Subcutaneous, Q24H  famotidine, 40 mg, Oral, BID AC  ferrous sulfate, 325 mg, Oral, Daily With Breakfast  gabapentin, 300 mg, Oral, TID  insulin lispro, 0-14 Units, Subcutaneous, TID AC  rosuvastatin, 5 mg, Oral, Nightly  senna, 1 tablet, Oral, Daily  sertraline, 75 mg, Oral, Daily  sodium chloride, 10 mL, Intravenous, Q12H  tamsulosin, 0.4 mg, Oral, Nightly      Continuous Infusions:pain,         Allergies:  Allergies   Allergen Reactions   • Bacitracin Unknown - High Severity   • Gentamycin [Gentamicin] Unknown - High Severity   • Neosporin [Neomycin-Bacitracin Zn-Polymyx] Other (See Comments)     ALLERGY TESTED   • Nsaids Unknown - High Severity   • Tobramycin Unknown - High Severity       Assessment:       Cellulitis of right lower extremity    HALIMA (obstructive sleep apnea)    CSA (central sleep apnea)    HTN (hypertension)    Type 2 diabetes mellitus with stage 3b chronic kidney disease (HCC)    Elevated troponin    RBBB    Pulmonary vascular congestion    Diastolic CHF, acute (HCC)    Elevated LFTs    Acute respiratory failure with hypoxia (HCC)    Acute on chronic respiratory failure with hypercapnia (HCC)    Anemia of chronic disease    Sepsis due to Gram  negative bacteria (formerly [facility])        Plan:     1.  Cellulitis per other physicians  2.   Elevated troponin.  More likely this is just demand ischemia.  EF was fine by echo on 8/11/2022 with no wall motion abnormality.  3.  Monitor reviewed appears to be an intermittent bundle branch block.  Blood pressure is fine he is asymptomatic.  He is also having some PVCs.  Again EF is fine.  4.  Hypertension blood pressure is markedly elevated.  I am going to increase his carvedilol.  We will watch his heart rates but I think this is the best choice given the current scenario.  If heart rate decreases and I would consider ACE inhibitor versus hydralazine.    Cosme Reed MD  08/15/22  11:06 EDT

## 2022-08-15 NOTE — THERAPY EVALUATION
"Patient Name: Yakov Dubon  : 1945    MRN: [id number]                              Today's Date: 8/15/2022       Admit Date: 2022    Visit Dx:     ICD-10-CM ICD-9-CM   1. Cellulitis of right lower extremity  L03.115 682.6   2. Elevated troponin level not due to acute coronary syndrome  R77.8 790.6   3. Sepsis, due to unspecified organism, unspecified whether acute organ dysfunction present ([facility])  A41.9 038.9     995.91   4. Type 2 diabetes mellitus without complication, unspecified whether long term insulin use ([facility])  E11.9 250.00   5. 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 condition   • Periodic breathing   • Cellulitis of right lower extremity   • HTN (hypertension)   • Type 2 diabetes mellitus with stage 3b chronic kidney disease ([facility])   • Elevated troponin   • RBBB   • Pulmonary vascular congestion   • Diastolic CHF, acute ([facility])   • Elevated LFTs   • Acute respiratory failure with hypoxia ([facility])   • Acute on chronic respiratory failure with hypercapnia ([facility])   • Anemia of chronic disease   • Sepsis due to Gram negative bacteria ([facility])     Past Medical History:   Diagnosis Date   • Anxiety    • Back pain    • Depression    • Diabetes mellitus ([facility])     \"BORDERLINE\"   • High cholesterol    • Hypertension    • Kidney disease, chronic, stage III (GFR 30-59 ml/min) ([facility])    • Reflux esophagitis    • Sleep apnea      Past Surgical History:   Procedure Laterality Date   • APPENDECTOMY     • CARPAL TUNNEL RELEASE     • CATARACT EXTRACTION     • HAMMER TOE REPAIR     • LUMBAR SPINE SURGERY     • PAIN PUMP INSERTION/REVISION Right 2016    Procedure: RT PAIN PUMP REPLACEMENT;  Surgeon: Chris Messer MD;  Location: [facility] OR;  Service:    • TOTAL KNEE ARTHROPLASTY Left       General Information     Row Name " 08/15/22 1619          OT Time and Intention    Document Type evaluation;therapy note (daily note)  -LE     Mode of Treatment occupational therapy;individual therapy  -     Row Name 08/15/22 1619          General Information    Patient Profile Reviewed yes  -LE     Prior Level of Function --  pt reports does on own ADL, meals brought to room.  -LE     Existing Precautions/Restrictions fall  RN states OK for activity, R LE wrapped and covered by WOCN and  RN wants pt to be up moving.  -LE     Barriers to Rehab cognitive status  -     Row Name 08/15/22 1619          Living Environment    People in Home facility resident  -     Row Name 08/15/22 1619          Cognition    Orientation Status (Cognition) oriented to;person;place  -     Row Name 08/15/22 1619          Safety Issues, Functional Mobility    Safety Issues Affecting Function (Mobility) insight into deficits/self-awareness;judgment;problem-solving  delay in reponses and processing  -LE     Comment, Safety Issues/Impairments (Mobility) no skid socks and gait belt.  -LE           User Key  (r) = Recorded By, (t) = Taken By, (c) = Cosigned By    Initials Name Provider Type    LE Shea Freitas OTR Occupational Therapist                 Mobility/ADL's     Row Name 08/15/22 1622          Bed Mobility    Bed Mobility supine-sit;sit-supine  -LE     Supine-Sit [place] (Bed Mobility) moderate assist (50% patient effort);nonverbal cues (demo/gesture);verbal cues  -LE     Sit-Supine [place] (Bed Mobility) moderate assist (50% patient effort);verbal cues;nonverbal cues (demo/gesture)  -LE     Bed Mobility, Safety Issues decreased use of arms for pushing/pulling;decreased use of legs for bridging/pushing;impaired trunk control for bed mobility  -LE     Assistive Device (Bed Mobility) bed rails;head of bed elevated  -LE     Row Name 08/15/22 1622          Transfers    Transfers stand-sit transfer;sit-stand transfer  -LE     Sit-Stand [place]  (Transfers) moderate assist (50% patient effort);nonverbal cues (demo/gesture);verbal cues  -     Stand-Sit [place] (Transfers) nonverbal cues (demo/gesture);verbal cues;moderate assist (50% patient effort)  -     Row Name 08/15/22 1622          Sit-Stand Transfer    Assistive Device (Sit-Stand Transfers) walker, front-wheeled  -LE     Row Name 08/15/22 1622          Stand-Sit Transfer    Assistive Device (Stand-Sit Transfers) walker, front-wheeled  -LE     Row Name 08/15/22 1622          Functional Mobility    Functional Mobility- Comment mod A for slight weight shift up and back and sidestep.  -[facility] Name 08/15/22 1622          Activities of Daily Living    BADL Assessment/Intervention grooming;lower body dressing;toileting  -LE     Row Name 08/15/22 1622          Toileting Assessment/Training    Comment, (Toileting) using urinal with set up for placement per RN  -     Row Name 08/15/22 1622          Lower Body Dressing Assessment/Training    [place] Level (Lower Body Dressing) don;socks;dependent (less than 25% patient effort)  -     Position (Lower Body Dressing) supine  -           User Key  (r) = Recorded By, (t) = Taken By, (c) = Cosigned By    Initials Name Provider Type    Shea Cagle OTR Occupational Therapist               Obj/Interventions     [facility] Name 08/15/22 1625          Sensory Assessment (Somatosensory)    Sensory Assessment (Somatosensory) unable/difficult to assess  -LE     Row Name 08/15/22 1625          Range of Motion Comprehensive    Comment, General Range of Motion chronic impaired R shld and mild limited R shld. distal B about 2/3 AROM.  crepidation noted with all movement  -LE     Row Name 08/15/22 1625          Strength Comprehensive (MMT)    Comment, General Manual Muscle Testing (MMT) Assessment fair B .  -[facility] Name 08/15/22 1625          Shoulder (Therapeutic Exercise)    Shoulder (Therapeutic Exercise) AAROM (active assistive range of motion)   -LE     Shoulder AAROM (Therapeutic Exercise) bilateral;flexion;extension;sitting;10 repetitions  -LE     Row Name 08/15/22 1625          Motor Skills    Therapeutic Exercise shoulder  -LE     Row Name 08/15/22 1625          Balance    Balance Assessment sitting static balance;standing static balance;standing dynamic balance  -LE     Static Sitting Balance standby assist  -LE     Static Standing Balance moderate assist;non-verbal cues (demo/gesture);verbal cues  -LE     Position/Device Used, Standing Balance supported;walker, rolling  -LE     Balance Interventions standing;supported;static  -LE     Comment, Balance pt with flexed posture over walker. OT cues and assist for upright posture with minimal return demo  -LE           User Key  (r) = Recorded By, (t) = Taken By, (c) = Cosigned By    Initials Name Provider Type    Shea Cagle OTR Occupational Therapist               Goals/Plan     Row Name 08/15/22 1631          Transfer Goal 1 (OT)    Activity/Assistive Device (Transfer Goal 1, OT) sit-to-stand/stand-to-sit;bed-to-chair/chair-to-bed;toilet  -LE     [place] Level/Cues Needed (Transfer Goal 1, OT) contact guard required  -LE     Time Frame (Transfer Goal 1, OT) 2 weeks  -LE     Progress/Outcome (Transfer Goal 1, OT) goal ongoing  -     Row Name 08/15/22 1631          Dressing Goal 1 (OT)    Activity/Device (Dressing Goal 1, OT) upper body dressing  -LE     [place]/Cues Needed (Dressing Goal 1, OT) standby assist;set-up required  -LE     Time Frame (Dressing Goal 1, OT) 2 weeks  -LE     Progress/Outcome (Dressing Goal 1, OT) goal ongoing  -     Row Name 08/15/22 1631          Toileting Goal 1 (OT)    Activity/Device (Toileting Goal 1, OT) toileting skills, all  -LE     [place] Level/Cues Needed (Toileting Goal 1, OT) moderate assist (50-74% patient effort)  -LE     Time Frame (Toileting Goal 1, OT) 2 weeks  -LE     Progress/Outcome (Toileting Goal 1, OT) goal ongoing  -     Row Name  08/15/22 1631          Grooming Goal 1 (OT)    Activity/Device (Grooming Goal 1, OT) grooming skills, all  -LE     [place] (Grooming Goal 1, OT) set-up required;standby assist  -LE     Time Frame (Grooming Goal 1, OT) 2 weeks  -LE     Progress/Outcome (Grooming Goal 1, OT) goal ongoing  -LE     Row Name 08/15/22 1631          ROM Goal 1 (OT)    ROM Goal 1 (OT) B UE AAROM to increase functional reach and hold during ADL tasks.  -LE     Time Frame (ROM Goal 1, OT) 2 weeks  -LE     Progress/Outcome (ROM Goal 1, OT) goal ongoing  -LE     Row Name 08/15/22 1631          Problem Specific Goal 1 (OT)    Problem Specific Goal 1 (OT) Min A to ambulate to/from sink to groom.  -LE     Time Frame (Problem Specific Goal 1, OT) 2 weeks  -LE     Progress/Outcome (Problem Specific Goal 1, OT) goal ongoing  -LE     Row Name 08/15/22 1631          Therapy Assessment/Plan (OT)    Planned Therapy Interventions (OT) activity tolerance training;adaptive equipment training;BADL retraining;patient/caregiver education/training;transfer/mobility retraining;ROM/therapeutic exercise;occupation/activity based interventions;functional balance retraining  -LE           User Key  (r) = Recorded By, (t) = Taken By, (c) = Cosigned By    Initials Name Provider Type    Shea Cagle OTR Occupational Therapist               Clinical Impression     Row Name 08/15/22 1627          Pain Assessment    Pretreatment Pain Rating 4/10  -LE     Posttreatment Pain Rating 4/10  -LE     Pain Location - Side/Orientation Right  -LE     Pain Location lower  -LE     Pain Location - extremity  -LE     Pre/Posttreatment Pain Comment c/o pain only when standing at walker  -LE     Pain Intervention(s) Rest;Repositioned;Emotional support  -LE     Row Name 08/15/22 1627          Plan of Care Review    Plan of Care Reviewed With patient  -LE     Outcome Evaluation Pt admit when found down at nursing facility.  Difficulty to clarify prior level of function but pt  reports doing tasks on own.  Pt seen by OT and jerrod impaired processing and responses, mod A to move to sit EOB, MOd A to stand at walker with flexed posture and difficult to weight shift to attempt sidestepping.  Pt requiring assist with ADL tasks and may benefit from skilled OT to increase safety and independence.  -LE     Row Name 08/15/22 1627          Therapy Assessment/Plan (OT)    Rehab Potential (OT) fair, will monitor progress closely  -LE     Criteria for Skilled Therapeutic Interventions Met (OT) meets criteria;yes  -LE     Therapy Frequency (OT) 3 times/wk  -LE     Row Name 08/15/22 1627          Therapy Plan Review/Discharge Plan (OT)    Anticipated Discharge Disposition (OT) skilled nursing facility  -LE     Row Name 08/15/22 1627          Vital Signs    O2 Delivery Pre Treatment room air  -LE     Pre Patient Position Supine  -LE     Intra Patient Position Standing  -LE     Post Patient Position Supine  -LE     Row Name 08/15/22 1627          Positioning and Restraints    Pre-Treatment Position in bed  -LE     Post Treatment Position bed  -LE     In Bed notified nsg;fowlers;call light within reach;encouraged to call for assist;exit alarm on;RLE elevated;LLE elevated  -LE           User Key  (r) = Recorded By, (t) = Taken By, (c) = Cosigned By    Initials Name Provider Type    Shea Cagle OTR Occupational Therapist               Outcome Measures     Row Name 08/15/22 1633          How much help from another is currently needed...    Putting on and taking off regular lower body clothing? 1  -LE     Bathing (including washing, rinsing, and drying) 2  -LE     Toileting (which includes using toilet bed pan or urinal) 2  -LE     Putting on and taking off regular upper body clothing 2  -LE     Taking care of personal grooming (such as brushing teeth) 2  -LE     Eating meals 3  -LE     AM-PAC 6 Clicks Score (OT) 12  -LE     Row Name 08/15/22 0856          How much help from another person do you currently  need...    Turning from your back to your side while in flat bed without using bedrails? 2  -SM     Moving from lying on back to sitting on the side of a flat bed without bedrails? 2  -SM     Moving to and from a bed to a chair (including a wheelchair)? 2  -SM     Standing up from a chair using your arms (e.g., wheelchair, bedside chair)? 2  -SM     Climbing 3-5 steps with a railing? 2  -SM     To walk in hospital room? 2  -SM     AM-PAC 6 Clicks Score (PT) 12  -SM     Highest level of mobility 4 --> Transferred to chair/commode  -SM     Row Name 08/15/22 1633          Functional Assessment    Outcome Measure Options AM-PAC 6 Clicks Daily Activity (OT);Modified [place]  -MARGOT           User Key  (r) = Recorded By, (t) = Taken By, (c) = Cosigned By    Initials Name Provider Type    Shea Cagle OTR Occupational Therapist    Fara Kwan, RN Registered Nurse                Occupational Therapy Education                 Title: PT OT SLP Therapies (In Progress)     Topic: Occupational Therapy (Done)     Point: ADL training (Done)     Description:   Instruct learner(s) on proper safety adaptation and remediation techniques during self care or transfers.   Instruct in proper use of assistive devices.              Learning Progress Summary           Patient Acceptance, E, Bed IU by MARGOT at 8/15/2022 1634    Comment: role of OT,plan of care, balance and posture cueing, fall risk, us eof call light.                   Point: Precautions (Done)     Description:   Instruct learner(s) on prescribed precautions during self-care and functional transfers.              Learning Progress Summary           Patient Acceptance, E, Bed IU by MARGOT at 8/15/2022 1634    Comment: role of OT,plan of care, balance and posture cueing, fall risk, us eof call light.                   Point: Body mechanics (Done)     Description:   Instruct learner(s) on proper positioning and spine alignment during self-care, functional mobility activities  and/or exercises.              Learning Progress Summary           Patient Acceptance, E, Bed IU by LE at 8/15/2022 1639    Comment: role of OT,plan of care, balance and posture cueing, fall risk, us eof call light.                               User Key     Initials Effective Dates Name Provider Type Discipline    LE 06/16/21 -  Shea Freitas, OTR Occupational Therapist OT              OT Recommendation and Plan  Planned Therapy Interventions (OT): activity tolerance training, adaptive equipment training, BADL retraining, patient/caregiver education/training, transfer/mobility retraining, ROM/therapeutic exercise, occupation/activity based interventions, functional balance retraining  Therapy Frequency (OT): 3 times/wk  Plan of Care Review  Plan of Care Reviewed With: patient  Outcome Evaluation: Pt admit when found down at nursing facility.  Difficulty to clarify prior level of function but pt reports doing tasks on own.  Pt seen by OT and demo impaired processing and responses, mod A to move to sit EOB, MOd A to stand at walker with flexed posture and difficult to weight shift to attempt sidestepping.  Pt requiring assist with ADL tasks and may benefit from skilled OT to increase safety and independence.     Time Calculation:    Time Calculation- OT     Row Name 08/15/22 1635             Time Calculation- OT    OT Start Time 1502  -LE      OT Stop Time 1516  -LE      OT Time Calculation (min) 14 min  -LE      Total Timed Code Minutes- OT 8 minute(s)  -LE      OT Received On 08/15/22  -LE      OT - Next Appointment 08/16/22  -LE      OT Goal Re-Cert Due Date 08/29/22  -LE              Timed Charges    06461 - OT Therapeutic Activity Minutes 8  -LE              Untimed Charges    OT Eval/Re-eval Minutes 6  -LE              Total Minutes    Timed Charges Total Minutes 8  -LE      Untimed Charges Total Minutes 6  -LE       Total Minutes 14  -LE            User Key  (r) = Recorded By, (t) = Taken By, (c) = Cosigned By     Initials Name Provider Type    Shea Cagle OTR Occupational Therapist              Therapy Charges for Today     Code Description Service Date Service Provider Modifiers Qty    [id number]  OT EVAL MOD COMPLEXITY 2 8/15/2022 Shea Freitas OTR GO 1    [id number]  OT THERAPEUTIC ACT EA 15 MIN 8/15/2022 Shea Freitas OTR GO 1               JIGNA New  8/15/2022

## 2022-08-15 NOTE — PROGRESS NOTES
pulm stable  Continue AVAPS at hs while here  Continue home ASV device on d/c  Follow up outpatient with Dr. Reddy  Will see prn while here.

## 2022-08-15 NOTE — PLAN OF CARE
Goal Outcome Evaluation:     Pt. Is AOX3, patient does not complain of any pain at this moment. Legs are wrapped by wound care nurse. No new nursing concerns.

## 2022-08-15 NOTE — PLAN OF CARE
Goal Outcome Evaluation:  Plan of Care Reviewed With: patient           Outcome Evaluation: Pt admit when found down at nursing facility.  Difficulty to clarify prior level of function but pt reports doing tasks on own.  Pt seen by OT and demo impaired processing and responses, mod A to move to sit EOB, MOd A to stand at walker with flexed posture and difficult to weight shift to attempt sidestepping.  Pt requiring assist with ADL tasks and may benefit from skilled OT to increase safety and independence.

## 2022-08-15 NOTE — PROGRESS NOTES
[facility]  Inpatient Progress Note    Reason for Follow-up:  Elevated liver enzymes, abnormal liver ultrasound    Subjective     Interval History:   No abdominal pain, nausea or vomiting. Patient refusing MRI. LFTs normalized.  Only complaint of his right leg pain.      Current Facility-Administered Medications:   •  acetaminophen (TYLENOL) tablet 650 mg, 650 mg, Oral, Q4H PRN **OR** acetaminophen (TYLENOL) 160 MG/5ML solution 650 mg, 650 mg, Oral, Q4H PRN **OR** acetaminophen (TYLENOL) suppository 650 mg, 650 mg, Rectal, Q4H PRN, Bernie Robbins APRN  •  acetaminophen (TYLENOL) tablet 650 mg, 650 mg, Oral, Nightly, Bernie Robbins APRN, 650 mg at 08/14/22 2018  •  aspirin chewable tablet 81 mg, 81 mg, Oral, Daily, Bernie Robbins APRN, 81 mg at 08/15/22 0951  •  calcium-vitamin D 500-200 MG-UNIT tablet 1 tablet, 1 tablet, Oral, BID, Bernie Robbins APRN, 1 tablet at 08/15/22 0952  •  carvedilol (COREG) tablet 12.5 mg, 12.5 mg, Oral, BID With Meals, Cosme Reed MD  •  cefepime 2 gm IVPB in 100 ml NS (VTB), 2 g, Intravenous, Q8H, Kwasi Ferreira MD, 2 g at 08/15/22 0952  •  dextrose (D50W) (25 g/50 mL) IV injection 25 g, 25 g, Intravenous, Q15 Min PRN, Bernie Robbins APRKELIN  •  dextrose (GLUTOSE) oral gel 15 g, 15 g, Oral, Q15 Min PRN, Bernie Robbins APRN  •  Enoxaparin Sodium (LOVENOX) syringe 40 mg, 40 mg, Subcutaneous, Q24H, Bernie Robbins APRN, 40 mg at 08/14/22 1412  •  famotidine (PEPCID) tablet 40 mg, 40 mg, Oral, BID AC, Bernie Robbins APRN, 40 mg at 08/15/22 0952  •  ferrous sulfate tablet 325 mg, 325 mg, Oral, Daily With Breakfast, Bernie Robbins APRN, 325 mg at 08/15/22 0951  •  gabapentin (NEURONTIN) capsule 300 mg, 300 mg, Oral, TID, Bernie Robbins APRN, 300 mg at 08/15/22 0953  •  glucagon (human recombinant) (GLUCAGEN DIAGNOSTIC) injection 1 mg, 1 mg, Intramuscular, Q15 Min PRN, Bernie Robbins, CECILE  •   insulin lispro (ADMELOG) injection 0-14 Units, 0-14 Units, Subcutaneous, TID AC, Bernie Robbins, APRN, 3 Units at 08/13/22 1745  •  morphine injection 2 mg, 2 mg, Intravenous, Q3H PRN, 2 mg at 08/13/22 0909 **AND** naloxone (NARCAN) injection 0.4 mg, 0.4 mg, Intravenous, Q5 Min PRN, Bernie Robbins APRN  •  nitroglycerin (NITROSTAT) SL tablet 0.4 mg, 0.4 mg, Sublingual, Q5 Min PRN, Bernie Robbins APRN  •  ondansetron (ZOFRAN) injection 4 mg, 4 mg, Intravenous, Q6H PRN, Bernie Robbins APRN  •  oxyCODONE-acetaminophen (PERCOCET) 7.5-325 MG per tablet 1 tablet, 1 tablet, Oral, Q4H PRN, Bernie Robbins APRN, 1 tablet at 08/13/22 2136  •  Patient Supplied Pain Pump, , Intrathecal, Continuous, Joshua Taylor MD, Currently Infusing at 08/11/22 1712  •  polyethylene glycol (MIRALAX) packet 17 g, 17 g, Oral, Daily PRN, Bernie Robbins APRN  •  rosuvastatin (CRESTOR) tablet 5 mg, 5 mg, Oral, Nightly, Bernie Robbins APRN, 5 mg at 08/14/22 2018  •  senna (SENOKOT) tablet 1 tablet, 1 tablet, Oral, Daily, Bernie oRbbins APRN, 1 tablet at 08/15/22 0951  •  sertraline (ZOLOFT) tablet 75 mg, 75 mg, Oral, Daily, Bernie Robbins APRN, 75 mg at 08/15/22 0951  •  [COMPLETED] Insert peripheral IV, , , Once **AND** sodium chloride 0.9 % flush 10 mL, 10 mL, Intravenous, PRN, Stefan Crowley MD  •  sodium chloride 0.9 % flush 10 mL, 10 mL, Intravenous, Q12H, Bernie Robbins APRN, 10 mL at 08/15/22 0953  •  sodium chloride 0.9 % flush 10 mL, 10 mL, Intravenous, PRN, Bernie Robbins APRN  •  tamsulosin (FLOMAX) 24 hr capsule 0.4 mg, 0.4 mg, Oral, Nightly, Bernie Robbins APRN, 0.4 mg at 08/14/22 2018  Review of Systems:    Constitutional: No fevers, chills, sweats   Respiratory: No shortness of breath, cough   Cardiovascular: No Chest pain, palpitations   Gastrointestinal: No nausea, vomiting, diarrhea   Genitourinary: No hematuria, dysuria    Objective     Vital  Signs  Temp:  [98 °F (36.7 °C)-98.1 °F (36.7 °C)] 98 °F (36.7 °C)  Heart Rate:  [48-64] 54  Resp:  [18] 18  BP: (163-194)/(67-85) 189/85  Body mass index is 38.07 kg/m².    Intake/Output Summary (Last 24 hours) at 8/15/2022 1449  Last data filed at 8/15/2022 0951  Gross per 24 hour   Intake 100 ml   Output 300 ml   Net -200 ml     I/O this shift:  In: 100 [IV Piggyback:100]  Out: -      Physical Exam:   General: Awake, alert and conversive. No acute distress.   Eyes: Normal lids and lashes, no scleral icterus.   Neck: Supple and symmetric. Trachea midline.    Skin: Warm and dry, not jaundiced.    Cardiovascular: Regular rate and rhythm. No murmur.   Pulm: Quiet, even, nonlabored breathing. Clear to auscultation bilaterally.    Abdomen: Soft, protuberant, nontender. No rebound or guarding. Bowel sounds present in all 4 quadrants.   Psychiatric: Appropriate mood and affect. Cooperative.     Results Review:     I reviewed the patient's new clinical results.    Results from last 7 days   Lab Units 08/15/22  0623 08/14/22  0905 08/13/22  0720   WBC 10*3/mm3 7.55 10.45 8.27   HEMOGLOBIN g/dL 10.1* 9.7* 9.7*   HEMATOCRIT % 31.9* 30.3* 30.0*   PLATELETS 10*3/mm3 167 152 124*     Results from last 7 days   Lab Units 08/15/22  0623 08/14/22  0905 08/13/22  0720   SODIUM mmol/L 139 141 139   POTASSIUM mmol/L 5.3* 5.1 5.1   CHLORIDE mmol/L 104 104 105   CO2 mmol/L 28.6 29.8* 25.8   BUN mg/dL 32* 33* 37*   CREATININE mg/dL 1.15 1.25 1.45*   CALCIUM mg/dL 9.0 9.0 8.6   BILIRUBIN mg/dL 0.2 0.3 0.4   ALK PHOS U/L 97 111 89   ALT (SGPT) U/L 32 43* 55*   AST (SGOT) U/L 18 32 52*   GLUCOSE mg/dL 115* 120* 124*         Lab Results   Lab Value Date/Time    LIPASE 14 08/14/2022 0905       Radiology:  US Liver   Final Result       Cholelithiasis and biliary sludge with common bile duct and intrahepatic   biliary ductal dilatation. There is poor visualization of the distal   common bile duct. Findings raise concern for choledocholithiasis  or   other distal obstructing process. Recommend further evaluation with ERCP   or contrast-enhanced MRCP.       This report was finalized on 8/12/2022 9:00 PM by Dr. Ping Bone M.D.          CT Lower Extremity Bilateral Without Contrast   Final Result   Nonspecific soft tissue edema in both lower legs   asymmetrically more prominent on the right. There are several blisters   at the level of the dermis of the right lower leg proximally. However,   no soft tissue gas or abscess collection is present.       This report was finalized on 8/11/2022 2:38 PM by Dr. Joshua Mendoza M.D.          XR Chest 1 View   Final Result       1. Cardiomegaly with vascular congestion.   2. Nonspecific bibasilar consolidation.       This report was finalized on 8/11/2022 5:59 AM by Dr. Karin Weathers M.D.          XR Tibia Fibula 2 View Right   Final Result       1. Diffuse edema of the right calf.   2. No aggressive osseous abnormalities are seen.       This report was finalized on 8/11/2022 6:01 AM by Dr. Karin Weathers M.D.          MRI abdomen wo contrast mrcp    (Results Pending)       Assessment & Plan     Patient Active Problem List   Diagnosis   • HALIMA (obstructive sleep apnea)   • CSA (central sleep apnea)   • REM behavioral disorder   • Hypersomnia due to medical condition   • Periodic breathing   • Cellulitis of right lower extremity   • HTN (hypertension)   • Type 2 diabetes mellitus with stage 3b chronic kidney disease (HCC)   • Elevated troponin   • RBBB   • Pulmonary vascular congestion   • Diastolic CHF, acute (HCC)   • Elevated LFTs   • Acute respiratory failure with hypoxia (HCC)   • Acute on chronic respiratory failure with hypercapnia (HCC)   • Anemia of chronic disease   • Sepsis due to Gram negative bacteria (HCC)       Assessment:  1. Elevated liver enzymes  2. Abnormal right upper quadrant ultrasound  3. Enterobacter sepsis from cellulitis  4. Right lower extremity cellulitis  5. Chronic kidney  disease stage III      Plan:  · LFTs normalized.  Continue to trend.  · Given the findings of central intrahepatic biliary ductal prominence, would recommend MRCP for further evaluation however patient is refusing procedure at this time.    · Antibiotics per ID recommendations    I discussed the patient's findings and my recommendations with patient, nursing staff and Dr. Archibald.    Dragon dictation used throughout this note.            COURTNEY Cleveland  [facility], [facility]  [address] [place] [address]  Office: [phone]

## 2022-08-15 NOTE — PROGRESS NOTES
ID note for cellulitis    Subjective:   Right lower extremity continues to improved. Some thigh pain which I suspect is lymphangitis. No abdominal pain. No f/c/ns    Objective:   Temp:  [98 °F (36.7 °C)-98.1 °F (36.7 °C)] 98.1 °F (36.7 °C)  Heart Rate:  [48-64] 48  Resp:  [18] 18  BP: (163-194)/(67-83) 180/67  No acute distress, significant confluent erythema the right lower extremity with decrease edema.  Ruptured bulla but no purulence. New bullae on posterior leg. he is more coherent today    8/11 blood cultures 1/2 Enterobacter  8/12 blood cultures 2/2 ngtd     WBC 7.55, Cr 1.15    Assessment and plan  Enterobacter septicemia  Right lower extremity cellulitis  Chronic kidney disease stage IIIb    Continue cefepime 2 g IV every 8 hours as dosed for improving Cr.   Leg seems to be improving albeit slowly in keeping with very severe cellulitis  Getting MRCP to r/o choledocholithiasis or obstruction  If MRCP negative can change to po levofloxacin 750mg po q24 through 8/18

## 2022-08-16 LAB
ALBUMIN SERPL-MCNC: 2.7 G/DL (ref 3.5–5.2)
ALBUMIN/GLOB SERPL: 0.9 G/DL
ALP SERPL-CCNC: 98 U/L (ref 39–117)
ALT SERPL W P-5'-P-CCNC: 25 U/L (ref 1–41)
ANION GAP SERPL CALCULATED.3IONS-SCNC: 8 MMOL/L (ref 5–15)
AST SERPL-CCNC: 14 U/L (ref 1–40)
BACTERIA SPEC AEROBE CULT: NORMAL
BILIRUB SERPL-MCNC: 0.3 MG/DL (ref 0–1.2)
BUN SERPL-MCNC: 32 MG/DL (ref 8–23)
BUN/CREAT SERPL: 27.6 (ref 7–25)
CALCIUM SPEC-SCNC: 9.5 MG/DL (ref 8.6–10.5)
CHLORIDE SERPL-SCNC: 102 MMOL/L (ref 98–107)
CO2 SERPL-SCNC: 28 MMOL/L (ref 22–29)
CREAT SERPL-MCNC: 1.16 MG/DL (ref 0.76–1.27)
DEPRECATED RDW RBC AUTO: 49.8 FL (ref 37–54)
EGFRCR SERPLBLD CKD-EPI 2021: 64.5 ML/MIN/1.73
ERYTHROCYTE [DISTWIDTH] IN BLOOD BY AUTOMATED COUNT: 14.5 % (ref 12.3–15.4)
GLOBULIN UR ELPH-MCNC: 3.1 GM/DL
GLUCOSE BLDC GLUCOMTR-MCNC: 116 MG/DL (ref 70–130)
GLUCOSE BLDC GLUCOMTR-MCNC: 121 MG/DL (ref 70–130)
GLUCOSE BLDC GLUCOMTR-MCNC: 143 MG/DL (ref 70–130)
GLUCOSE SERPL-MCNC: 110 MG/DL (ref 65–99)
HCT VFR BLD AUTO: 32.4 % (ref 37.5–51)
HGB BLD-MCNC: 10.3 G/DL (ref 13–17.7)
MAGNESIUM SERPL-MCNC: 2.1 MG/DL (ref 1.6–2.4)
MCH RBC QN AUTO: 29.9 PG (ref 26.6–33)
MCHC RBC AUTO-ENTMCNC: 31.8 G/DL (ref 31.5–35.7)
MCV RBC AUTO: 93.9 FL (ref 79–97)
PLATELET # BLD AUTO: 185 10*3/MM3 (ref 140–450)
PMV BLD AUTO: 10 FL (ref 6–12)
POTASSIUM SERPL-SCNC: 5.3 MMOL/L (ref 3.5–5.2)
POTASSIUM SERPL-SCNC: 5.7 MMOL/L (ref 3.5–5.2)
PROT SERPL-MCNC: 5.8 G/DL (ref 6–8.5)
RBC # BLD AUTO: 3.45 10*6/MM3 (ref 4.14–5.8)
SODIUM SERPL-SCNC: 138 MMOL/L (ref 136–145)
WBC NRBC COR # BLD: 6.23 10*3/MM3 (ref 3.4–10.8)

## 2022-08-16 PROCEDURE — 97530 THERAPEUTIC ACTIVITIES: CPT

## 2022-08-16 PROCEDURE — 83735 ASSAY OF MAGNESIUM: CPT | Performed by: HOSPITALIST

## 2022-08-16 PROCEDURE — 84132 ASSAY OF SERUM POTASSIUM: CPT | Performed by: INTERNAL MEDICINE

## 2022-08-16 PROCEDURE — 25010000002 CEFEPIME PER 500 MG: Performed by: INTERNAL MEDICINE

## 2022-08-16 PROCEDURE — 80053 COMPREHEN METABOLIC PANEL: CPT | Performed by: INTERNAL MEDICINE

## 2022-08-16 PROCEDURE — 99232 SBSQ HOSP IP/OBS MODERATE 35: CPT | Performed by: PHYSICIAN ASSISTANT

## 2022-08-16 PROCEDURE — 25010000002 ENOXAPARIN PER 10 MG: Performed by: NURSE PRACTITIONER

## 2022-08-16 PROCEDURE — 99232 SBSQ HOSP IP/OBS MODERATE 35: CPT | Performed by: INTERNAL MEDICINE

## 2022-08-16 PROCEDURE — 85027 COMPLETE CBC AUTOMATED: CPT | Performed by: INTERNAL MEDICINE

## 2022-08-16 PROCEDURE — 82962 GLUCOSE BLOOD TEST: CPT

## 2022-08-16 RX ORDER — DIAZEPAM 5 MG/ML
2.5 INJECTION, SOLUTION INTRAMUSCULAR; INTRAVENOUS ONCE
Status: DISCONTINUED | OUTPATIENT
Start: 2022-08-16 | End: 2022-08-19 | Stop reason: HOSPADM

## 2022-08-16 RX ORDER — HYDRALAZINE HYDROCHLORIDE 25 MG/1
25 TABLET, FILM COATED ORAL EVERY 8 HOURS SCHEDULED
Status: DISCONTINUED | OUTPATIENT
Start: 2022-08-16 | End: 2022-08-19 | Stop reason: HOSPADM

## 2022-08-16 RX ADMIN — HYDRALAZINE HYDROCHLORIDE 25 MG: 25 TABLET, FILM COATED ORAL at 13:17

## 2022-08-16 RX ADMIN — GABAPENTIN 300 MG: 300 CAPSULE ORAL at 09:09

## 2022-08-16 RX ADMIN — TAMSULOSIN HYDROCHLORIDE 0.4 MG: 0.4 CAPSULE ORAL at 20:59

## 2022-08-16 RX ADMIN — FAMOTIDINE 40 MG: 20 TABLET ORAL at 16:38

## 2022-08-16 RX ADMIN — CARVEDILOL 12.5 MG: 12.5 TABLET, FILM COATED ORAL at 18:42

## 2022-08-16 RX ADMIN — CALCIUM CARBONATE-VITAMIN D TAB 500 MG-200 UNIT 1 TABLET: 500-200 TAB at 20:59

## 2022-08-16 RX ADMIN — CEFEPIME 2 G: 2 INJECTION, POWDER, FOR SOLUTION INTRAVENOUS at 09:10

## 2022-08-16 RX ADMIN — GABAPENTIN 300 MG: 300 CAPSULE ORAL at 21:00

## 2022-08-16 RX ADMIN — ENOXAPARIN SODIUM 40 MG: 100 INJECTION SUBCUTANEOUS at 13:17

## 2022-08-16 RX ADMIN — FUROSEMIDE 40 MG: 40 TABLET ORAL at 09:09

## 2022-08-16 RX ADMIN — HYDRALAZINE HYDROCHLORIDE 25 MG: 25 TABLET, FILM COATED ORAL at 21:00

## 2022-08-16 RX ADMIN — GABAPENTIN 300 MG: 300 CAPSULE ORAL at 16:38

## 2022-08-16 RX ADMIN — OXYCODONE HYDROCHLORIDE AND ACETAMINOPHEN 1 TABLET: 7.5; 325 TABLET ORAL at 09:09

## 2022-08-16 RX ADMIN — CARVEDILOL 12.5 MG: 12.5 TABLET, FILM COATED ORAL at 09:09

## 2022-08-16 RX ADMIN — Medication 10 ML: at 09:10

## 2022-08-16 RX ADMIN — OXYCODONE HYDROCHLORIDE AND ACETAMINOPHEN 1 TABLET: 7.5; 325 TABLET ORAL at 16:38

## 2022-08-16 RX ADMIN — ASPIRIN 81 MG: 81 TABLET, CHEWABLE ORAL at 09:09

## 2022-08-16 RX ADMIN — ACETAMINOPHEN 650 MG: 325 TABLET, FILM COATED ORAL at 21:00

## 2022-08-16 RX ADMIN — FERROUS SULFATE TAB 325 MG (65 MG ELEMENTAL FE) 325 MG: 325 (65 FE) TAB at 09:09

## 2022-08-16 RX ADMIN — CEFEPIME 2 G: 2 INJECTION, POWDER, FOR SOLUTION INTRAVENOUS at 23:57

## 2022-08-16 RX ADMIN — Medication 10 ML: at 21:00

## 2022-08-16 RX ADMIN — SENNOSIDES 1 TABLET: 8.6 TABLET, FILM COATED ORAL at 09:09

## 2022-08-16 RX ADMIN — SERTRALINE 75 MG: 25 TABLET, FILM COATED ORAL at 09:09

## 2022-08-16 RX ADMIN — CALCIUM CARBONATE-VITAMIN D TAB 500 MG-200 UNIT 1 TABLET: 500-200 TAB at 09:09

## 2022-08-16 RX ADMIN — CEFEPIME 2 G: 2 INJECTION, POWDER, FOR SOLUTION INTRAVENOUS at 16:38

## 2022-08-16 RX ADMIN — FAMOTIDINE 40 MG: 20 TABLET ORAL at 09:09

## 2022-08-16 RX ADMIN — ROSUVASTATIN CALCIUM 5 MG: 5 TABLET, FILM COATED ORAL at 20:59

## 2022-08-16 NOTE — PROGRESS NOTES
[facility] Progress Note      Patient Name: Yakov Dubon  : 1945  MRN: [id number]  Primary Care Physician:  Chencho Sterling MD  Date of admission: 2022    Subjective     Interval History:   Follow up CKD 3, hyperkalemia.  Irritable. Spilled water on sheet, but refuses to allow it to be changed because he would have to roll on his side.  Clear yellow urine in urinal.  Bowels moving.     Review of Systems:   As noted above    Objective     Vitals:   Temp:  [97.8 °F (36.6 °C)-98.9 °F (37.2 °C)] 98.9 °F (37.2 °C)  Heart Rate:  [51-63] 53  Resp:  [18] 18  BP: (173-199)/(74-91) 199/91  Flow (L/min):  [1] 1    Intake/Output Summary (Last 24 hours) at 2022 1415  Last data filed at 2022 1300  Gross per 24 hour   Intake --   Output 650 ml   Net -650 ml       Physical Exam:    General Appearance: awake, irritable.  no acute distress.  Chronically ill.    Skin: warm and dry  HEENT: oral mucosa normal, nonicteric sclera  Neck: supple, no JVD  Lungs: clear to auscultation, unlabored.   Heart: RRR, no s3 or rub  Abdomen: + bs, nontender, nondistended. Obese, soft.  : male purwik  Extremities: 1+ lower ext edema.       Scheduled Meds:     acetaminophen, 650 mg, Oral, Nightly  aspirin, 81 mg, Oral, Daily  calcium-vitamin D, 1 tablet, Oral, BID  carvedilol, 12.5 mg, Oral, BID With Meals  cefepime, 2 g, Intravenous, Q8H  diazePAM, 2.5 mg, Intravenous, Once  enoxaparin, 40 mg, Subcutaneous, Q24H  famotidine, 40 mg, Oral, BID AC  ferrous sulfate, 325 mg, Oral, Daily With Breakfast  furosemide, 40 mg, Oral, Daily  gabapentin, 300 mg, Oral, TID  hydrALAZINE, 25 mg, Oral, Q8H  insulin lispro, 0-14 Units, Subcutaneous, TID AC  rosuvastatin, 5 mg, Oral, Nightly  senna, 1 tablet, Oral, Daily  sertraline, 75 mg, Oral, Daily  sodium chloride, 10 mL, Intravenous, Q12H  tamsulosin, 0.4 mg, Oral, Nightly      IV Meds:   pain,         Results Reviewed:   I have personally reviewed the  results from the time of this admission to 8/16/2022 14:15 EDT     Results from last 7 days   Lab Units 08/16/22  0600 08/15/22  0623 08/14/22  0905   SODIUM mmol/L 138 139 141   POTASSIUM mmol/L 5.7* 5.3* 5.1   CHLORIDE mmol/L 102 104 104   CO2 mmol/L 28.0 28.6 29.8*   BUN mg/dL 32* 32* 33*   CREATININE mg/dL 1.16 1.15 1.25   CALCIUM mg/dL 9.5 9.0 9.0   BILIRUBIN mg/dL 0.3 0.2 0.3   ALK PHOS U/L 98 97 111   ALT (SGPT) U/L 25 32 43*   AST (SGOT) U/L 14 18 32   GLUCOSE mg/dL 110* 115* 120*       Estimated Creatinine Clearance: 69.6 mL/min (by C-G formula based on SCr of 1.16 mg/dL).    Results from last 7 days   Lab Units 08/16/22  0600 08/15/22  0623 08/14/22  0905   MAGNESIUM mg/dL 2.1 2.0 2.1   PHOSPHORUS mg/dL  --  3.3 3.1       Results from last 7 days   Lab Units 08/15/22  0623 08/14/22  0905 08/11/22  0524   URIC ACID mg/dL 6.8 7.4* 10.0*       Results from last 7 days   Lab Units 08/16/22  0600 08/15/22  0623 08/14/22  0905 08/13/22  0720 08/12/22  0524   WBC 10*3/mm3 6.23 7.55 10.45 8.27 8.21   HEMOGLOBIN g/dL 10.3* 10.1* 9.7* 9.7* 11.0*   PLATELETS 10*3/mm3 185 167 152 124* 181             Assessment / Plan     ASSESSMENT:  1. CKDIII. Very stable.  Hyperkalemia.  Started po lasix this am.  Will recheck K at 3 pm today .  On K restricted diet.  2. RLE cellulitis, enterobacter bacteremia. On cefepime.   3. Acute on chronic respiratory failure, COPD, HALIMA. Non-compliant with BiPAP.  4. DM2  5. HTN not controlled. Hydralazine added .  6. Anemia. Hg stable.   7. Elevated LFT's.  GI recommended MRCP .   PLAN:  1. Check K at 3 pm today.   Lexie Rao MD  08/16/22  14:15 EDT    [facility]  [phone]

## 2022-08-16 NOTE — THERAPY TREATMENT NOTE
"Patient Name: Yakov Dubon  : 1945    MRN: [id number]                              Today's Date: 2022       Admit Date: 2022    Visit Dx:     ICD-10-CM ICD-9-CM   1. Cellulitis of right lower extremity  L03.115 682.6   2. Elevated troponin level not due to acute coronary syndrome  R77.8 790.6   3. Sepsis, due to unspecified organism, unspecified whether acute organ dysfunction present ([facility])  A41.9 038.9     995.91   4. Type 2 diabetes mellitus without complication, unspecified whether long term insulin use ([facility])  E11.9 250.00   5. 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 condition   • Periodic breathing   • Cellulitis of right lower extremity   • HTN (hypertension)   • Type 2 diabetes mellitus with stage 3b chronic kidney disease ([facility])   • Elevated troponin   • RBBB   • Pulmonary vascular congestion   • Diastolic CHF, acute ([facility])   • Elevated LFTs   • Acute respiratory failure with hypoxia ([facility])   • Acute on chronic respiratory failure with hypercapnia ([facility])   • Anemia of chronic disease   • Sepsis due to Gram negative bacteria ([facility])     Past Medical History:   Diagnosis Date   • Anxiety    • Back pain    • Depression    • Diabetes mellitus ([facility])     \"BORDERLINE\"   • High cholesterol    • Hypertension    • Kidney disease, chronic, stage III (GFR 30-59 ml/min) ([facility])    • Reflux esophagitis    • Sleep apnea      Past Surgical History:   Procedure Laterality Date   • APPENDECTOMY     • CARPAL TUNNEL RELEASE     • CATARACT EXTRACTION     • HAMMER TOE REPAIR     • LUMBAR SPINE SURGERY     • PAIN PUMP INSERTION/REVISION Right 2016    Procedure: RT PAIN PUMP REPLACEMENT;  Surgeon: Chris Messer MD;  Location: [facility] OR;  Service:    • TOTAL KNEE ARTHROPLASTY Left       General Information     Row Name " 08/16/22 1618          Physical Therapy Time and Intention    Document Type therapy note (daily note)  -CS     Mode of Treatment individual therapy;physical therapy  -CS     Row Name 08/16/22 1618          General Information    Existing Precautions/Restrictions fall  -CS     Row Name 08/16/22 1618          Cognition    Orientation Status (Cognition) oriented x 3  -CS     Row Name 08/16/22 1618          Safety Issues, Functional Mobility    Impairments Affecting Function (Mobility) endurance/activity tolerance;pain;range of motion (ROM);strength  -CS           User Key  (r) = Recorded By, (t) = Taken By, (c) = Cosigned By    Initials Name Provider Type    CS Deysi Sheets, PT Physical Therapist               Mobility     Row Name 08/16/22 1619          Bed Mobility    Bed Mobility supine-sit;sit-supine;scooting/bridging  -CS     Scooting/Bridging [place] (Bed Mobility) standby assist;verbal cues;nonverbal cues (demo/gesture)  -CS     Supine-Sit [place] (Bed Mobility) moderate assist (50% patient effort);verbal cues;nonverbal cues (demo/gesture)  -CS     Sit-Supine [place] (Bed Mobility) moderate assist (50% patient effort);verbal cues;nonverbal cues (demo/gesture)  -CS     Assistive Device (Bed Mobility) bed rails;head of bed elevated  -CS     Comment, (Bed Mobility) Pt performed scooting to HOB while sitting EOB; required A for B LE for bed mobility  -CS     Row Name 08/16/22 1619          Transfers    Comment, (Transfers) attempted but unsuccessful secondary to weakness and decreased R LE ROM  -     Row Name 08/16/22 1619          Sit-Stand Transfer    Sit-Stand [place] (Transfers) unable to assess  -CS           User Key  (r) = Recorded By, (t) = Taken By, (c) = Cosigned By    Initials Name Provider Type    CS Deysi Sheets, PT Physical Therapist               Obj/Interventions     Row Name 08/16/22 1619          Range of Motion Comprehensive    Comment, General Range of Motion  significant R knee flexion deficit; PROM to ~ 60-70 degrees  -     Row Name 08/16/22 1619          Motor Skills    Therapeutic Exercise --  10 reps BLE AP & lying hip flexion; 10 reps PROM R knee flexion  -     Row Name 08/16/22 1619          Balance    Balance Assessment sitting static balance;sitting dynamic balance  -CS     Static Sitting Balance standby assist  -CS     Dynamic Sitting Balance standby assist  -CS     Position, Sitting Balance sitting edge of bed  -CS           User Key  (r) = Recorded By, (t) = Taken By, (c) = Cosigned By    Initials Name Provider Type    CS Deysi Sheets, PT Physical Therapist               Goals/Plan    No documentation.                Clinical Impression     [facility] Name 08/16/22 1620          Pain    Pretreatment Pain Rating 2/10  -CS     Posttreatment Pain Rating 2/10  -CS     Pain Location - Side/Orientation Right  -CS     Pain Location lower  -CS     Pain Location - extremity  -CS     Pain Intervention(s) Rest;Repositioned  -     Row Name 08/16/22 1620          Plan of Care Review    Plan of Care Reviewed With patient  -CS     Outcome Evaluation Pt received in bed upon arrival and agreeable to PT this afternoon. Per EVANGELINA Rhodes pt cleared for activity. Pt required mod A for bed mobility. Pt SBA sitting EOB. Pt attempted to stand but unsuccessful secondary to weakness and decreased R LE ROM. Pt returned to supine where R LE PROM performed. Pt demonstrates significant decreased R knee flexion ROM with crepitus present with movement. Pt reports mild discomfort. Nsg aware. Pt will continue to benefit from skilled PT to address strength, ROM, and functional mobility.  -     Row Name 08/16/22 1620          Therapy Assessment/Plan (PT)    Criteria for Skilled Interventions Met (PT) yes;meets criteria  -     Therapy Frequency (PT) 5 times/wk  -     Row Name 08/16/22 1620          Positioning and Restraints    Pre-Treatment Position in bed  -CS     Post Treatment Position  bed  -CS     In Bed notified nsg;supine;call light within reach;encouraged to call for assist;exit alarm on  -CS           User Key  (r) = Recorded By, (t) = Taken By, (c) = Cosigned By    Initials Name Provider Type    Deysi Gamble, PT Physical Therapist               Outcome Measures     Row Name 08/16/22 1640          How much help from another person do you currently need...    Turning from your back to your side while in flat bed without using bedrails? 2  -CS     Moving from lying on back to sitting on the side of a flat bed without bedrails? 2  -CS     Moving to and from a bed to a chair (including a wheelchair)? 1  -CS     Standing up from a chair using your arms (e.g., wheelchair, bedside chair)? 1  -CS     Climbing 3-5 steps with a railing? 1  -CS     To walk in hospital room? 1  -CS     AM-PAC 6 Clicks Score (PT) 8  -CS     Highest level of mobility 3 --> Sat at edge of bed  -CS     Row Name 08/16/22 1640          Functional Assessment    Outcome Measure Options AM-PAC 6 Clicks Basic Mobility (PT)  -CS           User Key  (r) = Recorded By, (t) = Taken By, (c) = Cosigned By    Initials Name Provider Type    Deyis Gamble PT Physical Therapist                             Physical Therapy Education                 Title: PT OT SLP Therapies (Done)     Topic: Physical Therapy (Done)     Point: Mobility training (Done)     Learning Progress Summary           Patient Acceptance, E,TB, VU,DU by  at 8/16/2022 1640    Acceptance, E,D, VU,NR by MS at 8/13/2022 1052                   Point: Home exercise program (Done)     Learning Progress Summary           Patient Acceptance, E,TB, VU,DU by  at 8/16/2022 1640    Acceptance, E,D, VU,NR by MS at 8/13/2022 1052                   Point: Body mechanics (Done)     Learning Progress Summary           Patient Acceptance, E,TB, VU,DU by  at 8/16/2022 1640                   Point: Precautions (Done)     Learning Progress Summary           Patient  Acceptance, E,TB, VU,DU by  at 8/16/2022 1640                               User Key     Initials Effective Dates Name Provider Type Discipline    MS 06/16/21 -  Riaz Singh PT Physical Therapist PT     07/25/22 -  Brionna Sheets PT Physical Therapist PT              PT Recommendation and Plan     Plan of Care Reviewed With: patient  Outcome Evaluation: Pt received in bed upon arrival and agreeable to PT this afternoon. Per EVANGELINA Rhodes pt cleared for activity. Pt required mod A for bed mobility. Pt SBA sitting EOB. Pt attempted to stand but unsuccessful secondary to weakness and decreased R LE ROM. Pt returned to supine where R LE PROM performed. Pt demonstrates significant decreased R knee flexion ROM with crepitus present with movement. Pt reports mild discomfort. Nsg aware. Pt will continue to benefit from skilled PT to address strength, ROM, and functional mobility.     Time Calculation:    PT Charges     Row Name 08/16/22 1641             Time Calculation    Start Time 1610  -CS      Stop Time 1631  -      Time Calculation (min) 21 min  -CS      PT Received On 08/16/22  -      PT - Next Appointment 08/17/22  -              Time Calculation- PT    Total Timed Code Minutes- PT 18 minute(s)  -CS              Timed Charges    67945 - PT Therapeutic Activity Minutes 18  -CS              Total Minutes    Timed Charges Total Minutes 18  -CS       Total Minutes 18  -CS            User Key  (r) = Recorded By, (t) = Taken By, (c) = Cosigned By    Initials Name Provider Type    CS Brionna Sheets, PT Physical Therapist              Therapy Charges for Today     Code Description Service Date Service Provider Modifiers Qty    1944495  PT THERAPEUTIC ACT EA 15 MIN 8/16/2022 Brionna Sheets PT GP 1          PT G-Codes  Outcome Measure Options: AM-PAC 6 Clicks Basic Mobility (PT)  AM-PAC 6 Clicks Score (PT): 8  AM-PAC 6 Clicks Score (OT): 12    BRIONNA SHEETS PT  8/16/2022

## 2022-08-16 NOTE — PROGRESS NOTES
[facility] Progress Note      Patient Name: Yakov Dubon  : 1945  MRN: [id number]  Primary Care Physician:  Chencho Sterling MD  Date of admission: 2022    Subjective     Interval History:   Follow-up acute kidney injury on chronic kidney disease    Patient not using BiPAP (states that he never uses it); denies shortness of breath  Appetite fair: No N/V    Review of Systems:   As noted above    Objective     Vitals:   Temp:  [98 °F (36.7 °C)-98.5 °F (36.9 °C)] 98 °F (36.7 °C)  Heart Rate:  [48-61] 56  Resp:  [18] 18  BP: (179-189)/(67-85) 179/78  Flow (L/min):  [1] 1    Intake/Output Summary (Last 24 hours) at 8/15/2022 2149  Last data filed at 8/15/2022 0951  Gross per 24 hour   Intake 100 ml   Output 300 ml   Net -200 ml       Physical Exam:    General Appearance: alert, awake, chronically ill, NAD  Skin: warm and dry, legs wrapped  HEENT: oral mucosa normal, nonicteric sclera  Neck: supple, no JVD  Lungs: Coarse breath sounds; not labored  Heart: RR, bradycardic  Abdomen: soft, nontender, +distended, normoactive bowel  : no palpable bladder  Extremities: 1+ lower extremity edema extending to hips  Neuro: normal speech and mental status     Scheduled Meds:     acetaminophen, 650 mg, Oral, Nightly  aspirin, 81 mg, Oral, Daily  calcium-vitamin D, 1 tablet, Oral, BID  carvedilol, 12.5 mg, Oral, BID With Meals  cefepime, 2 g, Intravenous, Q8H  enoxaparin, 40 mg, Subcutaneous, Q24H  famotidine, 40 mg, Oral, BID AC  ferrous sulfate, 325 mg, Oral, Daily With Breakfast  gabapentin, 300 mg, Oral, TID  insulin lispro, 0-14 Units, Subcutaneous, TID AC  rosuvastatin, 5 mg, Oral, Nightly  senna, 1 tablet, Oral, Daily  sertraline, 75 mg, Oral, Daily  sodium chloride, 10 mL, Intravenous, Q12H  tamsulosin, 0.4 mg, Oral, Nightly      IV Meds:   pain,         Results Reviewed:   I have personally reviewed the results from the time of this admission to 8/15/2022 21:49 EDT      Results from last 7 days   Lab Units 08/15/22  0623 08/14/22  0905 08/13/22  0720   SODIUM mmol/L 139 141 139   POTASSIUM mmol/L 5.3* 5.1 5.1   CHLORIDE mmol/L 104 104 105   CO2 mmol/L 28.6 29.8* 25.8   BUN mg/dL 32* 33* 37*   CREATININE mg/dL 1.15 1.25 1.45*   CALCIUM mg/dL 9.0 9.0 8.6   BILIRUBIN mg/dL 0.2 0.3 0.4   ALK PHOS U/L 97 111 89   ALT (SGPT) U/L 32 43* 55*   AST (SGOT) U/L 18 32 52*   GLUCOSE mg/dL 115* 120* 124*       Estimated Creatinine Clearance: 72.3 mL/min (by C-G formula based on SCr of 1.15 mg/dL).    Results from last 7 days   Lab Units 08/15/22  0623 08/14/22  0905 08/13/22  0720   MAGNESIUM mg/dL 2.0 2.1 2.2   PHOSPHORUS mg/dL 3.3 3.1  --        Results from last 7 days   Lab Units 08/15/22  0623 08/14/22  0905 08/11/22  0524   URIC ACID mg/dL 6.8 7.4* 10.0*       Results from last 7 days   Lab Units 08/15/22  0623 08/14/22  0905 08/13/22  0720 08/12/22  0524 08/11/22  0524   WBC 10*3/mm3 7.55 10.45 8.27 8.21 8.40   HEMOGLOBIN g/dL 10.1* 9.7* 9.7* 11.0* 12.1*   PLATELETS 10*3/mm3 167 152 124* 181 213             Assessment / Plan     ASSESSMENT:  1.  Chronic kidney disease stage IIIb, stable; mild hyperkalemia, likely in part due to chronic respiratory acidosis.  Current diet unrestricted with respect to potassium  2.  Right leg cellulitis  3.  Acute on chronic respiratory failure associated with COPD and obstructive sleep apnea: Noncompliant with BiPAP  4.  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  Hypertension associate with chronic kidney  6.  Anemia and thrombocytopenia    PLAN:  1.  Add 2-gram potassium restriction to diet   2.  Begin furosemide 40 mg once daily to remove volume and potassium    Thank you for involving us in the care of Yakov Dubon.  Please feel free to call with any questions.    Jose Wayne MD  08/15/22  21:49 EDT    [facility]  [phone]      Much of this encounter note is an electronic transcription/translation  of spoken language to printed text. The electronic translation of spoken language may permit erroneous, or at times, nonsensical words or phrases to be inadvertently transcribed; Although I have reviewed the note for such errors, some may still exist.

## 2022-08-16 NOTE — PLAN OF CARE
Goal Outcome Evaluation:  Plan of Care Reviewed With: patient           Outcome Evaluation: Pt received in bed upon arrival and agreeable to PT this afternoon. Per EVANGELINA Rhodes pt cleared for activity. Pt required mod A for bed mobility. Pt SBA sitting EOB. Pt attempted to stand but unsuccessful secondary to weakness and decreased R LE ROM. Pt returned to supine where R LE PROM performed. Pt demonstrates significant decreased R knee flexion ROM with crepitus present with movement. Pt reports mild discomfort. Nsg aware. Pt will continue to benefit from skilled PT to address strength, ROM, and functional mobility.

## 2022-08-16 NOTE — PROGRESS NOTES
ID note for cellulitis    Subjective:   Right lower extremity continues to improved. Some thigh pain which I suspect is lymphangitis. No abdominal pain. No f/c/ns.  He says that he is unable to feed himself but his breakfast plate is completely clean    Objective:   Temp:  [97.8 °F (36.6 °C)-98.5 °F (36.9 °C)] 97.9 °F (36.6 °C)  Heart Rate:  [51-63] 63  Resp:  [18] 18  BP: (173-192)/(74-85) 192/85  No acute distress, significant confluent erythema the right lower extremity with decrease edema.  Ruptured bulla but no purulence. New bullae on posterior leg. he is more coherent today    8/11 blood cultures 1/2 Enterobacter  8/12 blood cultures 2/2 ngtd     WBC 6.23, hemoglobin 10.3, platelets 185  Creatinine 1.16  Liver function test normal    Assessment and plan  Enterobacter septicemia  Right lower extremity cellulitis  Chronic kidney disease stage IIIb    Continue cefepime 2 g IV every 8 hours as dosed for improving Cr.   Leg seems to be improving albeit slowly in keeping with very severe cellulitis  Had planned on MRCP to r/o choledocholithiasis or obstruction, but currently patient declining  If MRCP negative can change to po levofloxacin 750mg po q24 through 8/18

## 2022-08-16 NOTE — PROGRESS NOTES
Name: Yakov Dubon ADMIT: 2022   : 1945  PCP: Chencho Sterling MD    MRN: [id number] LOS: 5 days   AGE/SEX: 77 y.o. male  ROOM: [facility]   Subjective   Chief Complaint   Patient presents with   • Hypotension   • Fall     Dyspnea fair  On IV ABX  Denies abd pain  +pain in right leg  He states he is agreeable to do MRCP    ROS  No f/c  No n/v  No cp/palp  No soa/cough    Objective   Vital Signs  Temp:  [97.8 °F (36.6 °C)-98.5 °F (36.9 °C)] 97.9 °F (36.6 °C)  Heart Rate:  [51-63] 63  Resp:  [18] 18  BP: (173-192)/(74-85) 192/85  SpO2:  [92 %-98 %] 98 %  on  Flow (L/min):  [1] 1;   Device (Oxygen Therapy): nasal cannula  Body mass index is 35.8 kg/m².    Physical Exam  Constitutional:       Appearance: He is obese. He is ill-appearing (chronically).   HENT:      Head: Normocephalic and atraumatic.   Eyes:      General: No scleral icterus.  Cardiovascular:      Rate and Rhythm: Normal rate and regular rhythm.      Heart sounds: Normal heart sounds.   Pulmonary:      Effort: Pulmonary effort is normal. No respiratory distress.      Breath sounds: Normal breath sounds.   Abdominal:      General: There is no distension.      Palpations: Abdomen is soft.      Tenderness: There is no abdominal tenderness.   Musculoskeletal:      Cervical back: Neck supple.      Right lower leg: Edema present.      Left lower leg: Edema present.   Skin:     Findings: Erythema (RLE) present.   Neurological:      Mental Status: He is alert.   Psychiatric:         Behavior: Behavior normal.     right leg wrapped    Results Review:       I reviewed the patient's new clinical results.  Results from last 7 days   Lab Units 22  0600 08/15/22  0623 22  0905 22  0720   WBC 10*3/mm3 6.23 7.55 10.45 8.27   HEMOGLOBIN g/dL 10.3* 10.1* 9.7* 9.7*   PLATELETS 10*3/mm3 185 167 152 124*     Results from last 7 days   Lab Units 22  0600 08/15/22  0623 22  0905 22  0720   SODIUM mmol/L 138 139 141 139    POTASSIUM mmol/L 5.7* 5.3* 5.1 5.1   CHLORIDE mmol/L 102 104 104 105   CO2 mmol/L 28.0 28.6 29.8* 25.8   BUN mg/dL 32* 32* 33* 37*   CREATININE mg/dL 1.16 1.15 1.25 1.45*   GLUCOSE mg/dL 110* 115* 120* 124*   Estimated Creatinine Clearance: 69.6 mL/min (by C-G formula based on SCr of 1.16 mg/dL).  Results from last 7 days   Lab Units 08/16/22  0600 08/15/22  0623 08/14/22  0905 08/13/22  0720   ALBUMIN g/dL 2.70* 2.50* 2.60* 2.40*   BILIRUBIN mg/dL 0.3 0.2 0.3 0.4   ALK PHOS U/L 98 97 111 89   AST (SGOT) U/L 14 18 32 52*   ALT (SGPT) U/L 25 32 43* 55*     Results from last 7 days   Lab Units 08/16/22  0600 08/15/22  0623 08/14/22  0905 08/13/22  0720   CALCIUM mg/dL 9.5 9.0 9.0 8.6   ALBUMIN g/dL 2.70* 2.50* 2.60* 2.40*   MAGNESIUM mg/dL 2.1 2.0 2.1 2.2   PHOSPHORUS mg/dL  --  3.3 3.1  --      Results from last 7 days   Lab Units 08/13/22  0720 08/12/22  0524 08/11/22  0524   PROCALCITONIN ng/mL 6.95* 15.00* 3.39*   LACTATE mmol/L 1.1 2.1* 2.2*       Coag     HbA1C   Lab Results   Component Value Date    HGBA1C 6.00 (H) 08/11/2022    HGBA1C 7.8 (H) 02/01/2019    HGBA1C 9.2 (H) 12/01/2018     Infection   Results from last 7 days   Lab Units 08/13/22  0720 08/12/22  1418 08/12/22  1346 08/12/22  0524 08/11/22  1126 08/11/22  0539 08/11/22  0524   BLOODCX   --  No growth at 3 days No growth at 3 days  --   --  No growth at 5 days Enterobacter cloacae complex*   WOUNDCX   --   --   --   --  No growth at 3 days  --   --    BCIDPCR   --   --   --   --   --   --  Enterobacter cloacae complex. Identification by BCID2 PCR.*   PROCALCITONIN ng/mL 6.95*  --   --  15.00*  --   --  3.39*     Radiology(recent) No radiology results for the last day  No results found for: TROPONINT, TROPONINI, BNP  No components found for: TSH;2    acetaminophen, 650 mg, Oral, Nightly  aspirin, 81 mg, Oral, Daily  calcium-vitamin D, 1 tablet, Oral, BID  carvedilol, 12.5 mg, Oral, BID With Meals  cefepime, 2 g, Intravenous, Q8H  diazePAM, 2.5 mg,  Intravenous, Once  enoxaparin, 40 mg, Subcutaneous, Q24H  famotidine, 40 mg, Oral, BID AC  ferrous sulfate, 325 mg, Oral, Daily With Breakfast  furosemide, 40 mg, Oral, Daily  gabapentin, 300 mg, Oral, TID  hydrALAZINE, 25 mg, Oral, Q8H  insulin lispro, 0-14 Units, Subcutaneous, TID AC  rosuvastatin, 5 mg, Oral, Nightly  senna, 1 tablet, Oral, Daily  sertraline, 75 mg, Oral, Daily  sodium chloride, 10 mL, Intravenous, Q12H  tamsulosin, 0.4 mg, Oral, Nightly      pain,     Diet Pureed; Thin; Cardiac, Low Potassium; 2 gm (50 mEq)      Assessment & Plan      Active Hospital Problems    Diagnosis  POA   • **Cellulitis of right lower extremity [L03.115]  Yes   • Anemia of chronic disease [D63.8]  Yes   • Sepsis due to Gram negative bacteria (HCC) [A41.50]  Yes   • Elevated LFTs [R79.89]  No   • Acute respiratory failure with hypoxia (HCC) [J96.01]  Yes   • Acute on chronic respiratory failure with hypercapnia (HCC) [J96.22]  Yes   • HTN (hypertension) [I10]  Yes   • Type 2 diabetes mellitus with stage 3b chronic kidney disease (HCC) [E11.22, N18.32]  Yes   • Elevated troponin [R77.8]  Yes   • RBBB [I45.10]  Yes   • Pulmonary vascular congestion [R09.89]  Yes   • Diastolic CHF, acute (HCC) [I50.31]  Yes   • CSA (central sleep apnea) [G47.31]  Yes   • HALIMA (obstructive sleep apnea) [G47.33]  Yes      Resolved Hospital Problems   No resolved problems to display.         78yo gentleman who presented from nursing home with lethargy, hypotension, and severe RLE redness/swelling/pain and was admitted to our service with sepsis due to RLE cellulitis.     Sepsis due to RLE cellulitis as well as enterobacter bactremia    IV Cefepime currently  Wound RN following  Venous duplex BLEs negative for DVT  CT BLEs negative for deep infection  Ortho has seen and signed off--no interventions indicated     Volume overload  Quite volume overloaded on admission with peripheral edema, vasc congestion on CXR, and hypoxia  Uncertain CHF  history  LVSF okay on Echo here and BNP fine  Trending daily weights and I/Os  On oral lasix as directed by nephrology- monitor potassium as elevated    HTN  Agents for HTN  Monitor renal fx  Adjustment today by Cardiology with hydralazine added- monitor to see if need to increase agents further     Elevated Trop  Card has seen, no ACS  EKG okay and no WMA on Echo  Suspect due to CKD     CKD3b  Cr around baseline or better  +hyperkalemia   Defer to Renal     Anemia of chronic disease  With component of iron deficiency  Continue oral iron     Acute on chronic hypercapnic resp failure, acute hypoxic resp failure, HALIMA  Appreciate Pulm attention to pt  BiPAP ordered, pt using overnight (not compliant with PAP at facility prior to admission)     Lethargy  Seems better     DM2  A1c 6.0  Lantus on hold for now  Continue SSI  Sugars remain acceptable today     Elevated LFTs  Uncertain etiology--suspect congestive/ischemic  No N/V/anorexia, does have some soreness in RUQ  Viral hep panel negative  plan MRCP- patient is aggreeable- will also have PRN low dose valium to help with anxiety during procedure  Trending LFTs        · Lovenox 40 mg SC daily for DVT prophylaxis.  · Discussed with patient and nursing staff.   Anticipate discharge back to SNU facility, potentially tomorrow if MRCP ok and if stable          Kwasi Ferreira MD  [facility]  08/16/22  13:19 EDT

## 2022-08-16 NOTE — PLAN OF CARE
Problem: Adult Inpatient Plan of Care  Goal: Plan of Care Review  Outcome: Ongoing, Progressing   Goal Outcome Evaluation:  VSS. Pt with periodic confusion through the night, but reorients easily. Bed alarm on. Q2 hr turn. Pt rested throughout the night with no issues or complaints.

## 2022-08-16 NOTE — PROGRESS NOTES
Hospital Follow Up    LOS:  LOS: 5 days   Patient Name: Yakov Dubon  Age/Sex: 77 y.o. male  : 1945  MRN: [id number]    Day of Service: 22   Length of Stay: 5  Encounter Provider: Cosme Reed MD  Place of Service: [facility] CARDIOLOGY  Patient Care Team:  Chencho Sterling MD as PCP - General (Internal Medicine)  Reasor, Chris Polo MD as Consulting Physician (Pain Medicine)    Subjective:     Chief Complaint: Shortness of breath/cellulitis    Interval History: Overall he says he does not think it is different.    Objective:     Objective:  Temp:  [97.8 °F (36.6 °C)-98.5 °F (36.9 °C)] 97.9 °F (36.6 °C)  Heart Rate:  [51-63] 63  Resp:  [18] 18  BP: (173-192)/(74-85) 192/85   No intake or output data in the 24 hours ending 22 1028  Body mass index is 35.8 kg/m².      22  0617 08/15/22  0600 22  0500   Weight: 132 kg (291 lb) 126 kg (278 lb) 119 kg (261 lb 6.4 oz) (weighed without accumax pump on bed)     Weight change: -7.53 kg (-16 lb 9.6 oz)      Physical Exam:   General : Alert, cooperative, in no acute distress.  Neuro: Alert,cooperative and oriented.  Lungs: CTAB. Normal respiratory effort and rate.  CV: Regular rate and rhythm, normal S1 and S2, no murmurs, gallops or rubs.  ABD: Soft, nontender, nondistended. Positive bowel sounds.  Extr: Right leg was wrapped today.    Lab Review:   Results from last 7 days   Lab Units 22  0600 08/15/22  0623   SODIUM mmol/L 138 139   POTASSIUM mmol/L 5.7* 5.3*   CHLORIDE mmol/L 102 104   CO2 mmol/L 28.0 28.6   BUN mg/dL 32* 32*   CREATININE mg/dL 1.16 1.15   GLUCOSE mg/dL 110* 115*   CALCIUM mg/dL 9.5 9.0   AST (SGOT) U/L 14 18   ALT (SGPT) U/L 25 32     Results from last 7 days   Lab Units 22  0720 22  0524 22  0524   CK TOTAL U/L  --   --  497*   TROPONIN T ng/mL 0.084* 0.147* 0.131*     Results from last 7 days   Lab Units 22  0600 08/15/22  0623   WBC 10*3/mm3  6.23 7.55   HEMOGLOBIN g/dL 10.3* 10.1*   HEMATOCRIT % 32.4* 31.9*   PLATELETS 10*3/mm3 185 167         Results from last 7 days   Lab Units 08/16/22  0600 08/15/22  0623   MAGNESIUM mg/dL 2.1 2.0           Invalid input(s): LDLCALC  Results from last 7 days   Lab Units 08/11/22  0524   PROBNP pg/mL 1,298.0           Current Medications:   Scheduled Meds:acetaminophen, 650 mg, Oral, Nightly  aspirin, 81 mg, Oral, Daily  calcium-vitamin D, 1 tablet, Oral, BID  carvedilol, 12.5 mg, Oral, BID With Meals  cefepime, 2 g, Intravenous, Q8H  enoxaparin, 40 mg, Subcutaneous, Q24H  famotidine, 40 mg, Oral, BID AC  ferrous sulfate, 325 mg, Oral, Daily With Breakfast  furosemide, 40 mg, Oral, Daily  gabapentin, 300 mg, Oral, TID  insulin lispro, 0-14 Units, Subcutaneous, TID AC  rosuvastatin, 5 mg, Oral, Nightly  senna, 1 tablet, Oral, Daily  sertraline, 75 mg, Oral, Daily  sodium chloride, 10 mL, Intravenous, Q12H  tamsulosin, 0.4 mg, Oral, Nightly      Continuous Infusions:pain,         Allergies:  Allergies   Allergen Reactions   • Bacitracin Unknown - High Severity   • Gentamycin [Gentamicin] Unknown - High Severity   • Neosporin [Neomycin-Bacitracin Zn-Polymyx] Other (See Comments)     ALLERGY TESTED   • Nsaids Unknown - High Severity   • Tobramycin Unknown - High Severity       Assessment:       Cellulitis of right lower extremity    HALIMA (obstructive sleep apnea)    CSA (central sleep apnea)    HTN (hypertension)    Type 2 diabetes mellitus with stage 3b chronic kidney disease (HCC)    Elevated troponin    RBBB    Pulmonary vascular congestion    Diastolic CHF, acute (HCC)    Elevated LFTs    Acute respiratory failure with hypoxia (HCC)    Acute on chronic respiratory failure with hypercapnia (HCC)    Anemia of chronic disease    Sepsis due to Gram negative bacteria ([facility])        Plan:     1.  Cellulitis per other physicians  2.   Elevated troponin.  More likely this is just demand ischemia.  EF was fine by echo on  8/11/2022 with no wall motion abnormality.  3.  Monitor reviewed appears to be an intermittent bundle branch block.  Blood pressure is fine he is asymptomatic.  He is also having some PVCs.  Again EF is fine.  4.  Hypertension blood pressure is markedly elevated.  Carvedilol increased yesterday with minimal effects.  We will place him on hydralazine see if we can do better on his blood pressure.    Cosme Reed MD  08/16/22  10:28 EDT

## 2022-08-16 NOTE — PROGRESS NOTES
[facility]  Inpatient Progress Note    Reason for Follow-up: Elevated liver enzymes, abnormal liver ultrasound    Subjective     Interval History:   Denies GI symptoms.  Tolerated p.o. intake well.  He does report generalized weakness and pain in his leg.    8/16/2022 labs show normal LFTs, albumin 2.7, creatinine 1.16, potassium 5.7.  Normal magnesium.  CBC with hemoglobin stable at 10.3      Current Facility-Administered Medications:   •  acetaminophen (TYLENOL) tablet 650 mg, 650 mg, Oral, Q4H PRN **OR** acetaminophen (TYLENOL) 160 MG/5ML solution 650 mg, 650 mg, Oral, Q4H PRN **OR** acetaminophen (TYLENOL) suppository 650 mg, 650 mg, Rectal, Q4H PRN, Bernie Robbins APRN  •  acetaminophen (TYLENOL) tablet 650 mg, 650 mg, Oral, Nightly, Bernie Robbins APRN, 650 mg at 08/15/22 2047  •  aspirin chewable tablet 81 mg, 81 mg, Oral, Daily, Bernie Robbins APRN, 81 mg at 08/16/22 0909  •  calcium-vitamin D 500-200 MG-UNIT tablet 1 tablet, 1 tablet, Oral, BID, Bernie Robbins APRN, 1 tablet at 08/16/22 0909  •  carvedilol (COREG) tablet 12.5 mg, 12.5 mg, Oral, BID With Meals, Cosme Reed MD, 12.5 mg at 08/16/22 0909  •  cefepime 2 gm IVPB in 100 ml NS (VTB), 2 g, Intravenous, Q8H, Kwasi Ferreira MD, 2 g at 08/16/22 0910  •  dextrose (D50W) (25 g/50 mL) IV injection 25 g, 25 g, Intravenous, Q15 Min PRN, Bernie Robbins APRN  •  dextrose (GLUTOSE) oral gel 15 g, 15 g, Oral, Q15 Min PRN, Bernie Robbins APRN  •  diazePAM (VALIUM) injection 2.5 mg, 2.5 mg, Intravenous, Once, Kwasi Ferreira MD  •  Enoxaparin Sodium (LOVENOX) syringe 40 mg, 40 mg, Subcutaneous, Q24H, Bernie Robbins APRN, 40 mg at 08/15/22 1458  •  famotidine (PEPCID) tablet 40 mg, 40 mg, Oral, BID AC, Bernie Robbins APRN, 40 mg at 08/16/22 0909  •  ferrous sulfate tablet 325 mg, 325 mg, Oral, Daily With Breakfast, Bernie Robbins APRN, 325 mg at 08/16/22 0909  •   furosemide (LASIX) tablet 40 mg, 40 mg, Oral, Daily, Jose Wayne MD, 40 mg at 08/16/22 0909  •  gabapentin (NEURONTIN) capsule 300 mg, 300 mg, Oral, TID, Bernie Robbins, APRN, 300 mg at 08/16/22 0909  •  glucagon (human recombinant) (GLUCAGEN DIAGNOSTIC) injection 1 mg, 1 mg, Intramuscular, Q15 Min PRN, Bernie Robbins, APRN  •  hydrALAZINE (APRESOLINE) tablet 25 mg, 25 mg, Oral, Q8H, Cosme Reed MD  •  insulin lispro (ADMELOG) injection 0-14 Units, 0-14 Units, Subcutaneous, TID AC, Bernie Robbins, APRN, 3 Units at 08/15/22 1813  •  morphine injection 2 mg, 2 mg, Intravenous, Q3H PRN, 2 mg at 08/13/22 0909 **AND** naloxone (NARCAN) injection 0.4 mg, 0.4 mg, Intravenous, Q5 Min PRN, Bernie Robbins, APRN  •  nitroglycerin (NITROSTAT) SL tablet 0.4 mg, 0.4 mg, Sublingual, Q5 Min PRN, Bernie Robbins, APRN  •  ondansetron (ZOFRAN) injection 4 mg, 4 mg, Intravenous, Q6H PRN, Bernie Robbins, APRN  •  oxyCODONE-acetaminophen (PERCOCET) 7.5-325 MG per tablet 1 tablet, 1 tablet, Oral, Q4H PRN, Bernie Robbins, APRN, 1 tablet at 08/16/22 0909  •  Patient Supplied Pain Pump, , Intrathecal, Continuous, Joshua Taylor MD, Currently Infusing at 08/11/22 1712  •  polyethylene glycol (MIRALAX) packet 17 g, 17 g, Oral, Daily PRN, Bernie Robbins, APRN  •  rosuvastatin (CRESTOR) tablet 5 mg, 5 mg, Oral, Nightly, Bernie Robbins, APRN, 5 mg at 08/15/22 2047  •  senna (SENOKOT) tablet 1 tablet, 1 tablet, Oral, Daily, Bernie Robbins APRN, 1 tablet at 08/16/22 0909  •  sertraline (ZOLOFT) tablet 75 mg, 75 mg, Oral, Daily, Bernie Robbins APRN, 75 mg at 08/16/22 0909  •  [COMPLETED] Insert peripheral IV, , , Once **AND** sodium chloride 0.9 % flush 10 mL, 10 mL, Intravenous, PRN, Stefan Crowley MD  •  sodium chloride 0.9 % flush 10 mL, 10 mL, Intravenous, Q12H, Bernie Robbins APRN, 10 mL at 08/16/22 0910  •  sodium chloride 0.9 % flush 10 mL, 10 mL,  Intravenous, PRN, Bernie Robbins APRN  •  tamsulosin (FLOMAX) 24 hr capsule 0.4 mg, 0.4 mg, Oral, Nightly, Bernie Robbins APRN, 0.4 mg at 08/15/22 2047  Review of Systems:    The following systems were reviewed and negative;  cardiovascular and gastrointestinal    Objective     Vital Signs  Temp:  [97.8 °F (36.6 °C)-98.5 °F (36.9 °C)] 97.9 °F (36.6 °C)  Heart Rate:  [51-63] 63  Resp:  [18] 18  BP: (173-192)/(74-85) 192/85  Body mass index is 35.8 kg/m².  No intake or output data in the 24 hours ending 08/16/22 1140  No intake/output data recorded.     Physical Exam:    General: patient awake, alert and cooperative   Eyes: Normal lids and lashes, no scleral icterus   Skin: warm and dry, not jaundiced   Pulm: regular and unlabored   Abdomen: soft, nontender, nondistended; normal bowel sounds   Psychiatric: Normal mood and behavior; memory intact     Results Review:     I reviewed the patient's new clinical results.    Results from last 7 days   Lab Units 08/16/22  0600 08/15/22  0623 08/14/22  0905   WBC 10*3/mm3 6.23 7.55 10.45   HEMOGLOBIN g/dL 10.3* 10.1* 9.7*   HEMATOCRIT % 32.4* 31.9* 30.3*   PLATELETS 10*3/mm3 185 167 152     Results from last 7 days   Lab Units 08/16/22  0600 08/15/22  0623 08/14/22  0905   SODIUM mmol/L 138 139 141   POTASSIUM mmol/L 5.7* 5.3* 5.1   CHLORIDE mmol/L 102 104 104   CO2 mmol/L 28.0 28.6 29.8*   BUN mg/dL 32* 32* 33*   CREATININE mg/dL 1.16 1.15 1.25   CALCIUM mg/dL 9.5 9.0 9.0   BILIRUBIN mg/dL 0.3 0.2 0.3   ALK PHOS U/L 98 97 111   ALT (SGPT) U/L 25 32 43*   AST (SGOT) U/L 14 18 32   GLUCOSE mg/dL 110* 115* 120*         Lab Results   Lab Value Date/Time    LIPASE 14 08/14/2022 0905       Radiology:  US Liver   Final Result       Cholelithiasis and biliary sludge with common bile duct and intrahepatic   biliary ductal dilatation. There is poor visualization of the distal   common bile duct. Findings raise concern for choledocholithiasis or   other distal obstructing  process. Recommend further evaluation with ERCP   or contrast-enhanced MRCP.       This report was finalized on 8/12/2022 9:00 PM by Dr. Ping Bone M.D.          CT Lower Extremity Bilateral Without Contrast   Final Result   Nonspecific soft tissue edema in both lower legs   asymmetrically more prominent on the right. There are several blisters   at the level of the dermis of the right lower leg proximally. However,   no soft tissue gas or abscess collection is present.       This report was finalized on 8/11/2022 2:38 PM by Dr. Joshua Mendoza M.D.          XR Chest 1 View   Final Result       1. Cardiomegaly with vascular congestion.   2. Nonspecific bibasilar consolidation.       This report was finalized on 8/11/2022 5:59 AM by Dr. Karin Weathers M.D.          XR Tibia Fibula 2 View Right   Final Result       1. Diffuse edema of the right calf.   2. No aggressive osseous abnormalities are seen.       This report was finalized on 8/11/2022 6:01 AM by Dr. Karin Weathers M.D.          MRI abdomen wo contrast mrcp    (Results Pending)       Assessment & Plan     Patient Active Problem List   Diagnosis   • HALIMA (obstructive sleep apnea)   • CSA (central sleep apnea)   • REM behavioral disorder   • Hypersomnia due to medical condition   • Periodic breathing   • Cellulitis of right lower extremity   • HTN (hypertension)   • Type 2 diabetes mellitus with stage 3b chronic kidney disease (HCC)   • Elevated troponin   • RBBB   • Pulmonary vascular congestion   • Diastolic CHF, acute (HCC)   • Elevated LFTs   • Acute respiratory failure with hypoxia (HCC)   • Acute on chronic respiratory failure with hypercapnia (HCC)   • Anemia of chronic disease   • Sepsis due to Gram negative bacteria (HCC)       Assessment:  1. Elevated liver enzymes  2. Abnormal right upper quadrant ultrasound  3. Enterobacter sepsis from cellulitis  4. Right lower extremity cellulitis  5. Chronic kidney disease stage  III      Plan:  · LFTs normalized.  · Recommended MRCP for CT findings of central intrahepatic biliary ductal prominence however patient refusing procedure due to claustrophobia.  Even with offer of benzodiazepine, he still declines.    · Antibiotics per ID recommendations.  · Given normalized LFTs and patient refusal to undergo MRCP, our service will sign off at this time.  Thank you for the consult.  Please call us back if we are needed further or patient changes his mind.    I discussed the patients findings and my recommendations with patient.    Dragon dictation used throughout this note.            Savi Alfonso PA-C  [facility]  [address] [place] [address]  Office: [phone]

## 2022-08-17 LAB
ALBUMIN SERPL-MCNC: 3.3 G/DL (ref 3.5–5.2)
ALP SERPL-CCNC: 90 U/L (ref 39–117)
ALT SERPL W P-5'-P-CCNC: 22 U/L (ref 1–41)
ANION GAP SERPL CALCULATED.3IONS-SCNC: 5.8 MMOL/L (ref 5–15)
AST SERPL-CCNC: 13 U/L (ref 1–40)
BACTERIA SPEC AEROBE CULT: NORMAL
BACTERIA SPEC AEROBE CULT: NORMAL
BILIRUB CONJ SERPL-MCNC: <0.2 MG/DL (ref 0–0.3)
BILIRUB INDIRECT SERPL-MCNC: ABNORMAL MG/DL
BILIRUB SERPL-MCNC: 0.3 MG/DL (ref 0–1.2)
BUN SERPL-MCNC: 35 MG/DL (ref 8–23)
BUN/CREAT SERPL: 27.3 (ref 7–25)
CALCIUM SPEC-SCNC: 9.6 MG/DL (ref 8.6–10.5)
CHLORIDE SERPL-SCNC: 97 MMOL/L (ref 98–107)
CO2 SERPL-SCNC: 35.2 MMOL/L (ref 22–29)
CREAT SERPL-MCNC: 1.28 MG/DL (ref 0.76–1.27)
EGFRCR SERPLBLD CKD-EPI 2021: 57.3 ML/MIN/1.73
GLUCOSE BLDC GLUCOMTR-MCNC: 106 MG/DL (ref 70–130)
GLUCOSE BLDC GLUCOMTR-MCNC: 127 MG/DL (ref 70–130)
GLUCOSE BLDC GLUCOMTR-MCNC: 134 MG/DL (ref 70–130)
GLUCOSE BLDC GLUCOMTR-MCNC: 146 MG/DL (ref 70–130)
GLUCOSE SERPL-MCNC: 123 MG/DL (ref 65–99)
MAGNESIUM SERPL-MCNC: 1.9 MG/DL (ref 1.6–2.4)
PHOSPHATE SERPL-MCNC: 3 MG/DL (ref 2.5–4.5)
POTASSIUM SERPL-SCNC: 4.8 MMOL/L (ref 3.5–5.2)
PROT SERPL-MCNC: 6.5 G/DL (ref 6–8.5)
SODIUM SERPL-SCNC: 138 MMOL/L (ref 136–145)

## 2022-08-17 PROCEDURE — 84100 ASSAY OF PHOSPHORUS: CPT | Performed by: INTERNAL MEDICINE

## 2022-08-17 PROCEDURE — 97530 THERAPEUTIC ACTIVITIES: CPT

## 2022-08-17 PROCEDURE — 80048 BASIC METABOLIC PNL TOTAL CA: CPT | Performed by: INTERNAL MEDICINE

## 2022-08-17 PROCEDURE — 83735 ASSAY OF MAGNESIUM: CPT | Performed by: HOSPITALIST

## 2022-08-17 PROCEDURE — 25010000002 ENOXAPARIN PER 10 MG: Performed by: NURSE PRACTITIONER

## 2022-08-17 PROCEDURE — 82962 GLUCOSE BLOOD TEST: CPT

## 2022-08-17 PROCEDURE — 99232 SBSQ HOSP IP/OBS MODERATE 35: CPT | Performed by: NURSE PRACTITIONER

## 2022-08-17 PROCEDURE — 25010000002 CEFEPIME PER 500 MG: Performed by: INTERNAL MEDICINE

## 2022-08-17 PROCEDURE — 99232 SBSQ HOSP IP/OBS MODERATE 35: CPT | Performed by: STUDENT IN AN ORGANIZED HEALTH CARE EDUCATION/TRAINING PROGRAM

## 2022-08-17 PROCEDURE — 80076 HEPATIC FUNCTION PANEL: CPT | Performed by: INTERNAL MEDICINE

## 2022-08-17 RX ORDER — GABAPENTIN 100 MG/1
100 CAPSULE ORAL EVERY 12 HOURS SCHEDULED
Status: DISCONTINUED | OUTPATIENT
Start: 2022-08-17 | End: 2022-08-19 | Stop reason: HOSPADM

## 2022-08-17 RX ORDER — GABAPENTIN 100 MG/1
100 CAPSULE ORAL 3 TIMES DAILY
Status: DISCONTINUED | OUTPATIENT
Start: 2022-08-17 | End: 2022-08-17

## 2022-08-17 RX ADMIN — Medication 10 ML: at 21:14

## 2022-08-17 RX ADMIN — CALCIUM CARBONATE-VITAMIN D TAB 500 MG-200 UNIT 1 TABLET: 500-200 TAB at 08:51

## 2022-08-17 RX ADMIN — TAMSULOSIN HYDROCHLORIDE 0.4 MG: 0.4 CAPSULE ORAL at 21:12

## 2022-08-17 RX ADMIN — HYDRALAZINE HYDROCHLORIDE 25 MG: 25 TABLET, FILM COATED ORAL at 13:20

## 2022-08-17 RX ADMIN — ASPIRIN 81 MG: 81 TABLET, CHEWABLE ORAL at 08:50

## 2022-08-17 RX ADMIN — CARVEDILOL 12.5 MG: 12.5 TABLET, FILM COATED ORAL at 08:51

## 2022-08-17 RX ADMIN — CALCIUM CARBONATE-VITAMIN D TAB 500 MG-200 UNIT 1 TABLET: 500-200 TAB at 21:12

## 2022-08-17 RX ADMIN — SERTRALINE 75 MG: 25 TABLET, FILM COATED ORAL at 09:11

## 2022-08-17 RX ADMIN — ENOXAPARIN SODIUM 40 MG: 100 INJECTION SUBCUTANEOUS at 13:20

## 2022-08-17 RX ADMIN — GABAPENTIN 100 MG: 100 CAPSULE ORAL at 21:13

## 2022-08-17 RX ADMIN — FAMOTIDINE 40 MG: 20 TABLET ORAL at 18:39

## 2022-08-17 RX ADMIN — HYDRALAZINE HYDROCHLORIDE 25 MG: 25 TABLET, FILM COATED ORAL at 06:16

## 2022-08-17 RX ADMIN — OXYCODONE HYDROCHLORIDE AND ACETAMINOPHEN 1 TABLET: 7.5; 325 TABLET ORAL at 08:51

## 2022-08-17 RX ADMIN — CEFEPIME 2 G: 2 INJECTION, POWDER, FOR SOLUTION INTRAVENOUS at 18:39

## 2022-08-17 RX ADMIN — HYDRALAZINE HYDROCHLORIDE 25 MG: 25 TABLET, FILM COATED ORAL at 21:12

## 2022-08-17 RX ADMIN — FERROUS SULFATE TAB 325 MG (65 MG ELEMENTAL FE) 325 MG: 325 (65 FE) TAB at 08:51

## 2022-08-17 RX ADMIN — OXYCODONE HYDROCHLORIDE AND ACETAMINOPHEN 1 TABLET: 7.5; 325 TABLET ORAL at 18:45

## 2022-08-17 RX ADMIN — ACETAMINOPHEN 650 MG: 325 TABLET, FILM COATED ORAL at 21:12

## 2022-08-17 RX ADMIN — ROSUVASTATIN CALCIUM 5 MG: 5 TABLET, FILM COATED ORAL at 21:12

## 2022-08-17 RX ADMIN — Medication 10 ML: at 13:20

## 2022-08-17 RX ADMIN — CEFEPIME 2 G: 2 INJECTION, POWDER, FOR SOLUTION INTRAVENOUS at 08:51

## 2022-08-17 RX ADMIN — SENNOSIDES 1 TABLET: 8.6 TABLET, FILM COATED ORAL at 08:51

## 2022-08-17 RX ADMIN — GABAPENTIN 300 MG: 300 CAPSULE ORAL at 08:51

## 2022-08-17 RX ADMIN — FUROSEMIDE 40 MG: 40 TABLET ORAL at 08:51

## 2022-08-17 RX ADMIN — FAMOTIDINE 40 MG: 20 TABLET ORAL at 08:51

## 2022-08-17 NOTE — THERAPY TREATMENT NOTE
"Patient Name: Yakov Dubon  : 1945    MRN: [id number]                              Today's Date: 2022       Admit Date: 2022    Visit Dx:     ICD-10-CM ICD-9-CM   1. Cellulitis of right lower extremity  L03.115 682.6   2. Elevated troponin level not due to acute coronary syndrome  R77.8 790.6   3. Sepsis, due to unspecified organism, unspecified whether acute organ dysfunction present ([facility])  A41.9 038.9     995.91   4. Type 2 diabetes mellitus without complication, unspecified whether long term insulin use ([facility])  E11.9 250.00   5. 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 condition   • Periodic breathing   • Cellulitis of right lower extremity   • HTN (hypertension)   • Type 2 diabetes mellitus with stage 3b chronic kidney disease ([facility])   • Elevated troponin   • RBBB   • Pulmonary vascular congestion   • Diastolic CHF, acute ([facility])   • Elevated LFTs   • Acute respiratory failure with hypoxia ([facility])   • Acute on chronic respiratory failure with hypercapnia ([facility])   • Anemia of chronic disease   • Sepsis due to Gram negative bacteria ([facility])     Past Medical History:   Diagnosis Date   • Anxiety    • Back pain    • Depression    • Diabetes mellitus ([facility])     \"BORDERLINE\"   • High cholesterol    • Hypertension    • Kidney disease, chronic, stage III (GFR 30-59 ml/min) ([facility])    • Reflux esophagitis    • Sleep apnea      Past Surgical History:   Procedure Laterality Date   • APPENDECTOMY     • CARPAL TUNNEL RELEASE     • CATARACT EXTRACTION     • HAMMER TOE REPAIR     • LUMBAR SPINE SURGERY     • PAIN PUMP INSERTION/REVISION Right 2016    Procedure: RT PAIN PUMP REPLACEMENT;  Surgeon: Chris Messer MD;  Location: [facility] OR;  Service:    • TOTAL KNEE ARTHROPLASTY Left       General Information     Row Name " 08/17/22 1422          Physical Therapy Time and Intention    Document Type therapy note (daily note)  -EJ     Mode of Treatment physical therapy  -EJ     Row Name 08/17/22 1422          General Information    Existing Precautions/Restrictions fall  -EJ     Barriers to Rehab cognitive status  -EJ           User Key  (r) = Recorded By, (t) = Taken By, (c) = Cosigned By    Initials Name Provider Type    EJ Eugenia Costa, PT Physical Therapist               Mobility     Row Name 08/17/22 1422          Bed Mobility    Supine-Sit [place] (Bed Mobility) verbal cues;nonverbal cues (demo/gesture);moderate assist (50% patient effort);2 person assist  -EJ     Sit-Supine [place] (Bed Mobility) verbal cues;nonverbal cues (demo/gesture);2 person assist;moderate assist (50% patient effort)  -EJ     Assistive Device (Bed Mobility) bed rails;head of bed elevated  -EJ     Comment, (Bed Mobility) able to initiate movement w BLE on/off bed  -EJ     Row Name 08/17/22 1422          Transfers    Comment, (Transfers) pt not agreeable to attempt standign due to pain/weakness and decreased RLE ROM  -EJ     Row Name 08/17/22 1422          Sit-Stand Transfer    Sit-Stand [place] (Transfers) unable to assess  -EJ     Row Name 08/17/22 1422          Gait/Stairs (Locomotion)    [place] Level (Gait) unable to assess  -EJ           User Key  (r) = Recorded By, (t) = Taken By, (c) = Cosigned By    Initials Name Provider Type    EJ Eugenia Costa, PT Physical Therapist               Obj/Interventions     Row Name 08/17/22 1424          Motor Skills    Therapeutic Exercise --  seated BLE LAQ x 10 reps  -EJ     Row Name 08/17/22 1424          Balance    Static Sitting Balance standby assist  -EJ     Comment, Balance sat EOB for approx 6 minutes w SBA  -EJ           User Key  (r) = Recorded By, (t) = Taken By, (c) = Cosigned By    Initials Name Provider Type    EJ Eugenia Costa, PT Physical Therapist                Goals/Plan    No documentation.                Clinical Impression     Row Name 08/17/22 1427          Pain    Pain Location - Side/Orientation Right  -EJ     Pain Location lower  -EJ     Pain Location - extremity  -EJ     Pre/Posttreatment Pain Comment pt unable to rate pain  -EJ     Row Name 08/17/22 1427          Plan of Care Review    Plan of Care Reviewed With patient  -EJ     Outcome Evaluation Pt seen for PT this afternoon. He reports pain in RLE but is unable to rate pain during session. HE was able to perform supine to sit w mod A x 2. Pt did initiate most movement of RLE. HE tolerated sitting for approx 6 minutes w SBA, but was not agreeable to attempt standing today. Pt assisted back to bed at end of session. He is slow to respond to questions and often would not verbalize anything at all. Will continue to progress activity as tolerated.  -EJ     Row Name 08/17/22 1427          Positioning and Restraints    Pre-Treatment Position in bed  -EJ     Post Treatment Position bed  -EJ     In Bed notified nsg;supine;call light within reach;encouraged to call for assist;exit alarm on  -EJ           User Key  (r) = Recorded By, (t) = Taken By, (c) = Cosigned By    Initials Name Provider Type    EJ Eugenia Costa, PT Physical Therapist               Outcome Measures     Row Name 08/17/22 1429          How much help from another person do you currently need...    Turning from your back to your side while in flat bed without using bedrails? 2  -EJ     Moving from lying on back to sitting on the side of a flat bed without bedrails? 2  -EJ     Moving to and from a bed to a chair (including a wheelchair)? 1  -EJ     Standing up from a chair using your arms (e.g., wheelchair, bedside chair)? 1  -EJ     Climbing 3-5 steps with a railing? 1  -EJ     To walk in hospital room? 1  -EJ     AM-PAC 6 Clicks Score (PT) 8  -EJ     Highest level of mobility 3 --> Sat at edge of bed  -EJ           User Key  (r) = Recorded By, (t)  = Taken By, (c) = Cosigned By    Initials Name Provider Type     Eugenia Costa, PT Physical Therapist                             Physical Therapy Education                 Title: PT OT SLP Therapies (In Progress)     Topic: Physical Therapy (In Progress)     Point: Mobility training (In Progress)     Learning Progress Summary           Patient Acceptance, E,TB,D, NR by  at 8/17/2022 1429    Acceptance, E,TB, VU,DU by  at 8/16/2022 1640    Acceptance, E,D, VU,NR by MS at 8/13/2022 1052                   Point: Home exercise program (In Progress)     Learning Progress Summary           Patient Acceptance, E,TB,D, NR by  at 8/17/2022 1429    Acceptance, E,TB, VU,DU by  at 8/16/2022 1640    Acceptance, E,D, VU,NR by MS at 8/13/2022 1052                   Point: Body mechanics (Done)     Learning Progress Summary           Patient Acceptance, E,TB, VU,DU by  at 8/16/2022 1640                   Point: Precautions (Done)     Learning Progress Summary           Patient Acceptance, E,TB, VU,DU by  at 8/16/2022 1640                               User Key     Initials Effective Dates Name Provider Type Discipline    MS 06/16/21 -  Riaz Singh, PT Physical Therapist PT     06/16/21 -  Eugenia Costa, PT Physical Therapist PT     07/25/22 -  Deysi Sheets, JUAN Physical Therapist PT              PT Recommendation and Plan     Plan of Care Reviewed With: patient  Outcome Evaluation: Pt seen for PT this afternoon. He reports pain in RLE but is unable to rate pain during session. HE was able to perform supine to sit w mod A x 2. Pt did initiate most movement of RLE. HE tolerated sitting for approx 6 minutes w SBA, but was not agreeable to attempt standing today. Pt assisted back to bed at end of session. He is slow to respond to questions and often would not verbalize anything at all. Will continue to progress activity as tolerated.     Time Calculation:    PT Charges     Row Name 08/17/22 6795              Time Calculation    Start Time 1401  -EJ      Stop Time 1413  -EJ      Time Calculation (min) 12 min  -EJ      PT Received On 08/17/22  -EJ      PT - Next Appointment 08/18/22  -EJ            User Key  (r) = Recorded By, (t) = Taken By, (c) = Cosigned By    Initials Name Provider Type    Eugenia Fowler, PT Physical Therapist              Therapy Charges for Today     Code Description Service Date Service Provider Modifiers Qty    12167285856  PT THERAPEUTIC ACT EA 15 MIN 8/17/2022 Eugenia Costa, PT GP 1    [id number]  PT THER SUPP EA 15 MIN 8/17/2022 Eugenia Costa, PT GP 1          PT G-Codes  Outcome Measure Options: AM-PAC 6 Clicks Basic Mobility (PT)  AM-PAC 6 Clicks Score (PT): 8  AM-PAC 6 Clicks Score (OT): 12    Eugenia Costa, JUAN  8/17/2022

## 2022-08-17 NOTE — NURSING NOTE
Wound/ostomy - the middle portion of patient's DO mattress is not staying properly inflated, skin assessed to sacral/buttocks and no skin damage present. Contacted [employer] and a replacement low airl oss mattress was requested and bed frame requested from [employer]

## 2022-08-17 NOTE — PROGRESS NOTES
"    [facility] Progress Note      Patient Name: Yakov Dubon  : 1945  MRN: [id number]  Primary Care Physician:  Chencho Sterling MD  Date of admission: 2022    Subjective     Interval History:   Follow up CKD 3, hyperkalemia. Sitting up in bed eating. Answers all questions with \"no\" or does not answer at all .    Review of Systems:   As noted above    Objective     Vitals:   Temp:  [98.3 °F (36.8 °C)-98.5 °F (36.9 °C)] 98.3 °F (36.8 °C)  Heart Rate:  [51-60] 59  Resp:  [18] 18  BP: (135-167)/(61-78) 135/66  Flow (L/min):  [1-2] 1    Intake/Output Summary (Last 24 hours) at 2022 1737  Last data filed at 2022 1236  Gross per 24 hour   Intake 460 ml   Output 2350 ml   Net -1890 ml       Physical Exam:    General Appearance: awake, sitting up in bed eating.   no acute distress.  Chronically ill.  Apraxic with fork.   Skin: warm and dry  HEENT: oral mucosa normal, nonicteric sclera  Neck: supple, no JVD  Lungs: clear to auscultation, unlabored.   Heart: RRR, no s3 or rub  Abdomen: + bs, nontender, nondistended. Obese, soft.  : male purwik  Extremities: 1+ lower ext edema.       Scheduled Meds:     acetaminophen, 650 mg, Oral, Nightly  aspirin, 81 mg, Oral, Daily  calcium-vitamin D, 1 tablet, Oral, BID  carvedilol, 12.5 mg, Oral, BID With Meals  cefepime, 2 g, Intravenous, Q8H  diazePAM, 2.5 mg, Intravenous, Once  enoxaparin, 40 mg, Subcutaneous, Q24H  famotidine, 40 mg, Oral, BID AC  ferrous sulfate, 325 mg, Oral, Daily With Breakfast  furosemide, 40 mg, Oral, Daily  gabapentin, 100 mg, Oral, Q12H  hydrALAZINE, 25 mg, Oral, Q8H  insulin lispro, 0-14 Units, Subcutaneous, TID AC  rosuvastatin, 5 mg, Oral, Nightly  senna, 1 tablet, Oral, Daily  sertraline, 75 mg, Oral, Daily  sodium chloride, 10 mL, Intravenous, Q12H  tamsulosin, 0.4 mg, Oral, Nightly      IV Meds:   pain,         Results Reviewed:   I have personally reviewed the results from the time of this " admission to 8/17/2022 17:37 EDT     Results from last 7 days   Lab Units 08/17/22  1009 08/16/22  1451 08/16/22  0600 08/15/22  0623   SODIUM mmol/L 138  --  138 139   POTASSIUM mmol/L 4.8 5.3* 5.7* 5.3*   CHLORIDE mmol/L 97*  --  102 104   CO2 mmol/L 35.2*  --  28.0 28.6   BUN mg/dL 35*  --  32* 32*   CREATININE mg/dL 1.28*  --  1.16 1.15   CALCIUM mg/dL 9.6  --  9.5 9.0   BILIRUBIN mg/dL 0.3  --  0.3 0.2   ALK PHOS U/L 90  --  98 97   ALT (SGPT) U/L 22  --  25 32   AST (SGOT) U/L 13  --  14 18   GLUCOSE mg/dL 123*  --  110* 115*       Estimated Creatinine Clearance: 63.3 mL/min (A) (by C-G formula based on SCr of 1.28 mg/dL (H)).    Results from last 7 days   Lab Units 08/17/22  1009 08/16/22  0600 08/15/22  0623 08/14/22  0905   MAGNESIUM mg/dL 1.9 2.1 2.0 2.1   PHOSPHORUS mg/dL 3.0  --  3.3 3.1       Results from last 7 days   Lab Units 08/15/22  0623 08/14/22  0905 08/11/22  0524   URIC ACID mg/dL 6.8 7.4* 10.0*       Results from last 7 days   Lab Units 08/16/22  0600 08/15/22  0623 08/14/22  0905 08/13/22  0720 08/12/22  0524   WBC 10*3/mm3 6.23 7.55 10.45 8.27 8.21   HEMOGLOBIN g/dL 10.3* 10.1* 9.7* 9.7* 11.0*   PLATELETS 10*3/mm3 185 167 152 124* 181             Assessment / Plan     ASSESSMENT:  1. CKDIII.  stable.  Hyperkalemia.  Started po lasix yesterday  K better.   2. RLE cellulitis, enterobacter bacteremia. On cefepime.   3. Acute on chronic respiratory failure, COPD, HALIMA. Non-compliant with BiPAP.  4. DM2  5. HTN not controlled. Hydralazine added yesterday, better today .  6. Anemia. Hg stable.   7. Elevated LFT's.  GI recommended MRCP, patient ambiguous  .   8. Confusion. Agree with decrease in gabapentin .  PLAN:  1. No new rec today .  Lexie Rao MD  08/17/22  17:37 EDT    [facility]  [phone]

## 2022-08-17 NOTE — PROGRESS NOTES
LOS: 6 days     Chief Complaint: Cellulitis    Interval History: Patient reports he is doing well this morning.  Denies any acute complaints.  States he is tolerating antibiotics well.  Denies any fevers or chills.    Vital Signs  Temp:  [97.9 °F (36.6 °C)-98.9 °F (37.2 °C)] 98.3 °F (36.8 °C)  Heart Rate:  [51-63] 59  Resp:  [18] 18  BP: (144-199)/(61-91) 144/61    Physical Exam:  General: In no acute distress  HEENT: Oropharynx clear, moist mucous membranes  Cardiovascular: RRR, normal S1 and S2, no M/R/G  Respiratory: Clear to ascultation bilaterally, no wheezing  GI: Soft, NT/ND, + bowel sounds bilaterally, no masses  Skin: Right leg with dressing in place and signs of serosanguineous drainage.  Extremities: Bilateral lower extremity edema with right worse than left.  Access: Peripheral IV.    Antibiotics:  Anti-Infectives (From admission, onward)    Ordered     Dose/Rate Route Frequency Start Stop    08/15/22 0741  cefepime 2 gm IVPB in 100 ml NS (VTB)        Ordering Provider: Kwasi Ferreira MD    2 g  over 4 Hours Intravenous Every 8 Hours 08/15/22 0745 08/19/22 0829    08/12/22 0022  cefepime 2 gm IVPB in 100 ml NS (VTB)        Ordering Provider: Lenny Rosa MD    2 g  over 30 Minutes Intravenous Once 08/12/22 0115 08/12/22 0155    08/11/22 0520  vancomycin 2500 mg/500 mL 0.9% NS IVPB ([facility])        Ordering Provider: Stefan Crowley MD    20 mg/kg × 124 kg Intravenous Once 08/11/22 0522 08/11/22 0650    08/11/22 0520  piperacillin-tazobactam (ZOSYN) 3.375 g in iso-osmotic dextrose 50 ml (premix)        Ordering Provider: Stefan Crowley MD    3.375 g  over 30 Minutes Intravenous Once 08/11/22 0522 08/11/22 0612    08/11/22 0520  clindamycin (CLEOCIN) 600 mg in dextrose 5% 50 mL IVPB (premix)        Ordering Provider: Stefan Crowley MD    600 mg Intravenous Once 08/11/22 0522 08/11/22 0648           Results Review:     I reviewed the patient's new clinical  results.    Lab Results   Component Value Date    WBC 6.23 08/16/2022    HGB 10.3 (L) 08/16/2022    HCT 32.4 (L) 08/16/2022    MCV 93.9 08/16/2022     08/16/2022     Lab Results   Component Value Date    GLUCOSE 110 (H) 08/16/2022    BUN 32 (H) 08/16/2022    CREATININE 1.16 08/16/2022    EGFRIFNONA 45 (L) 04/11/2016    BCR 27.6 (H) 08/16/2022    CO2 28.0 08/16/2022    CALCIUM 9.5 08/16/2022    ALBUMIN 2.70 (L) 08/16/2022    LABIL2 1.2 12/30/2019    AST 14 08/16/2022    ALT 25 08/16/2022       Microbiology:  8/11 blood cultures 1 of 2 positive for Enterobacter  8/11 COVID-negative  8/12 blood cultures 2 out of 2 no growth to date    Assessment    #Enterobacter septicemia  #Right lower extremity cellulitis  #Right lower extremity venous stasis  #CKD    Plan for MRCP today and if negative then would transition to p.o. levofloxacin 750 mg every 24 hours through 8/19.  In the meantime we will continue cefepime 2 g every 8 hours.  Blood cultures have been no growth to date on repeat.  Discussed at bedside with nursing staff to obtain pictures of the right lower extremity on dressing change today.    ID will follow.

## 2022-08-17 NOTE — PROGRESS NOTES
"    Patient Name: Yakov Dubon  :1945  77 y.o.      Patient Care Team:  Chencho Sterling MD as PCP - General (Internal Medicine)  Reasor, Chris Polo MD as Consulting Physician (Pain Medicine)    Chief Complaint: follow up elevated troponin    Interval History: resting in bed. No CP. Can't tell me why he is in the hospital.        Objective   Vital Signs  Temp:  [98.3 °F (36.8 °C)-98.9 °F (37.2 °C)] 98.3 °F (36.8 °C)  Heart Rate:  [51-60] 59  Resp:  [18] 18  BP: (144-199)/(61-91) 144/61    Intake/Output Summary (Last 24 hours) at 2022 1046  Last data filed at 2022 0900  Gross per 24 hour   Intake 180 ml   Output 2150 ml   Net -1970 ml     Flowsheet Rows    Flowsheet Row First Filed Value   Admission Height 182 cm (71.65\") Documented at 2022 1538   Admission Weight 124 kg (272 lb 14.9 oz) Documented at 2022 0522          Physical Exam:   General Appearance:    Alert, cooperative, in no acute distress   Lungs:     Clear to auscultation.  Normal respiratory effort and rate.      Heart:    Regular rhythm and normal rate, normal S1 and S2, no murmurs, gallops or rubs.     Chest Wall:    No abnormalities observed   Abdomen:     Soft, nontender, positive bowel sounds.     Extremities:   no cyanosis, clubbing or edema.  No marked joint deformities.  Adequate musculoskeletal strength.       Results Review:    Results from last 7 days   Lab Units 22  1451 22  0600   SODIUM mmol/L  --  138   POTASSIUM mmol/L 5.3* 5.7*   CHLORIDE mmol/L  --  102   CO2 mmol/L  --  28.0   BUN mg/dL  --  32*   CREATININE mg/dL  --  1.16   GLUCOSE mg/dL  --  110*   CALCIUM mg/dL  --  9.5     Results from last 7 days   Lab Units 22  0720 22  0524 22  0524   CK TOTAL U/L  --   --  497*   TROPONIN T ng/mL 0.084* 0.147* 0.131*     Results from last 7 days   Lab Units 22  0600   WBC 10*3/mm3 6.23   HEMOGLOBIN g/dL 10.3*   HEMATOCRIT % 32.4*   PLATELETS 10*3/mm3 185         Results " from last 7 days   Lab Units 08/16/22  0600   MAGNESIUM mg/dL 2.1                   Medication Review:   acetaminophen, 650 mg, Oral, Nightly  aspirin, 81 mg, Oral, Daily  calcium-vitamin D, 1 tablet, Oral, BID  carvedilol, 12.5 mg, Oral, BID With Meals  cefepime, 2 g, Intravenous, Q8H  diazePAM, 2.5 mg, Intravenous, Once  enoxaparin, 40 mg, Subcutaneous, Q24H  famotidine, 40 mg, Oral, BID AC  ferrous sulfate, 325 mg, Oral, Daily With Breakfast  furosemide, 40 mg, Oral, Daily  gabapentin, 300 mg, Oral, TID  hydrALAZINE, 25 mg, Oral, Q8H  insulin lispro, 0-14 Units, Subcutaneous, TID AC  rosuvastatin, 5 mg, Oral, Nightly  senna, 1 tablet, Oral, Daily  sertraline, 75 mg, Oral, Daily  sodium chloride, 10 mL, Intravenous, Q12H  tamsulosin, 0.4 mg, Oral, Nightly         pain,         Assessment & Plan   1. Enterobacter septicemia  2. Right lower extremity cellulitis  3. Chronic kidney disease  4. Elevated liver enzymes, abnormal RUQ ultrasound, CT with intrahepatic biliary ductal prominence. Refusing MRCP.   5. Elevated troponin - likely demand ischemia. Echo without regional wall motion abnormalities and preserved EF. No additional testing recommended at this time.   6. Hypoalbuminemia  7. Intermittent bundle branch block.   8. PVCs.     Continue beta blocker.   No additional testing recommended at this time.   Follow blood pressure. Improved with hydralazine.    CECILE Sandoval  [facility]  08/17/22  10:46 EDT

## 2022-08-17 NOTE — PROGRESS NOTES
Name: Yakov Dubon ADMIT: 2022   : 1945  PCP: Chencho Sterling MD    MRN: [id number] LOS: 6 days   AGE/SEX: 77 y.o. male  ROOM: [facility]   Subjective   Chief Complaint   Patient presents with   • Hypotension   • Fall     Dyspnea fair  On IV ABX  On diuretics  Denies abd pain  Some times agreeable to do MRCP, some times not  Confused at times    ROS  No f/c  No n/v  No cp/palp  No soa/cough    Objective   Vital Signs  Temp:  [98.3 °F (36.8 °C)-98.5 °F (36.9 °C)] 98.3 °F (36.8 °C)  Heart Rate:  [51-60] 59  Resp:  [18] 18  BP: (135-170)/(61-78) 135/66  SpO2:  [95 %-97 %] 95 %  on  Flow (L/min):  [1-2] 1;   Device (Oxygen Therapy): nasal cannula  Body mass index is 36.35 kg/m².    Chronically ill  Some confusion  Physical Exam  Constitutional:       Appearance: He is obese. He is ill-appearing (chronically).   HENT:      Head: Normocephalic and atraumatic.   Eyes:      General: No scleral icterus.  Cardiovascular:      Rate and Rhythm: Normal rate and regular rhythm.      Heart sounds: Normal heart sounds.   Pulmonary:      Effort: Pulmonary effort is normal. No respiratory distress.      Breath sounds: Normal breath sounds.   Abdominal:      General: There is no distension.      Palpations: Abdomen is soft.      Tenderness: There is no abdominal tenderness.   Musculoskeletal:      Cervical back: Neck supple.      Right lower leg: Edema present.      Left lower leg: Edema present.   Skin:     Findings: Erythema (RLE) present.   Neurological:      Mental Status: He is alert.   Psychiatric:         Behavior: Behavior normal.     right leg wrapped    Results Review:       I reviewed the patient's new clinical results.  Results from last 7 days   Lab Units 22  0600 08/15/22  0623 22  0905 22  0720   WBC 10*3/mm3 6.23 7.55 10.45 8.27   HEMOGLOBIN g/dL 10.3* 10.1* 9.7* 9.7*   PLATELETS 10*3/mm3 185 167 152 124*     Results from last 7 days   Lab Units 22  1009 22  2761  08/16/22  0600 08/15/22  0623 08/14/22  0905   SODIUM mmol/L 138  --  138 139 141   POTASSIUM mmol/L 4.8 5.3* 5.7* 5.3* 5.1   CHLORIDE mmol/L 97*  --  102 104 104   CO2 mmol/L 35.2*  --  28.0 28.6 29.8*   BUN mg/dL 35*  --  32* 32* 33*   CREATININE mg/dL 1.28*  --  1.16 1.15 1.25   GLUCOSE mg/dL 123*  --  110* 115* 120*   Estimated Creatinine Clearance: 63.3 mL/min (A) (by C-G formula based on SCr of 1.28 mg/dL (H)).  Results from last 7 days   Lab Units 08/17/22  1009 08/16/22  0600 08/15/22  0623 08/14/22  0905   ALBUMIN g/dL 3.30* 2.70* 2.50* 2.60*   BILIRUBIN mg/dL 0.3 0.3 0.2 0.3   ALK PHOS U/L 90 98 97 111   AST (SGOT) U/L 13 14 18 32   ALT (SGPT) U/L 22 25 32 43*     Results from last 7 days   Lab Units 08/17/22  1009 08/16/22  0600 08/15/22  0623 08/14/22  0905   CALCIUM mg/dL 9.6 9.5 9.0 9.0   ALBUMIN g/dL 3.30* 2.70* 2.50* 2.60*   MAGNESIUM mg/dL 1.9 2.1 2.0 2.1   PHOSPHORUS mg/dL 3.0  --  3.3 3.1     Results from last 7 days   Lab Units 08/13/22  0720 08/12/22  0524 08/11/22  0524   PROCALCITONIN ng/mL 6.95* 15.00* 3.39*   LACTATE mmol/L 1.1 2.1* 2.2*       Coag     HbA1C   Lab Results   Component Value Date    HGBA1C 6.00 (H) 08/11/2022    HGBA1C 7.8 (H) 02/01/2019    HGBA1C 9.2 (H) 12/01/2018     Infection   Results from last 7 days   Lab Units 08/13/22  0720 08/12/22  1418 08/12/22  1346 08/12/22  0524 08/11/22  1126 08/11/22  0539 08/11/22  0524   BLOODCX   --  No growth at 4 days No growth at 5 days  --   --  No growth at 5 days Enterobacter cloacae complex*   WOUNDCX   --   --   --   --  No growth at 3 days  --   --    BCIDPCR   --   --   --   --   --   --  Enterobacter cloacae complex. Identification by BCID2 PCR.*   PROCALCITONIN ng/mL 6.95*  --   --  15.00*  --   --  3.39*     Radiology(recent) No radiology results for the last day  No results found for: TROPONINT, TROPONINI, BNP  No components found for: TSH;2    acetaminophen, 650 mg, Oral, Nightly  aspirin, 81 mg, Oral, Daily  calcium-vitamin  D, 1 tablet, Oral, BID  carvedilol, 12.5 mg, Oral, BID With Meals  cefepime, 2 g, Intravenous, Q8H  diazePAM, 2.5 mg, Intravenous, Once  enoxaparin, 40 mg, Subcutaneous, Q24H  famotidine, 40 mg, Oral, BID AC  ferrous sulfate, 325 mg, Oral, Daily With Breakfast  furosemide, 40 mg, Oral, Daily  gabapentin, 300 mg, Oral, TID  hydrALAZINE, 25 mg, Oral, Q8H  insulin lispro, 0-14 Units, Subcutaneous, TID AC  rosuvastatin, 5 mg, Oral, Nightly  senna, 1 tablet, Oral, Daily  sertraline, 75 mg, Oral, Daily  sodium chloride, 10 mL, Intravenous, Q12H  tamsulosin, 0.4 mg, Oral, Nightly      pain,     Diet Pureed; Thin; Cardiac, Low Potassium; 2 gm (50 mEq)      Assessment & Plan      Active Hospital Problems    Diagnosis  POA   • **Cellulitis of right lower extremity [L03.115]  Yes   • Anemia of chronic disease [D63.8]  Yes   • Sepsis due to Gram negative bacteria (HCC) [A41.50]  Yes   • Elevated LFTs [R79.89]  No   • Acute respiratory failure with hypoxia (HCC) [J96.01]  Yes   • Acute on chronic respiratory failure with hypercapnia (HCC) [J96.22]  Yes   • HTN (hypertension) [I10]  Yes   • Type 2 diabetes mellitus with stage 3b chronic kidney disease (HCC) [E11.22, N18.32]  Yes   • Elevated troponin [R77.8]  Yes   • RBBB [I45.10]  Yes   • Pulmonary vascular congestion [R09.89]  Yes   • Diastolic CHF, acute (HCC) [I50.31]  Yes   • CSA (central sleep apnea) [G47.31]  Yes   • HALIMA (obstructive sleep apnea) [G47.33]  Yes      Resolved Hospital Problems   No resolved problems to display.         78yo gentleman who presented from nursing home with lethargy, hypotension, and severe RLE redness/swelling/pain and was admitted to our service with sepsis due to RLE cellulitis.     Sepsis due to RLE cellulitis as well as enterobacter bactremia    IV Cefepime currently  Wound RN following  Venous duplex BLEs negative for DVT  CT BLEs negative for deep infection  Ortho has seen and signed off--no interventions indicated     Volume  overload  Quite volume overloaded on admission with peripheral edema, vasc congestion on CXR, and hypoxia  Uncertain CHF history  LVSF okay on Echo here and BNP fine  Trending daily weights and I/Os  On oral lasix as directed by nephrology- monitor potassium as elevated at times    HTN  Agents for HTN  Monitor renal fx  Adjustment today by Cardiology with hydralazine added-improved     Elevated Trop  Card has seen, no ACS  EKG okay and no WMA on Echo  Suspect due to CKD     CKD3b  Cr around baseline or better  Defer to Renal     Anemia of chronic disease  With component of iron deficiency  Continue oral iron     Acute on chronic hypercapnic resp failure, acute hypoxic resp failure, HALIMA  BiPAP ordered,  (not compliant with PAP at facility prior to admission)  Seen by pulmonary     Lethargy  Maybe worse again 8/17 but has been up and down.   Check Nh3  Decrease gabapentin     DM2  A1c 6.0  Lantus on hold for now  Continue SSI  Sugars remain acceptable today     Elevated LFTs  Uncertain etiology--suspect congestive/ischemic  No N/V/anorexia, does have some soreness in RUQ  Viral hep panel negative  plan MRCP- patient is aggreeable at times but other times has declined- ordered PRN low dose valium to help with anxiety during procedure  Trending LFTs        · Lovenox 40 mg SC daily for DVT prophylaxis.  · Discussed with patient and nursing staff.   Anticipate discharge back to SNU facility, potentially soon if MRCP ok and if stable- though unclear if he will go through with the MRCP. He remains ill with up and down fluctuations           Kwasi Ferreira MD  [facility]  08/17/22  13:19 EDT

## 2022-08-18 LAB
ALBUMIN SERPL-MCNC: 3.2 G/DL (ref 3.5–5.2)
ALBUMIN/GLOB SERPL: 1 G/DL
ALP SERPL-CCNC: 86 U/L (ref 39–117)
ALT SERPL W P-5'-P-CCNC: 19 U/L (ref 1–41)
AMMONIA BLD-SCNC: 21 UMOL/L (ref 16–60)
ANION GAP SERPL CALCULATED.3IONS-SCNC: 9 MMOL/L (ref 5–15)
AST SERPL-CCNC: 11 U/L (ref 1–40)
BILIRUB SERPL-MCNC: 0.4 MG/DL (ref 0–1.2)
BUN SERPL-MCNC: 42 MG/DL (ref 8–23)
BUN/CREAT SERPL: 25.6 (ref 7–25)
CALCIUM SPEC-SCNC: 9.6 MG/DL (ref 8.6–10.5)
CHLORIDE SERPL-SCNC: 97 MMOL/L (ref 98–107)
CO2 SERPL-SCNC: 34 MMOL/L (ref 22–29)
CREAT SERPL-MCNC: 1.64 MG/DL (ref 0.76–1.27)
DEPRECATED RDW RBC AUTO: 49.4 FL (ref 37–54)
EGFRCR SERPLBLD CKD-EPI 2021: 42.5 ML/MIN/1.73
ERYTHROCYTE [DISTWIDTH] IN BLOOD BY AUTOMATED COUNT: 14.5 % (ref 12.3–15.4)
GLOBULIN UR ELPH-MCNC: 3.2 GM/DL
GLUCOSE BLDC GLUCOMTR-MCNC: 107 MG/DL (ref 70–130)
GLUCOSE BLDC GLUCOMTR-MCNC: 114 MG/DL (ref 70–130)
GLUCOSE BLDC GLUCOMTR-MCNC: 123 MG/DL (ref 70–130)
GLUCOSE BLDC GLUCOMTR-MCNC: 138 MG/DL (ref 70–130)
GLUCOSE SERPL-MCNC: 120 MG/DL (ref 65–99)
HCT VFR BLD AUTO: 36.9 % (ref 37.5–51)
HGB BLD-MCNC: 11.8 G/DL (ref 13–17.7)
MAGNESIUM SERPL-MCNC: 2.3 MG/DL (ref 1.6–2.4)
MCH RBC QN AUTO: 29.6 PG (ref 26.6–33)
MCHC RBC AUTO-ENTMCNC: 32 G/DL (ref 31.5–35.7)
MCV RBC AUTO: 92.7 FL (ref 79–97)
PLATELET # BLD AUTO: 255 10*3/MM3 (ref 140–450)
PMV BLD AUTO: 9.5 FL (ref 6–12)
POTASSIUM SERPL-SCNC: 5.3 MMOL/L (ref 3.5–5.2)
PROT SERPL-MCNC: 6.4 G/DL (ref 6–8.5)
RBC # BLD AUTO: 3.98 10*6/MM3 (ref 4.14–5.8)
SODIUM SERPL-SCNC: 140 MMOL/L (ref 136–145)
WBC NRBC COR # BLD: 8.69 10*3/MM3 (ref 3.4–10.8)

## 2022-08-18 PROCEDURE — 25010000002 CEFEPIME PER 500 MG: Performed by: INTERNAL MEDICINE

## 2022-08-18 PROCEDURE — 97110 THERAPEUTIC EXERCISES: CPT

## 2022-08-18 PROCEDURE — 80053 COMPREHEN METABOLIC PANEL: CPT | Performed by: INTERNAL MEDICINE

## 2022-08-18 PROCEDURE — 82140 ASSAY OF AMMONIA: CPT | Performed by: INTERNAL MEDICINE

## 2022-08-18 PROCEDURE — 85027 COMPLETE CBC AUTOMATED: CPT | Performed by: INTERNAL MEDICINE

## 2022-08-18 PROCEDURE — 83735 ASSAY OF MAGNESIUM: CPT | Performed by: HOSPITALIST

## 2022-08-18 PROCEDURE — 99231 SBSQ HOSP IP/OBS SF/LOW 25: CPT | Performed by: NURSE PRACTITIONER

## 2022-08-18 PROCEDURE — 25010000002 ONDANSETRON PER 1 MG: Performed by: NURSE PRACTITIONER

## 2022-08-18 PROCEDURE — 99232 SBSQ HOSP IP/OBS MODERATE 35: CPT | Performed by: STUDENT IN AN ORGANIZED HEALTH CARE EDUCATION/TRAINING PROGRAM

## 2022-08-18 PROCEDURE — 82962 GLUCOSE BLOOD TEST: CPT

## 2022-08-18 PROCEDURE — 25010000002 ENOXAPARIN PER 10 MG: Performed by: NURSE PRACTITIONER

## 2022-08-18 RX ORDER — GABAPENTIN 100 MG/1
100 CAPSULE ORAL EVERY 12 HOURS SCHEDULED
Qty: 6 CAPSULE | Refills: 0 | Status: SHIPPED | OUTPATIENT
Start: 2022-08-18 | End: 2022-10-11 | Stop reason: HOSPADM

## 2022-08-18 RX ORDER — FUROSEMIDE 40 MG/1
40 TABLET ORAL DAILY
Qty: 30 TABLET | Refills: 0 | Status: SHIPPED | OUTPATIENT
Start: 2022-08-19 | End: 2022-10-11 | Stop reason: HOSPADM

## 2022-08-18 RX ORDER — LEVOFLOXACIN 750 MG/1
750 TABLET ORAL EVERY OTHER DAY
Status: DISCONTINUED | OUTPATIENT
Start: 2022-08-18 | End: 2022-08-19 | Stop reason: HOSPADM

## 2022-08-18 RX ORDER — HYDRALAZINE HYDROCHLORIDE 25 MG/1
25 TABLET, FILM COATED ORAL EVERY 8 HOURS SCHEDULED
Qty: 90 TABLET | Refills: 0 | Status: SHIPPED | OUTPATIENT
Start: 2022-08-18 | End: 2022-10-11 | Stop reason: HOSPADM

## 2022-08-18 RX ORDER — FAMOTIDINE 40 MG/1
40 TABLET, FILM COATED ORAL
Qty: 60 TABLET | Refills: 1 | Status: SHIPPED | OUTPATIENT
Start: 2022-08-18 | End: 2022-09-23

## 2022-08-18 RX ORDER — LEVOFLOXACIN 750 MG/1
750 TABLET ORAL EVERY OTHER DAY
Qty: 2 TABLET | Refills: 0 | Status: SHIPPED | OUTPATIENT
Start: 2022-08-20 | End: 2022-08-23

## 2022-08-18 RX ORDER — OXYCODONE AND ACETAMINOPHEN 7.5; 325 MG/1; MG/1
1 TABLET ORAL 2 TIMES DAILY PRN
Qty: 6 TABLET | Refills: 0 | Status: SHIPPED | OUTPATIENT
Start: 2022-08-18 | End: 2022-08-21

## 2022-08-18 RX ADMIN — GABAPENTIN 100 MG: 100 CAPSULE ORAL at 20:56

## 2022-08-18 RX ADMIN — OXYCODONE HYDROCHLORIDE AND ACETAMINOPHEN 1 TABLET: 7.5; 325 TABLET ORAL at 15:05

## 2022-08-18 RX ADMIN — FAMOTIDINE 40 MG: 20 TABLET ORAL at 06:13

## 2022-08-18 RX ADMIN — TAMSULOSIN HYDROCHLORIDE 0.4 MG: 0.4 CAPSULE ORAL at 20:48

## 2022-08-18 RX ADMIN — LEVOFLOXACIN 750 MG: 750 TABLET, FILM COATED ORAL at 12:38

## 2022-08-18 RX ADMIN — FERROUS SULFATE TAB 325 MG (65 MG ELEMENTAL FE) 325 MG: 325 (65 FE) TAB at 08:47

## 2022-08-18 RX ADMIN — ONDANSETRON 4 MG: 2 INJECTION INTRAMUSCULAR; INTRAVENOUS at 17:30

## 2022-08-18 RX ADMIN — SERTRALINE 75 MG: 25 TABLET, FILM COATED ORAL at 08:46

## 2022-08-18 RX ADMIN — CEFEPIME 2 G: 2 INJECTION, POWDER, FOR SOLUTION INTRAVENOUS at 00:25

## 2022-08-18 RX ADMIN — HYDRALAZINE HYDROCHLORIDE 25 MG: 25 TABLET, FILM COATED ORAL at 23:08

## 2022-08-18 RX ADMIN — CARVEDILOL 12.5 MG: 12.5 TABLET, FILM COATED ORAL at 08:46

## 2022-08-18 RX ADMIN — Medication 10 ML: at 17:31

## 2022-08-18 RX ADMIN — SENNOSIDES 1 TABLET: 8.6 TABLET, FILM COATED ORAL at 08:46

## 2022-08-18 RX ADMIN — CARVEDILOL 12.5 MG: 12.5 TABLET, FILM COATED ORAL at 20:57

## 2022-08-18 RX ADMIN — OXYCODONE HYDROCHLORIDE AND ACETAMINOPHEN 1 TABLET: 7.5; 325 TABLET ORAL at 08:47

## 2022-08-18 RX ADMIN — ASPIRIN 81 MG: 81 TABLET, CHEWABLE ORAL at 08:46

## 2022-08-18 RX ADMIN — FUROSEMIDE 40 MG: 40 TABLET ORAL at 08:46

## 2022-08-18 RX ADMIN — CALCIUM CARBONATE-VITAMIN D TAB 500 MG-200 UNIT 1 TABLET: 500-200 TAB at 20:48

## 2022-08-18 RX ADMIN — ENOXAPARIN SODIUM 40 MG: 100 INJECTION SUBCUTANEOUS at 12:38

## 2022-08-18 RX ADMIN — ROSUVASTATIN CALCIUM 5 MG: 5 TABLET, FILM COATED ORAL at 20:48

## 2022-08-18 RX ADMIN — GABAPENTIN 100 MG: 100 CAPSULE ORAL at 08:47

## 2022-08-18 RX ADMIN — ACETAMINOPHEN 650 MG: 325 TABLET, FILM COATED ORAL at 20:48

## 2022-08-18 RX ADMIN — Medication 10 ML: at 23:08

## 2022-08-18 RX ADMIN — CALCIUM CARBONATE-VITAMIN D TAB 500 MG-200 UNIT 1 TABLET: 500-200 TAB at 08:47

## 2022-08-18 RX ADMIN — HYDRALAZINE HYDROCHLORIDE 25 MG: 25 TABLET, FILM COATED ORAL at 06:12

## 2022-08-18 RX ADMIN — HYDRALAZINE HYDROCHLORIDE 25 MG: 25 TABLET, FILM COATED ORAL at 15:05

## 2022-08-18 NOTE — PROGRESS NOTES
LOS: 7 days     Chief Complaint: Cellulitis    Interval History: Patient continues to refuse MRI.  Denies any acute complaints other than difficulty with eating his breakfast and right leg pain.  Denies any fevers or chills.  Fever curve within normal limits.  No leukocytosis.    Vital Signs  Temp:  [98.1 °F (36.7 °C)-98.4 °F (36.9 °C)] 98.1 °F (36.7 °C)  Heart Rate:  [54-63] 63  Resp:  [18] 18  BP: (129-143)/(57-69) 134/69    Physical Exam:  General: In no acute distress  HEENT: Oropharynx clear, moist mucous membranes  Cardiovascular: RRR, normal S1 and S2, no M/R/G  Respiratory: Clear to ascultation bilaterally, no wheezing  GI: Soft, NT/ND, + bowel sounds bilaterally, no masses  Skin: Right leg with dressing in place and serosanguineous drainage on the bandage.  Extremities: Bilateral lower extremity edema with right worse than left.  Access: Peripheral IV.    Antibiotics:  Anti-Infectives (From admission, onward)    Ordered     Dose/Rate Route Frequency Start Stop    08/18/22 0900  cefepime 2 gm IVPB in 100 ml NS (VTB)        Ordering Provider: Kwasi Ferreira MD    2 g  over 4 Hours Intravenous Every 12 Hours 08/18/22 1200 08/19/22 1159    08/12/22 0022  cefepime 2 gm IVPB in 100 ml NS (VTB)        Ordering Provider: Lenny Rosa MD    2 g  over 30 Minutes Intravenous Once 08/12/22 0115 08/12/22 0155    08/11/22 0520  vancomycin 2500 mg/500 mL 0.9% NS IVPB ([facility])        Ordering Provider: Stefan Crowley MD    20 mg/kg × 124 kg Intravenous Once 08/11/22 0522 08/11/22 0650    08/11/22 0520  piperacillin-tazobactam (ZOSYN) 3.375 g in iso-osmotic dextrose 50 ml (premix)        Ordering Provider: Stefan Crowley MD    3.375 g  over 30 Minutes Intravenous Once 08/11/22 0522 08/11/22 0612    08/11/22 0520  clindamycin (CLEOCIN) 600 mg in dextrose 5% 50 mL IVPB (premix)        Ordering Provider: Stefan Crowley MD    600 mg Intravenous Once 08/11/22 0522 08/11/22 0648            Results Review:     I reviewed the patient's new clinical results.    Lab Results   Component Value Date    WBC 8.69 08/18/2022    HGB 11.8 (L) 08/18/2022    HCT 36.9 (L) 08/18/2022    MCV 92.7 08/18/2022     08/18/2022     Lab Results   Component Value Date    GLUCOSE 120 (H) 08/18/2022    BUN 42 (H) 08/18/2022    CREATININE 1.64 (H) 08/18/2022    EGFRIFNONA 45 (L) 04/11/2016    BCR 25.6 (H) 08/18/2022    CO2 34.0 (H) 08/18/2022    CALCIUM 9.6 08/18/2022    ALBUMIN 3.20 (L) 08/18/2022    LABIL2 1.2 12/30/2019    AST 11 08/18/2022    ALT 19 08/18/2022       Microbiology:  8/11 blood cultures 1 of 2 positive for Enterobacter  8/11 COVID-negative  8/12 blood cultures 2 out of 2 no growth to date    Assessment    #Enterobacter septicemia  #Right lower extremity cellulitis  #Right lower extremity venous stasis  #CKD    Given patient's refusal of MRCP would plan to transition to p.o. levofloxacin 750 mg every 48 hours through 8/23.  Patient continues to have erythema however I suspect a 14-day course of antibiotic should be sufficient and his remaining erythema is likely related to his significant venous stasis dermatitis due to edema.    Thank you for allowing me to be involved in the care of this patient. Infectious diseases will sign off at this time with antibiotics plan in place, but please call me at [phone] if any further ID questions or new ID concerns.  .

## 2022-08-18 NOTE — DISCHARGE SUMMARY
Patient Name: Yakov Dubon  : 1945  MRN: [id number]    Date of Admission: 2022  Date of Discharge:  2022  Primary Care Physician: Chencho Sterling MD      Chief Complaint:   Hypotension and Fall      Discharge Diagnoses     Active Hospital Problems    Diagnosis  POA   • **Cellulitis of right lower extremity [L03.115]  Yes   • Anemia of chronic disease [D63.8]  Yes   • Sepsis due to Gram negative bacteria (HCC) [A41.50]  Yes   • Elevated LFTs [R79.89]  No   • Acute respiratory failure with hypoxia (HCC) [J96.01]  Yes   • Acute on chronic respiratory failure with hypercapnia (HCC) [J96.22]  Yes   • HTN (hypertension) [I10]  Yes   • Type 2 diabetes mellitus with stage 3b chronic kidney disease (HCC) [E11.22, N18.32]  Yes   • Elevated troponin [R77.8]  Yes   • RBBB [I45.10]  Yes   • Pulmonary vascular congestion [R09.89]  Yes   • Diastolic CHF, acute (HCC) [I50.31]  Yes   • CSA (central sleep apnea) [G47.31]  Yes   • HALIMA (obstructive sleep apnea) [G47.33]  Yes      Resolved Hospital Problems   No resolved problems to display.        Hospital Course     Mr. Dubon is a 77 y.o. male with a history of hypertension, CKD stage III, GERD, type 2 diabetes mellitus who presented to [facility] initially complaining of hypotension and fall.  Please see the admitting history and physical for further details.  He was found to have cellulitis of right lower extremity with subsequent sepsis due to gram-negative bacteria and was admitted to the hospital for further evaluation and treatment.      Gentleman presented from nursing home with lethargy, hypotension, fluid volume overload, and severe RLE redness/swelling/pain and admitted with sepsis due to RLE cellulitis.  Infectious disease followed patient with recommendation for transition to levofloxacin 750 mg every 48 hours through 2022 given patient's refusal for MRCP.    Gastroenterology recommended MRCP for transaminitis with  abnormal ultrasound findings; however, patient declined diagnostic work-up.    Nephrology followed patient for acute injury on chronic kidney disease with associated hyperkalemia.  Patient started on oral formulation furosemide and potassium improved with trend.  Serum creatinine normalizing to baseline 1.5-1.7.  Recommend repeat BMP at nursing home on 8/22/2022.    Pulmonology evaluated and treated issues regarding obstructive sleep apnea with overall improvement in level of consciousness.  Follow-up with Dr. Reddy in outpatient setting advised.    Cardiology noted elevated troponin most likely demand ischemia and no additional work-up necessary during hospital admission.  Carvedilol increased with minimal effects on blood pressure; therefore, hydralazine 25 mg 3 times daily added with improved BP thereafter.  Follow-up primary care provider for continued monitoring blood pressure as well.    Day of Discharge     Physical Exam:  Temp:  [98.1 °F (36.7 °C)-98.4 °F (36.9 °C)] 98.2 °F (36.8 °C)  Heart Rate:  [54-63] 59  Resp:  [18] 18  BP: (109-143)/(55-69) 109/55  Body mass index is 34.89 kg/m².     Physical Exam    Consultants     Consult Orders (all) (From admission, onward)     Start     Ordered    08/13/22 1244  Inpatient Gastroenterology Consult  Once        Specialty:  Gastroenterology  Provider:  Robinson Colón MD    08/13/22 1244    08/11/22 1648  Inpatient Nephrology Consult  Once        Specialty:  Nephrology  Provider:  Darling Maciel MD    08/11/22 1647    08/11/22 1647  Inpatient Cardiology Consult  Once        Specialty:  Cardiology  Provider:  Enrique Harris MD    08/11/22 1647    08/11/22 1646  Inpatient Pulmonology Consult  Once        Specialty:  Pulmonary Disease  Provider:  Mathieu Lara Jr., MD    08/11/22 1647    08/11/22 1048  Inpatient Orthopedic Surgery Consult  Once        Specialty:  Orthopedic Surgery  Provider:  Joshua Barraza Jr., MD    08/11/22 1048    08/11/22 1038   Inpatient Infectious Diseases Consult  Once        Specialty:  Infectious Diseases  Provider:  Meenakshi Jaramillo MD    08/11/22 1037    08/11/22 0636  [facility] (on-call MD unless specified) Details  Once        Specialty:  Hospitalist  Provider:  (Not yet assigned)    08/11/22 0635    08/11/22 0620  Pulmonology (on-call MD unless specified)  Once        Specialty:  Pulmonary Disease  Provider:  (Not yet assigned)    08/11/22 0619    08/11/22 0620  Ortho (on-call MD unless specified)  Once        Specialty:  Orthopedic Surgery  Provider:  (Not yet assigned)    08/11/22 0619              Procedures     * Surgery not found *      Imaging Results (All)     Procedure Component Value Units Date/Time    US Liver [[id number]] Collected: 08/12/22 2057     Updated: 08/12/22 2103    Narrative:      PROCEDURE: US LIVER-     HISTORY: Elevated LFTs, right upper quadrant pain.     TECHNIQUE: Grayscale and color Doppler ultrasound of the liver was  performed.     COMPARISON: None      FINDINGS:     MEASUREMENTS:   Liver:  14.6 cm   Common bile duct diameter:  1.5 cm  Right kidney: 11.1 cm     LIVER: The liver is normal in size and echogenicity. The echotexture is  smooth. There is central intrahepatic biliary ductal prominence. There  is no discrete intrahepatic lesion. There is hepatopetal flow in the  main portal vein.      GALLBLADDER: The gallbladder is present without cholelithiasis and  biliary sludge, wall thickening or pericholecystic fluid. No sonographic  Alfonso's sign was elicited. There is no extrahepatic biliary ductal  dilatation.     PANCREAS: The pancreas is poorly seen and cannot be evaluated.     RIGHT KIDNEY: There is no hydronephrosis. No shadowing renal stone is  visualized. There are right renal cysts, measuring up to 6.6 cm.          Impression:         Cholelithiasis and biliary sludge with common bile duct and intrahepatic  biliary ductal dilatation. There is poor visualization of the distal  common bile duct.  Findings raise concern for choledocholithiasis or  other distal obstructing process. Recommend further evaluation with ERCP  or contrast-enhanced MRCP.     This report was finalized on 8/12/2022 9:00 PM by Dr. Ping Bone M.D.       CT Lower Extremity Bilateral Without Contrast [[id number]] Collected: 08/11/22 0856     Updated: 08/11/22 1442    Narrative:      BILATERAL LOWER EXTREMITY CT WITHOUT CONTRAST     HISTORY: Right leg cellulitis. Evaluate for abscess or soft tissue gas.     TECHNIQUE: Axial CT of both lower legs from above the knees to the feet  along with multiplanar reformatted images is provided. No IV contrast  was used. No previous imaging for correlation.     Radiation dose reduction techniques were utilized, including automated  exposure control and exposure modulation based on body size.     FINDINGS: There are several blisters at the level of the dermis along  the proximal aspect of the right lower leg anteriorly. Diffuse  subcutaneous edema is observed in the leg, most pronounced around the  ankle and over the dorsum of the foot. There is no soft tissue gas or  focal fluid collection. Musculature is diffusely atrophic. No joint  effusion. There is arthritic change in the foot and at the knee.     On the left, there is subcutaneous edema along the distal lower leg,  ankle and over the dorsum of the foot which is asymmetrically less  pronounced than that observed on the right. The musculature is diffusely  atrophic as at the opposite leg. There is no focal fluid collection or  joint effusion. Arthritic changes are observed in the foot and ankle.       Impression:      Nonspecific soft tissue edema in both lower legs  asymmetrically more prominent on the right. There are several blisters  at the level of the dermis of the right lower leg proximally. However,  no soft tissue gas or abscess collection is present.     This report was finalized on 8/11/2022 2:38 PM by Dr. Joshua Mendoza M.D.       XR  Tibia Fibula 2 View Right [[id number]] Collected: 08/11/22 0559     Updated: 08/11/22 0604    Narrative:      2 VIEWS RIGHT TIBIA AND FIBULA     HISTORY: Right leg swelling     COMPARISON: None available.     FINDINGS:  No acute osseous abnormalities are seen. The patient has advanced  degenerative changes involving the right knee. There is diffuse edema of  the right calf, as well as a skin blister which is seen along the  anteromedial aspect of the proximal calf. No definite soft tissue gas is  seen. There is a suprapatellar effusion, poorly assessed.       Impression:         1. Diffuse edema of the right calf.  2. No aggressive osseous abnormalities are seen.     This report was finalized on 8/11/2022 6:01 AM by Dr. Karin Weathers M.D.       XR Chest 1 View [[id number]] Collected: 08/11/22 0558     Updated: 08/11/22 0602    Narrative:      SINGLE VIEW OF THE CHEST     HISTORY: Sepsis     COMPARISON: None available.     FINDINGS:  Cardiomegaly is present. There is tortuosity and ectasia of the thoracic  aorta. There is vascular congestion. There is nonspecific bibasilar  consolidation. Bilateral pleural effusions are suspected.       Impression:         1. Cardiomegaly with vascular congestion.  2. Nonspecific bibasilar consolidation.     This report was finalized on 8/11/2022 5:59 AM by Dr. Karin Weathers M.D.           Results for orders placed during the hospital encounter of 08/11/22    Duplex Venous Lower Extremity - Bilateral CAR    Interpretation Summary  · Normal bilateral lower extremity venous duplex scan.    Results for orders placed during the hospital encounter of 08/11/22    Adult Transthoracic Echo Complete W/ Cont if Necessary Per Protocol    Interpretation Summary  · Left ventricular wall thickness is consistent with mild concentric hypertrophy.  · Calculated left ventricular EF = 58.3% Estimated left ventricular EF was in agreement with the calculated left ventricular EF. Left ventricular  systolic function is normal.  · Left ventricular diastolic function was normal.    Pertinent Labs     Results from last 7 days   Lab Units 08/18/22  0633 08/16/22  0600 08/15/22  0623 08/14/22  0905   WBC 10*3/mm3 8.69 6.23 7.55 10.45   HEMOGLOBIN g/dL 11.8* 10.3* 10.1* 9.7*   PLATELETS 10*3/mm3 255 185 167 152     Results from last 7 days   Lab Units 08/18/22  0633 08/17/22  1009 08/16/22  1451 08/16/22  0600 08/15/22  0623   SODIUM mmol/L 140 138  --  138 139   POTASSIUM mmol/L 5.3* 4.8 5.3* 5.7* 5.3*   CHLORIDE mmol/L 97* 97*  --  102 104   CO2 mmol/L 34.0* 35.2*  --  28.0 28.6   BUN mg/dL 42* 35*  --  32* 32*   CREATININE mg/dL 1.64* 1.28*  --  1.16 1.15   GLUCOSE mg/dL 120* 123*  --  110* 115*   EGFR mL/min/1.73 42.5* 57.3*  --  64.5 65.1     Results from last 7 days   Lab Units 08/18/22  0633 08/17/22  1009 08/16/22  0600 08/15/22  0623   ALBUMIN g/dL 3.20* 3.30* 2.70* 2.50*   BILIRUBIN mg/dL 0.4 0.3 0.3 0.2   ALK PHOS U/L 86 90 98 97   AST (SGOT) U/L 11 13 14 18   ALT (SGPT) U/L 19 22 25 32     Results from last 7 days   Lab Units 08/18/22  0633 08/17/22  1009 08/16/22  0600 08/15/22  0623 08/14/22  0905   CALCIUM mg/dL 9.6 9.6 9.5 9.0 9.0   ALBUMIN g/dL 3.20* 3.30* 2.70* 2.50* 2.60*   MAGNESIUM mg/dL 2.3 1.9 2.1 2.0 2.1   PHOSPHORUS mg/dL  --  3.0  --  3.3 3.1     Results from last 7 days   Lab Units 08/18/22  0751 08/14/22  0905   LIPASE U/L  --  14   AMMONIA umol/L 21  --      Results from last 7 days   Lab Units 08/13/22  0720 08/12/22  0524   TROPONIN T ng/mL 0.084* 0.147*     Results from last 7 days   Lab Units 08/15/22  0623   URIC ACID mg/dL 6.8         Invalid input(s): LDLCALC  Results from last 7 days   Lab Units 08/12/22  1418 08/12/22  1346   BLOODCX  No growth at 5 days No growth at 5 days         Test Results Pending at Discharge       Discharge Details        Discharge Medications      New Medications      Instructions Start Date   famotidine 40 MG tablet  Commonly known as: PEPCID   40 mg,  Oral, 2 Times Daily Before Meals      furosemide 40 MG tablet  Commonly known as: LASIX   40 mg, Oral, Daily   Start Date: August 19, 2022     hydrALAZINE 25 MG tablet  Commonly known as: APRESOLINE   25 mg, Oral, Every 8 Hours Scheduled      levoFLOXacin 750 MG tablet  Commonly known as: LEVAQUIN   750 mg, Oral, Every Other Day   Start Date: August 20, 2022        Changes to Medications      Instructions Start Date   gabapentin 100 MG capsule  Commonly known as: NEURONTIN  What changed:   · medication strength  · how much to take  · when to take this   100 mg, Oral, Every 12 Hours Scheduled      oxyCODONE-acetaminophen 7.5-325 MG per tablet  Commonly known as: PERCOCET  What changed:   · when to take this  · reasons to take this   1 tablet, Oral, 2 Times Daily PRN         Continue These Medications      Instructions Start Date   acetaminophen 325 MG tablet  Commonly known as: TYLENOL   650 mg, Oral, Nightly      aspirin 81 MG chewable tablet   81 mg, Oral, Daily      calcium carbonate 600 MG tablet  Commonly known as: OS-SERJIO   600 mg, Oral, 2 Times Daily With Meals      DILAUDID-HP IJ   Injection, PAIN PUMP       ferrous gluconate 324 MG tablet  Commonly known as: FERGON   324 mg, Oral, Daily With Breakfast      insulin glargine 100 UNIT/ML injection  Commonly known as: LANTUS, SEMGLEE   15 Units, Subcutaneous, Daily      multivitamin with minerals tablet tablet   Oral, Daily      nitroglycerin 0.4 MG SL tablet  Commonly known as: NITROSTAT   0.4 mg, Sublingual, Every 5 Minutes PRN, Take no more than 3 doses in 15 minutes.      polyethylene glycol 17 g packet  Commonly known as: MIRALAX   17 g, Oral, Daily PRN      rosuvastatin 10 MG tablet  Commonly known as: CRESTOR   5 mg, Oral, Nightly      senna 8.6 MG tablet  Commonly known as: SENOKOT   1 tablet, Oral, Daily      sertraline 100 MG tablet  Commonly known as: ZOLOFT   75 mg, Oral, Daily      tamsulosin 0.4 MG capsule 24 hr capsule  Commonly known as: FLOMAX    1 capsule, Oral, Nightly         Stop These Medications    carvedilol 6.25 MG tablet  Commonly known as: COREG            Allergies   Allergen Reactions   • Bacitracin Unknown - High Severity   • Gentamycin [Gentamicin] Unknown - High Severity   • Neosporin [Neomycin-Bacitracin Zn-Polymyx] Other (See Comments)     ALLERGY TESTED   • Nsaids Unknown - High Severity   • Tobramycin Unknown - High Severity       Discharge Disposition:  Skilled Nursing Facility (DC - External)      Discharge Diet:  Diet Order   Procedures   • Diet Pureed; Thin; Cardiac, Low Potassium; 2 gm (50 mEq)       Discharge Activity:   Activity Instructions     Activity as Tolerated      Up WIth Assist            CODE STATUS:    Code Status and Medical Interventions:   Ordered at: 08/11/22 1043     Code Status (Patient has no pulse and is not breathing):    CPR (Attempt to Resuscitate)     Medical Interventions (Patient has pulse or is breathing):    Full Support     Release to patient:    Routine Release       No future appointments.  Additional Instructions for the Follow-ups that You Need to Schedule     Discharge Follow-up with PCP   As directed       Currently Documented PCP:    Chencho Sterling MD    PCP Phone Number:    [phone]     Follow Up Details: Please call to schedule a one week or earliest available follow-up appointment with PCP; BMP, follow-up wound--RLE            Contact information for follow-up providers     Chencho Sterling MD .    Specialty: Internal Medicine  Why: Please call to schedule a one week or earliest available follow-up appointment with PCP; BMP, follow-up wound--RLE  Contact information:  [address]  [facility] [address]  [phone]             Chapincito Reddy MD. Call.    Specialties: Pulmonary Disease, Sleep Medicine  Why: Please call to schedule follow-up with pulmonologist as previously discussed  Contact information:  [address]  [address]  [phone]                   Contact information for after-discharge care     Destination     [facility] .    Service: Nursing Home  Contact information:  [address] [facility] [address] [phone]                             Additional Instructions for the Follow-ups that You Need to Schedule     Discharge Follow-up with PCP   As directed       Currently Documented PCP:    Chencho Sterling MD    PCP Phone Number:    [phone]     Follow Up Details: Please call to schedule a one week or earliest available follow-up appointment with PCP; BMP, follow-up wound--RLE           Time Spent on Discharge:  Greater than 30 minutes      CECILE Nieto  [facility]  08/18/22  13:53 EDT

## 2022-08-18 NOTE — PROGRESS NOTES
[facility] Progress Note      Patient Name: Yakov Dubon  : 1945  MRN: [id number]  Primary Care Physician:  Chencho Sterling MD  Date of admission: 2022    Subjective     Interval History:   Follow up CKD 3, hyperkalemia. More awake today. UOP excellent 2.3 Liters.  Bowels moving.     Review of Systems:   As noted above    Objective     Vitals:   Temp:  [98.1 °F (36.7 °C)-98.4 °F (36.9 °C)] 98.2 °F (36.8 °C)  Heart Rate:  [54-63] 59  Resp:  [18] 18  BP: (109-143)/(55-69) 109/55  Flow (L/min):  [2] 2    Intake/Output Summary (Last 24 hours) at 2022 1543  Last data filed at 2022 1329  Gross per 24 hour   Intake 390 ml   Output 2325 ml   Net -1935 ml       Physical Exam:    General Appearance: awake, sitting up in bed eating.   no acute distress.  Chronically ill.  Apraxic with fork.   Skin: warm and dry  HEENT: oral mucosa normal, nonicteric sclera  Neck: supple, no JVD  Lungs: clear to auscultation, unlabored.   Heart: RRR, no s3 or rub  Abdomen: + bs, nontender, nondistended. Obese, soft.  : male purwik  Extremities: 1+ lower ext edema.       Scheduled Meds:     acetaminophen, 650 mg, Oral, Nightly  aspirin, 81 mg, Oral, Daily  calcium-vitamin D, 1 tablet, Oral, BID  carvedilol, 12.5 mg, Oral, BID With Meals  diazePAM, 2.5 mg, Intravenous, Once  enoxaparin, 40 mg, Subcutaneous, Q24H  famotidine, 40 mg, Oral, BID AC  ferrous sulfate, 325 mg, Oral, Daily With Breakfast  furosemide, 40 mg, Oral, Daily  gabapentin, 100 mg, Oral, Q12H  hydrALAZINE, 25 mg, Oral, Q8H  insulin lispro, 0-14 Units, Subcutaneous, TID AC  levoFLOXacin, 750 mg, Oral, Every Other Day  rosuvastatin, 5 mg, Oral, Nightly  senna, 1 tablet, Oral, Daily  sertraline, 75 mg, Oral, Daily  sodium chloride, 10 mL, Intravenous, Q12H  tamsulosin, 0.4 mg, Oral, Nightly      IV Meds:   pain,         Results Reviewed:   I have personally reviewed the results from the time of this admission to  8/18/2022 15:43 EDT     Results from last 7 days   Lab Units 08/18/22  0633 08/17/22  1009 08/16/22  1451 08/16/22  0600   SODIUM mmol/L 140 138  --  138   POTASSIUM mmol/L 5.3* 4.8 5.3* 5.7*   CHLORIDE mmol/L 97* 97*  --  102   CO2 mmol/L 34.0* 35.2*  --  28.0   BUN mg/dL 42* 35*  --  32*   CREATININE mg/dL 1.64* 1.28*  --  1.16   CALCIUM mg/dL 9.6 9.6  --  9.5   BILIRUBIN mg/dL 0.4 0.3  --  0.3   ALK PHOS U/L 86 90  --  98   ALT (SGPT) U/L 19 22  --  25   AST (SGOT) U/L 11 13  --  14   GLUCOSE mg/dL 120* 123*  --  110*       Estimated Creatinine Clearance: 48.6 mL/min (A) (by C-G formula based on SCr of 1.64 mg/dL (H)).    Results from last 7 days   Lab Units 08/18/22  0633 08/17/22  1009 08/16/22  0600 08/15/22  0623 08/14/22  0905   MAGNESIUM mg/dL 2.3 1.9 2.1 2.0 2.1   PHOSPHORUS mg/dL  --  3.0  --  3.3 3.1       Results from last 7 days   Lab Units 08/15/22  0623 08/14/22  0905   URIC ACID mg/dL 6.8 7.4*       Results from last 7 days   Lab Units 08/18/22  0633 08/16/22  0600 08/15/22  0623 08/14/22  0905 08/13/22  0720   WBC 10*3/mm3 8.69 6.23 7.55 10.45 8.27   HEMOGLOBIN g/dL 11.8* 10.3* 10.1* 9.7* 9.7*   PLATELETS 10*3/mm3 255 185 167 152 124*             Assessment / Plan     ASSESSMENT:  1. CKDIII.  stable.  Hyperkalemia.  On  po lasix . K restricted diet. His creatinine is back to his usual baseline dating back to 2020. 1.5-1.6.   2. RLE cellulitis, enterobacter bacteremia. On cefepime, now transitioning to levaquin.   3. Acute on chronic respiratory failure, COPD, HALIMA. Non-compliant with BiPAP.  4. DM2  5. HTN controlled.   6. Anemia. Hg stable.   7. Elevated LFT's.  GI recommended MRCP, patient declined.     PLAN:  1. No new rec today .  2. Ok for dc to rehab tomorrow barring any surprises in his labs.   Lexie Rao MD  08/18/22  15:43 EDT    [facility]  [phone]

## 2022-08-18 NOTE — CASE MANAGEMENT/SOCIAL WORK
"Physicians Statement of Medical Necessity for  Ambulance Transportation    GENERAL INFORMATION     Name: Yakov Dubon  YOB: 1945    Medicare #:  [id number]  Transport Date:    (Valid for round trips this date, or for scheduled repetitive trips for 60 days from the date signed below.)  Origin: [facility]    Destination: [address], [place] [address]  Is the Patient's stay covered under Medicare Part A (PPS/DRG?)Yes  Closest appropriate facility? Yes  If this a hosp-hosp transfer? No  Is this a hospice patient? No    MEDICAL NECESSITY QUESTIONAIRE    Ambulance Transportation is medically necessary only if other means of transportation are contraindicated or would be potentially harmful to the patient.  To meet this requirement, the patient must be either \"bed confined\" or suffer from a condition such that transport by means other than an ambulance is contraindicated by the patient's condition.  The following questions must be answered by the healthcare professional signing below for this form to be valid:     1) Describe the MEDICAL CONDITION (physical and/or mental) of this patient AT THE TIME OF AMBULANCE TRANSPORT that requires the patient to be transported in an ambulance, and why transport by other means is contraindicated by the patient's condition:     Sepsis due to RLE cellulitis as well as enterobacter bactremia  Volume overload  Elevated Trop  Acute on chronic hypercapnic resp failure, acute hypoxic resp failure, HALIMA  Lethargy  Elevated LFTs    Past Medical History:   Diagnosis Date   • Anxiety    • Back pain    • Depression    • Diabetes mellitus (HCC)     \"BORDERLINE\"   • High cholesterol    • Hypertension    • Kidney disease, chronic, stage III (GFR 30-59 ml/min) ([facility])    • Reflux esophagitis    • Sleep apnea       Past Surgical History:   Procedure Laterality Date   • APPENDECTOMY     • CARPAL TUNNEL RELEASE     • CATARACT EXTRACTION     • " "HAMMER TOE REPAIR     • LUMBAR SPINE SURGERY     • PAIN PUMP INSERTION/REVISION Right 4/18/2016    Procedure: RT PAIN PUMP REPLACEMENT;  Surgeon: Chris Messer MD;  Location: [facility];  Service:    • TOTAL KNEE ARTHROPLASTY Left       2) Is this patient \"bed confined\" as defined below?Yes   To be \"bed confined\" the patient must satisfy all three of the following criteria:  (1) unable to get up from bed without assistance; AND (2) unable to ambulate;  AND (3) unable to sit in a chair or wheelchair.  3) Can this patient safely be transported by car or wheelchair van (I.e., may safely sit during transport, without an attendant or monitoring?)No   4. In addition to completing questions 1-3 above, please check any of the following conditions that apply*:          *Note: supporting documentation for any boxes checked must be maintained in the patient's medical records Requires oxygen - unable to self administer and Unable to tolerate seated position for time needed to transport      SIGNATURE OF PHYSICIAN OR OTHER AUTHORIZED HEALTHCARE PROFESSIONAL    I certify that the above information is true and correct based on my evaluation of this patient, and represent that the patient requires transport by ambulance and that other forms of transport are contraindicated.  I understand that this information will be used by the Centers for Medicare and Medicaid Services (CMS) to support the determiniation of medical necessity for ambulance services, and I represent that I have personal knowledge of the patient's condition at the time of transport.       If this box is checked, I also certify that the patient is physically or mentally incapable of signing the ambulance service's claim form and that the institution with which I am affiliated has furnished care, services or assistance to the patient.  My signature below is made on behalf of the patient pursuant to 42 .36(b)(4). In accordance with 42 .37, the " specific reason(s) that the patient is physically or mentally incapable of signing the claim for is as follows:     Signature of Physician or Healthcare Professional  Date/Time:        (For Scheduled repetitive transport, this form is not valid for transports performed more than 60 days after this date).                                                                                                                                            --------------------------------------------------------------------------------------------  Printed Name and Credentials of Physician or Authorized Healthcare Professional     *Form must be signed by patient's attending physician for scheduled, repetitive transports,.  For non-repetitive ambulance transports, if unable to obtain the signature of the attending physician, any of the following may sign (please select below):     Physician  Clinical Nurse Specialist  Registered Nurse     Physician Assistant  Discharge Planner  Licensed Practical Nurse     Nurse Practitioner

## 2022-08-18 NOTE — PLAN OF CARE
"Goal Outcome Evaluation:  Plan of Care Reviewed With: patient           Outcome Evaluation: Upon entering room, pt. supine in bed, awake/alert, and agreeable to work with P.T. this date.  Pt. does report pain in his Right lower L.E. as well as his \"back\" this date.  Pt. requires Max. assist x 2 for attempts at sitting upright on EOB as well as to perform long sitting in bed x 5 reps for core strengthening.  BLE ther. ex. program (AAROM RLE/AROM LLE) x 10 reps completed for general strengthening.  Mobility limited due to overall pain and fatigue.  Will continue to progress functional mobility as tolerated.    Patient was not wearing a face mask during this therapy encounter. Therapist used appropriate personal protective equipment including eye protection, mask, and gloves.  Mask used was standard procedure mask. Appropriate PPE was worn during the entire therapy session. Hand hygiene was completed before and after therapy session. Patient is not in enhanced droplet precautions.     "

## 2022-08-18 NOTE — CASE MANAGEMENT/SOCIAL WORK
Continued Stay Note  [facility]     Patient Name: Yakov Dubon  MRN: [id number]  Today's Date: 8/18/2022    Admit Date: 8/11/2022     Discharge Plan     Row Name 08/18/22 1522       Plan    Plan Comments DC orders noted. No transport available today.  [facility] arranged for tomorrow morning at 0900.  MD notified. CCP spoke w/ Liz/ terian - Pt can return tomorrow.  [facility]  ..               Discharge Codes    No documentation.               Expected Discharge Date and Time     Expected Discharge Date Expected Discharge Time    Aug 18, 2022             Porsha Ellis RN

## 2022-08-18 NOTE — PROGRESS NOTES
"    Patient Name: Yakov Dubon  :1945  77 y.o.      Patient Care Team:  Chencho Sterling MD as PCP - General (Internal Medicine)  Reasor, Chris Polo MD as Consulting Physician (Pain Medicine)    Chief Complaint: follow up elevated troponin    Interval History: resting in bed. No complaints.        Objective   Vital Signs  Temp:  [98.1 °F (36.7 °C)-98.4 °F (36.9 °C)] 98.1 °F (36.7 °C)  Heart Rate:  [54-63] 63  Resp:  [18] 18  BP: (129-143)/(57-69) 134/69    Intake/Output Summary (Last 24 hours) at 2022 1031  Last data filed at 2022 0500  Gross per 24 hour   Intake 560 ml   Output 2350 ml   Net -1790 ml     Flowsheet Rows    Flowsheet Row First Filed Value   Admission Height 182 cm (71.65\") Documented at 2022 1538   Admission Weight 124 kg (272 lb 14.9 oz) Documented at 2022 0522          Physical Exam:   General Appearance:    Alert, cooperative, in no acute distress   Lungs:     Clear to auscultation.  Normal respiratory effort and rate.      Heart:    Regular rhythm and normal rate, normal S1 and S2, no murmurs, gallops or rubs.     Chest Wall:    No abnormalities observed   Abdomen:     Soft, nontender, positive bowel sounds.     Extremities:   no cyanosis, clubbing or edema.  No marked joint deformities.  Adequate musculoskeletal strength.       Results Review:    Results from last 7 days   Lab Units 22  0633   SODIUM mmol/L 140   POTASSIUM mmol/L 5.3*   CHLORIDE mmol/L 97*   CO2 mmol/L 34.0*   BUN mg/dL 42*   CREATININE mg/dL 1.64*   GLUCOSE mg/dL 120*   CALCIUM mg/dL 9.6     Results from last 7 days   Lab Units 22  0720 22  0524   TROPONIN T ng/mL 0.084* 0.147*     Results from last 7 days   Lab Units 22  0633   WBC 10*3/mm3 8.69   HEMOGLOBIN g/dL 11.8*   HEMATOCRIT % 36.9*   PLATELETS 10*3/mm3 255         Results from last 7 days   Lab Units 22  0633   MAGNESIUM mg/dL 2.3                   Medication Review:   acetaminophen, 650 mg, Oral, " Nightly  aspirin, 81 mg, Oral, Daily  calcium-vitamin D, 1 tablet, Oral, BID  carvedilol, 12.5 mg, Oral, BID With Meals  diazePAM, 2.5 mg, Intravenous, Once  enoxaparin, 40 mg, Subcutaneous, Q24H  famotidine, 40 mg, Oral, BID AC  ferrous sulfate, 325 mg, Oral, Daily With Breakfast  furosemide, 40 mg, Oral, Daily  gabapentin, 100 mg, Oral, Q12H  hydrALAZINE, 25 mg, Oral, Q8H  insulin lispro, 0-14 Units, Subcutaneous, TID AC  levoFLOXacin, 750 mg, Oral, Every Other Day  rosuvastatin, 5 mg, Oral, Nightly  senna, 1 tablet, Oral, Daily  sertraline, 75 mg, Oral, Daily  sodium chloride, 10 mL, Intravenous, Q12H  tamsulosin, 0.4 mg, Oral, Nightly         pain,         Assessment & Plan   1. Enterobacter septicemia  2. Right lower extremity cellulitis  3. Chronic kidney disease  4. Elevated liver enzymes, abnormal RUQ ultrasound, CT with intrahepatic biliary ductal prominence. Refusing MRCP.   5. Elevated troponin - likely demand ischemia. Echo without regional wall motion abnormalities and preserved EF. No additional testing recommended at this time.   6. Hypoalbuminemia  7. Intermittent bundle branch block.   8. PVCs.     BP stable. No additional med changes recommended at this time.   Will see as needed.     CECILE Sandoval  [facility]  08/18/22  10:31 EDT

## 2022-08-18 NOTE — PLAN OF CARE
Problem: Fall Injury Risk  Goal: Absence of Fall and Fall-Related Injury  Intervention: Identify and Manage Contributors  Description: Develop a fall prevention plan with the patient and caregiver/family.  Provide reorientation, appropriate sensory stimulation and routines with changes in mental status to decrease risk of fall.  Promote use of personal vision and auditory aids.  Assess assistance level required for safe and effective self-care; provide support as needed, such as toileting, mobilization. For age 65 and older, implement timed toileting with assistance.  Encourage physical activity, such as performance of mobility and self-care at highest level of patient ability, multicomponent exercise program and provision of appropriate assistive devices.  If fall occurs, assess the severity of injury; implement fall injury protocol. Determine the cause and revise fall injury prevention plan.  Regularly review medication contribution to fall risk; adjust medication administration times to minimize risk of falling.  Consider risk related to polypharmacy and age.  Balance adequate pain management with potential for oversedation.  Recent Flowsheet Documentation  Taken 8/18/2022 0420 by Jaden Hernandez RN  Medication Review/Management: medications reviewed  Taken 8/18/2022 0214 by Jaden Hernandez RN  Medication Review/Management: medications reviewed  Taken 8/18/2022 0021 by Jaden Hernandez RN  Medication Review/Management: medications reviewed  Taken 8/17/2022 2217 by Jaden Hernandez RN  Medication Review/Management: medications reviewed  Taken 8/17/2022 2041 by Jaden Hernandez RN  Medication Review/Management: medications reviewed  Intervention: Promote Injury-Free Environment  Description: Provide a safe, barrier-free environment that encourages independent activity.  Keep care area uncluttered and well-lighted.  Determine need for increased observation or monitoring.  Avoid use of devices that minimize  mobility, such as restraints or indwelling urinary catheter.  Recent Flowsheet Documentation  Taken 8/18/2022 0420 by Jaden Hernandez RN  Safety Promotion/Fall Prevention:   activity supervised   assistive device/personal items within reach   clutter free environment maintained   safety round/check completed   room organization consistent  Taken 8/18/2022 0214 by Jaden Hernandez RN  Safety Promotion/Fall Prevention:   activity supervised   assistive device/personal items within reach   clutter free environment maintained   safety round/check completed   room organization consistent  Taken 8/18/2022 0021 by Jaden Hernandez RN  Safety Promotion/Fall Prevention:   activity supervised   assistive device/personal items within reach   clutter free environment maintained   safety round/check completed   room organization consistent  Taken 8/17/2022 2217 by Jaden Hernandez RN  Safety Promotion/Fall Prevention:   activity supervised   assistive device/personal items within reach   clutter free environment maintained   safety round/check completed   room organization consistent  Taken 8/17/2022 2041 by Jaden Hernandez RN  Safety Promotion/Fall Prevention:   activity supervised   assistive device/personal items within reach   clutter free environment maintained   safety round/check completed   room organization consistent   Goal Outcome Evaluation:   Patient resting most of the night with no complaints of pain, VSS, bed alarm set and call light in reach. Patients dressing changed and cleansed this shift.

## 2022-08-18 NOTE — PROGRESS NOTES
Name: Yakov Dubon ADMIT: 2022   : 1945  PCP: Chencho Sterling MD    MRN: [id number] LOS: 7 days   AGE/SEX: 77 y.o. male  ROOM: [facility]   Subjective   Chief Complaint   Patient presents with   • Hypotension   • Fall     Dyspnea fair  On ABX  On diuretics  Denies pain  declines MRCP  Confused at times but more alert today    ROS  No f/c  No n/v  No cp/palp  No soa/cough    Objective   Vital Signs  Temp:  [98.1 °F (36.7 °C)-98.4 °F (36.9 °C)] 98.2 °F (36.8 °C)  Heart Rate:  [54-63] 59  Resp:  [18] 18  BP: (109-143)/(55-69) 109/55  SpO2:  [96 %-98 %] 97 %  on  Flow (L/min):  [2] 2;   Device (Oxygen Therapy): nasal cannula  Body mass index is 34.89 kg/m².    Chronically ill  Some confusion  Physical Exam  Constitutional:       Appearance: He is obese. He is ill-appearing (chronically).   HENT:      Head: Normocephalic and atraumatic.   Eyes:      General: No scleral icterus.  Cardiovascular:      Rate and Rhythm: Normal rate and regular rhythm.      Heart sounds: Normal heart sounds.   Pulmonary:      Effort: Pulmonary effort is normal. No respiratory distress (decreased bs at bases).      Breath sounds: Normal breath sounds.   Abdominal:      General: There is no distension.      Palpations: Abdomen is soft.      Tenderness: There is no abdominal tenderness.   Musculoskeletal:      Cervical back: Neck supple.      Right lower leg: Edema present.      Left lower leg: Edema present.   Skin:     Findings: Erythema (RLE) present.   Neurological:      Mental Status: He is alert.   Psychiatric:         Behavior: Behavior normal.     right leg wrapped    Results Review:       I reviewed the patient's new clinical results.  Results from last 7 days   Lab Units 22  0633 22  0600 08/15/22  0623 22  0905   WBC 10*3/mm3 8.69 6.23 7.55 10.45   HEMOGLOBIN g/dL 11.8* 10.3* 10.1* 9.7*   PLATELETS 10*3/mm3 255 185 167 152     Results from last 7 days   Lab Units 22  0633 22  1009  08/16/22  1451 08/16/22  0600 08/15/22  0623   SODIUM mmol/L 140 138  --  138 139   POTASSIUM mmol/L 5.3* 4.8 5.3* 5.7* 5.3*   CHLORIDE mmol/L 97* 97*  --  102 104   CO2 mmol/L 34.0* 35.2*  --  28.0 28.6   BUN mg/dL 42* 35*  --  32* 32*   CREATININE mg/dL 1.64* 1.28*  --  1.16 1.15   GLUCOSE mg/dL 120* 123*  --  110* 115*   Estimated Creatinine Clearance: 48.6 mL/min (A) (by C-G formula based on SCr of 1.64 mg/dL (H)).  Results from last 7 days   Lab Units 08/18/22  0633 08/17/22  1009 08/16/22  0600 08/15/22  0623   ALBUMIN g/dL 3.20* 3.30* 2.70* 2.50*   BILIRUBIN mg/dL 0.4 0.3 0.3 0.2   ALK PHOS U/L 86 90 98 97   AST (SGOT) U/L 11 13 14 18   ALT (SGPT) U/L 19 22 25 32     Results from last 7 days   Lab Units 08/18/22  0633 08/17/22  1009 08/16/22  0600 08/15/22  0623 08/14/22  0905   CALCIUM mg/dL 9.6 9.6 9.5 9.0 9.0   ALBUMIN g/dL 3.20* 3.30* 2.70* 2.50* 2.60*   MAGNESIUM mg/dL 2.3 1.9 2.1 2.0 2.1   PHOSPHORUS mg/dL  --  3.0  --  3.3 3.1     Results from last 7 days   Lab Units 08/13/22  0720 08/12/22  0524   PROCALCITONIN ng/mL 6.95* 15.00*   LACTATE mmol/L 1.1 2.1*       Coag     HbA1C   Lab Results   Component Value Date    HGBA1C 6.00 (H) 08/11/2022    HGBA1C 7.8 (H) 02/01/2019    HGBA1C 9.2 (H) 12/01/2018     Infection   Results from last 7 days   Lab Units 08/13/22  0720 08/12/22  1418 08/12/22  1346 08/12/22  0524   BLOODCX   --  No growth at 5 days No growth at 5 days  --    PROCALCITONIN ng/mL 6.95*  --   --  15.00*     Radiology(recent) No radiology results for the last day  No results found for: TROPONINT, TROPONINI, BNP  No components found for: TSH;2    acetaminophen, 650 mg, Oral, Nightly  aspirin, 81 mg, Oral, Daily  calcium-vitamin D, 1 tablet, Oral, BID  carvedilol, 12.5 mg, Oral, BID With Meals  diazePAM, 2.5 mg, Intravenous, Once  enoxaparin, 40 mg, Subcutaneous, Q24H  famotidine, 40 mg, Oral, BID AC  ferrous sulfate, 325 mg, Oral, Daily With Breakfast  furosemide, 40 mg, Oral,  Daily  gabapentin, 100 mg, Oral, Q12H  hydrALAZINE, 25 mg, Oral, Q8H  insulin lispro, 0-14 Units, Subcutaneous, TID AC  levoFLOXacin, 750 mg, Oral, Every Other Day  rosuvastatin, 5 mg, Oral, Nightly  senna, 1 tablet, Oral, Daily  sertraline, 75 mg, Oral, Daily  sodium chloride, 10 mL, Intravenous, Q12H  tamsulosin, 0.4 mg, Oral, Nightly      pain,     Diet Pureed; Thin; Cardiac, Low Potassium; 2 gm (50 mEq)      Assessment & Plan      Active Hospital Problems    Diagnosis  POA   • **Cellulitis of right lower extremity [L03.115]  Yes   • Anemia of chronic disease [D63.8]  Yes   • Sepsis due to Gram negative bacteria (HCC) [A41.50]  Yes   • Elevated LFTs [R79.89]  No   • Acute respiratory failure with hypoxia (HCC) [J96.01]  Yes   • Acute on chronic respiratory failure with hypercapnia (HCC) [J96.22]  Yes   • HTN (hypertension) [I10]  Yes   • Type 2 diabetes mellitus with stage 3b chronic kidney disease (HCC) [E11.22, N18.32]  Yes   • Elevated troponin [R77.8]  Yes   • RBBB [I45.10]  Yes   • Pulmonary vascular congestion [R09.89]  Yes   • Diastolic CHF, acute (HCC) [I50.31]  Yes   • CSA (central sleep apnea) [G47.31]  Yes   • HALIMA (obstructive sleep apnea) [G47.33]  Yes      Resolved Hospital Problems   No resolved problems to display.         78yo gentleman who presented from nursing home with lethargy, hypotension, and severe RLE redness/swelling/pain and was admitted to our service with sepsis due to RLE cellulitis.     Sepsis due to RLE cellulitis as well as enterobacter bactremia    IV Cefepime currently  Wound RN following  Venous duplex BLEs negative for DVT  CT BLEs negative for deep infection  Ortho has seen and signed off--no interventions indicated     Volume overload  Quite volume overloaded on admission with peripheral edema, vasc congestion on CXR, and hypoxia  Trending daily weights and I/Os  On oral lasix as directed by nephrology- monitor potassium as elevated at times  Element of CKD as  well    HTN  Agents for HTN  Monitor renal fx  Better with adjustment in medications     Elevated Trop  Card has seen, no ACS  EKG okay and no WMA on Echo  Suspect due to CKD     CKD3b  Nephrology following     Anemia of chronic disease  With component of iron deficiency  Continue oral iron     Acute on chronic hypercapnic resp failure, acute hypoxic resp failure, HALIMA  BiPAP ordered,  (not compliant with PAP at facility prior to admission)  Seen by pulmonary     Lethargy  has been up and down, multifactorial  Nh3 ok  Decreased gabapentin     DM2  A1c 6.0  Lantus on hold for now  Continue SSI  Sugars remain acceptable today     Elevated LFTs  Uncertain etiology--suspect congestive/ischemic  Viral hep panel negative  Patient declined MRCP   LFTs normalized        · Lovenox 40 mg SC daily for DVT prophylaxis.  · Discussed with patient and nursing staff.     Anticipate discharge back to SNU facility, was going to discharge today but no transport, plan for tomorrow morning    Greater than 36 minutes spent with greater than 50% counseling and coordinating care        Kwasi Ferreira MD  [facility]  08/18/22  13:19 EDT

## 2022-08-18 NOTE — THERAPY TREATMENT NOTE
"Patient Name: Yakov Dubon  : 1945    MRN: [id number]                              Today's Date: 2022       Admit Date: 2022    Visit Dx:     ICD-10-CM ICD-9-CM   1. Cellulitis of right lower extremity  L03.115 682.6   2. Elevated troponin level not due to acute coronary syndrome  R77.8 790.6   3. Sepsis, due to unspecified organism, unspecified whether acute organ dysfunction present ([facility])  A41.9 038.9     995.91   4. Type 2 diabetes mellitus without complication, unspecified whether long term insulin use ([facility])  E11.9 250.00   5. 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 condition   • Periodic breathing   • Cellulitis of right lower extremity   • HTN (hypertension)   • Type 2 diabetes mellitus with stage 3b chronic kidney disease ([facility])   • Elevated troponin   • RBBB   • Pulmonary vascular congestion   • Diastolic CHF, acute ([facility])   • Elevated LFTs   • Acute respiratory failure with hypoxia ([facility])   • Acute on chronic respiratory failure with hypercapnia ([facility])   • Anemia of chronic disease   • Sepsis due to Gram negative bacteria ([facility])     Past Medical History:   Diagnosis Date   • Anxiety    • Back pain    • Depression    • Diabetes mellitus ([facility])     \"BORDERLINE\"   • High cholesterol    • Hypertension    • Kidney disease, chronic, stage III (GFR 30-59 ml/min) ([facility])    • Reflux esophagitis    • Sleep apnea      Past Surgical History:   Procedure Laterality Date   • APPENDECTOMY     • CARPAL TUNNEL RELEASE     • CATARACT EXTRACTION     • HAMMER TOE REPAIR     • LUMBAR SPINE SURGERY     • PAIN PUMP INSERTION/REVISION Right 2016    Procedure: RT PAIN PUMP REPLACEMENT;  Surgeon: Chris Messer MD;  Location: [facility] OR;  Service:    • TOTAL KNEE ARTHROPLASTY Left       General Information     Row Name " "08/18/22 1512          Physical Therapy Time and Intention    Document Type therapy note (daily note)  -MS     Mode of Treatment physical therapy;individual therapy  -MS     Row Name 08/18/22 1512          General Information    Patient Profile Reviewed yes  -MS     Existing Precautions/Restrictions fall   Exit alarm; Dec. skin integrity  -MS     Barriers to Rehab none identified  -MS           User Key  (r) = Recorded By, (t) = Taken By, (c) = Cosigned By    Initials Name Provider Type    Riaz Saab, PT Physical Therapist               Mobility     Row Name 08/18/22 1513          Bed Mobility    Scooting/Bridging [place] (Bed Mobility) maximum assist (25% patient effort);2 person assist  -MS     Assistive Device (Bed Mobility) draw sheet  -MS     Comment, (Bed Mobility) Pt. unable to sit fully upright due to pain.  Pt. also performed long sitting in bed x 5 reps (Max. assist x2) for core strengthening.  -MS           User Key  (r) = Recorded By, (t) = Taken By, (c) = Cosigned By    Initials Name Provider Type    Riaz Saab PT Physical Therapist               Obj/Interventions     Row Name 08/18/22 1514          Motor Skills    Therapeutic Exercise --  BLE ther. ex. program (AAROM RLE/AROM LLE) x 10 reps completed (Ankle pumps, Heel Slides, Hip Abduction, SLR's)  -MS           User Key  (r) = Recorded By, (t) = Taken By, (c) = Cosigned By    Initials Name Provider Type    Riaz Saab, PT Physical Therapist               Goals/Plan    No documentation.                Clinical Impression     Row Name 08/18/22 1514          Pain    Pretreatment Pain Rating 4/10  -MS     Posttreatment Pain Rating 4/10  -MS     Pre/Posttreatment Pain Comment Pt. reports pain in his Right lower L.E.  (Calf/ankle/foot) but also in his \"back\".  -MS     Pain Intervention(s) Medication (See MAR);Elevated;Nursing Notified;Repositioned  -MS     Row Name 08/18/22 1514          Positioning and Restraints    " Pre-Treatment Position in bed  -MS     Post Treatment Position bed  -MS     In Bed notified nsg;supine;call light within reach;encouraged to call for assist;exit alarm on;RLE elevated;LLE elevated;R heel elevated;L heel elevated  All lines intact.  -MS           User Key  (r) = Recorded By, (t) = Taken By, (c) = Cosigned By    Initials Name Provider Type    Riaz Saab LAZARO, PT Physical Therapist               Outcome Measures     Row Name 08/18/22 1516          How much help from another person do you currently need...    Turning from your back to your side while in flat bed without using bedrails? 2  -MS     Moving from lying on back to sitting on the side of a flat bed without bedrails? 2  -MS     Moving to and from a bed to a chair (including a wheelchair)? 1  -MS     Standing up from a chair using your arms (e.g., wheelchair, bedside chair)? 1  -MS     Climbing 3-5 steps with a railing? 1  -MS     To walk in hospital room? 1  -MS     AM-PAC 6 Clicks Score (PT) 8  -MS     Highest level of mobility 3 --> Sat at edge of bed  -MS     Row Name 08/18/22 1516          Functional Assessment    Outcome Measure Options AM-PAC 6 Clicks Basic Mobility (PT)  -MS           User Key  (r) = Recorded By, (t) = Taken By, (c) = Cosigned By    Initials Name Provider Type    Riaz Saab LAZARO, PT Physical Therapist                             Physical Therapy Education                 Title: PT OT SLP Therapies (Done)     Topic: Physical Therapy (Done)     Point: Mobility training (Done)     Learning Progress Summary           Patient Acceptance, E,D, VU,NR by MS at 8/18/2022 1517    Acceptance, E,TB,D, NR by MANGO at 8/17/2022 1429    Acceptance, E,TB, VU,DU by  at 8/16/2022 1640    Acceptance, E,D, VU,NR by MS at 8/13/2022 1052                   Point: Home exercise program (Done)     Learning Progress Summary           Patient Acceptance, E,D, VU,NR by MS at 8/18/2022 1517    Acceptance, E,TB,D, NR by MANGO at 8/17/2022 1429  "   Acceptance, E,TB, VU,DU by  at 8/16/2022 1640    Acceptance, E,D, VU,NR by MS at 8/13/2022 1052                   Point: Body mechanics (Done)     Learning Progress Summary           Patient Acceptance, E,TB, VU,DU by  at 8/16/2022 1640                   Point: Precautions (Done)     Learning Progress Summary           Patient Acceptance, E,TB, VU,DU by  at 8/16/2022 1640                               User Key     Initials Effective Dates Name Provider Type Discipline    MS 06/16/21 -  Riaz Singh, PT Physical Therapist PT     06/16/21 -  Eugenia Costa, PT Physical Therapist PT     07/25/22 -  Deysi Sheets, PT Physical Therapist PT              PT Recommendation and Plan  Planned Therapy Interventions (PT): balance training, bed mobility training, gait training, home exercise program, patient/family education, postural re-education, transfer training, strengthening  Plan of Care Reviewed With: patient  Outcome Evaluation: Upon entering room, pt. supine in bed, awake/alert, and agreeable to work with P.T. this date.  Pt. does report pain in his Right lower L.E. as well as his \"back\" this date.  Pt. requires Max. assist x 2 for attempts at sitting upright on EOB as well as to perform long sitting in bed x 5 reps for core strengthening.  BLE ther. ex. program (AAROM RLE/AROM LLE) x 10 reps completed for general strengthening.  Mobility limited due to overall pain and fatigue.  Will continue to progress functional mobility as tolerated.     Time Calculation:    PT Charges     Row Name 08/18/22 1519             Time Calculation    Start Time 1430  -MS      Stop Time 1443  -MS      Time Calculation (min) 13 min  -MS      PT Received On 08/18/22  -MS      PT - Next Appointment 08/19/22  -MS              Time Calculation- PT    Total Timed Code Minutes- PT 12 minute(s)  -MS            User Key  (r) = Recorded By, (t) = Taken By, (c) = Cosigned By    Initials Name Provider Type    MS Singh, " Riaz WILLIS, PT Physical Therapist              Therapy Charges for Today     Code Description Service Date Service Provider Modifiers Qty    23037900976 HC PT THER PROC EA 15 MIN 8/18/2022 Riaz Singh, PT GP 1    12604878680 HC PT THER SUPP EA 15 MIN 8/18/2022 Riaz Singh, PT GP 1          PT G-Codes  Outcome Measure Options: AM-PAC 6 Clicks Basic Mobility (PT)  AM-PAC 6 Clicks Score (PT): 8  AM-PAC 6 Clicks Score (OT): 12    Riaz Singh, PT  8/18/2022

## 2022-08-19 VITALS
BODY MASS INDEX: 34.64 KG/M2 | HEIGHT: 72 IN | TEMPERATURE: 98.5 F | RESPIRATION RATE: 18 BRPM | DIASTOLIC BLOOD PRESSURE: 64 MMHG | WEIGHT: 255.73 LBS | OXYGEN SATURATION: 98 % | HEART RATE: 74 BPM | SYSTOLIC BLOOD PRESSURE: 156 MMHG

## 2022-08-19 LAB
ALBUMIN SERPL-MCNC: 3.3 G/DL (ref 3.5–5.2)
ANION GAP SERPL CALCULATED.3IONS-SCNC: 6.4 MMOL/L (ref 5–15)
BUN SERPL-MCNC: 44 MG/DL (ref 8–23)
BUN/CREAT SERPL: 24.6 (ref 7–25)
CALCIUM SPEC-SCNC: 10 MG/DL (ref 8.6–10.5)
CHLORIDE SERPL-SCNC: 96 MMOL/L (ref 98–107)
CO2 SERPL-SCNC: 37.6 MMOL/L (ref 22–29)
CREAT SERPL-MCNC: 1.79 MG/DL (ref 0.76–1.27)
EGFRCR SERPLBLD CKD-EPI 2021: 38.3 ML/MIN/1.73
GLUCOSE BLDC GLUCOMTR-MCNC: 126 MG/DL (ref 70–130)
GLUCOSE SERPL-MCNC: 124 MG/DL (ref 65–99)
MAGNESIUM SERPL-MCNC: 2.1 MG/DL (ref 1.6–2.4)
PHOSPHATE SERPL-MCNC: 4.2 MG/DL (ref 2.5–4.5)
POTASSIUM SERPL-SCNC: 5.3 MMOL/L (ref 3.5–5.2)
SARS-COV-2 RNA RESP QL NAA+PROBE: NOT DETECTED
SODIUM SERPL-SCNC: 140 MMOL/L (ref 136–145)

## 2022-08-19 PROCEDURE — U0003 INFECTIOUS AGENT DETECTION BY NUCLEIC ACID (DNA OR RNA); SEVERE ACUTE RESPIRATORY SYNDROME CORONAVIRUS 2 (SARS-COV-2) (CORONAVIRUS DISEASE [COVID-19]), AMPLIFIED PROBE TECHNIQUE, MAKING USE OF HIGH THROUGHPUT TECHNOLOGIES AS DESCRIBED BY CMS-2020-01-R: HCPCS | Performed by: INTERNAL MEDICINE

## 2022-08-19 PROCEDURE — 83735 ASSAY OF MAGNESIUM: CPT | Performed by: HOSPITALIST

## 2022-08-19 PROCEDURE — 82962 GLUCOSE BLOOD TEST: CPT

## 2022-08-19 PROCEDURE — 80069 RENAL FUNCTION PANEL: CPT | Performed by: INTERNAL MEDICINE

## 2022-08-19 PROCEDURE — U0005 INFEC AGEN DETEC AMPLI PROBE: HCPCS | Performed by: INTERNAL MEDICINE

## 2022-08-19 RX ORDER — CARVEDILOL 12.5 MG/1
12.5 TABLET ORAL 2 TIMES DAILY WITH MEALS
Start: 2022-08-19 | End: 2022-10-11 | Stop reason: HOSPADM

## 2022-08-19 RX ORDER — DIAPER,BRIEF,INFANT-TODD,DISP
1 EACH MISCELLANEOUS EVERY 12 HOURS SCHEDULED
Qty: 14 G | Refills: 0
Start: 2022-08-19 | End: 2022-08-26

## 2022-08-19 RX ORDER — PETROLATUM 420 MG/G
1 OINTMENT TOPICAL EVERY 12 HOURS SCHEDULED
Start: 2022-08-19 | End: 2022-10-11 | Stop reason: HOSPADM

## 2022-08-19 RX ORDER — PETROLATUM 420 MG/G
1 OINTMENT TOPICAL EVERY 12 HOURS SCHEDULED
Status: DISCONTINUED | OUTPATIENT
Start: 2022-08-19 | End: 2022-08-19 | Stop reason: HOSPADM

## 2022-08-19 RX ORDER — DIAPER,BRIEF,INFANT-TODD,DISP
1 EACH MISCELLANEOUS EVERY 12 HOURS SCHEDULED
Status: DISCONTINUED | OUTPATIENT
Start: 2022-08-19 | End: 2022-08-19 | Stop reason: HOSPADM

## 2022-08-19 RX ORDER — INSULIN LISPRO 100 [IU]/ML
0-14 INJECTION, SOLUTION INTRAVENOUS; SUBCUTANEOUS
Refills: 12
Start: 2022-08-19

## 2022-08-19 RX ADMIN — FAMOTIDINE 40 MG: 20 TABLET ORAL at 05:16

## 2022-08-19 RX ADMIN — FERROUS SULFATE TAB 325 MG (65 MG ELEMENTAL FE) 325 MG: 325 (65 FE) TAB at 09:46

## 2022-08-19 RX ADMIN — GABAPENTIN 100 MG: 100 CAPSULE ORAL at 09:46

## 2022-08-19 RX ADMIN — SERTRALINE 75 MG: 25 TABLET, FILM COATED ORAL at 09:46

## 2022-08-19 RX ADMIN — SENNOSIDES 1 TABLET: 8.6 TABLET, FILM COATED ORAL at 09:46

## 2022-08-19 RX ADMIN — HYDRALAZINE HYDROCHLORIDE 25 MG: 25 TABLET, FILM COATED ORAL at 05:16

## 2022-08-19 RX ADMIN — CARVEDILOL 12.5 MG: 12.5 TABLET, FILM COATED ORAL at 09:45

## 2022-08-19 RX ADMIN — PETROLATUM 1 APPLICATION: 420 OINTMENT TOPICAL at 09:45

## 2022-08-19 RX ADMIN — CALCIUM CARBONATE-VITAMIN D TAB 500 MG-200 UNIT 1 TABLET: 500-200 TAB at 09:46

## 2022-08-19 RX ADMIN — ASPIRIN 81 MG: 81 TABLET, CHEWABLE ORAL at 09:46

## 2022-08-19 RX ADMIN — FUROSEMIDE 40 MG: 40 TABLET ORAL at 09:46

## 2022-08-19 RX ADMIN — HYDROCORTISONE 1 APPLICATION: 1 CREAM TOPICAL at 09:45

## 2022-08-19 RX ADMIN — Medication 10 ML: at 09:47

## 2022-08-19 NOTE — NURSING NOTE
Patient cleared to be d/c to assisted living. IV and heart monitor taken out. Belongings and paperwork gathered and sent with patient. Report given to receiving RN by nightshift nurse. Patient was transferred by stretcher.

## 2022-08-19 NOTE — CASE MANAGEMENT/SOCIAL WORK
Case Management Discharge Note      Final Note: DC back to IC level bed at [facility]..........Porsha GONZALEZ/ MORA         Selected Continued Care - Discharged on 8/19/2022 Admission date: 8/11/2022 - Discharge disposition: Skilled Nursing Facility (DC - External)    Destination Coordination complete.    Service Provider Selected Services Address Phone Fax Patient Preferred    [place] [address] [phone] --          Durable Medical Equipment    No services have been selected for the patient.              Dialysis/Infusion    No services have been selected for the patient.              Home Medical Care    No services have been selected for the patient.              Therapy    No services have been selected for the patient.              Community Resources    No services have been selected for the patient.              Community & DME    No services have been selected for the patient.                  Transportation Services  Ambulance: [facility]    Final Discharge Disposition Code: 04 - intermediate care facility ([facility])

## 2022-08-19 NOTE — PLAN OF CARE
Problem: Adult Inpatient Plan of Care  Goal: Plan of Care Review  Outcome: Met  Goal: Patient-Specific Goal (Individualized)  Outcome: Met  Goal: Absence of Hospital-Acquired Illness or Injury  Outcome: Met  Intervention: Identify and Manage Fall Risk  Recent Flowsheet Documentation  Taken 8/19/2022 0944 by Ann Santiago RN  Safety Promotion/Fall Prevention:   activity supervised   fall prevention program maintained   safety round/check completed  Taken 8/19/2022 0725 by Ann Santiago RN  Safety Promotion/Fall Prevention:   activity supervised   fall prevention program maintained   safety round/check completed   toileting scheduled  Intervention: Prevent Skin Injury  Recent Flowsheet Documentation  Taken 8/19/2022 0944 by Ann Santiago RN  Body Position:   turned   right   weight shifting  Taken 8/19/2022 0725 by Ann Santiago RN  Body Position:   supine   supine, legs elevated  Intervention: Prevent and Manage VTE (Venous Thromboembolism) Risk  Recent Flowsheet Documentation  Taken 8/19/2022 0944 by Ann Santiago RN  Activity Management: activity adjusted per tolerance  VTE Prevention/Management: (lovenox) --  Range of Motion: active ROM (range of motion) encouraged  Taken 8/19/2022 0725 by Ann Santiago RN  Activity Management: activity adjusted per tolerance  Intervention: Prevent Infection  Recent Flowsheet Documentation  Taken 8/19/2022 0944 by Ann Santiago RN  Infection Prevention: environmental surveillance performed  Taken 8/19/2022 0725 by Ann Santiago RN  Infection Prevention: single patient room provided  Goal: Optimal Comfort and Wellbeing  Outcome: Met  Intervention: Monitor Pain and Promote Comfort  Recent Flowsheet Documentation  Taken 8/19/2022 0944 by Ann Santiago RN  Pain Management Interventions: no interventions per patient request  Intervention: Provide Person-Centered Care  Recent  Flowsheet Documentation  Taken 8/19/2022 0944 by Ann Santiago RN  Trust Relationship/Rapport:   questions encouraged   reassurance provided  Goal: Readiness for Transition of Care  Outcome: Met     Problem: Fall Injury Risk  Goal: Absence of Fall and Fall-Related Injury  Outcome: Met  Intervention: Identify and Manage Contributors  Recent Flowsheet Documentation  Taken 8/19/2022 0944 by Ann Santiago, RN  Medication Review/Management: medications reviewed  Taken 8/19/2022 0725 by Ann Santiago RN  Medication Review/Management: medications reviewed  Intervention: Promote Injury-Free Environment  Recent Flowsheet Documentation  Taken 8/19/2022 0944 by Ann Santiago RN  Safety Promotion/Fall Prevention:   activity supervised   fall prevention program maintained   safety round/check completed  Taken 8/19/2022 0725 by Ann Santiago RN  Safety Promotion/Fall Prevention:   activity supervised   fall prevention program maintained   safety round/check completed   toileting scheduled     Problem: Skin Injury Risk Increased  Goal: Skin Health and Integrity  Outcome: Met  Intervention: Optimize Skin Protection  Recent Flowsheet Documentation  Taken 8/19/2022 0944 by Ann Santiago, RN  Head of Bed (HOB) Positioning: HOB at 30-45 degrees  Taken 8/19/2022 0725 by Ann Santiago RN  Head of Bed (HOB) Positioning: HOB at 20-30 degrees   Goal Outcome Evaluation:

## 2022-08-19 NOTE — NURSING NOTE
Report called to [facility] spoke with nurse Winter, who informed me that she was night shift nurse and that day shift would most likely call back . This nurse informed her that EMS is likely to come early and that they can feel free to call with any questions.

## 2022-08-19 NOTE — DISCHARGE SUMMARY
Patient Name: Yakov Dubon  : 1945  MRN: [id number]    Date of Admission: 2022  Date of Discharge:  2022  Primary Care Physician: Chencho Sterling MD      Chief Complaint:   Hypotension and Fall      Discharge Diagnoses     Active Hospital Problems    Diagnosis  POA   • **Cellulitis of right lower extremity [L03.115]  Yes   • Anemia of chronic disease [D63.8]  Yes   • Sepsis due to Gram negative bacteria (HCC) [A41.50]  Yes   • Elevated LFTs [R79.89]  No   • Acute respiratory failure with hypoxia (HCC) [J96.01]  Yes   • Acute on chronic respiratory failure with hypercapnia (HCC) [J96.22]  Yes   • HTN (hypertension) [I10]  Yes   • Type 2 diabetes mellitus with stage 3b chronic kidney disease (HCC) [E11.22, N18.32]  Yes   • Elevated troponin [R77.8]  Yes   • RBBB [I45.10]  Yes   • Pulmonary vascular congestion [R09.89]  Yes   • Diastolic CHF, acute (HCC) [I50.31]  Yes   • CSA (central sleep apnea) [G47.31]  Yes   • HALIMA (obstructive sleep apnea) [G47.33]  Yes      Resolved Hospital Problems   No resolved problems to display.        Hospital Course     Mr. Dubon is a 77 y.o. male with a history of hypertension, CKD stage III, GERD, type 2 diabetes mellitus who presented to [facility] initially complaining of hypotension and fall.  Please see the admitting history and physical for further details.  He was found to have cellulitis of right lower extremity with subsequent sepsis due to gram-negative bacteria and was admitted to the hospital for further evaluation and treatment.      Gentleman presented from nursing home with lethargy, hypotension, fluid volume overload, and severe RLE redness/swelling/pain and admitted with sepsis due to RLE cellulitis.  Infectious disease followed patient with recommendation for transition to levofloxacin 750 mg every 48 hours through 2022 given patient's refusal for MRCP.    Gastroenterology recommended MRCP for transaminitis with  abnormal ultrasound findings; however, patient declined diagnostic work-up.    Nephrology followed patient for acute injury on chronic kidney disease with associated hyperkalemia.  Patient started on oral formulation furosemide and potassium improved with trend.  Serum creatinine normalizing to baseline 1.5-1.7.  Recommend repeat BMP at nursing home on 8/22/2022.    Pulmonology evaluated and treated issues regarding obstructive sleep apnea with overall improvement in level of consciousness.  Follow-up with Dr. Reddy in outpatient setting advised.    Cardiology noted elevated troponin most likely demand ischemia and no additional work-up necessary during hospital admission.  Carvedilol increased with minimal effects on blood pressure; therefore, hydralazine 25 mg 3 times daily added with improved BP thereafter.  Follow-up primary care provider for continued monitoring blood pressure as well.    Addendum- above done by CECILE Adams    He is doing about the same on 8/19 and can be discharged. He is chronically ill and likely with continued complications on going in the future. Recommend to continue his PAP at his facility- though was non-complaint previously. Monitor BMP on 8/22. Continue wound care for cellulitis- though ID feels he likely will have continued erythema due to venous stasis dermatitis in his leg (not all infection).     Electronically signed by Kwasi Ferreira MD, 08/19/22, 7:53 AM EDT.      Day of Discharge     Physical Exam:  Temp:  [97.9 °F (36.6 °C)-98.5 °F (36.9 °C)] 98.5 °F (36.9 °C)  Heart Rate:  [59-99] 59  Resp:  [18] 18  BP: (109-167)/() 156/64  Body mass index is 35.02 kg/m².     Physical Exam    Chronically ill  Some confusion  Physical Exam  Constitutional:       Appearance: He is obese. He is ill-appearing (chronically).   HENT:      Head: Normocephalic and atraumatic.   Eyes:      General: No scleral icterus.  Cardiovascular:      Rate and Rhythm: Normal rate and regular  rhythm.      Heart sounds: Normal heart sounds.   Pulmonary:      Effort: Pulmonary effort is normal. No respiratory distress (decreased bs at bases).      Breath sounds: Normal breath sounds.   Abdominal:      General: There is no distension.      Palpations: Abdomen is soft.      Tenderness: There is no abdominal tenderness.   Musculoskeletal:      Cervical back: Neck supple.      Right lower leg: Edema present.      Left lower leg: Edema present.   Skin:     Findings: Erythema (RLE) present.   Neurological:      Mental Status: He is alert.   Psychiatric:         Behavior: Behavior normal.     Consultants     Consult Orders (all) (From admission, onward)     Start     Ordered    08/13/22 1244  Inpatient Gastroenterology Consult  Once        Specialty:  Gastroenterology  Provider:  Robinson Colón MD    08/13/22 1244    08/11/22 1648  Inpatient Nephrology Consult  Once        Specialty:  Nephrology  Provider:  Darling Maciel MD    08/11/22 1647    08/11/22 1647  Inpatient Cardiology Consult  Once        Specialty:  Cardiology  Provider:  Enrique Harris MD    08/11/22 1647    08/11/22 1646  Inpatient Pulmonology Consult  Once        Specialty:  Pulmonary Disease  Provider:  Mathieu Lara Jr., MD    08/11/22 1647    08/11/22 1048  Inpatient Orthopedic Surgery Consult  Once        Specialty:  Orthopedic Surgery  Provider:  Joshua Barraza Jr., MD    08/11/22 1048    08/11/22 1038  Inpatient Infectious Diseases Consult  Once        Specialty:  Infectious Diseases  Provider:  Meenakshi Jaramillo MD    08/11/22 1037    08/11/22 0636  [facility] (on-call MD unless specified) Details  Once        Specialty:  Hospitalist  Provider:  (Not yet assigned)    08/11/22 0635    08/11/22 0620  Pulmonology (on-call MD unless specified)  Once        Specialty:  Pulmonary Disease  Provider:  (Not yet assigned)    08/11/22 0619    08/11/22 0620  Ortho (on-call MD unless specified)  Once        Specialty:  Orthopedic Surgery  Provider:   (Not yet assigned)    08/11/22 0619              Procedures     * Surgery not found *  08/19/2022 0612 08/19/2022 0717 Magnesium [[id number]]   Blood    Final result Component Value Units   Magnesium 2.1 mg/dL          08/19/2022 0612 08/19/2022 0732 Renal Function Panel [[id number]]    (Abnormal)   Blood    Final result Component Value Units   Glucose 124 High  mg/dL   BUN 44 High  mg/dL   Creatinine 1.79 High  mg/dL   Sodium 140 mmol/L   Potassium 5.3 High  mmol/L   Chloride 96 Low  mmol/L   CO2 37.6 High  mmol/L   Calcium 10.0 mg/dL   Albumin 3.30 Low  g/dL   Phosphorus 4.2 mg/dL   Anion Gap 6.4 mmol/L   BUN/Creatinine Ratio 24.6    eGFR 38.3 Low   mL/min/1.73          08/19/2022 0516 08/19/2022 0644 COVID PRE-OP / PRE-PROCEDURE SCREENING ORDER (NO ISOLATION) - Swab, Nasopharynx [[id number]]    Swab from Nasopharynx    Final result Component Value   No component results             08/19/2022 0516 08/19/2022 0644 COVID-19, [facility] IN-HOUSE CEPHEID/YAIMA NP SWAB IN TRANSPORT MEDIA 8-12 HR TAT - Swab, Nasopharynx [[id number]]    Swab from Nasopharynx    Final result Component Value   COVID19 Not Detected            Imaging Results (All)     Procedure Component Value Units Date/Time    US Liver [[id number]] Collected: 08/12/22 2057     Updated: 08/12/22 2103    Narrative:      PROCEDURE: US LIVER-     HISTORY: Elevated LFTs, right upper quadrant pain.     TECHNIQUE: Grayscale and color Doppler ultrasound of the liver was  performed.     COMPARISON: None      FINDINGS:     MEASUREMENTS:   Liver:  14.6 cm   Common bile duct diameter:  1.5 cm  Right kidney: 11.1 cm     LIVER: The liver is normal in size and echogenicity. The echotexture is  smooth. There is central intrahepatic biliary ductal prominence. There  is no discrete intrahepatic lesion. There is hepatopetal flow in the  main portal vein.      GALLBLADDER: The gallbladder is present without cholelithiasis and  biliary sludge, wall thickening or pericholecystic fluid.  No sonographic  Alfonso's sign was elicited. There is no extrahepatic biliary ductal  dilatation.     PANCREAS: The pancreas is poorly seen and cannot be evaluated.     RIGHT KIDNEY: There is no hydronephrosis. No shadowing renal stone is  visualized. There are right renal cysts, measuring up to 6.6 cm.          Impression:         Cholelithiasis and biliary sludge with common bile duct and intrahepatic  biliary ductal dilatation. There is poor visualization of the distal  common bile duct. Findings raise concern for choledocholithiasis or  other distal obstructing process. Recommend further evaluation with ERCP  or contrast-enhanced MRCP.     This report was finalized on 8/12/2022 9:00 PM by Dr. Ping Bone M.D.       CT Lower Extremity Bilateral Without Contrast [[id number]] Collected: 08/11/22 0856     Updated: 08/11/22 1442    Narrative:      BILATERAL LOWER EXTREMITY CT WITHOUT CONTRAST     HISTORY: Right leg cellulitis. Evaluate for abscess or soft tissue gas.     TECHNIQUE: Axial CT of both lower legs from above the knees to the feet  along with multiplanar reformatted images is provided. No IV contrast  was used. No previous imaging for correlation.     Radiation dose reduction techniques were utilized, including automated  exposure control and exposure modulation based on body size.     FINDINGS: There are several blisters at the level of the dermis along  the proximal aspect of the right lower leg anteriorly. Diffuse  subcutaneous edema is observed in the leg, most pronounced around the  ankle and over the dorsum of the foot. There is no soft tissue gas or  focal fluid collection. Musculature is diffusely atrophic. No joint  effusion. There is arthritic change in the foot and at the knee.     On the left, there is subcutaneous edema along the distal lower leg,  ankle and over the dorsum of the foot which is asymmetrically less  pronounced than that observed on the right. The musculature is  diffusely  atrophic as at the opposite leg. There is no focal fluid collection or  joint effusion. Arthritic changes are observed in the foot and ankle.       Impression:      Nonspecific soft tissue edema in both lower legs  asymmetrically more prominent on the right. There are several blisters  at the level of the dermis of the right lower leg proximally. However,  no soft tissue gas or abscess collection is present.     This report was finalized on 8/11/2022 2:38 PM by Dr. Joshua Mendoza M.D.       XR Tibia Fibula 2 View Right [[id number]] Collected: 08/11/22 0559     Updated: 08/11/22 0604    Narrative:      2 VIEWS RIGHT TIBIA AND FIBULA     HISTORY: Right leg swelling     COMPARISON: None available.     FINDINGS:  No acute osseous abnormalities are seen. The patient has advanced  degenerative changes involving the right knee. There is diffuse edema of  the right calf, as well as a skin blister which is seen along the  anteromedial aspect of the proximal calf. No definite soft tissue gas is  seen. There is a suprapatellar effusion, poorly assessed.       Impression:         1. Diffuse edema of the right calf.  2. No aggressive osseous abnormalities are seen.     This report was finalized on 8/11/2022 6:01 AM by Dr. Karin Weathers M.D.       XR Chest 1 View [[id number]] Collected: 08/11/22 0558     Updated: 08/11/22 0602    Narrative:      SINGLE VIEW OF THE CHEST     HISTORY: Sepsis     COMPARISON: None available.     FINDINGS:  Cardiomegaly is present. There is tortuosity and ectasia of the thoracic  aorta. There is vascular congestion. There is nonspecific bibasilar  consolidation. Bilateral pleural effusions are suspected.       Impression:         1. Cardiomegaly with vascular congestion.  2. Nonspecific bibasilar consolidation.     This report was finalized on 8/11/2022 5:59 AM by Dr. Karin Weathers M.D.           Results for orders placed during the hospital encounter of 08/11/22    Duplex Venous  Lower Extremity - Bilateral CAR    Interpretation Summary  · Normal bilateral lower extremity venous duplex scan.    Results for orders placed during the hospital encounter of 08/11/22    Adult Transthoracic Echo Complete W/ Cont if Necessary Per Protocol    Interpretation Summary  · Left ventricular wall thickness is consistent with mild concentric hypertrophy.  · Calculated left ventricular EF = 58.3% Estimated left ventricular EF was in agreement with the calculated left ventricular EF. Left ventricular systolic function is normal.  · Left ventricular diastolic function was normal.    Pertinent Labs     Results from last 7 days   Lab Units 08/18/22  0633 08/16/22  0600 08/15/22  0623 08/14/22  0905   WBC 10*3/mm3 8.69 6.23 7.55 10.45   HEMOGLOBIN g/dL 11.8* 10.3* 10.1* 9.7*   PLATELETS 10*3/mm3 255 185 167 152     Results from last 7 days   Lab Units 08/19/22  0612 08/18/22  0633 08/17/22  1009 08/16/22  1451 08/16/22  0600   SODIUM mmol/L 140 140 138  --  138   POTASSIUM mmol/L 5.3* 5.3* 4.8 5.3* 5.7*   CHLORIDE mmol/L 96* 97* 97*  --  102   CO2 mmol/L 37.6* 34.0* 35.2*  --  28.0   BUN mg/dL 44* 42* 35*  --  32*   CREATININE mg/dL 1.79* 1.64* 1.28*  --  1.16   GLUCOSE mg/dL 124* 120* 123*  --  110*   EGFR mL/min/1.73 38.3* 42.5* 57.3*  --  64.5     Results from last 7 days   Lab Units 08/19/22  0612 08/18/22  0633 08/17/22  1009 08/16/22  0600 08/15/22  0623   ALBUMIN g/dL 3.30* 3.20* 3.30* 2.70* 2.50*   BILIRUBIN mg/dL  --  0.4 0.3 0.3 0.2   ALK PHOS U/L  --  86 90 98 97   AST (SGOT) U/L  --  11 13 14 18   ALT (SGPT) U/L  --  19 22 25 32     Results from last 7 days   Lab Units 08/19/22  0612 08/18/22  0633 08/17/22  1009 08/16/22  0600 08/15/22  0623 08/14/22  0905   CALCIUM mg/dL 10.0 9.6 9.6 9.5 9.0 9.0   ALBUMIN g/dL 3.30* 3.20* 3.30* 2.70* 2.50* 2.60*   MAGNESIUM mg/dL 2.1 2.3 1.9 2.1 2.0 2.1   PHOSPHORUS mg/dL 4.2  --  3.0  --  3.3 3.1     Results from last 7 days   Lab Units 08/18/22  0751 08/14/22  0905    LIPASE U/L  --  14   AMMONIA umol/L 21  --      Results from last 7 days   Lab Units 08/13/22  0720   TROPONIN T ng/mL 0.084*     Results from last 7 days   Lab Units 08/15/22  0623   URIC ACID mg/dL 6.8         Invalid input(s): LDLCALC  Results from last 7 days   Lab Units 08/12/22  1418 08/12/22  1346   BLOODCX  No growth at 5 days No growth at 5 days     Results from last 7 days   Lab Units 08/19/22  0516   COVID19  Not Detected       Test Results Pending at Discharge       Discharge Details        Discharge Medications      New Medications      Instructions Start Date   famotidine 40 MG tablet  Commonly known as: PEPCID   40 mg, Oral, 2 Times Daily Before Meals      furosemide 40 MG tablet  Commonly known as: LASIX   40 mg, Oral, Daily      hydrALAZINE 25 MG tablet  Commonly known as: APRESOLINE   25 mg, Oral, Every 8 Hours Scheduled      hydrocortisone 1 % cream   1 application, Topical, Every 12 Hours Scheduled      insulin lispro 100 UNIT/ML injection  Commonly known as: ADMELOG   0-14 Units, Subcutaneous, 3 Times Daily Before Meals      levoFLOXacin 750 MG tablet  Commonly known as: LEVAQUIN   750 mg, Oral, Every Other Day   Start Date: August 20, 2022     Petrolatum 42 % ointment   1 application, Topical, Every 12 Hours Scheduled         Changes to Medications      Instructions Start Date   carvedilol 12.5 MG tablet  Commonly known as: COREG  What changed:   · medication strength  · how much to take   12.5 mg, Oral, 2 Times Daily With Meals      gabapentin 100 MG capsule  Commonly known as: NEURONTIN  What changed:   · medication strength  · how much to take  · when to take this   100 mg, Oral, Every 12 Hours Scheduled      oxyCODONE-acetaminophen 7.5-325 MG per tablet  Commonly known as: PERCOCET  What changed:   · when to take this  · reasons to take this   1 tablet, Oral, 2 Times Daily PRN         Continue These Medications      Instructions Start Date   acetaminophen 325 MG tablet  Commonly known as:  TYLENOL   650 mg, Oral, Nightly      aspirin 81 MG chewable tablet   81 mg, Oral, Daily      calcium carbonate 600 MG tablet  Commonly known as: OS-SERJIO   600 mg, Oral, 2 Times Daily With Meals      DILAUDID-HP IJ   Injection, PAIN PUMP       ferrous gluconate 324 MG tablet  Commonly known as: FERGON   324 mg, Oral, Daily With Breakfast      multivitamin with minerals tablet tablet   Oral, Daily      nitroglycerin 0.4 MG SL tablet  Commonly known as: NITROSTAT   0.4 mg, Sublingual, Every 5 Minutes PRN, Take no more than 3 doses in 15 minutes.      polyethylene glycol 17 g packet  Commonly known as: MIRALAX   17 g, Oral, Daily PRN      rosuvastatin 10 MG tablet  Commonly known as: CRESTOR   5 mg, Oral, Nightly      senna 8.6 MG tablet  Commonly known as: SENOKOT   1 tablet, Oral, Daily      sertraline 100 MG tablet  Commonly known as: ZOLOFT   75 mg, Oral, Daily      tamsulosin 0.4 MG capsule 24 hr capsule  Commonly known as: FLOMAX   1 capsule, Oral, Nightly         Stop These Medications    insulin glargine 100 UNIT/ML injection  Commonly known as: LANTUS, SEMGLEE            Allergies   Allergen Reactions   • Bacitracin Unknown - High Severity   • Gentamycin [Gentamicin] Unknown - High Severity   • Neosporin [Neomycin-Bacitracin Zn-Polymyx] Other (See Comments)     ALLERGY TESTED   • Nsaids Unknown - High Severity   • Tobramycin Unknown - High Severity       Discharge Disposition:  Skilled Nursing Facility (DC - External)      Discharge Diet:  Diet Order   Procedures   • Diet Pureed; Thin; Cardiac, Low Potassium; 2 gm (50 mEq)       Discharge Activity:   Activity Instructions     Activity as Tolerated      Up WIth Assist            CODE STATUS:    Code Status and Medical Interventions:   Ordered at: 08/11/22 1043     Code Status (Patient has no pulse and is not breathing):    CPR (Attempt to Resuscitate)     Medical Interventions (Patient has pulse or is breathing):    Full Support     Release to patient:    Routine  Release       Future Appointments   Date Time Provider Department Center   8/19/2022  9:00 AM EMS MED 4  [facility]     Additional Instructions for the Follow-ups that You Need to Schedule     Discharge Follow-up with PCP   As directed       Currently Documented PCP:    Chencho Sterling MD    PCP Phone Number:    [phone]     Follow Up Details: Please call to schedule a one week or earliest available follow-up appointment with PCP; BMP, follow-up wound--RLE            Contact information for follow-up providers     Chencho Sterling MD .    Specialty: Internal Medicine  Why: Please call to schedule a one week or earliest available follow-up appointment with PCP; BMP, follow-up wound--RLE  Contact information:  [address]  [address]  [facility] [address]  [phone]             Chapincito Reddy MD. Call.    Specialties: Pulmonary Disease, Sleep Medicine  Why: Please call to schedule follow-up with pulmonologist as previously discussed  Contact information:  [address] [address]  [phone]                   Contact information for after-discharge care     Destination     [facility] .    Service: Nursing Home  Contact information:  [address] [facility] [id number] [phone]                             Additional Instructions for the Follow-ups that You Need to Schedule     Discharge Follow-up with PCP   As directed       Currently Documented PCP:    Chencho Sterling MD    PCP Phone Number:    [phone]     Follow Up Details: Please call to schedule a one week or earliest available follow-up appointment with PCP; BMP, follow-up wound--RLE           Time Spent on Discharge:  Greater than 30 minutes      CECILE Nieto  [facility]  08/19/22  13:53 EDT

## 2022-10-01 ENCOUNTER — APPOINTMENT (OUTPATIENT)
Dept: CT IMAGING | Facility: HOSPITAL | Age: 77
End: 2022-10-01

## 2022-10-01 ENCOUNTER — APPOINTMENT (OUTPATIENT)
Dept: GENERAL RADIOLOGY | Facility: HOSPITAL | Age: 77
End: 2022-10-01

## 2022-10-01 ENCOUNTER — HOSPITAL ENCOUNTER (INPATIENT)
Facility: HOSPITAL | Age: 77
LOS: 10 days | Discharge: NURSING, REHAB OR LONG-TERM CARE | End: 2022-10-11
Attending: EMERGENCY MEDICINE | Admitting: HOSPITALIST

## 2022-10-01 DIAGNOSIS — N18.9 ACUTE ON CHRONIC RENAL INSUFFICIENCY: ICD-10-CM

## 2022-10-01 DIAGNOSIS — N39.0 URINARY TRACT INFECTION, ACUTE: ICD-10-CM

## 2022-10-01 DIAGNOSIS — R41.82 ALTERED MENTAL STATUS, UNSPECIFIED ALTERED MENTAL STATUS TYPE: ICD-10-CM

## 2022-10-01 DIAGNOSIS — N28.9 ACUTE ON CHRONIC RENAL INSUFFICIENCY: ICD-10-CM

## 2022-10-01 DIAGNOSIS — L03.116 LEFT LEG CELLULITIS: Primary | ICD-10-CM

## 2022-10-01 LAB
ALBUMIN SERPL-MCNC: 4 G/DL (ref 3.5–5.2)
ALBUMIN/GLOB SERPL: 1.5 G/DL
ALP SERPL-CCNC: 56 U/L (ref 39–117)
ALT SERPL W P-5'-P-CCNC: 6 U/L (ref 1–41)
AMMONIA BLD-SCNC: 22 UMOL/L (ref 16–60)
ANION GAP SERPL CALCULATED.3IONS-SCNC: 11 MMOL/L (ref 5–15)
ARTERIAL PATENCY WRIST A: POSITIVE
AST SERPL-CCNC: 13 U/L (ref 1–40)
ATMOSPHERIC PRESS: 752.7 MMHG
BACTERIA UR QL AUTO: ABNORMAL /HPF
BASE EXCESS BLDA CALC-SCNC: 3.3 MMOL/L (ref 0–2)
BASOPHILS # BLD AUTO: 0.03 10*3/MM3 (ref 0–0.2)
BASOPHILS NFR BLD AUTO: 0.2 % (ref 0–1.5)
BDY SITE: ABNORMAL
BILIRUB SERPL-MCNC: 0.3 MG/DL (ref 0–1.2)
BILIRUB UR QL STRIP: NEGATIVE
BUN SERPL-MCNC: 37 MG/DL (ref 8–23)
BUN/CREAT SERPL: 15.5 (ref 7–25)
CALCIUM SPEC-SCNC: 9.7 MG/DL (ref 8.6–10.5)
CHLORIDE SERPL-SCNC: 100 MMOL/L (ref 98–107)
CLARITY UR: ABNORMAL
CO2 SERPL-SCNC: 32 MMOL/L (ref 22–29)
COLOR UR: YELLOW
CREAT SERPL-MCNC: 2.38 MG/DL (ref 0.76–1.27)
D-LACTATE SERPL-SCNC: 1.7 MMOL/L (ref 0.5–2)
DEPRECATED RDW RBC AUTO: 47.5 FL (ref 37–54)
EGFRCR SERPLBLD CKD-EPI 2021: 27.2 ML/MIN/1.73
EOSINOPHIL # BLD AUTO: 0.05 10*3/MM3 (ref 0–0.4)
EOSINOPHIL NFR BLD AUTO: 0.4 % (ref 0.3–6.2)
ERYTHROCYTE [DISTWIDTH] IN BLOOD BY AUTOMATED COUNT: 13.8 % (ref 12.3–15.4)
GAS FLOW AIRWAY: 2 LPM
GLOBULIN UR ELPH-MCNC: 2.7 GM/DL
GLUCOSE BLDC GLUCOMTR-MCNC: 111 MG/DL (ref 70–130)
GLUCOSE BLDC GLUCOMTR-MCNC: 163 MG/DL (ref 70–130)
GLUCOSE SERPL-MCNC: 115 MG/DL (ref 65–99)
GLUCOSE UR STRIP-MCNC: NEGATIVE MG/DL
HCO3 BLDA-SCNC: 27.9 MMOL/L (ref 22–28)
HCT VFR BLD AUTO: 33.3 % (ref 37.5–51)
HGB BLD-MCNC: 11.1 G/DL (ref 13–17.7)
HGB UR QL STRIP.AUTO: NEGATIVE
HOLD SPECIMEN: NORMAL
HOLD SPECIMEN: NORMAL
HYALINE CASTS UR QL AUTO: ABNORMAL /LPF
IMM GRANULOCYTES # BLD AUTO: 0.04 10*3/MM3 (ref 0–0.05)
IMM GRANULOCYTES NFR BLD AUTO: 0.3 % (ref 0–0.5)
KETONES UR QL STRIP: NEGATIVE
LEUKOCYTE ESTERASE UR QL STRIP.AUTO: ABNORMAL
LYMPHOCYTES # BLD AUTO: 0.88 10*3/MM3 (ref 0.7–3.1)
LYMPHOCYTES NFR BLD AUTO: 7.1 % (ref 19.6–45.3)
MAGNESIUM SERPL-MCNC: 2.1 MG/DL (ref 1.6–2.4)
MCH RBC QN AUTO: 30.6 PG (ref 26.6–33)
MCHC RBC AUTO-ENTMCNC: 33.3 G/DL (ref 31.5–35.7)
MCV RBC AUTO: 91.7 FL (ref 79–97)
MODALITY: ABNORMAL
MONOCYTES # BLD AUTO: 0.23 10*3/MM3 (ref 0.1–0.9)
MONOCYTES NFR BLD AUTO: 1.9 % (ref 5–12)
NEUTROPHILS NFR BLD AUTO: 11.17 10*3/MM3 (ref 1.7–7)
NEUTROPHILS NFR BLD AUTO: 90.1 % (ref 42.7–76)
NITRITE UR QL STRIP: POSITIVE
NRBC BLD AUTO-RTO: 0 /100 WBC (ref 0–0.2)
NT-PROBNP SERPL-MCNC: 3474 PG/ML (ref 0–1800)
PCO2 BLDA: 41.8 MM HG (ref 35–45)
PH BLDA: 7.43 PH UNITS (ref 7.35–7.45)
PH UR STRIP.AUTO: 5.5 [PH] (ref 5–8)
PLATELET # BLD AUTO: 349 10*3/MM3 (ref 140–450)
PMV BLD AUTO: 9 FL (ref 6–12)
PO2 BLDA: 82.2 MM HG (ref 80–100)
POTASSIUM SERPL-SCNC: 4.5 MMOL/L (ref 3.5–5.2)
PROCALCITONIN SERPL-MCNC: 0.3 NG/ML (ref 0–0.25)
PROT SERPL-MCNC: 6.7 G/DL (ref 6–8.5)
PROT UR QL STRIP: NEGATIVE
RBC # BLD AUTO: 3.63 10*6/MM3 (ref 4.14–5.8)
RBC # UR STRIP: ABNORMAL /HPF
REF LAB TEST METHOD: ABNORMAL
SAO2 % BLDCOA: 96.3 % (ref 92–99)
SODIUM SERPL-SCNC: 143 MMOL/L (ref 136–145)
SP GR UR STRIP: 1.01 (ref 1–1.03)
SQUAMOUS #/AREA URNS HPF: ABNORMAL /HPF
TOTAL RATE: 18 BREATHS/MINUTE
UROBILINOGEN UR QL STRIP: ABNORMAL
WBC # UR STRIP: ABNORMAL /HPF
WBC NRBC COR # BLD: 12.4 10*3/MM3 (ref 3.4–10.8)
WHOLE BLOOD HOLD COAG: NORMAL
WHOLE BLOOD HOLD SPECIMEN: NORMAL

## 2022-10-01 PROCEDURE — 83880 ASSAY OF NATRIURETIC PEPTIDE: CPT | Performed by: HOSPITALIST

## 2022-10-01 PROCEDURE — 80053 COMPREHEN METABOLIC PANEL: CPT | Performed by: EMERGENCY MEDICINE

## 2022-10-01 PROCEDURE — 99285 EMERGENCY DEPT VISIT HI MDM: CPT

## 2022-10-01 PROCEDURE — 85025 COMPLETE CBC W/AUTO DIFF WBC: CPT | Performed by: EMERGENCY MEDICINE

## 2022-10-01 PROCEDURE — 87077 CULTURE AEROBIC IDENTIFY: CPT | Performed by: EMERGENCY MEDICINE

## 2022-10-01 PROCEDURE — 81001 URINALYSIS AUTO W/SCOPE: CPT | Performed by: EMERGENCY MEDICINE

## 2022-10-01 PROCEDURE — 25010000002 VANCOMYCIN 10 G RECONSTITUTED SOLUTION: Performed by: EMERGENCY MEDICINE

## 2022-10-01 PROCEDURE — 82962 GLUCOSE BLOOD TEST: CPT

## 2022-10-01 PROCEDURE — 87186 SC STD MICRODIL/AGAR DIL: CPT | Performed by: EMERGENCY MEDICINE

## 2022-10-01 PROCEDURE — 87150 DNA/RNA AMPLIFIED PROBE: CPT | Performed by: EMERGENCY MEDICINE

## 2022-10-01 PROCEDURE — 87040 BLOOD CULTURE FOR BACTERIA: CPT | Performed by: EMERGENCY MEDICINE

## 2022-10-01 PROCEDURE — 36600 WITHDRAWAL OF ARTERIAL BLOOD: CPT

## 2022-10-01 PROCEDURE — 82803 BLOOD GASES ANY COMBINATION: CPT

## 2022-10-01 PROCEDURE — 63710000001 INSULIN LISPRO (HUMAN) PER 5 UNITS: Performed by: HOSPITALIST

## 2022-10-01 PROCEDURE — 70450 CT HEAD/BRAIN W/O DYE: CPT

## 2022-10-01 PROCEDURE — 87086 URINE CULTURE/COLONY COUNT: CPT | Performed by: EMERGENCY MEDICINE

## 2022-10-01 PROCEDURE — 83735 ASSAY OF MAGNESIUM: CPT | Performed by: EMERGENCY MEDICINE

## 2022-10-01 PROCEDURE — 83605 ASSAY OF LACTIC ACID: CPT | Performed by: EMERGENCY MEDICINE

## 2022-10-01 PROCEDURE — 25010000002 PIPERACILLIN SOD-TAZOBACTAM PER 1 G: Performed by: EMERGENCY MEDICINE

## 2022-10-01 PROCEDURE — 84145 PROCALCITONIN (PCT): CPT | Performed by: EMERGENCY MEDICINE

## 2022-10-01 PROCEDURE — 25010000002 PIPERACILLIN SOD-TAZOBACTAM PER 1 G: Performed by: HOSPITALIST

## 2022-10-01 PROCEDURE — 71045 X-RAY EXAM CHEST 1 VIEW: CPT

## 2022-10-01 PROCEDURE — 51798 US URINE CAPACITY MEASURE: CPT

## 2022-10-01 PROCEDURE — 82140 ASSAY OF AMMONIA: CPT | Performed by: EMERGENCY MEDICINE

## 2022-10-01 RX ORDER — ATORVASTATIN CALCIUM 20 MG/1
20 TABLET, FILM COATED ORAL DAILY
COMMUNITY
End: 2022-10-11 | Stop reason: HOSPADM

## 2022-10-01 RX ORDER — POLYETHYLENE GLYCOL 3350 17 G/17G
17 POWDER, FOR SOLUTION ORAL DAILY
Status: DISCONTINUED | OUTPATIENT
Start: 2022-10-01 | End: 2022-10-11 | Stop reason: HOSPADM

## 2022-10-01 RX ORDER — TAMSULOSIN HYDROCHLORIDE 0.4 MG/1
0.4 CAPSULE ORAL NIGHTLY
Status: DISCONTINUED | OUTPATIENT
Start: 2022-10-01 | End: 2022-10-11 | Stop reason: HOSPADM

## 2022-10-01 RX ORDER — BISACODYL 10 MG
10 SUPPOSITORY, RECTAL RECTAL DAILY PRN
COMMUNITY
End: 2022-10-11 | Stop reason: HOSPADM

## 2022-10-01 RX ORDER — FAMOTIDINE 40 MG/1
40 TABLET, FILM COATED ORAL 2 TIMES DAILY
COMMUNITY

## 2022-10-01 RX ORDER — SENNA PLUS 8.6 MG/1
1 TABLET ORAL DAILY
Status: DISCONTINUED | OUTPATIENT
Start: 2022-10-01 | End: 2022-10-11 | Stop reason: HOSPADM

## 2022-10-01 RX ORDER — ISOSORBIDE DINITRATE 40 MG/1
0.5 TABLET ORAL 3 TIMES DAILY
COMMUNITY
End: 2022-10-11 | Stop reason: HOSPADM

## 2022-10-01 RX ORDER — ASPIRIN 81 MG/1
81 TABLET, CHEWABLE ORAL DAILY
Status: DISCONTINUED | OUTPATIENT
Start: 2022-10-01 | End: 2022-10-11 | Stop reason: HOSPADM

## 2022-10-01 RX ORDER — ACETAMINOPHEN 325 MG/1
650 TABLET ORAL EVERY 6 HOURS PRN
Status: DISCONTINUED | OUTPATIENT
Start: 2022-10-01 | End: 2022-10-11

## 2022-10-01 RX ORDER — CARVEDILOL 6.25 MG/1
6.25 TABLET ORAL 2 TIMES DAILY WITH MEALS
Status: DISCONTINUED | OUTPATIENT
Start: 2022-10-01 | End: 2022-10-02

## 2022-10-01 RX ORDER — OXYCODONE AND ACETAMINOPHEN 7.5; 325 MG/1; MG/1
1 TABLET ORAL 2 TIMES DAILY
COMMUNITY
Start: 2022-07-22 | End: 2022-10-11 | Stop reason: HOSPADM

## 2022-10-01 RX ORDER — SODIUM CHLORIDE 450 MG/100ML
75 INJECTION, SOLUTION INTRAVENOUS CONTINUOUS
Status: DISCONTINUED | OUTPATIENT
Start: 2022-10-01 | End: 2022-10-03

## 2022-10-01 RX ORDER — FAMOTIDINE 20 MG/1
20 TABLET, FILM COATED ORAL 2 TIMES DAILY
Status: DISCONTINUED | OUTPATIENT
Start: 2022-10-01 | End: 2022-10-11 | Stop reason: HOSPADM

## 2022-10-01 RX ORDER — CARVEDILOL 12.5 MG/1
12.5 TABLET ORAL 2 TIMES DAILY WITH MEALS
Status: DISCONTINUED | OUTPATIENT
Start: 2022-10-01 | End: 2022-10-01

## 2022-10-01 RX ORDER — INSULIN LISPRO 100 [IU]/ML
0-7 INJECTION, SOLUTION INTRAVENOUS; SUBCUTANEOUS
Status: DISCONTINUED | OUTPATIENT
Start: 2022-10-01 | End: 2022-10-11 | Stop reason: HOSPADM

## 2022-10-01 RX ORDER — SODIUM CHLORIDE 0.9 % (FLUSH) 0.9 %
10 SYRINGE (ML) INJECTION AS NEEDED
Status: DISCONTINUED | OUTPATIENT
Start: 2022-10-01 | End: 2022-10-11

## 2022-10-01 RX ORDER — DIAPER,BRIEF,INFANT-TODD,DISP
1 EACH MISCELLANEOUS EVERY 12 HOURS
COMMUNITY
End: 2022-10-11 | Stop reason: HOSPADM

## 2022-10-01 RX ORDER — INSULIN LISPRO 100 [IU]/ML
0-7 INJECTION, SOLUTION INTRAVENOUS; SUBCUTANEOUS
Status: DISCONTINUED | OUTPATIENT
Start: 2022-10-01 | End: 2022-10-01

## 2022-10-01 RX ORDER — ROSUVASTATIN CALCIUM 5 MG/1
5 TABLET, COATED ORAL NIGHTLY
Status: DISCONTINUED | OUTPATIENT
Start: 2022-10-01 | End: 2022-10-11 | Stop reason: HOSPADM

## 2022-10-01 RX ADMIN — FAMOTIDINE 20 MG: 20 TABLET ORAL at 22:44

## 2022-10-01 RX ADMIN — SODIUM CHLORIDE, POTASSIUM CHLORIDE, SODIUM LACTATE AND CALCIUM CHLORIDE 500 ML: 600; 310; 30; 20 INJECTION, SOLUTION INTRAVENOUS at 14:29

## 2022-10-01 RX ADMIN — VANCOMYCIN HYDROCHLORIDE 2250 MG: 10 INJECTION, POWDER, LYOPHILIZED, FOR SOLUTION INTRAVENOUS at 13:18

## 2022-10-01 RX ADMIN — SODIUM CHLORIDE 75 ML/HR: 4.5 INJECTION, SOLUTION INTRAVENOUS at 22:52

## 2022-10-01 RX ADMIN — SENNOSIDES 1 TABLET: 8.6 TABLET, FILM COATED ORAL at 22:42

## 2022-10-01 RX ADMIN — ROSUVASTATIN CALCIUM 5 MG: 5 TABLET, FILM COATED ORAL at 22:42

## 2022-10-01 RX ADMIN — TAZOBACTAM SODIUM AND PIPERACILLIN SODIUM 3.38 G: 375; 3 INJECTION, SOLUTION INTRAVENOUS at 12:22

## 2022-10-01 RX ADMIN — CARVEDILOL 6.25 MG: 6.25 TABLET, FILM COATED ORAL at 22:42

## 2022-10-01 RX ADMIN — ASPIRIN 81 MG: 81 TABLET, CHEWABLE ORAL at 22:42

## 2022-10-01 RX ADMIN — INSULIN LISPRO 2 UNITS: 100 INJECTION, SOLUTION INTRAVENOUS; SUBCUTANEOUS at 22:51

## 2022-10-01 RX ADMIN — SODIUM CHLORIDE, POTASSIUM CHLORIDE, SODIUM LACTATE AND CALCIUM CHLORIDE 1000 ML: 600; 310; 30; 20 INJECTION, SOLUTION INTRAVENOUS at 14:29

## 2022-10-01 RX ADMIN — SERTRALINE 75 MG: 25 TABLET, FILM COATED ORAL at 22:42

## 2022-10-01 RX ADMIN — TAMSULOSIN HYDROCHLORIDE 0.4 MG: 0.4 CAPSULE ORAL at 22:42

## 2022-10-01 RX ADMIN — Medication 10 ML: at 22:42

## 2022-10-01 RX ADMIN — TAZOBACTAM SODIUM AND PIPERACILLIN SODIUM 3.38 G: 375; 3 INJECTION, SOLUTION INTRAVENOUS at 22:41

## 2022-10-01 NOTE — ED PROVIDER NOTES
" EMERGENCY DEPARTMENT ENCOUNTER    CHIEF COMPLAINT  Chief Complaint: Altered mental status  History given by: EMS and staff at patient's facility  History limited by: Patient disoriented  Room Number: N444/1  PMD: Melissa Steven MD      HPI:  Pt is a 77 y.o. male brought by EMS from [facility] where at baseline he is oriented and conversant and able to take a few steps independently but largely wheelchair-bound with a report of less oriented than usual, generalized weakness, nausea, tremors and \"left leg hot\" this morning.  Staff reports the patient has not been normal since he woke this morning and are unable to report the last time patient was at his baseline status.  Staff report that the patient is on 2 L of oxygen all the time, is supposed to use CPAP but refuses to do so frequently.  Staff report patient is not on any antibiotics recently and did take his morning meds.    Duration: Unknown, altered today  Associated Symptoms: Nausea, tremors, \"left leg hot\", generalized weakness  Aggravating Factors: Unknown  Alleviating Factors: Nothing  Treatment before arrival: EMS transport, a.m. meds    On review the patient's chart is noted:  Patient had 2D echo 8/11/2022 with an EF of 58%    PAST MEDICAL HISTORY  Active Ambulatory Problems     Diagnosis Date Noted   • HALIMA (obstructive sleep apnea) 12/02/2017   • CSA (central sleep apnea) 12/02/2017   • REM behavioral disorder 12/02/2017   • Hypersomnia due to medical condition 07/01/2018   • Periodic breathing 10/28/2019   • Cellulitis of right lower extremity 08/11/2022   • HTN (hypertension) 08/11/2022   • Type 2 diabetes mellitus with stage 3b chronic kidney disease (HCC) 08/11/2022   • Elevated troponin 08/11/2022   • RBBB 08/11/2022   • Pulmonary vascular congestion 08/11/2022   • Diastolic CHF, acute ([facility]) 08/11/2022   • Elevated LFTs 08/12/2022   • Acute respiratory failure with hypoxia ([facility]) 08/12/2022   • Acute on chronic respiratory failure with " hypercapnia ([facility]) 08/12/2022   • Anemia of chronic disease 08/14/2022   • Sepsis due to Gram negative bacteria ([facility]) 08/14/2022     Resolved Ambulatory Problems     Diagnosis Date Noted   • Mechanical complication of nervous system device, implant, and graft 04/18/2016     Past Medical History:   Diagnosis Date   • Anxiety    • Back pain    • Depression    • Diabetes mellitus ([facility])    • High cholesterol    • Hypertension    • Kidney disease, chronic, stage III (GFR 30-59 ml/min) ([facility])    • Reflux esophagitis    • Sleep apnea        PAST SURGICAL HISTORY  Past Surgical History:   Procedure Laterality Date   • APPENDECTOMY     • CARPAL TUNNEL RELEASE     • CATARACT EXTRACTION     • HAMMER TOE REPAIR     • LUMBAR SPINE SURGERY     • PAIN PUMP INSERTION/REVISION Right 4/18/2016    Procedure: RT PAIN PUMP REPLACEMENT;  Surgeon: Chris Messer MD;  Location: [facility];  Service:    • TOTAL KNEE ARTHROPLASTY Left        FAMILY HISTORY  No family history on file.    SOCIAL HISTORY  Social History     Socioeconomic History   • Marital status:    Tobacco Use   • Smoking status: Never Smoker   • Smokeless tobacco: Never Used   Substance and Sexual Activity   • Alcohol use: No   • Drug use: No       ALLERGIES  Bacitracin, Gentamycin [gentamicin], Neosporin [neomycin-bacitracin zn-polymyx], Nsaids, and Tobramycin    REVIEW OF SYSTEMS  Review of Systems   Constitutional: Positive for activity change. Negative for chills and fever.   HENT: Negative for sore throat and trouble swallowing.    Eyes: Negative for visual disturbance.   Respiratory: Negative for cough and shortness of breath.    Cardiovascular: Negative for chest pain and leg swelling.   Gastrointestinal: Positive for nausea. Negative for abdominal pain, diarrhea and vomiting.   Endocrine: Negative.    Genitourinary: Negative for decreased urine volume and frequency.        EMS reports patient had urinary incontinence when moving from bed, to EMS  stretcher   Musculoskeletal: Negative for neck pain.   Skin: Negative for rash.   Allergic/Immunologic: Negative.    Neurological: Positive for tremors and weakness ( Generalized). Negative for numbness.   Hematological: Negative.    Psychiatric/Behavioral: Positive for confusion and decreased concentration.   All other systems reviewed and are negative.      PHYSICAL EXAM  ED Triage Vitals [10/01/22 1141]   Temp Heart Rate Resp BP SpO2   97.8 °F (36.6 °C) 73 16 118/74 98 %      Temp src Heart Rate Source Patient Position BP Location FiO2 (%)   -- -- -- -- --       Physical Exam  Vitals and nursing note reviewed.   Constitutional:       General: He is in acute distress.      Appearance: He is ill-appearing.      Comments: Listless, but rouses to verbal for brief periods of time   HENT:      Head: Normocephalic and atraumatic.   Eyes:      Pupils: Pupils are equal, round, and reactive to light.   Cardiovascular:      Rate and Rhythm: Normal rate and regular rhythm.      Pulses:           Posterior tibial pulses are 2+ on the right side and 2+ on the left side.      Heart sounds: Normal heart sounds. No murmur heard.  Pulmonary:      Effort: Pulmonary effort is normal. No respiratory distress.      Breath sounds: Normal breath sounds. No wheezing.   Abdominal:      General: Bowel sounds are normal.      Palpations: Abdomen is soft.      Tenderness: There is no abdominal tenderness. There is no guarding or rebound.   Musculoskeletal:         General: Swelling and tenderness ( Left leg) present. Normal range of motion.      Cervical back: Normal range of motion.   Skin:     General: Skin is warm and dry.      Findings: Erythema ( Left lower leg and posterior thigh) present.   Neurological:      Mental Status: He is confused.      GCS: GCS eye subscore is 1. GCS verbal subscore is 4. GCS motor subscore is 6.      Cranial Nerves: No cranial nerve deficit.      Sensory: No sensory deficit.      Comments: Patient with  frequent irregular, nonrhythmic, myoclonic jerks throughout limbs and torso   Psychiatric:         Mood and Affect: Affect normal.       Patient did not receive the recommended 30ml/kg fluid bolus for sepsis because it would be harmful or detrimental to the patient. The patient has No hypotension, lactic acid within normal limits.   The patient was ordered 1 L of fluids.    LAB RESULTS  Lab Results (last 24 hours)     Procedure Component Value Units Date/Time    POC Glucose Once [[id number]]  (Normal) Collected: 10/01/22 1157    Specimen: Blood Updated: 10/01/22 1159     Glucose 111 mg/dL      Comment: Meter: [id number] : [id number] Yoel Parvez PRUITT  [employer]       CBC & Differential [[id number]]  (Abnormal) Collected: 10/01/22 1201    Specimen: Blood Updated: 10/01/22 1217    Narrative:      The following orders were created for panel order CBC & Differential.  Procedure                               Abnormality         Status                     ---------                               -----------         ------                     CBC Auto Differential[[id number]]        Abnormal            Final result                 Please view results for these tests on the individual orders.    Comprehensive Metabolic Panel [[id number]]  (Abnormal) Collected: 10/01/22 1201    Specimen: Blood Updated: 10/01/22 1236     Glucose 115 mg/dL      BUN 37 mg/dL      Creatinine 2.38 mg/dL      Sodium 143 mmol/L      Potassium 4.5 mmol/L      Chloride 100 mmol/L      CO2 32.0 mmol/L      Calcium 9.7 mg/dL      Total Protein 6.7 g/dL      Albumin 4.00 g/dL      ALT (SGPT) 6 U/L      AST (SGOT) 13 U/L      Alkaline Phosphatase 56 U/L      Total Bilirubin 0.3 mg/dL      Globulin 2.7 gm/dL      A/G Ratio 1.5 g/dL      BUN/Creatinine Ratio 15.5     Anion Gap 11.0 mmol/L      eGFR 27.2 mL/min/1.73      Comment: National Kidney Foundation and American Society of Nephrology (ASN) Task Force recommended calculation based on the Chronic Kidney  Disease Epidemiology Collaboration (CKD-EPI) equation refit without adjustment for race.       Narrative:      GFR Normal >60  Chronic Kidney Disease <60  Kidney Failure <15      CBC Auto Differential [[id number]]  (Abnormal) Collected: 10/01/22 1201    Specimen: Blood Updated: 10/01/22 1217     WBC 12.40 10*3/mm3      RBC 3.63 10*6/mm3      Hemoglobin 11.1 g/dL      Hematocrit 33.3 %      MCV 91.7 fL      MCH 30.6 pg      MCHC 33.3 g/dL      RDW 13.8 %      RDW-SD 47.5 fl      MPV 9.0 fL      Platelets 349 10*3/mm3      Neutrophil % 90.1 %      Lymphocyte % 7.1 %      Monocyte % 1.9 %      Eosinophil % 0.4 %      Basophil % 0.2 %      Immature Grans % 0.3 %      Neutrophils, Absolute 11.17 10*3/mm3      Lymphocytes, Absolute 0.88 10*3/mm3      Monocytes, Absolute 0.23 10*3/mm3      Eosinophils, Absolute 0.05 10*3/mm3      Basophils, Absolute 0.03 10*3/mm3      Immature Grans, Absolute 0.04 10*3/mm3      nRBC 0.0 /100 WBC     Magnesium [[id number]]  (Normal) Collected: 10/01/22 1201    Specimen: Blood Updated: 10/01/22 1236     Magnesium 2.1 mg/dL     Ammonia [[id number]]  (Normal) Collected: 10/01/22 1201    Specimen: Blood Updated: 10/01/22 1243     Ammonia 22 umol/L     Blood Culture - Blood, Arm, Right [[id number]] Collected: 10/01/22 1201    Specimen: Blood from Arm, Right Updated: 10/01/22 1211    Lactic Acid, Plasma [[id number]]  (Normal) Collected: 10/01/22 1201    Specimen: Blood Updated: 10/01/22 1234     Lactate 1.7 mmol/L     Procalcitonin [[id number]]  (Abnormal) Collected: 10/01/22 1201    Specimen: Blood Updated: 10/01/22 1243     Procalcitonin 0.30 ng/mL     Narrative:      As a Marker for Sepsis (Non-Neonates):    1. <0.5 ng/mL represents a low risk of severe sepsis and/or septic shock.  2. >2 ng/mL represents a high risk of severe sepsis and/or septic shock.    As a Marker for Lower Respiratory Tract Infections that require antibiotic therapy:    PCT on Admission    Antibiotic Therapy       6-12 Hrs  "later    >0.5                Strongly Recommended  >0.25 - <0.5        Recommended   0.1 - 0.25          Discouraged              Remeasure/reassess PCT  <0.1                Strongly Discouraged     Remeasure/reassess PCT    As 28 day mortality risk marker: \"Change in Procalcitonin Result\" (>80% or <=80%) if Day 0 (or Day 1) and Day 4 values are available. Refer to http://[email or web address]pct[facility]calculator.com    Change in PCT <=80%  A decrease of PCT levels below or equal to 80% defines a positive change in PCT test result representing a higher risk for 28-day all-cause mortality of patients diagnosed with severe sepsis for septic shock.    Change in PCT >80%  A decrease of PCT levels of more than 80% defines a negative change in PCT result representing a lower risk for 28-day all-cause mortality of patients diagnosed with severe sepsis or septic shock.       BNP [[id number]]  (Abnormal) Collected: 10/01/22 1201    Specimen: Blood Updated: 10/01/22 1506     proBNP 3,474.0 pg/mL     Narrative:      Among patients with dyspnea, NT-proBNP is highly sensitive for the detection of acute congestive heart failure. In addition NT-proBNP of <300 pg/ml effectively rules out acute congestive heart failure with 99% negative predictive value.    Results may be falsely decreased if patient taking Biotin.      Blood Gas, Arterial - [[id number]]  (Abnormal) Collected: 10/01/22 1214    Specimen: Arterial Blood Updated: 10/01/22 1216     Site Arterial: left radial     Jhon's Test Positive     pH, Arterial 7.433 pH units      pCO2, Arterial 41.8 mm Hg      pO2, Arterial 82.2 mm Hg      HCO3, Arterial 27.9 mmol/L      Base Excess, Arterial 3.3 mmol/L      O2 Saturation Calculated 96.3 %      Barometric Pressure for Blood Gas 752.7 mmHg      Comment: SPO2 99 Meter: [id number] : [id number] Bell        Modality Cannula     Flow Rate 2 lpm      Rate 18 Breaths/minute     Blood Culture - Blood, Arm, Left [[id number]] Collected: " 10/01/22 1216    Specimen: Blood from Arm, Left Updated: 10/01/22 1247    Urinalysis With Microscopic If Indicated (No Culture) - Urine, Clean Catch [[id number]]  (Abnormal) Collected: 10/01/22 1317    Specimen: Urine, Clean Catch Updated: 10/01/22 1346     Color, UA Yellow     Appearance, UA Cloudy     pH, UA 5.5     Specific Gravity, UA 1.010     Glucose, UA Negative     Ketones, UA Negative     Bilirubin, UA Negative     Blood, UA Negative     Protein, UA Negative     Leuk Esterase, UA Large (3+)     Nitrite, UA Positive     Urobilinogen, UA 0.2 E.U./dL    Urinalysis, Microscopic Only - Urine, Clean Catch [[id number]]  (Abnormal) Collected: 10/01/22 1317    Specimen: Urine, Clean Catch Updated: 10/01/22 1346     RBC, UA 0-2 /HPF      WBC, UA Too Numerous to Count /HPF      Bacteria, UA 4+ /HPF      Squamous Epithelial Cells, UA 0-2 /HPF      Hyaline Casts, UA None Seen /LPF      Methodology Automated Microscopy    POC Glucose Once [[id number]]  (Abnormal) Collected: 10/01/22 2043    Specimen: Blood Updated: 10/01/22 2044     Glucose 163 mg/dL      Comment: Meter: [id number] : [id number] Max SEYMOUR             I ordered the above labs and reviewed the results    RADIOLOGY  CT Head Without Contrast   Final Result   No acute intracranial hemorrhage. At least mild small vessel ischemic   disease, progressed from 2012. If there is clinical concern for acute   infarction, this would be better assessed with MRI.       Discussed with Dr. Marvin at 12:58 PM.       This report was finalized on 10/1/2022 12:59 PM by Dr. Ping Bone M.D.          XR Chest 1 View   Final Result   Stable bibasilar atelectasis or scarring       This report was finalized on 10/1/2022 12:34 PM by Dr. Jarrett Pacheco M.D.               I ordered the above noted radiological studies. Interpreted by radiologist. Viewed by me in PACS.       PROCEDURES  Procedures      PROGRESS AND CONSULTS  ED Course as of 10/01/22 2150   Sat Oct 01, 2022    1306 Discussed with radiology, Dr. Ping Bone who reports patient CT head negative for acute bleed, some advance small vessel disease new compared with 2012 imaging [TO]   1401 Discussed with Dr. Jean العراقي, on-call for [facility] who is aware of patient's presentation, imaging, labs, concern for lower extremity cellulitis and agrees to admit for further testing, treatment as needed. [TO]      ED Course User Index  [TO] Rachna Marvin MD           MEDICAL DECISION MAKING  Results were reviewed/discussed with the patient and they were also made aware of online access. Pt also made aware that some labs, such as cultures, will not be resulted during ER visit and followup with PMD is necessary.       MDM       DIAGNOSIS  Final diagnoses:   Left leg cellulitis   Urinary tract infection, acute   Acute on chronic renal insufficiency   Altered mental status, unspecified altered mental status type       DISPOSITION  ADMISSION    Discussed treatment plan and reason for admission with pt/family and admitting physician.  Pt/family voiced understanding of the plan for admission for further testing/treatment as needed.         Latest Documented Vital Signs:  As of 21:50 EDT  BP- 167/75 HR- 66 Temp- 98.2 °F (36.8 °C) (Oral) O2 sat- 97%    --  Patient was wearing facemask when I entered the room and throughout our encounter. Full protective equipment was worn throughout this patient encounter including a face mask, eye protection and gloves. Hand hygiene was performed before donning protective equipment and after removal when leaving the room.      Rachna Marvin MD  10/01/22 0430

## 2022-10-01 NOTE — CONSULTS
CONSULT NOTE    Infectious Diseases - Miguel Lobo MD  [facility]       Patient Identification:  Name: Yakov Dubon  Age: 77 y.o.  Sex: male  :  1945  MRN: [id number]             Date of Consultation: 10/1/2022      Primary Care Physician: Melissa Steven MD                               Requesting Physician: Dr. العراقي  Reason for Consultation: Sepsis    Impression: Patient is a 77-year-old male who has complicated past medical history remarkable for diabetes, immobility, hypertension, chronic diastolic congestive heart failure chronic kidney disease who is a resident of nursing home presented to the emergency room earlier today with altered mental status starting earlier this morning and was known to be himself the night prior.  Evaluation in the emergency room revealed bilateral lower extremity erythema with left leg being slightly more red and hot with redness extending onto the medial thigh.  Patient was noted to have leukocytosis as well as abnormal urinalysis consistent with urinary tract infection.  Patient received a dose of vancomycin and was started on Zosyn and is being admitted for further care.  Patient currently complains of pain and discomfort in both legs.  He is able to interact but unable to get in the details of his presentation.  This presentation and above context is consistent with:  1-toxic metabolic encephalopathy secondary to  2-urinary tract infection with superimposed cellulitis of left lower extremity with lymphangitis in the setting of  3-chronic venous stasis changes and edema of the lower extremity along with immobility  4-other diagnosis per primary team.    Recommendations/Discussions:  At this juncture I agree with the care plan consisting of supportive care and broad-spectrum antibiotics while awaiting urine culture and blood culture results.  Monitor his renal function and modify antibiotic treatment to cefepime and vancomycin to address gram-negative  "urinary tract infection with gram-positive cellulitis until the culture data is available while avoiding nephrotoxicity from this combination.  De-escalate antibiotics once the culture data is available and his clinical status is improved.  Thank you Dr. Ardon for letting me be the part of your patient care please see above impression and recommendations      History of Present Illness:    Patient is a 77-year-old male who has complicated past medical history remarkable for diabetes, immobility, hypertension, chronic diastolic congestive heart failure chronic kidney disease who is a resident of nursing home presented to the emergency room earlier today with altered mental status starting earlier this morning and was known to be himself the night prior.  Evaluation in the emergency room revealed bilateral lower extremity erythema with left leg being slightly more red and hot with redness extending onto the medial thigh.  Patient was noted to have leukocytosis as well as abnormal urinalysis consistent with urinary tract infection.  Patient received a dose of vancomycin and was started on Zosyn and is being admitted for further care.  Patient currently complains of pain and discomfort in both legs.  He is able to interact but unable to get in the details of his presentation.      Past Medical History:  Past Medical History:   Diagnosis Date   • Anxiety    • Back pain    • Depression    • Diabetes mellitus (HCC)     \"BORDERLINE\"   • High cholesterol    • Hypertension    • Kidney disease, chronic, stage III (GFR 30-59 ml/min) ([facility]ly [facility])    • Reflux esophagitis    • Sleep apnea      Past Surgical History:  Past Surgical History:   Procedure Laterality Date   • APPENDECTOMY     • CARPAL TUNNEL RELEASE     • CATARACT EXTRACTION     • HAMMER TOE REPAIR     • LUMBAR SPINE SURGERY     • PAIN PUMP INSERTION/REVISION Right 4/18/2016    Procedure: RT PAIN PUMP REPLACEMENT;  Surgeon: Chris Messer MD;  Location: [facility] OR;  " Service:    • TOTAL KNEE ARTHROPLASTY Left       Home Meds:  (Not in a hospital admission)    Current Meds:     Current Facility-Administered Medications:   •  acetaminophen (TYLENOL) tablet 650 mg, 650 mg, Oral, Q6H PRN, Jean العراقي MD  •  aspirin chewable tablet 81 mg, 81 mg, Oral, Daily, Jean العراقي MD  •  carvedilol (COREG) tablet 6.25 mg, 6.25 mg, Oral, BID With Meals, Jean العراقي MD  •  famotidine (PEPCID) tablet 20 mg, 20 mg, Oral, BID, Jean العراقي MD  •  insulin lispro (ADMELOG) injection 0-7 Units, 0-7 Units, Subcutaneous, 4x Daily With Meals & Nightly, Jean العراقي MD  •  Pharmacy to dose vancomycin, , Does not apply, Continuous PRN, Jean العراقي MD  •  Pharmacy to Dose Zosyn, , Does not apply, Continuous PRN, Jean العراقي MD  •  piperacillin-tazobactam (ZOSYN) 3.375 g in iso-osmotic dextrose 50 ml (premix), 3.375 g, Intravenous, Q8H, Jean العراقي MD  •  polyethylene glycol (MIRALAX) packet 17 g, 17 g, Oral, Daily, Jean العراقي MD  •  rosuvastatin (CRESTOR) tablet 5 mg, 5 mg, Oral, Nightly, Jean العراقي MD  •  senna (SENOKOT) tablet 1 tablet, 1 tablet, Oral, Daily, Jean العراقي MD  •  sertraline (ZOLOFT) tablet 75 mg, 75 mg, Oral, Nightly, Jean العراقي MD  •  sodium chloride 0.45 % infusion, 75 mL/hr, Intravenous, Continuous, Jean العراقي MD  •  sodium chloride 0.9 % flush 10 mL, 10 mL, Intravenous, PRN, Rachna Marvin MD  •  tamsulosin (FLOMAX) 24 hr capsule 0.4 mg, 0.4 mg, Oral, Nightly, Jean العراقي MD    Current Outpatient Medications:   •  acetaminophen (TYLENOL) 325 MG tablet, Take 650 mg by mouth Every Night., Disp: , Rfl:   •  aspirin 81 MG chewable tablet, Chew 81 mg Daily., Disp: , Rfl:   •  atorvastatin (LIPITOR) 20 MG tablet, Take 20 mg by mouth Daily., Disp: , Rfl:   •  bisacodyl (DULCOLAX) 10 MG suppository, Insert 10 mg into the rectum Daily As Needed for Constipation., Disp: , Rfl:   •  calcium carbonate (OS-SERJIO) 600 MG tablet, Take 600 mg by mouth 2 (Two) Times a Day With  Meals., Disp: , Rfl:   •  carvedilol (COREG) 12.5 MG tablet, Take 1 tablet by mouth 2 (Two) Times a Day With Meals., Disp: , Rfl:   •  Diclofenac Sodium (VOLTAREN) 1 % gel gel, Apply 4 g topically to the appropriate area as directed 3 (Three) Times a Day., Disp: , Rfl:   •  famotidine (PEPCID) 40 MG tablet, Take 40 mg by mouth 2 (Two) Times a Day., Disp: , Rfl:   •  ferrous gluconate (FERGON) 324 MG tablet, Take 324 mg by mouth Daily With Breakfast., Disp: , Rfl:   •  furosemide (LASIX) 40 MG tablet, Take 1 tablet by mouth Daily., Disp: 30 tablet, Rfl: 0  •  hydrocortisone 1 % cream, Apply 1 application topically to the appropriate area as directed Every 12 (Twelve) Hours., Disp: , Rfl:   •  insulin lispro (ADMELOG) 100 UNIT/ML injection, Inject 0-14 Units under the skin into the appropriate area as directed 3 (Three) Times a Day Before Meals., Disp: , Rfl: 12  •  isosorbide dinitrate (ISORDIL) 40 MG tablet, Take 0.5 tablets by mouth 3 (Three) Times a Day., Disp: , Rfl:   •  Multiple Vitamins-Minerals (OCUVITE PO), Take 1 tablet by mouth Daily., Disp: , Rfl:   •  nitroglycerin (NITROSTAT) 0.4 MG SL tablet, Place 0.4 mg under the tongue Every 5 (Five) Minutes As Needed for Chest Pain. Take no more than 3 doses in 15 minutes., Disp: , Rfl:   •  oxyCODONE-acetaminophen (PERCOCET) 7.5-325 MG per tablet, Take 1 tablet by mouth 2 (Two) Times a Day., Disp: , Rfl:   •  Petrolatum 42 % ointment, Apply 1 application topically to the appropriate area as directed Every 12 (Twelve) Hours., Disp: , Rfl:   •  polyethylene glycol (MIRALAX) 17 g packet, Take 17 g by mouth Daily As Needed., Disp: , Rfl:   •  rosuvastatin (CRESTOR) 10 MG tablet, Take 5 mg by mouth Every Night., Disp: , Rfl:   •  senna (SENOKOT) 8.6 MG tablet, Take 1 tablet by mouth Daily., Disp: , Rfl:   •  sertraline (ZOLOFT) 100 MG tablet, Take 50 mg by mouth Daily., Disp: , Rfl:   •  tamsulosin (FLOMAX) 0.4 MG capsule 24 hr capsule, Take 1 capsule by mouth every  night., Disp: , Rfl:   •  gabapentin (NEURONTIN) 100 MG capsule, Take 1 capsule by mouth Every 12 (Twelve) Hours for 3 days., Disp: 6 capsule, Rfl: 0  •  hydrALAZINE (APRESOLINE) 25 MG tablet, Take 1 tablet by mouth Every 8 (Eight) Hours for 30 days., Disp: 90 tablet, Rfl: 0  Allergies:  Allergies   Allergen Reactions   • Bacitracin Unknown - High Severity   • Gentamycin [Gentamicin] Unknown - High Severity   • Neosporin [Neomycin-Bacitracin Zn-Polymyx] Other (See Comments)     ALLERGY TESTED   • Nsaids Unknown - High Severity   • Tobramycin Unknown - High Severity     Social History:   Social History     Tobacco Use   • Smoking status: Never Smoker   • Smokeless tobacco: Never Used   Substance Use Topics   • Alcohol use: No      Family History:  No family history on file.       Review of Systems  See history of present illness and past medical history.   Feels slightly better and claims that his legs are hurting.  Still unable to give details of his symptoms.  In the emergency room his review of system was recorded as follows:  Constitutional: Positive for activity change. Negative for chills and fever.   HENT: Negative for sore throat and trouble swallowing.    Eyes: Negative for visual disturbance.   Respiratory: Negative for cough and shortness of breath.    Cardiovascular: Negative for chest pain and leg swelling.   Gastrointestinal: Positive for nausea. Negative for abdominal pain, diarrhea and vomiting.   Endocrine: Negative.    Genitourinary: Negative for decreased urine volume and frequency.        EMS reports patient had urinary incontinence when moving from bed, to EMS stretcher   Musculoskeletal: Negative for neck pain.   Skin: Negative for rash.   Allergic/Immunologic: Negative.    Neurological: Positive for tremors and weakness ( Generalized). Negative for numbness.   Hematological: Negative.    Psychiatric/Behavioral: Positive for confusion and decreased concentration.   All other systems reviewed and  are negative.  Remainder of ROS is negative.      Vitals:   /65   Pulse 57   Temp 97.8 °F (36.6 °C)   Resp 16   Wt 116 kg (255 lb 11.7 oz)   SpO2 98%   BMI 35.02 kg/m²   I/O:     Intake/Output Summary (Last 24 hours) at 10/1/2022 1933  Last data filed at 10/1/2022 1630  Gross per 24 hour   Intake 2050 ml   Output 350 ml   Net 1700 ml     Exam:  Patient is examined using the personal protective equipment as per guidelines from infection control for this particular patient as enacted.  Hand washing was performed before and after patient interaction.  General Appearance:   Chronically ill obese male who does not appear to be in any acute distress   Head:    Normocephalic, without obvious abnormality, atraumatic   Eyes:    PERRL, conjunctivae/corneas clear, EOM's intact, both eyes   Ears:    Normal external ear canals, both ears   Nose:   Nares normal, septum midline, mucosa normal, no drainage    or sinus tenderness   Throat:   Lips, tongue, gums normal; oral mucosa pink and moist   Neck:   Supple, symmetrical, trachea midline, no adenopathy;     thyroid:  no enlargement/tenderness/nodules; no carotid    bruit or JVD   Back:     Symmetric, no curvature, ROM normal, no CVA tenderness   Lungs:     Clear to auscultation bilaterally, respirations unlabored   Chest Wall:    No tenderness or deformity    Heart:    Regular rate and rhythm, S1 and S2 normal, no murmur, rub   or gallop   Abdomen:     Soft, non-tender, bowel sounds active all four quadrants,     no masses, no hepatomegaly, no splenomegaly   Extremities:                  Pulses:   Pulses palpable in all extremities; symmetric all extremities   Skin:  Significant erythematous changes involving bilateral lower extremity with left greater than right.  S   Neurologic:   CNII-XII intact, motor strength grossly intact, sensation grossly intact to light touch, no focal deficits noted       Data Review:    I reviewed the patient's new clinical  results.  Results from last 7 days   Lab Units 10/01/22  1201   WBC 10*3/mm3 12.40*   HEMOGLOBIN g/dL 11.1*   PLATELETS 10*3/mm3 349     Results from last 7 days   Lab Units 10/01/22  1201   SODIUM mmol/L 143   POTASSIUM mmol/L 4.5   CHLORIDE mmol/L 100   CO2 mmol/L 32.0*   BUN mg/dL 37*   CREATININE mg/dL 2.38*   CALCIUM mg/dL 9.7   GLUCOSE mg/dL 115*     Microbiology Results (last 10 days)     ** No results found for the last 240 hours. **        CT Head Without Contrast    Result Date: 10/1/2022  No acute intracranial hemorrhage. At least mild small vessel ischemic disease, progressed from 2012. If there is clinical concern for acute infarction, this would be better assessed with MRI.  Discussed with Dr. Marvin at 12:58 PM.  This report was finalized on 10/1/2022 12:59 PM by Dr. Ping Bone M.D.      XR Chest 1 View    Result Date: 10/1/2022  Stable bibasilar atelectasis or scarring  This report was finalized on 10/1/2022 12:34 PM by Dr. Jarrett Pacheco M.D.          Assessment:  Active Hospital Problems    Diagnosis  POA   • Left leg cellulitis [L03.116]  Yes      Resolved Hospital Problems   No resolved problems to display.         Plan:  See above  Miguel Anderson MD   10/1/2022  19:33 EDT    Much of this encounter note is an electronic transcription/translation of spoken language to printed text. The electronic translation of spoken language may permit erroneous, or at times, nonsensical words or phrases to be inadvertently transcribed; Although I have reviewed the note for such errors, some may still exist

## 2022-10-01 NOTE — PROGRESS NOTES
[facility]: Vancomycin and Zosyn Pharmacokinetic Initial Consult Note    Yakov Dubon is a 77 y.o. male who is on day 1/5 of pharmacy to dose vancomycin.    Indication: Sepsis ; left leg cellulitis  Consulting Provider: Dr. العراقي  Planned Duration of Therapy: 5 days  Loading Dose Ordered or Given: 2250 mg on 10/1/22 at 1318  Culture/Source: 2/2 sets of blood cultures in process  Target: Dose by levels in setting of CARLOS  Other Antimicrobials: Zosyn 3.375 gram IV q8h    Vitals/Labs  Ht:  ; Wt: 116 kg (255 lb 11.7 oz)  Temp Readings from Last 1 Encounters:   10/01/22 97.8 °F (36.6 °C)    Estimated Creatinine Clearance: 33.5 mL/min (A) (by C-G formula based on SCr of 2.38 mg/dL (H)).  Scr is 2.38 mg/dL today; typical baseline of 1.3-1.4 mg/dL concerning for CARLOS     Results from last 7 days   Lab Units 10/01/22  1201   CREATININE mg/dL 2.38*   WBC 10*3/mm3 12.40*     Assessment/Plan:    1. Pulse dose vancomycin in the setting of CARLOS; will obtain random vancomycin level with 10/2/22 AM labs to assess clearance and to guide scheduling subsequent doses.   2. Zosyn 3.375 gram IV q8h extended infusion protocol    Pharmacy will follow patient's kidney function and will adjust doses and obtain levels as necessary. Thank you for involving pharmacy in this patient's care. Please contact pharmacy with any questions or concerns.           Paige William, PharmD  Emergency Medicine Clinical Pharmacist   Phone: [phone]

## 2022-10-01 NOTE — ED NOTES
Nursing report ED to floor  Yakov Dubon  77 y.o.  male    HPI (triage note):   Chief Complaint   Patient presents with   • Altered Mental Status       Admitting doctor:   Jean العراقي MD    Admitting diagnosis:   The primary encounter diagnosis was Left leg cellulitis. Diagnoses of Urinary tract infection, acute, Acute on chronic renal insufficiency, and Altered mental status, unspecified altered mental status type were also pertinent to this visit.    Code status:   Current Code Status     Date Active Code Status Order ID Comments User Context       Prior    Advance Care Planning Activity          Allergies:   Bacitracin, Gentamycin [gentamicin], Neosporin [neomycin-bacitracin zn-polymyx], Nsaids, and Tobramycin    Weight:       10/01/22  1212   Weight: 116 kg (255 lb 11.7 oz)       Most recent vitals:   Vitals:    10/01/22 1141 10/01/22 1212 10/01/22 1229 10/01/22 1431   BP: 118/74      Pulse: 73 73 68    Resp: 16      Temp: 97.8 °F (36.6 °C)      SpO2: 98% 98% 98% 99%   Weight:  116 kg (255 lb 11.7 oz)         Active LDAs/IV Access:   Lines, Drains & Airways     Active LDAs     Name Placement date Placement time Site Days    Peripheral IV 10/01/22 1220 Right Antecubital 10/01/22  1220  Antecubital  less than 1    Peripheral IV 10/01/22 1221 Left Forearm 10/01/22  1221  Forearm  less than 1    External Urinary Catheter 10/01/22  1320  --  less than 1    Pump Device --  --  --  --                Labs (abnormal labs have a star):   Labs Reviewed   COMPREHENSIVE METABOLIC PANEL - Abnormal; Notable for the following components:       Result Value    Glucose 115 (*)     BUN 37 (*)     Creatinine 2.38 (*)     CO2 32.0 (*)     eGFR 27.2 (*)     All other components within normal limits    Narrative:     GFR Normal >60  Chronic Kidney Disease <60  Kidney Failure <15     URINALYSIS W/ MICROSCOPIC IF INDICATED (NO CULTURE) - Abnormal; Notable for the following components:    Appearance, UA Cloudy (*)     Leuk Esterase, UA  "Large (3+) (*)     Nitrite, UA Positive (*)     All other components within normal limits   CBC WITH AUTO DIFFERENTIAL - Abnormal; Notable for the following components:    WBC 12.40 (*)     RBC 3.63 (*)     Hemoglobin 11.1 (*)     Hematocrit 33.3 (*)     Neutrophil % 90.1 (*)     Lymphocyte % 7.1 (*)     Monocyte % 1.9 (*)     Neutrophils, Absolute 11.17 (*)     All other components within normal limits   PROCALCITONIN - Abnormal; Notable for the following components:    Procalcitonin 0.30 (*)     All other components within normal limits    Narrative:     As a Marker for Sepsis (Non-Neonates):    1. <0.5 ng/mL represents a low risk of severe sepsis and/or septic shock.  2. >2 ng/mL represents a high risk of severe sepsis and/or septic shock.    As a Marker for Lower Respiratory Tract Infections that require antibiotic therapy:    PCT on Admission    Antibiotic Therapy       6-12 Hrs later    >0.5                Strongly Recommended  >0.25 - <0.5        Recommended   0.1 - 0.25          Discouraged              Remeasure/reassess PCT  <0.1                Strongly Discouraged     Remeasure/reassess PCT    As 28 day mortality risk marker: \"Change in Procalcitonin Result\" (>80% or <=80%) if Day 0 (or Day 1) and Day 4 values are available. Refer to http://www.[employer]-pct-calculator.com    Change in PCT <=80%  A decrease of PCT levels below or equal to 80% defines a positive change in PCT test result representing a higher risk for 28-day all-cause mortality of patients diagnosed with severe sepsis for septic shock.    Change in PCT >80%  A decrease of PCT levels of more than 80% defines a negative change in PCT result representing a lower risk for 28-day all-cause mortality of patients diagnosed with severe sepsis or septic shock.      BLOOD GAS, ARTERIAL - Abnormal; Notable for the following components:    Base Excess, Arterial 3.3 (*)     All other components within normal limits   URINALYSIS, MICROSCOPIC ONLY - " Abnormal; Notable for the following components:    WBC, UA Too Numerous to Count (*)     Bacteria, UA 4+ (*)     All other components within normal limits   MAGNESIUM - Normal   AMMONIA - Normal   LACTIC ACID, PLASMA - Normal   POCT GLUCOSE FINGERSTICK - Normal   BLOOD CULTURE   BLOOD CULTURE   RAINBOW DRAW    Narrative:     The following orders were created for panel order [place] Draw.  Procedure                               Abnormality         Status                     ---------                               -----------         ------                     Green Top (Gel)[[id number]]                                  Final result               Lavender Top[[id number]]                                     Final result               Gold Top - SST[[id number]]                                   Final result               Light Blue Top[[id number]]                                   Final result                 Please view results for these tests on the individual orders.   BLOOD GAS, ARTERIAL   POCT GLUCOSE FINGERSTICK   CBC AND DIFFERENTIAL    Narrative:     The following orders were created for panel order CBC & Differential.  Procedure                               Abnormality         Status                     ---------                               -----------         ------                     CBC Auto Differential[[id number]]        Abnormal            Final result                 Please view results for these tests on the individual orders.   GREEN TOP   LAVENDER TOP   GOLD TOP - SST   LIGHT BLUE TOP       EKG:   No orders to display       Meds given in ED:   Medications   sodium chloride 0.9 % flush 10 mL (has no administration in time range)   vancomycin 2250 mg/500 mL 0.9% NS IVPB ([facility]) (2,250 mg Intravenous New Bag 10/1/22 1318)   lactated ringers bolus 500 mL (500 mL Intravenous New Bag 10/1/22 1429)   lactated ringers bolus 1,000 mL (1,000 mL Intravenous New Bag 10/1/22 1429)   Pharmacy to Dose Zosyn (has no  administration in time range)   Pharmacy to dose vancomycin (has no administration in time range)   piperacillin-tazobactam (ZOSYN) 3.375 g in iso-osmotic dextrose 50 ml (premix) (0 g Intravenous Stopped 10/1/22 1252)       Imaging results:  CT Head Without Contrast    Result Date: 10/1/2022  No acute intracranial hemorrhage. At least mild small vessel ischemic disease, progressed from 2012. If there is clinical concern for acute infarction, this would be better assessed with MRI.  Discussed with Dr. Marvin at 12:58 PM.  This report was finalized on 10/1/2022 12:59 PM by Dr. Ping Bone M.D.      XR Chest 1 View    Result Date: 10/1/2022  Stable bibasilar atelectasis or scarring  This report was finalized on 10/1/2022 12:34 PM by Dr. Jarrett Pacheco M.D.        Ambulatory status:   - walks with walker at baseline, this RN has not seen pt up OOB, recommend assist x2 with walker.    Social issues:   Social History     Socioeconomic History   • Marital status:    Tobacco Use   • Smoking status: Never Smoker   • Smokeless tobacco: Never Used   Substance and Sexual Activity   • Alcohol use: No   • Drug use: No    Nursing report ED to floor

## 2022-10-01 NOTE — ED TRIAGE NOTES
Pt arrived via ems from [facility] with complaints of AMS with a last known normal of yesterday. Pt has tremors that are new and cellulitis in bilateral legs. Pt is usually alert and oriented x4 but was alert to self and city for EMS.      Pt was wearing a mask during assessment.  This RN wore appropriate PPE

## 2022-10-01 NOTE — PROGRESS NOTES
Clinical Pharmacy Services: Medication History    Yakov Dubon is a 77 y.o. male presenting to [facility] for   Chief Complaint   Patient presents with   • Altered Mental Status       He  has a past medical history of Anxiety, Back pain, Depression, Diabetes mellitus ([facility]), High cholesterol, Hypertension, Kidney disease, chronic, stage III (GFR 30-59 ml/min) ([facility]), Reflux esophagitis, and Sleep apnea.    Allergies as of 10/01/2022 - Reviewed 10/01/2022   Allergen Reaction Noted   • Bacitracin Unknown - High Severity 08/11/2022   • Gentamycin [gentamicin] Unknown - High Severity 08/11/2022   • Neosporin [neomycin-bacitracin zn-polymyx] Other (See Comments) 04/11/2016   • Nsaids Unknown - High Severity 08/11/2022   • Tobramycin Unknown - High Severity 08/11/2022       Medication information was obtained from: Nursing Home papers  Pharmacy and Phone Number:     Prior to Admission Medications     Prescriptions Last Dose Informant Patient Reported? Taking?    acetaminophen (TYLENOL) 325 MG tablet  Nursing Home Yes Yes    Take 650 mg by mouth Every Night.    aspirin 81 MG chewable tablet  Nursing Home Yes Yes    Chew 81 mg Daily.    atorvastatin (LIPITOR) 20 MG tablet  Nursing Home Yes Yes    Take 20 mg by mouth Daily.    bisacodyl (DULCOLAX) 10 MG suppository  Nursing Home Yes Yes    Insert 10 mg into the rectum Daily As Needed for Constipation.    calcium carbonate (OS-SERJIO) 600 MG tablet  Nursing Home Yes Yes    Take 600 mg by mouth 2 (Two) Times a Day With Meals.    carvedilol (COREG) 12.5 MG tablet  Nursing Home No Yes    Take 1 tablet by mouth 2 (Two) Times a Day With Meals.    Diclofenac Sodium (VOLTAREN) 1 % gel gel  Nursing Home Yes Yes    Apply 4 g topically to the appropriate area as directed 3 (Three) Times a Day.    famotidine (PEPCID) 40 MG tablet  Nursing Home Yes Yes    Take 40 mg by mouth 2 (Two) Times a Day.    ferrous gluconate (FERGON) 324 MG tablet  Nursing Home Yes Yes    Take 324 mg  by mouth Daily With Breakfast.    furosemide (LASIX) 40 MG tablet  Nursing Home No Yes    Take 1 tablet by mouth Daily.    hydrocortisone 1 % cream  Nursing Home Yes Yes    Apply 1 application topically to the appropriate area as directed Every 12 (Twelve) Hours.    insulin lispro (ADMELOG) 100 UNIT/ML injection   No Yes    Inject 0-14 Units under the skin into the appropriate area as directed 3 (Three) Times a Day Before Meals.    isosorbide dinitrate (ISORDIL) 40 MG tablet  Nursing Home Yes Yes    Take 0.5 tablets by mouth 3 (Three) Times a Day.    Multiple Vitamins-Minerals (OCUVITE PO)  Nursing Home Yes Yes    Take 1 tablet by mouth Daily.    nitroglycerin (NITROSTAT) 0.4 MG SL tablet  Nursing Home Yes Yes    Place 0.4 mg under the tongue Every 5 (Five) Minutes As Needed for Chest Pain. Take no more than 3 doses in 15 minutes.    oxyCODONE-acetaminophen (PERCOCET) 7.5-325 MG per tablet   Yes Yes    Take 1 tablet by mouth 2 (Two) Times a Day.    Petrolatum 42 % ointment  Nursing Home No Yes    Apply 1 application topically to the appropriate area as directed Every 12 (Twelve) Hours.    polyethylene glycol (MIRALAX) 17 g packet  Nursing Home Yes Yes    Take 17 g by mouth Daily As Needed.    rosuvastatin (CRESTOR) 10 MG tablet  Nursing Home Yes Yes    Take 5 mg by mouth Every Night.    senna (SENOKOT) 8.6 MG tablet  Nursing Home Yes Yes    Take 1 tablet by mouth Daily.    sertraline (ZOLOFT) 100 MG tablet  Nursing Home Yes Yes    Take 50 mg by mouth Daily.    tamsulosin (FLOMAX) 0.4 MG capsule 24 hr capsule  Nursing Home Yes Yes    Take 1 capsule by mouth every night.    gabapentin (NEURONTIN) 100 MG capsule   No No    Take 1 capsule by mouth Every 12 (Twelve) Hours for 3 days.    hydrALAZINE (APRESOLINE) 25 MG tablet   No No    Take 1 tablet by mouth Every 8 (Eight) Hours for 30 days.            Medication notes: gabapentin and hydralazine was removed from list. On nursing papers doesn't show end date. Doctor  removed these two medications.    This medication list is complete to the best of my knowledge as of 10/1/2022    Please call if questions.    Brooklyn Salinas  Medication History Technician   [phone]    10/1/2022 15:48 EDT

## 2022-10-01 NOTE — H&P
History and physical    Primary care physician      Chief complaint  Altered mental status    History of present illness  77-year-old white male with very complex past medical history including chronic hypoxic hypercapnic respiratory failure diabetes mellitus hypertension diastolic CHF obstructive sleep apnea and chronic kidney disease stage III who is a nursing home resident brought to the emergency room with altered mental status.  Patient in no respite distress but very weak and lethargic and answer questions slowly.  Patient work-up in ER revealed and found to have sepsis with cellulitis and UTI and also found to have acute kidney injury admit for management.  Patient is DNR per his wishes.    PAST MEDICAL HISTORY  • Anxiety     • Back pain     • Depression     • Diabetes mellitus (HCC)     • High cholesterol     • Hypertension     • Kidney disease, chronic, stage III (GFR 30-59 ml/min) ([facility])     • Reflux esophagitis     • Sleep apnea        PAST SURGICAL HISTORY              Procedure Laterality Date   • APPENDECTOMY       • CARPAL TUNNEL RELEASE       • CATARACT EXTRACTION       • HAMMER TOE REPAIR       • LUMBAR SPINE SURGERY       • PAIN PUMP INSERTION/REVISION Right 4/18/2016     Procedure: RT PAIN PUMP REPLACEMENT;  Surgeon: Chris Messer MD;  Location: [facility];  Service:    • TOTAL KNEE ARTHROPLASTY Left          FAMILY HISTORY  No family history on file.     SOCIAL HISTORY                Socioeconomic History   • Marital status:    Tobacco Use   • Smoking status: Never Smoker   • Smokeless tobacco: Never Used   Substance and Sexual Activity   • Alcohol use: No   • Drug use: No         ALLERGIES  Bacitracin, Gentamycin [gentamicin], Neosporin [neomycin-bacitracin zn-polymyx], Nsaids, and Tobramycin   Nursing home medications reviewed     REVIEW OF SYSTEMS  Constitutional: Negative for chills and fever.   HENT: Negative for sore throat and trouble swallowing.    Eyes: Negative  for visual disturbance.   Respiratory: Negative for cough and shortness of breath.    Cardiovascular: Negative for chest pain and leg swelling.   Gastrointestinal: Positive for nausea. Negative for abdominal pain, diarrhea and vomiting.   Endocrine: Negative.    Genitourinary: Negative for decreased urine volume and frequency.   Musculoskeletal: Negative for neck pain.   Skin: Negative for rash.   Allergic/Immunologic: Negative.    Neurological: Positive for tremors and weakness ( Generalized). Negative for numbness.   Hematological: Negative.    Psychiatric/Behavioral: Positive for confusion and decreased concentration.     PHYSICAL EXAM  Blood pressure 118/74, pulse 68, temperature 97.8 °F (36.6 °C), resp. rate 16, weight 116 kg (255 lb 11.7 oz), SpO2 99 %.    Constitutional:       General: He is in acute distress.      Appearance: He is ill-appearing.      Comments: Listless, but rouses to verbal for brief periods of time   HENT:      Head: Normocephalic and atraumatic.   Eyes:      Pupils: Pupils are equal, round, and reactive to light.   Cardiovascular:      Rate and Rhythm: Normal rate and regular rhythm.      Pulses:           Posterior tibial pulses are 2+ on the right side and 2+ on the left side.      Heart sounds: Normal heart sounds. No murmur heard.  Pulmonary:      Effort: Pulmonary effort is normal. No respiratory distress.      Breath sounds: Normal breath sounds. No wheezing.   Abdominal:      General: Bowel sounds are normal.      Palpations: Abdomen is soft.      Tenderness: There is no abdominal tenderness. There is no guarding or rebound.   Musculoskeletal:         General: Swelling and tenderness ( Left leg) present. Normal range of motion.      Cervical back: Normal range of motion.   Skin:     General: Skin is warm and dry.      Findings: Erythema ( Left lower leg and posterior thigh) present.   Neurological:      Mental Status: He is confused.      GCS: GCS eye subscore is 1. GCS verbal  subscore is 4. GCS motor subscore is 6.      Cranial Nerves: No cranial nerve deficit.      Sensory: No sensory deficit.      Comments: Patient with frequent irregular, nonrhythmic, myoclonic jerks throughout limbs and torso   Psychiatric:         Mood and Affect: Affect normal.      LAB RESULTS   Lab Results (last 24 hours)     Procedure Component Value Units Date/Time    BNP [[id number]] Collected: 10/01/22 1201    Specimen: Blood Updated: 10/01/22 1449    Urinalysis With Microscopic If Indicated (No Culture) - Urine, Clean Catch [[id number]]  (Abnormal) Collected: 10/01/22 1317    Specimen: Urine, Clean Catch Updated: 10/01/22 1346     Color, UA Yellow     Appearance, UA Cloudy     pH, UA 5.5     Specific Gravity, UA 1.010     Glucose, UA Negative     Ketones, UA Negative     Bilirubin, UA Negative     Blood, UA Negative     Protein, UA Negative     Leuk Esterase, UA Large (3+)     Nitrite, UA Positive     Urobilinogen, UA 0.2 E.U./dL    Urinalysis, Microscopic Only - Urine, Clean Catch [[id number]]  (Abnormal) Collected: 10/01/22 1317    Specimen: Urine, Clean Catch Updated: 10/01/22 1346     RBC, UA 0-2 /HPF      WBC, UA Too Numerous to Count /HPF      Bacteria, UA 4+ /HPF      Squamous Epithelial Cells, UA 0-2 /HPF      Hyaline Casts, UA None Seen /LPF      Methodology Automated Microscopy    [place] Draw [[id number]] Collected: 10/01/22 1201    Specimen: Blood Updated: 10/01/22 1304    Narrative:      The following orders were created for panel order [place] Draw.  Procedure                               Abnormality         Status                     ---------                               -----------         ------                     Green Top (Gel)[[id number]]                                  Final result               Lavender Top[[id number]]                                     Final result               Gold Top - SST[[id number]]                                   Final result               Light Blue  "Top[[id number]]                                   Final result                 Please view results for these tests on the individual orders.    Light Blue Top [[id number]] Collected: 10/01/22 1201    Specimen: Blood Updated: 10/01/22 1304     Extra Tube Hold for add-ons.     Comment: Auto resulted       Green Top (Gel) [[id number]] Collected: 10/01/22 1201    Specimen: Blood Updated: 10/01/22 1304     Extra Tube Hold for add-ons.     Comment: Auto resulted.       Lavender Top [[id number]] Collected: 10/01/22 1201    Specimen: Blood Updated: 10/01/22 1304     Extra Tube hold for add-on     Comment: Auto resulted       Gold Top - SST [[id number]] Collected: 10/01/22 1201    Specimen: Blood Updated: 10/01/22 1304     Extra Tube Hold for add-ons.     Comment: Auto resulted.       Blood Culture - Blood, Arm, Left [[id number]] Collected: 10/01/22 1216    Specimen: Blood from Arm, Left Updated: 10/01/22 1247    Procalcitonin [[id number]]  (Abnormal) Collected: 10/01/22 1201    Specimen: Blood Updated: 10/01/22 1243     Procalcitonin 0.30 ng/mL     Narrative:      As a Marker for Sepsis (Non-Neonates):    1. <0.5 ng/mL represents a low risk of severe sepsis and/or septic shock.  2. >2 ng/mL represents a high risk of severe sepsis and/or septic shock.    As a Marker for Lower Respiratory Tract Infections that require antibiotic therapy:    PCT on Admission    Antibiotic Therapy       6-12 Hrs later    >0.5                Strongly Recommended  >0.25 - <0.5        Recommended   0.1 - 0.25          Discouraged              Remeasure/reassess PCT  <0.1                Strongly Discouraged     Remeasure/reassess PCT    As 28 day mortality risk marker: \"Change in Procalcitonin Result\" (>80% or <=80%) if Day 0 (or Day 1) and Day 4 values are available. Refer to http://[email or web address]-pct-calculator.com    Change in PCT <=80%  A decrease of PCT levels below or equal to 80% defines a positive change in PCT test result representing a higher risk " for 28-day all-cause mortality of patients diagnosed with severe sepsis for septic shock.    Change in PCT >80%  A decrease of PCT levels of more than 80% defines a negative change in PCT result representing a lower risk for 28-day all-cause mortality of patients diagnosed with severe sepsis or septic shock.       Ammonia [[id number]]  (Normal) Collected: 10/01/22 1201    Specimen: Blood Updated: 10/01/22 1243     Ammonia 22 umol/L     Comprehensive Metabolic Panel [[id number]]  (Abnormal) Collected: 10/01/22 1201    Specimen: Blood Updated: 10/01/22 1236     Glucose 115 mg/dL      BUN 37 mg/dL      Creatinine 2.38 mg/dL      Sodium 143 mmol/L      Potassium 4.5 mmol/L      Chloride 100 mmol/L      CO2 32.0 mmol/L      Calcium 9.7 mg/dL      Total Protein 6.7 g/dL      Albumin 4.00 g/dL      ALT (SGPT) 6 U/L      AST (SGOT) 13 U/L      Alkaline Phosphatase 56 U/L      Total Bilirubin 0.3 mg/dL      Globulin 2.7 gm/dL      A/G Ratio 1.5 g/dL      BUN/Creatinine Ratio 15.5     Anion Gap 11.0 mmol/L      eGFR 27.2 mL/min/1.73      Comment: National Kidney Foundation and American Society of Nephrology (ASN) Task Force recommended calculation based on the Chronic Kidney Disease Epidemiology Collaboration (CKD-EPI) equation refit without adjustment for race.       Narrative:      GFR Normal >60  Chronic Kidney Disease <60  Kidney Failure <15      Magnesium [[id number]]  (Normal) Collected: 10/01/22 1201    Specimen: Blood Updated: 10/01/22 1236     Magnesium 2.1 mg/dL     Lactic Acid, Plasma [[id number]]  (Normal) Collected: 10/01/22 1201    Specimen: Blood Updated: 10/01/22 1234     Lactate 1.7 mmol/L     CBC & Differential [[id number]]  (Abnormal) Collected: 10/01/22 1201    Specimen: Blood Updated: 10/01/22 1217    Narrative:      The following orders were created for panel order CBC & Differential.  Procedure                               Abnormality         Status                     ---------                                -----------         ------                     CBC Auto Differential[[id number]]        Abnormal            Final result                 Please view results for these tests on the individual orders.    CBC Auto Differential [[id number]]  (Abnormal) Collected: 10/01/22 1201    Specimen: Blood Updated: 10/01/22 1217     WBC 12.40 10*3/mm3      RBC 3.63 10*6/mm3      Hemoglobin 11.1 g/dL      Hematocrit 33.3 %      MCV 91.7 fL      MCH 30.6 pg      MCHC 33.3 g/dL      RDW 13.8 %      RDW-SD 47.5 fl      MPV 9.0 fL      Platelets 349 10*3/mm3      Neutrophil % 90.1 %      Lymphocyte % 7.1 %      Monocyte % 1.9 %      Eosinophil % 0.4 %      Basophil % 0.2 %      Immature Grans % 0.3 %      Neutrophils, Absolute 11.17 10*3/mm3      Lymphocytes, Absolute 0.88 10*3/mm3      Monocytes, Absolute 0.23 10*3/mm3      Eosinophils, Absolute 0.05 10*3/mm3      Basophils, Absolute 0.03 10*3/mm3      Immature Grans, Absolute 0.04 10*3/mm3      nRBC 0.0 /100 WBC     Blood Gas, Arterial - [[id number]]  (Abnormal) Collected: 10/01/22 1214    Specimen: Arterial Blood Updated: 10/01/22 1216     Site Arterial: left radial     Jhon's Test Positive     pH, Arterial 7.433 pH units      pCO2, Arterial 41.8 mm Hg      pO2, Arterial 82.2 mm Hg      HCO3, Arterial 27.9 mmol/L      Base Excess, Arterial 3.3 mmol/L      O2 Saturation Calculated 96.3 %      Barometric Pressure for Blood Gas 752.7 mmHg      Comment: SPO2 99 Meter: [id number] : [id number] Jhon Bell        Modality Cannula     Flow Rate 2 lpm      Rate 18 Breaths/minute     Blood Culture - Blood, Arm, Right [[id number]] Collected: 10/01/22 1201    Specimen: Blood from Arm, Right Updated: 10/01/22 1211    POC Glucose Once [[id number]]  (Normal) Collected: 10/01/22 1157    Specimen: Blood Updated: 10/01/22 1159     Glucose 111 mg/dL      Comment: Meter: [id number] : [id number] Yoel PRUITT [facility]           Imaging Results (Last 24 Hours)     Procedure Component Value  Units Date/Time    CT Head Without Contrast [[id number]] Collected: 10/01/22 1249     Updated: 10/01/22 1302    Narrative:      EXAM:  CT HEAD WO CONTRAST-     HISTORY:  Altered mental status.     COMPARISON:  CT head without contrast 8/22/2012.     TECHNIQUE: Noncontrast images of the brain were obtained. Reformatted  images were reviewed. Radiation dose reduction techniques were utilized,  including automated exposure control and exposure modulation based on  body size.     FINDINGS:       There is mild global parenchymal volume loss.  No acute intracranial  hemorrhage or pathologic extra-axial collection is identified.  There is  no midline shift or mass effect.  The basal cisterns are patent.  Scattered subcortical and periventricular hypoattenuation suggestive of  at least mild small vessel ischemic disease, progressed from 2012. The  grey-white matter differentiation is maintained. There is calcific  intracranial atherosclerosis with involvement of the posterior  circulation.          Impression:      No acute intracranial hemorrhage. At least mild small vessel ischemic  disease, progressed from 2012. If there is clinical concern for acute  infarction, this would be better assessed with MRI.     Discussed with Dr. Marvin at 12:58 PM.     This report was finalized on 10/1/2022 12:59 PM by Dr. Ping Bone M.D.       XR Chest 1 View [[id number]] Collected: 10/01/22 1233     Updated: 10/01/22 1237    Narrative:      AP CHEST     HISTORY: Cough, altered mental status     COMPARISON: 08/11/2022     FINDINGS: There is bibasilar atelectasis or scarring. Heart size stable.  Extensive right shoulder degenerative changes. Spinal stimulator.       Impression:      Stable bibasilar atelectasis or scarring     This report was finalized on 10/1/2022 12:34 PM by Dr. Jarrett Pacheco M.D.             Current Facility-Administered Medications:   •  aspirin chewable tablet 81 mg, 81 mg, Oral, Daily, Jean العراقي MD  •   carvedilol (COREG) tablet 12.5 mg, 12.5 mg, Oral, BID With Meals, Jean العراقي MD  •  Pharmacy to dose vancomycin, , Does not apply, Continuous PRN, Jean العراقي MD  •  Pharmacy to Dose Zosyn, , Does not apply, Continuous PRN, Jean العراقي MD  •  piperacillin-tazobactam (ZOSYN) 3.375 g in iso-osmotic dextrose 50 ml (premix), 3.375 g, Intravenous, Q8H, Jean العراقي MD  •  polyethylene glycol (MIRALAX) packet 17 g, 17 g, Oral, Daily, Jean العراقي MD  •  rosuvastatin (CRESTOR) tablet 5 mg, 5 mg, Oral, Nightly, Jean العراقي MD  •  senna (SENOKOT) tablet 1 tablet, 1 tablet, Oral, Daily, Jean العراقي MD  •  sertraline (ZOLOFT) tablet 75 mg, 75 mg, Oral, Daily, Jean العراقي MD  •  sodium chloride 0.45 % infusion, 75 mL/hr, Intravenous, Continuous, Jean العراقي MD  •  sodium chloride 0.9 % flush 10 mL, 10 mL, Intravenous, PRN, Rachna Marvin MD  •  tamsulosin (FLOMAX) 24 hr capsule 0.4 mg, 0.4 mg, Oral, Nightly, Jean العراقي MD    Current Outpatient Medications:   •  acetaminophen (TYLENOL) 325 MG tablet, Take 650 mg by mouth Every Night., Disp: , Rfl:   •  aspirin 81 MG chewable tablet, Chew 81 mg Daily., Disp: , Rfl:   •  calcium carbonate (OS-SERJIO) 600 MG tablet, Take 600 mg by mouth 2 (Two) Times a Day With Meals., Disp: , Rfl:   •  carvedilol (COREG) 12.5 MG tablet, Take 1 tablet by mouth 2 (Two) Times a Day With Meals., Disp: , Rfl:   •  ferrous gluconate (FERGON) 324 MG tablet, Take 324 mg by mouth Daily With Breakfast., Disp: , Rfl:   •  furosemide (LASIX) 40 MG tablet, Take 1 tablet by mouth Daily., Disp: 30 tablet, Rfl: 0  •  gabapentin (NEURONTIN) 100 MG capsule, Take 1 capsule by mouth Every 12 (Twelve) Hours for 3 days., Disp: 6 capsule, Rfl: 0  •  hydrALAZINE (APRESOLINE) 25 MG tablet, Take 1 tablet by mouth Every 8 (Eight) Hours for 30 days., Disp: 90 tablet, Rfl: 0  •  HYDROmorphone HCl (DILAUDID-HP IJ), Inject  as directed. PAIN PUMP, Disp: , Rfl:   •  insulin lispro (ADMELOG) 100 UNIT/ML injection,  Inject 0-14 Units under the skin into the appropriate area as directed 3 (Three) Times a Day Before Meals., Disp: , Rfl: 12  •  Multiple Vitamins-Minerals (OCUVITE PO), Take  by mouth daily., Disp: , Rfl:   •  nitroglycerin (NITROSTAT) 0.4 MG SL tablet, Place 0.4 mg under the tongue Every 5 (Five) Minutes As Needed for Chest Pain. Take no more than 3 doses in 15 minutes., Disp: , Rfl:   •  Petrolatum 42 % ointment, Apply 1 application topically to the appropriate area as directed Every 12 (Twelve) Hours., Disp: , Rfl:   •  polyethylene glycol (MIRALAX) 17 g packet, Take 17 g by mouth Daily As Needed., Disp: , Rfl:   •  rosuvastatin (CRESTOR) 10 MG tablet, Take 5 mg by mouth Every Night., Disp: , Rfl:   •  senna (senna) 8.6 MG tablet, Take 1 tablet by mouth Daily., Disp: , Rfl:   •  sertraline (ZOLOFT) 100 MG tablet, Take 75 mg by mouth Daily., Disp: , Rfl:   •  tamsulosin (FLOMAX) 0.4 MG capsule 24 hr capsule, Take 1 capsule by mouth every night., Disp: , Rfl:      ASSESSMENT  Acute UTI with sepsis  Left lower extremity cellulitis with sepsis  Acute kidney injury  Chronic kidney disease stage III  Diabetes mellitus  Hypertension  Hyperlipidemia  Chronic hypoxic hypercapnic respiratory failure  Obstructive sleep apnea  BPH  Immobility syndrome  Gastroesophageal reflux disease    PLAN  Admit  IVF  IV antibiotics after obtaining the cultures  Adjust nursing home medications  Stress ulcer DVT prophylaxis  Nephrology consult  Infectious disease to follow patient  Supportive care  DNR  Discussed with nursing staff  Follow closely further recommendation about hospital course    TATO LOWERY MD

## 2022-10-02 LAB
ALBUMIN SERPL-MCNC: 2.7 G/DL (ref 3.5–5.2)
ALBUMIN/GLOB SERPL: 1 G/DL
ALP SERPL-CCNC: 49 U/L (ref 39–117)
ALT SERPL W P-5'-P-CCNC: 13 U/L (ref 1–41)
ANION GAP SERPL CALCULATED.3IONS-SCNC: 6 MMOL/L (ref 5–15)
AST SERPL-CCNC: 21 U/L (ref 1–40)
BACTERIA UR QL AUTO: ABNORMAL /HPF
BASOPHILS # BLD AUTO: 0.02 10*3/MM3 (ref 0–0.2)
BASOPHILS NFR BLD AUTO: 0.2 % (ref 0–1.5)
BILIRUB SERPL-MCNC: 0.4 MG/DL (ref 0–1.2)
BILIRUB UR QL STRIP: NEGATIVE
BUN SERPL-MCNC: 33 MG/DL (ref 8–23)
BUN/CREAT SERPL: 17 (ref 7–25)
CALCIUM SPEC-SCNC: 8.6 MG/DL (ref 8.6–10.5)
CHLORIDE SERPL-SCNC: 103 MMOL/L (ref 98–107)
CHOLEST SERPL-MCNC: 87 MG/DL (ref 0–200)
CLARITY UR: ABNORMAL
CO2 SERPL-SCNC: 30 MMOL/L (ref 22–29)
COLOR UR: YELLOW
CREAT SERPL-MCNC: 1.94 MG/DL (ref 0.76–1.27)
CREAT UR-MCNC: 59.7 MG/DL
DEPRECATED RDW RBC AUTO: 47.1 FL (ref 37–54)
EGFRCR SERPLBLD CKD-EPI 2021: 34.8 ML/MIN/1.73
EOSINOPHIL # BLD AUTO: 0.07 10*3/MM3 (ref 0–0.4)
EOSINOPHIL NFR BLD AUTO: 0.6 % (ref 0.3–6.2)
ERYTHROCYTE [DISTWIDTH] IN BLOOD BY AUTOMATED COUNT: 13.8 % (ref 12.3–15.4)
GLOBULIN UR ELPH-MCNC: 2.6 GM/DL
GLUCOSE BLDC GLUCOMTR-MCNC: 134 MG/DL (ref 70–130)
GLUCOSE BLDC GLUCOMTR-MCNC: 138 MG/DL (ref 70–130)
GLUCOSE BLDC GLUCOMTR-MCNC: 150 MG/DL (ref 70–130)
GLUCOSE BLDC GLUCOMTR-MCNC: 158 MG/DL (ref 70–130)
GLUCOSE SERPL-MCNC: 153 MG/DL (ref 65–99)
GLUCOSE UR STRIP-MCNC: NEGATIVE MG/DL
HBA1C MFR BLD: 5.8 % (ref 4.8–5.6)
HCT VFR BLD AUTO: 27.2 % (ref 37.5–51)
HDLC SERPL-MCNC: 44 MG/DL (ref 40–60)
HGB BLD-MCNC: 8.9 G/DL (ref 13–17.7)
HGB UR QL STRIP.AUTO: ABNORMAL
HYALINE CASTS UR QL AUTO: ABNORMAL /LPF
IMM GRANULOCYTES # BLD AUTO: 0.04 10*3/MM3 (ref 0–0.05)
IMM GRANULOCYTES NFR BLD AUTO: 0.4 % (ref 0–0.5)
KETONES UR QL STRIP: NEGATIVE
LDLC SERPL CALC-MCNC: 29 MG/DL (ref 0–100)
LDLC/HDLC SERPL: 0.7 {RATIO}
LEUKOCYTE ESTERASE UR QL STRIP.AUTO: ABNORMAL
LYMPHOCYTES # BLD AUTO: 0.9 10*3/MM3 (ref 0.7–3.1)
LYMPHOCYTES NFR BLD AUTO: 8.2 % (ref 19.6–45.3)
MCH RBC QN AUTO: 30.6 PG (ref 26.6–33)
MCHC RBC AUTO-ENTMCNC: 32.7 G/DL (ref 31.5–35.7)
MCV RBC AUTO: 93.5 FL (ref 79–97)
MONOCYTES # BLD AUTO: 0.34 10*3/MM3 (ref 0.1–0.9)
MONOCYTES NFR BLD AUTO: 3.1 % (ref 5–12)
NEUTROPHILS NFR BLD AUTO: 87.5 % (ref 42.7–76)
NEUTROPHILS NFR BLD AUTO: 9.67 10*3/MM3 (ref 1.7–7)
NITRITE UR QL STRIP: NEGATIVE
NRBC BLD AUTO-RTO: 0 /100 WBC (ref 0–0.2)
PH UR STRIP.AUTO: 6 [PH] (ref 5–8)
PLATELET # BLD AUTO: 233 10*3/MM3 (ref 140–450)
PMV BLD AUTO: 8.6 FL (ref 6–12)
POTASSIUM SERPL-SCNC: 4 MMOL/L (ref 3.5–5.2)
PROT SERPL-MCNC: 5.3 G/DL (ref 6–8.5)
PROT UR QL STRIP: ABNORMAL
RBC # BLD AUTO: 2.91 10*6/MM3 (ref 4.14–5.8)
RBC # UR STRIP: ABNORMAL /HPF
REF LAB TEST METHOD: ABNORMAL
SODIUM SERPL-SCNC: 139 MMOL/L (ref 136–145)
SODIUM UR-SCNC: 82 MMOL/L
SP GR UR STRIP: 1.01 (ref 1–1.03)
SQUAMOUS #/AREA URNS HPF: ABNORMAL /HPF
TRIGL SERPL-MCNC: 60 MG/DL (ref 0–150)
TSH SERPL DL<=0.05 MIU/L-ACNC: 1.26 UIU/ML (ref 0.27–4.2)
UROBILINOGEN UR QL STRIP: ABNORMAL
VANCOMYCIN SERPL-MCNC: 17.5 MCG/ML (ref 5–40)
VLDLC SERPL-MCNC: 14 MG/DL (ref 5–40)
WBC # UR STRIP: ABNORMAL /HPF
WBC NRBC COR # BLD: 11.04 10*3/MM3 (ref 3.4–10.8)

## 2022-10-02 PROCEDURE — 84443 ASSAY THYROID STIM HORMONE: CPT | Performed by: HOSPITALIST

## 2022-10-02 PROCEDURE — 80061 LIPID PANEL: CPT | Performed by: HOSPITALIST

## 2022-10-02 PROCEDURE — 25010000002 CEFEPIME PER 500 MG: Performed by: INTERNAL MEDICINE

## 2022-10-02 PROCEDURE — 36415 COLL VENOUS BLD VENIPUNCTURE: CPT | Performed by: HOSPITALIST

## 2022-10-02 PROCEDURE — 25010000002 VANCOMYCIN 10 G RECONSTITUTED SOLUTION: Performed by: HOSPITALIST

## 2022-10-02 PROCEDURE — 25010000002 PIPERACILLIN SOD-TAZOBACTAM PER 1 G: Performed by: HOSPITALIST

## 2022-10-02 PROCEDURE — 82570 ASSAY OF URINE CREATININE: CPT | Performed by: INTERNAL MEDICINE

## 2022-10-02 PROCEDURE — 83036 HEMOGLOBIN GLYCOSYLATED A1C: CPT | Performed by: HOSPITALIST

## 2022-10-02 PROCEDURE — 81001 URINALYSIS AUTO W/SCOPE: CPT | Performed by: INTERNAL MEDICINE

## 2022-10-02 PROCEDURE — 63710000001 INSULIN LISPRO (HUMAN) PER 5 UNITS: Performed by: HOSPITALIST

## 2022-10-02 PROCEDURE — 80202 ASSAY OF VANCOMYCIN: CPT | Performed by: HOSPITALIST

## 2022-10-02 PROCEDURE — 84300 ASSAY OF URINE SODIUM: CPT | Performed by: INTERNAL MEDICINE

## 2022-10-02 PROCEDURE — 85025 COMPLETE CBC W/AUTO DIFF WBC: CPT | Performed by: HOSPITALIST

## 2022-10-02 PROCEDURE — 82962 GLUCOSE BLOOD TEST: CPT

## 2022-10-02 PROCEDURE — 80053 COMPREHEN METABOLIC PANEL: CPT | Performed by: HOSPITALIST

## 2022-10-02 RX ORDER — CARVEDILOL 3.12 MG/1
3.12 TABLET ORAL 2 TIMES DAILY WITH MEALS
Status: DISCONTINUED | OUTPATIENT
Start: 2022-10-02 | End: 2022-10-04

## 2022-10-02 RX ADMIN — FAMOTIDINE 20 MG: 20 TABLET ORAL at 10:29

## 2022-10-02 RX ADMIN — ROSUVASTATIN CALCIUM 5 MG: 5 TABLET, FILM COATED ORAL at 21:12

## 2022-10-02 RX ADMIN — TAZOBACTAM SODIUM AND PIPERACILLIN SODIUM 3.38 G: 375; 3 INJECTION, SOLUTION INTRAVENOUS at 04:13

## 2022-10-02 RX ADMIN — CARVEDILOL 3.12 MG: 3.12 TABLET, FILM COATED ORAL at 19:25

## 2022-10-02 RX ADMIN — ASPIRIN 81 MG: 81 TABLET, CHEWABLE ORAL at 10:28

## 2022-10-02 RX ADMIN — SERTRALINE 75 MG: 25 TABLET, FILM COATED ORAL at 21:13

## 2022-10-02 RX ADMIN — INSULIN LISPRO 2 UNITS: 100 INJECTION, SOLUTION INTRAVENOUS; SUBCUTANEOUS at 12:04

## 2022-10-02 RX ADMIN — POLYETHYLENE GLYCOL 3350 17 G: 17 POWDER, FOR SOLUTION ORAL at 10:28

## 2022-10-02 RX ADMIN — CEFEPIME 2 G: 2 INJECTION, POWDER, FOR SOLUTION INTRAVENOUS at 10:28

## 2022-10-02 RX ADMIN — CEFEPIME 2 G: 2 INJECTION, POWDER, FOR SOLUTION INTRAVENOUS at 15:48

## 2022-10-02 RX ADMIN — FAMOTIDINE 20 MG: 20 TABLET ORAL at 21:13

## 2022-10-02 RX ADMIN — TAMSULOSIN HYDROCHLORIDE 0.4 MG: 0.4 CAPSULE ORAL at 21:12

## 2022-10-02 RX ADMIN — VANCOMYCIN HYDROCHLORIDE 1250 MG: 10 INJECTION, POWDER, LYOPHILIZED, FOR SOLUTION INTRAVENOUS at 10:38

## 2022-10-02 RX ADMIN — SENNOSIDES 1 TABLET: 8.6 TABLET, FILM COATED ORAL at 10:28

## 2022-10-02 RX ADMIN — Medication: at 10:38

## 2022-10-02 RX ADMIN — CARVEDILOL 6.25 MG: 6.25 TABLET, FILM COATED ORAL at 10:28

## 2022-10-02 RX ADMIN — INSULIN GLARGINE-YFGN 10 UNITS: 100 INJECTION, SOLUTION SUBCUTANEOUS at 21:13

## 2022-10-02 RX ADMIN — SODIUM CHLORIDE 75 ML/HR: 4.5 INJECTION, SOLUTION INTRAVENOUS at 10:38

## 2022-10-02 NOTE — CONSULTS
"  Patient Care Team:  Melissa Steven MD as PCP - General (Internal Medicine)  Rosieor, Chris Polo MD as Consulting Physician (Pain Medicine)    Chief complaint: CARLOS/CKD3    Subjective     History of Present Illness  78yo with h/o CKD3 with baseline creatinine of 1.5-1.6.  He presented to ER from NH with increased confusion.  He was found to have cellulitis/uti and carlos in ER.  Creatinine was elevated to 2.38 and we were asked to see him.  He is awake and alert at this time.  No acute complaints.    Review of Systems   Constitutional: Negative for chills, fatigue and fever.   Respiratory: Negative for cough and shortness of breath.    Cardiovascular: Positive for leg swelling. Negative for chest pain.   Gastrointestinal: Negative for abdominal pain, diarrhea, nausea and vomiting.   Genitourinary: Negative for difficulty urinating and dysuria.   Musculoskeletal: Negative for back pain.   Skin: Positive for rash.   Psychiatric/Behavioral: Negative for confusion.        Past Medical History:   Diagnosis Date   • Anxiety    • Back pain    • Depression    • Diabetes mellitus (HCC)     \"BORDERLINE\"   • High cholesterol    • Hypertension    • Kidney disease, chronic, stage III (GFR 30-59 ml/min) ([facility])    • Reflux esophagitis    • Sleep apnea    ,   Past Surgical History:   Procedure Laterality Date   • APPENDECTOMY     • CARPAL TUNNEL RELEASE     • CATARACT EXTRACTION     • HAMMER TOE REPAIR     • LUMBAR SPINE SURGERY     • PAIN PUMP INSERTION/REVISION Right 4/18/2016    Procedure: RT PAIN PUMP REPLACEMENT;  Surgeon: Chris Messer MD;  Location: [facility];  Service:    • TOTAL KNEE ARTHROPLASTY Left    , No family history on file.,   Social History     Socioeconomic History   • Marital status:    Tobacco Use   • Smoking status: Never Smoker   • Smokeless tobacco: Never Used   Substance and Sexual Activity   • Alcohol use: No   • Drug use: No     E-cigarette/Vaping     E-cigarette/Vaping Substances "     E-cigarette/Vaping Devices       ,   Medications Prior to Admission   Medication Sig Dispense Refill Last Dose   • acetaminophen (TYLENOL) 325 MG tablet Take 650 mg by mouth Every Night.      • aspirin 81 MG chewable tablet Chew 81 mg Daily.      • atorvastatin (LIPITOR) 20 MG tablet Take 20 mg by mouth Daily.      • bisacodyl (DULCOLAX) 10 MG suppository Insert 10 mg into the rectum Daily As Needed for Constipation.      • calcium carbonate (OS-SERJIO) 600 MG tablet Take 600 mg by mouth 2 (Two) Times a Day With Meals.      • carvedilol (COREG) 12.5 MG tablet Take 1 tablet by mouth 2 (Two) Times a Day With Meals.      • Diclofenac Sodium (VOLTAREN) 1 % gel gel Apply 4 g topically to the appropriate area as directed 3 (Three) Times a Day.      • famotidine (PEPCID) 40 MG tablet Take 40 mg by mouth 2 (Two) Times a Day.      • ferrous gluconate (FERGON) 324 MG tablet Take 324 mg by mouth Daily With Breakfast.      • furosemide (LASIX) 40 MG tablet Take 1 tablet by mouth Daily. 30 tablet 0    • hydrocortisone 1 % cream Apply 1 application topically to the appropriate area as directed Every 12 (Twelve) Hours.      • insulin lispro (ADMELOG) 100 UNIT/ML injection Inject 0-14 Units under the skin into the appropriate area as directed 3 (Three) Times a Day Before Meals.  12    • isosorbide dinitrate (ISORDIL) 40 MG tablet Take 0.5 tablets by mouth 3 (Three) Times a Day.      • Multiple Vitamins-Minerals (OCUVITE PO) Take 1 tablet by mouth Daily.      • nitroglycerin (NITROSTAT) 0.4 MG SL tablet Place 0.4 mg under the tongue Every 5 (Five) Minutes As Needed for Chest Pain. Take no more than 3 doses in 15 minutes.      • oxyCODONE-acetaminophen (PERCOCET) 7.5-325 MG per tablet Take 1 tablet by mouth 2 (Two) Times a Day.      • Petrolatum 42 % ointment Apply 1 application topically to the appropriate area as directed Every 12 (Twelve) Hours.      • polyethylene glycol (MIRALAX) 17 g packet Take 17 g by mouth Daily As Needed.       • rosuvastatin (CRESTOR) 10 MG tablet Take 5 mg by mouth Every Night.      • senna (SENOKOT) 8.6 MG tablet Take 1 tablet by mouth Daily.      • sertraline (ZOLOFT) 100 MG tablet Take 50 mg by mouth Daily.      • tamsulosin (FLOMAX) 0.4 MG capsule 24 hr capsule Take 1 capsule by mouth every night.      • gabapentin (NEURONTIN) 100 MG capsule Take 1 capsule by mouth Every 12 (Twelve) Hours for 3 days. 6 capsule 0    • hydrALAZINE (APRESOLINE) 25 MG tablet Take 1 tablet by mouth Every 8 (Eight) Hours for 30 days. 90 tablet 0    , Scheduled Meds:  aspirin, 81 mg, Oral, Daily  carvedilol, 6.25 mg, Oral, BID With Meals  cefepime, 2 g, Intravenous, Once  cefepime, 2 g, Intravenous, Q12H  famotidine, 20 mg, Oral, BID  insulin lispro, 0-7 Units, Subcutaneous, 4x Daily With Meals & Nightly  polyethylene glycol, 17 g, Oral, Daily  rosuvastatin, 5 mg, Oral, Nightly  senna, 1 tablet, Oral, Daily  sertraline, 75 mg, Oral, Nightly  tamsulosin, 0.4 mg, Oral, Nightly  vancomycin, 1,250 mg, Intravenous, Once  Vancomycin Pharmacy Intermittent/Pulse Dosing, , Does not apply, Daily    , Continuous Infusions:  Pharmacy to dose vancomycin,   sodium chloride, 75 mL/hr, Last Rate: 75 mL/hr (10/01/22 9532)    , PRN Meds:  •  acetaminophen  •  Pharmacy to dose vancomycin  •  sodium chloride and Allergies:  Bacitracin, Gentamycin [gentamicin], Neosporin [neomycin-bacitracin zn-polymyx], Nsaids, and Tobramycin    Objective     Vital Signs  Temp:  [97.8 °F (36.6 °C)-98.2 °F (36.8 °C)] 98.2 °F (36.8 °C)  Heart Rate:  [57-79] 79  Resp:  [16-22] 18  BP: (118-167)/(55-75) 122/66    I/O this shift:  In: -   Out: 650 [Urine:650]  I/O last 3 completed shifts:  In: 2050 [IV Piggyback:2050]  Out: 450 [Urine:450]    Physical Exam  Constitutional:       Appearance: Normal appearance.   HENT:      Mouth/Throat:      Mouth: Mucous membranes are moist.   Eyes:      General: No scleral icterus.     Pupils: Pupils are equal, round, and reactive to light.    Cardiovascular:      Rate and Rhythm: Normal rate and regular rhythm.      Pulses: Normal pulses.   Pulmonary:      Effort: Pulmonary effort is normal.   Abdominal:      General: Abdomen is flat.   Musculoskeletal:         General: Swelling present.      Cervical back: Normal range of motion.      Left lower leg: Edema present.   Skin:     General: Skin is warm and dry.      Findings: Erythema present.      Comments: LLE diffuse erythema   Neurological:      General: No focal deficit present.      Mental Status: He is alert.         Results Review:    I reviewed the patient's new clinical results.    WBC WBC   Date Value Ref Range Status   10/02/2022 11.04 (H) 3.40 - 10.80 10*3/mm3 Final   10/01/2022 12.40 (H) 3.40 - 10.80 10*3/mm3 Final      HGB Hemoglobin   Date Value Ref Range Status   10/02/2022 8.9 (L) 13.0 - 17.7 g/dL Final   10/01/2022 11.1 (L) 13.0 - 17.7 g/dL Final      HCT Hematocrit   Date Value Ref Range Status   10/02/2022 27.2 (L) 37.5 - 51.0 % Final   10/01/2022 33.3 (L) 37.5 - 51.0 % Final      Platlets No results found for: LABPLAT   MCV MCV   Date Value Ref Range Status   10/02/2022 93.5 79.0 - 97.0 fL Final   10/01/2022 91.7 79.0 - 97.0 fL Final          Sodium Sodium   Date Value Ref Range Status   10/02/2022 139 136 - 145 mmol/L Final   10/01/2022 143 136 - 145 mmol/L Final      Potassium Potassium   Date Value Ref Range Status   10/02/2022 4.0 3.5 - 5.2 mmol/L Final   10/01/2022 4.5 3.5 - 5.2 mmol/L Final      Chloride Chloride   Date Value Ref Range Status   10/02/2022 103 98 - 107 mmol/L Final   10/01/2022 100 98 - 107 mmol/L Final      CO2 CO2   Date Value Ref Range Status   10/02/2022 30.0 (H) 22.0 - 29.0 mmol/L Final   10/01/2022 32.0 (H) 22.0 - 29.0 mmol/L Final      BUN BUN   Date Value Ref Range Status   10/02/2022 33 (H) 8 - 23 mg/dL Final   10/01/2022 37 (H) 8 - 23 mg/dL Final      Creatinine Creatinine   Date Value Ref Range Status   10/02/2022 1.94 (H) 0.76 - 1.27 mg/dL Final    10/01/2022 2.38 (H) 0.76 - 1.27 mg/dL Final      Calcium Calcium   Date Value Ref Range Status   10/02/2022 8.6 8.6 - 10.5 mg/dL Final   10/01/2022 9.7 8.6 - 10.5 mg/dL Final      PO4 No results found for: CAPO4   Albumin Albumin   Date Value Ref Range Status   10/02/2022 2.70 (L) 3.50 - 5.20 g/dL Final   10/01/2022 4.00 3.50 - 5.20 g/dL Final      Magnesium Magnesium   Date Value Ref Range Status   10/01/2022 2.1 1.6 - 2.4 mg/dL Final      Uric Acid No results found for: URICACID         Assessment & Plan       Left leg cellulitis      Assessment & Plan  CARLOS/CKD3-  Previous baseline creatinine of 1.5-1.6.  Recently worsened to 2.38 but improved today to 1.94.  Getting IVF's at this time.  Continue supportive care and monitor.  Cellulitis-  ID consulted.  On cefepime/vanc now.  HTN-  Controlled  DMII-  On ssi.      I discussed the patients findings and my recommendations with patient and nursing staff    Toby Acosta MD  10/02/22  10:21 EDT

## 2022-10-02 NOTE — PROGRESS NOTES
[facility] Clinical Pharmacy Services: Vancomycin Monitoring Note    Yakov Dubon is a 77 y.o. male who is on day 2/5 of pharmacy to dose vancomycin for sepsis/cellulitis .    Previous Vancomycin Dose: Intermittent   Updated Cultures and Sensitivities: in process   Results from last 7 days   Lab Units 10/02/22  0435   VANCOMYCIN RM mcg/mL 17.50     Vitals/Labs  Ht:  ; Wt: 116 kg (255 lb 11.7 oz)   Temp Readings from Last 1 Encounters:   10/02/22 98.2 °F (36.8 °C) (Oral)     Estimated Creatinine Clearance: 41.1 mL/min (A) (by C-G formula based on SCr of 1.94 mg/dL (H)).     Results from last 7 days   Lab Units 10/02/22  0436 10/02/22  0435 10/01/22  1201   CREATININE mg/dL  --  1.94* 2.38*   WBC 10*3/mm3 11.04*  --  12.40*     Assessment/Plan  Patient's Scr has improved but remains elevated from baseline. Following a loading dose of 2250 mg x 1 yesterday evening, a random level was obtained this AM and returned at 17.5 mg/L (~15.5 hr level). I will continue with intermittent dosing for now, hopefully Scr will continue to normalize and a regimen can be scheduled tomorrow.     Current Vancomycin Dose: 1250 mg x 1 today   Next Level Date and Time: Random in w/AM labs   We will continue to monitor patient changes and renal function     Thank you for involving pharmacy in this patient's care. Please contact pharmacy with any questions or concerns.       Ekaterina Chi, [facility]  Clinical Pharmacist

## 2022-10-02 NOTE — PROGRESS NOTES
Daily progress note    Primary care physician      Chief complaint  Doing same with no new complaints but much more alert and answer all questions.  Patient denies any chest pain shortness of breath palpitation.    History of present illness  77-year-old white male with very complex past medical history including chronic hypoxic hypercapnic respiratory failure diabetes mellitus hypertension diastolic CHF obstructive sleep apnea and chronic kidney disease stage III who is a nursing home resident brought to the emergency room with altered mental status.  Patient in no respite distress but very weak and lethargic and answer questions slowly.  Patient work-up in ER revealed and found to have sepsis with cellulitis and UTI and also found to have acute kidney injury admit for management.  Patient is DNR per his wishes.     REVIEW OF SYSTEMS  Constitutional: Negative for chills and fever.   HENT: Negative for sore throat and trouble swallowing.    Eyes: Negative for visual disturbance.   Respiratory: Negative for cough and shortness of breath.    Cardiovascular: Negative for chest pain and leg swelling.   Gastrointestinal: Positive for nausea. Negative for abdominal pain, diarrhea and vomiting.   Endocrine: Negative.    Genitourinary: Negative for decreased urine volume and frequency.   Musculoskeletal: Negative for neck pain.   Skin: Negative for rash.   Allergic/Immunologic: Negative.    Neurological: Positive for tremors and weakness ( Generalized). Negative for numbness.   Hematological: Negative.    Psychiatric/Behavioral: Positive for confusion and decreased concentration.     PHYSICAL EXAM  Blood pressure 143/62, pulse 55, temperature 98.3 °F (36.8 °C), temperature source Oral, resp. rate 18, weight 116 kg (255 lb 11.7 oz), SpO2 96 %.    Constitutional:       General: He is in acute distress.      Appearance: He is ill-appearing.      Comments: Listless, but rouses to verbal for brief periods of time   HENT:       Head: Normocephalic and atraumatic.   Eyes:      Pupils: Pupils are equal, round, and reactive to light.   Cardiovascular:      Rate and Rhythm: Normal rate and regular rhythm.      Pulses:           Posterior tibial pulses are 2+ on the right side and 2+ on the left side.      Heart sounds: Normal heart sounds. No murmur heard.  Pulmonary:      Effort: Pulmonary effort is normal. No respiratory distress.      Breath sounds: Normal breath sounds. No wheezing.   Abdominal:      General: Bowel sounds are normal.      Palpations: Abdomen is soft.      Tenderness: There is no abdominal tenderness. There is no guarding or rebound.   Musculoskeletal:         General: Swelling and tenderness ( Left leg) present. Normal range of motion.      Cervical back: Normal range of motion.   Skin:     General: Skin is warm and dry.      Findings: Erythema ( Left lower leg and posterior thigh) present.   Neurological:      Mental Status: He is confused.      GCS: GCS eye subscore is 1. GCS verbal subscore is 4. GCS motor subscore is 6.      Cranial Nerves: No cranial nerve deficit.      Sensory: No sensory deficit.      Comments: Patient with frequent irregular, nonrhythmic, myoclonic jerks throughout limbs and torso   Psychiatric:         Mood and Affect: Affect normal.      LAB RESULTS   Lab Results (last 24 hours)     Procedure Component Value Units Date/Time    Urinalysis, Microscopic Only - Urine, Clean Catch [[id number]]  (Abnormal) Collected: 10/02/22 1209    Specimen: Urine, Clean Catch Updated: 10/02/22 1405     RBC, UA 3-5 /HPF      WBC, UA Too Numerous to Count /HPF      Bacteria, UA None Seen /HPF      Squamous Epithelial Cells, UA 0-2 /HPF      Hyaline Casts, UA None Seen /LPF      Methodology Automated Microscopy    Urinalysis With Microscopic If Indicated (No Culture) - Urine, Clean Catch [[id number]]  (Abnormal) Collected: 10/02/22 1209    Specimen: Urine, Clean Catch Updated: 10/02/22 1405     Color, UA Yellow      Appearance, UA Hazy     pH, UA 6.0     Specific Gravity, UA 1.015     Glucose, UA Negative     Ketones, UA Negative     Bilirubin, UA Negative     Blood, UA Trace     Protein, UA Trace     Leuk Esterase, UA Large (3+)     Nitrite, UA Negative     Urobilinogen, UA 0.2 E.U./dL    Urine Culture - Urine, Urine, Clean Catch [[id number]]  (Normal) Collected: 10/01/22 1317    Specimen: Urine, Clean Catch Updated: 10/02/22 1326     Urine Culture Culture in progress    Sodium, Urine, Random - Urine, Clean Catch [[id number]] Collected: 10/02/22 1209    Specimen: Urine, Clean Catch Updated: 10/02/22 1308     Sodium, Urine 82 mmol/L     Narrative:      Reference intervals for random urine have not been established.  Clinical usage is dependent upon physician's interpretation in combination with other laboratory tests.       Creatinine, Urine, Random - Urine, Clean Catch [[id number]] Collected: 10/02/22 1209    Specimen: Urine, Clean Catch Updated: 10/02/22 1308     Creatinine, Urine 59.7 mg/dL     Narrative:      Reference intervals for random urine have not been established.  Clinical usage is dependent upon physician's interpretation in combination with other laboratory tests.       Blood Culture - Blood, Arm, Left [[id number]]  (Normal) Collected: 10/01/22 1216    Specimen: Blood from Arm, Left Updated: 10/02/22 1304     Blood Culture No growth at 24 hours    Blood Culture - Blood, Arm, Right [[id number]]  (Normal) Collected: 10/01/22 1201    Specimen: Blood from Arm, Right Updated: 10/02/22 1218     Blood Culture No growth at 24 hours    POC Glucose Once [[id number]]  (Abnormal) Collected: 10/02/22 1110    Specimen: Blood Updated: 10/02/22 1111     Glucose 158 mg/dL      Comment: Meter: [id number] : [id number] José Miguel SEYMOUR       POC Glucose Once [[id number]]  (Abnormal) Collected: 10/02/22 0630    Specimen: Blood Updated: 10/02/22 0632     Glucose 138 mg/dL      Comment: Meter: [id number] : [id number] Max SEYMOUR        CBC & Differential [[id number]]  (Abnormal) Collected: 10/02/22 0436    Specimen: Blood Updated: 10/02/22 0523    Narrative:      The following orders were created for panel order CBC & Differential.  Procedure                               Abnormality         Status                     ---------                               -----------         ------                     CBC Auto Differential[[id number]]        Abnormal            Final result                 Please view results for these tests on the individual orders.    CBC Auto Differential [[id number]]  (Abnormal) Collected: 10/02/22 0436    Specimen: Blood Updated: 10/02/22 0523     WBC 11.04 10*3/mm3      RBC 2.91 10*6/mm3      Hemoglobin 8.9 g/dL      Hematocrit 27.2 %      MCV 93.5 fL      MCH 30.6 pg      MCHC 32.7 g/dL      RDW 13.8 %      RDW-SD 47.1 fl      MPV 8.6 fL      Platelets 233 10*3/mm3      Neutrophil % 87.5 %      Lymphocyte % 8.2 %      Monocyte % 3.1 %      Eosinophil % 0.6 %      Basophil % 0.2 %      Immature Grans % 0.4 %      Neutrophils, Absolute 9.67 10*3/mm3      Lymphocytes, Absolute 0.90 10*3/mm3      Monocytes, Absolute 0.34 10*3/mm3      Eosinophils, Absolute 0.07 10*3/mm3      Basophils, Absolute 0.02 10*3/mm3      Immature Grans, Absolute 0.04 10*3/mm3      nRBC 0.0 /100 WBC     TSH [[id number]]  (Normal) Collected: 10/02/22 0435    Specimen: Blood Updated: 10/02/22 0516     TSH 1.260 uIU/mL     Vancomycin, Random [[id number]]  (Normal) Collected: 10/02/22 0435    Specimen: Blood Updated: 10/02/22 0511     Vancomycin Random 17.50 mcg/mL     Narrative:      Therapeutic Ranges for Vancomycin    Vancomycin Random   5.0-40.0 mcg/mL  Vancomycin Trough   5.0-20.0 mcg/mL  Vancomycin Peak     20.0-40.0 mcg/mL    Comprehensive Metabolic Panel [[id number]]  (Abnormal) Collected: 10/02/22 0435    Specimen: Blood Updated: 10/02/22 0511     Glucose 153 mg/dL      BUN 33 mg/dL      Creatinine 1.94 mg/dL      Sodium 139 mmol/L      Potassium  4.0 mmol/L      Chloride 103 mmol/L      CO2 30.0 mmol/L      Calcium 8.6 mg/dL      Total Protein 5.3 g/dL      Albumin 2.70 g/dL      ALT (SGPT) 13 U/L      AST (SGOT) 21 U/L      Alkaline Phosphatase 49 U/L      Total Bilirubin 0.4 mg/dL      Globulin 2.6 gm/dL      A/G Ratio 1.0 g/dL      BUN/Creatinine Ratio 17.0     Anion Gap 6.0 mmol/L      eGFR 34.8 mL/min/1.73      Comment: National Kidney Foundation and American Society of Nephrology (ASN) Task Force recommended calculation based on the Chronic Kidney Disease Epidemiology Collaboration (CKD-EPI) equation refit without adjustment for race.       Narrative:      GFR Normal >60  Chronic Kidney Disease <60  Kidney Failure <15      Lipid Panel [[id number]] Collected: 10/02/22 0435    Specimen: Blood Updated: 10/02/22 0511     Total Cholesterol 87 mg/dL      Triglycerides 60 mg/dL      HDL Cholesterol 44 mg/dL      LDL Cholesterol  29 mg/dL      VLDL Cholesterol 14 mg/dL      LDL/HDL Ratio 0.70    Narrative:      Cholesterol Reference Ranges  (U.S. Department of Health and Human Services ATP III Classifications)    Desirable          <200 mg/dL  Borderline High    200-239 mg/dL  High Risk          >240 mg/dL      Triglyceride Reference Ranges  (U.S. Department of Health and Human Services ATP III Classifications)    Normal           <150 mg/dL  Borderline High  150-199 mg/dL  High             200-499 mg/dL  Very High        >500 mg/dL    HDL Reference Ranges  (U.S. Department of Health and Human Services ATP III Classifications)    Low     <40 mg/dl (major risk factor for CHD)  High    >60 mg/dl ('negative' risk factor for CHD)        LDL Reference Ranges  (U.S. Department of Health and Human Services ATP III Classifications)    Optimal          <100 mg/dL  Near Optimal     100-129 mg/dL  Borderline High  130-159 mg/dL  High             160-189 mg/dL  Very High        >189 mg/dL    Hemoglobin A1c [[id number]]  (Abnormal) Collected: 10/02/22 0435    Specimen: Blood  Updated: 10/02/22 0504     Hemoglobin A1C 5.80 %     Narrative:      Hemoglobin A1C Ranges:    Increased Risk for Diabetes  5.7% to 6.4%  Diabetes                     >= 6.5%  Diabetic Goal                < 7.0%    POC Glucose Once [[id number]]  (Abnormal) Collected: 10/01/22 2043    Specimen: Blood Updated: 10/01/22 2044     Glucose 163 mg/dL      Comment: Meter: [id number] : [id number] Max Garrido DAWN           Imaging Results (Last 24 Hours)     ** No results found for the last 24 hours. **          Current Facility-Administered Medications:   •  acetaminophen (TYLENOL) tablet 650 mg, 650 mg, Oral, Q6H PRN, Jean العراقي MD  •  aspirin chewable tablet 81 mg, 81 mg, Oral, Daily, Jean العراقي MD, 81 mg at 10/02/22 1028  •  carvedilol (COREG) tablet 6.25 mg, 6.25 mg, Oral, BID With Meals, Jean العراقي MD, 6.25 mg at 10/02/22 1028  •  cefepime 2 gm IVPB in 100 ml NS (VTB), 2 g, Intravenous, Q12H, Miguel Anderson MD  •  famotidine (PEPCID) tablet 20 mg, 20 mg, Oral, BID, Jean العراقي MD, 20 mg at 10/02/22 1029  •  insulin lispro (ADMELOG) injection 0-7 Units, 0-7 Units, Subcutaneous, 4x Daily With Meals & Nightly, Jean العراقي MD, 2 Units at 10/02/22 1204  •  Pharmacy to dose vancomycin, , Does not apply, Continuous PRN, Jean العراقي MD  •  polyethylene glycol (MIRALAX) packet 17 g, 17 g, Oral, Daily, Jean العراقي MD, 17 g at 10/02/22 1028  •  rosuvastatin (CRESTOR) tablet 5 mg, 5 mg, Oral, Nightly, Jean العراقي MD, 5 mg at 10/01/22 2242  •  senna (SENOKOT) tablet 1 tablet, 1 tablet, Oral, Daily, Jean العراقي MD, 1 tablet at 10/02/22 1028  •  sertraline (ZOLOFT) tablet 75 mg, 75 mg, Oral, Nightly, Jean العراقي MD, 75 mg at 10/01/22 2242  •  sodium chloride 0.45 % infusion, 75 mL/hr, Intravenous, Continuous, Jean العراقي MD, Last Rate: 75 mL/hr at 10/02/22 1038, 75 mL/hr at 10/02/22 1038  •  sodium chloride 0.9 % flush 10 mL, 10 mL, Intravenous, PRN, Rachna Marvin MD, 10 mL at 10/01/22 2242  •  tamsulosin  (FLOMAX) 24 hr capsule 0.4 mg, 0.4 mg, Oral, Nightly, Tato العراقي MD, 0.4 mg at 10/01/22 3775  •  Vancomycin Pharmacy Intermittent/Pulse Dosing, , Does not apply, Daily, Tato العراقي MD, Given at 10/02/22 1038     ASSESSMENT  Acute UTI with sepsis  Left lower extremity cellulitis with sepsis  Acute kidney injury  Chronic kidney disease stage III  Diabetes mellitus  Hypertension  Hyperlipidemia  Chronic hypoxic hypercapnic respiratory failure  Obstructive sleep apnea  BPH  Immobility syndrome  Gastroesophageal reflux disease    PLAN  CPM  IVF  IV antibiotics   Adjust nursing home medications  Stress ulcer DVT prophylaxis  Nephrology consult appreciated  Infectious disease to follow patient  Supportive care  DNR  PT OT  Discussed with nursing staff  Follow closely further recommendation about hospital course    TATO العراقي MD

## 2022-10-02 NOTE — PLAN OF CARE
Goal Outcome Evaluation:  Plan of Care Reviewed With: patient      Pt's left leg is very swollen, hot, red, shiny and very painful with any movement or touch. Right leg is also reddened but but not as severe as the left and not as painful or red.   Pt had a snack and his meds and IVF's were started. He tolerated all this well. Pt was able to sleep pretty well most of the shift, safety maintained, continue plan of care. Wound care should see pt today for further treatment of lower extremities,

## 2022-10-03 LAB
ANION GAP SERPL CALCULATED.3IONS-SCNC: 6.7 MMOL/L (ref 5–15)
BACTERIA BLD CULT: ABNORMAL
BACTERIA SPEC AEROBE CULT: ABNORMAL
BASOPHILS # BLD AUTO: 0.02 10*3/MM3 (ref 0–0.2)
BASOPHILS NFR BLD AUTO: 0.2 % (ref 0–1.5)
BUN SERPL-MCNC: 29 MG/DL (ref 8–23)
BUN/CREAT SERPL: 16 (ref 7–25)
CALCIUM SPEC-SCNC: 8.4 MG/DL (ref 8.6–10.5)
CHLORIDE SERPL-SCNC: 101 MMOL/L (ref 98–107)
CO2 SERPL-SCNC: 31.3 MMOL/L (ref 22–29)
CREAT SERPL-MCNC: 1.81 MG/DL (ref 0.76–1.27)
DEPRECATED RDW RBC AUTO: 49.4 FL (ref 37–54)
EGFRCR SERPLBLD CKD-EPI 2021: 37.8 ML/MIN/1.73
EOSINOPHIL # BLD AUTO: 0.07 10*3/MM3 (ref 0–0.4)
EOSINOPHIL NFR BLD AUTO: 0.6 % (ref 0.3–6.2)
ERYTHROCYTE [DISTWIDTH] IN BLOOD BY AUTOMATED COUNT: 13.9 % (ref 12.3–15.4)
GLUCOSE BLDC GLUCOMTR-MCNC: 118 MG/DL (ref 70–130)
GLUCOSE BLDC GLUCOMTR-MCNC: 130 MG/DL (ref 70–130)
GLUCOSE BLDC GLUCOMTR-MCNC: 272 MG/DL (ref 70–130)
GLUCOSE BLDC GLUCOMTR-MCNC: 98 MG/DL (ref 70–130)
GLUCOSE SERPL-MCNC: 137 MG/DL (ref 65–99)
HCT VFR BLD AUTO: 27.2 % (ref 37.5–51)
HGB BLD-MCNC: 8.7 G/DL (ref 13–17.7)
IMM GRANULOCYTES # BLD AUTO: 0.06 10*3/MM3 (ref 0–0.05)
IMM GRANULOCYTES NFR BLD AUTO: 0.5 % (ref 0–0.5)
LYMPHOCYTES # BLD AUTO: 0.81 10*3/MM3 (ref 0.7–3.1)
LYMPHOCYTES NFR BLD AUTO: 6.7 % (ref 19.6–45.3)
MCH RBC QN AUTO: 30.5 PG (ref 26.6–33)
MCHC RBC AUTO-ENTMCNC: 32 G/DL (ref 31.5–35.7)
MCV RBC AUTO: 95.4 FL (ref 79–97)
MONOCYTES # BLD AUTO: 0.3 10*3/MM3 (ref 0.1–0.9)
MONOCYTES NFR BLD AUTO: 2.5 % (ref 5–12)
NEUTROPHILS NFR BLD AUTO: 10.8 10*3/MM3 (ref 1.7–7)
NEUTROPHILS NFR BLD AUTO: 89.5 % (ref 42.7–76)
NRBC BLD AUTO-RTO: 0 /100 WBC (ref 0–0.2)
PLATELET # BLD AUTO: 234 10*3/MM3 (ref 140–450)
PMV BLD AUTO: 9.5 FL (ref 6–12)
POTASSIUM SERPL-SCNC: 4.3 MMOL/L (ref 3.5–5.2)
RBC # BLD AUTO: 2.85 10*6/MM3 (ref 4.14–5.8)
SODIUM SERPL-SCNC: 139 MMOL/L (ref 136–145)
VANCOMYCIN SERPL-MCNC: 18 MCG/ML (ref 5–40)
WBC NRBC COR # BLD: 12.06 10*3/MM3 (ref 3.4–10.8)

## 2022-10-03 PROCEDURE — 80048 BASIC METABOLIC PNL TOTAL CA: CPT | Performed by: HOSPITALIST

## 2022-10-03 PROCEDURE — 82962 GLUCOSE BLOOD TEST: CPT

## 2022-10-03 PROCEDURE — 63710000001 INSULIN LISPRO (HUMAN) PER 5 UNITS: Performed by: HOSPITALIST

## 2022-10-03 PROCEDURE — 80202 ASSAY OF VANCOMYCIN: CPT | Performed by: HOSPITALIST

## 2022-10-03 PROCEDURE — 87040 BLOOD CULTURE FOR BACTERIA: CPT | Performed by: HOSPITALIST

## 2022-10-03 PROCEDURE — 25010000002 CEFTRIAXONE PER 250 MG: Performed by: INTERNAL MEDICINE

## 2022-10-03 PROCEDURE — 25010000002 CEFEPIME PER 500 MG: Performed by: INTERNAL MEDICINE

## 2022-10-03 PROCEDURE — 85025 COMPLETE CBC W/AUTO DIFF WBC: CPT | Performed by: HOSPITALIST

## 2022-10-03 PROCEDURE — 25010000002 VANCOMYCIN 10 G RECONSTITUTED SOLUTION: Performed by: HOSPITALIST

## 2022-10-03 RX ORDER — AMLODIPINE BESYLATE 5 MG/1
5 TABLET ORAL
Status: DISCONTINUED | OUTPATIENT
Start: 2022-10-03 | End: 2022-10-04

## 2022-10-03 RX ORDER — CLOTRIMAZOLE AND BETAMETHASONE DIPROPIONATE 10; .64 MG/G; MG/G
1 CREAM TOPICAL EVERY 12 HOURS SCHEDULED
Status: DISCONTINUED | OUTPATIENT
Start: 2022-10-03 | End: 2022-10-11 | Stop reason: HOSPADM

## 2022-10-03 RX ADMIN — SENNOSIDES 1 TABLET: 8.6 TABLET, FILM COATED ORAL at 08:14

## 2022-10-03 RX ADMIN — CEFTRIAXONE 2 G: 2 INJECTION, POWDER, FOR SOLUTION INTRAMUSCULAR; INTRAVENOUS at 20:02

## 2022-10-03 RX ADMIN — AMLODIPINE BESYLATE 5 MG: 5 TABLET ORAL at 16:15

## 2022-10-03 RX ADMIN — ASPIRIN 81 MG: 81 TABLET, CHEWABLE ORAL at 08:14

## 2022-10-03 RX ADMIN — SODIUM CHLORIDE 75 ML/HR: 4.5 INJECTION, SOLUTION INTRAVENOUS at 06:42

## 2022-10-03 RX ADMIN — ACETAMINOPHEN 650 MG: 325 TABLET, FILM COATED ORAL at 08:18

## 2022-10-03 RX ADMIN — SERTRALINE 75 MG: 25 TABLET, FILM COATED ORAL at 20:07

## 2022-10-03 RX ADMIN — INSULIN GLARGINE-YFGN 10 UNITS: 100 INJECTION, SOLUTION SUBCUTANEOUS at 23:07

## 2022-10-03 RX ADMIN — FAMOTIDINE 20 MG: 20 TABLET ORAL at 20:03

## 2022-10-03 RX ADMIN — VANCOMYCIN HYDROCHLORIDE 1250 MG: 10 INJECTION, POWDER, LYOPHILIZED, FOR SOLUTION INTRAVENOUS at 12:48

## 2022-10-03 RX ADMIN — TAMSULOSIN HYDROCHLORIDE 0.4 MG: 0.4 CAPSULE ORAL at 20:03

## 2022-10-03 RX ADMIN — CEFEPIME 2 G: 2 INJECTION, POWDER, FOR SOLUTION INTRAVENOUS at 04:49

## 2022-10-03 RX ADMIN — CARVEDILOL 3.12 MG: 3.12 TABLET, FILM COATED ORAL at 08:15

## 2022-10-03 RX ADMIN — FAMOTIDINE 20 MG: 20 TABLET ORAL at 08:15

## 2022-10-03 RX ADMIN — ROSUVASTATIN CALCIUM 5 MG: 5 TABLET, FILM COATED ORAL at 20:03

## 2022-10-03 RX ADMIN — POLYETHYLENE GLYCOL 3350 17 G: 17 POWDER, FOR SOLUTION ORAL at 08:14

## 2022-10-03 RX ADMIN — INSULIN LISPRO 4 UNITS: 100 INJECTION, SOLUTION INTRAVENOUS; SUBCUTANEOUS at 12:48

## 2022-10-03 RX ADMIN — CARVEDILOL 3.12 MG: 3.12 TABLET, FILM COATED ORAL at 17:22

## 2022-10-03 NOTE — PLAN OF CARE
Goal Outcome Evaluation:  Plan of Care Reviewed With: patient        Progress: improving  Outcome Evaluation: blood cultures positive today. pt on room air, weaned from 1L. wound RN saw patient today for left leg cellulitis, will reassess tomorrow. cont IV abx, stop IVFs. pt eating and drinking well. q2 turn. pt incont, had a couple BMs today. discharge plans TBD.

## 2022-10-03 NOTE — PROGRESS NOTES
[facility] Progress Note      Patient Name: Yakov Dubon  : 1945  MRN: [id number]  Primary Care Physician:  Melissa Steven MD  Date of admission: 10/1/2022    Subjective     Interval History:   The patient was seen and examined today for follow-up on CARLOS   No new issues noted today.    Review of Systems:   As noted above    Objective     Vitals:   Temp:  [98.1 °F (36.7 °C)-98.8 °F (37.1 °C)] 98.3 °F (36.8 °C)  Heart Rate:  [53-74] 53  Resp:  [18] 18  BP: (143-161)/(54-76) 145/74  Flow (L/min):  [1] 1    Intake/Output Summary (Last 24 hours) at 10/3/2022 1018  Last data filed at 10/3/2022 0928  Gross per 24 hour   Intake 2650 ml   Output 0 ml   Net 2650 ml       Physical Exam:    General Appearance: alert, oriented x 3, no acute distress   Skin: warm and dry  HEENT: oral mucosa normal, nonicteric sclera  Neck: supple, no JVD  Lungs: CTA  Heart: RRR, normal S1 and S2  Abdomen: soft, nontender, nondistended  : no palpable bladder  Extremities: Bilateral lower extremity edema, no cyanosis or clubbing  Neuro: normal speech and mental status     Scheduled Meds:     aspirin, 81 mg, Oral, Daily  carvedilol, 3.125 mg, Oral, BID With Meals  cefepime, 2 g, Intravenous, Q12H  famotidine, 20 mg, Oral, BID  insulin glargine, 10 Units, Subcutaneous, Nightly  insulin lispro, 0-7 Units, Subcutaneous, 4x Daily With Meals & Nightly  polyethylene glycol, 17 g, Oral, Daily  rosuvastatin, 5 mg, Oral, Nightly  senna, 1 tablet, Oral, Daily  sertraline, 75 mg, Oral, Nightly  tamsulosin, 0.4 mg, Oral, Nightly  Vancomycin Pharmacy Intermittent/Pulse Dosing, , Does not apply, Daily      IV Meds:   Pharmacy to dose vancomycin,   sodium chloride, 75 mL/hr, Last Rate: 75 mL/hr (10/03/22 0642)        Results Reviewed:   I have personally reviewed the results from the time of this admission to 10/3/2022 10:18 EDT     Results from last 7 days   Lab Units 10/03/22  0352 10/02/22  0435 10/01/22  1201   SODIUM  mmol/L 139 139 143   POTASSIUM mmol/L 4.3 4.0 4.5   CHLORIDE mmol/L 101 103 100   CO2 mmol/L 31.3* 30.0* 32.0*   BUN mg/dL 29* 33* 37*   CREATININE mg/dL 1.81* 1.94* 2.38*   CALCIUM mg/dL 8.4* 8.6 9.7   BILIRUBIN mg/dL  --  0.4 0.3   ALK PHOS U/L  --  49 56   ALT (SGPT) U/L  --  13 6   AST (SGOT) U/L  --  21 13   GLUCOSE mg/dL 137* 153* 115*       Estimated Creatinine Clearance: 44 mL/min (A) (by C-G formula based on SCr of 1.81 mg/dL (H)).    Results from last 7 days   Lab Units 10/01/22  1201   MAGNESIUM mg/dL 2.1             Results from last 7 days   Lab Units 10/03/22  0352 10/02/22  0436 10/01/22  1201   WBC 10*3/mm3 12.06* 11.04* 12.40*   HEMOGLOBIN g/dL 8.7* 8.9* 11.1*   PLATELETS 10*3/mm3 234 233 349             Assessment / Plan     ASSESSMENT:  CARLOS/CKD3-  Previous baseline creatinine of 1.5-1.6.  Recently worsened to 2.38 but improved today to 1.8.  Getting IVF's at this time.  Continue supportive care and monitor.  Cellulitis-  ID consulted.  On cefepime/vanc now.  HTN-  Controlled  DMII-  On ssi.           PLAN:  We will DC IV fluid given lower extremity edema.  Encourage p.o. intake  Continue surveillance labs      Thank you for involving us in the care of Yakov BARRERA Jagdish.  Please feel free to call with any questions.    Darling Maciel MD  10/03/22  10:18 EDT    [facility]  [phone]      Much of this encounter note is an electronic transcription/translation of spoken language to printed text. The electronic translation of spoken language may permit erroneous, or at times, nonsensical words or phrases to be inadvertently transcribed; Although I have reviewed the note for such errors, some may still exist.

## 2022-10-03 NOTE — PROGRESS NOTES
Discharge Planning Assessment  [facility]     Patient Name: Yakov Dubon  MRN: [id number]  Today's Date: 10/3/2022    Admit Date: 10/1/2022    Plan: Return to [facility] LTC/SNF   Discharge Needs Assessment     Row Name 10/03/22 1627       Living Environment    People in Home facility resident    Current Living Arrangements extended care facility    Provides Primary Care For no one, unable/limited ability to care for self    Family Caregiver if Needed sibling(s)    Quality of Family Relationships involved    Able to Return to Prior Arrangements yes       Resource/Environmental Concerns    Resource/Environmental Concerns none       Transition Planning    Patient/Family Anticipates Transition to long-term care facility       Discharge Needs Assessment    Readmission Within the Last 30 Days no previous admission in last 30 days    Equipment Currently Used at Home wheelchair    Concerns to be Addressed adjustment to diagnosis/illness;no discharge needs identified    Equipment Needed After Discharge none    Discharge Facility/Level of Care Needs nursing facility, intermediate               Discharge Plan     Row Name 10/03/22 1628       Plan    Plan Return to [facility] LTC/SNF    Plan Comments Pt is from an  level of care with medicaid bed hold @ [facility], [facility] will accept back per Epic referral. CCP will confirm dc plan and transportation needs with pt's NOK/emergency contact, sister-in-law, Mitzy; [facility] [phone].              Continued Care and Services - Admitted Since 10/1/2022     Destination Coordination complete.    Service Provider Request Status Selected Services Address Phone Fax Patient Preferred    [facility]   Selected Skilled Nursing [address] [id number] [phone] --    KELI CANALES [facility]  Pending - Request Sent N/A [address] [facility] [address] [phone] --             Selected Continued Care - Prior Encounters Includes selections from prior encounters from 7/3/2022 to 10/3/2022    Discharged on 8/19/2022 Admission date: 8/11/2022 - Discharge disposition: Intermediate Care     Destination     Service Provider Selected Services Address Phone Fax Patient Preferred    [address] [place] [address] [phone] --                    Expected Discharge Date and Time     Expected Discharge Date Expected Discharge Time    Oct 5, 2022          Demographic Summary    No documentation.                Functional Status     Row Name 10/03/22 1628       Functional Status, IADL    Medications completely dependent    Meal Preparation completely dependent    Housekeeping completely dependent    Laundry completely dependent    Shopping completely dependent       Mental Status Summary    Recent Changes in Mental Status/Cognitive Functioning unable to assess               Psychosocial    No documentation.                Abuse/Neglect    No documentation.                Legal    No documentation.                Substance Abuse    No documentation.                Patient Forms    No documentation.                   Shahrzad Robertson, RN

## 2022-10-03 NOTE — PLAN OF CARE
Goal Outcome Evaluation:              Outcome Evaluation: Pt alert , sleeping on and off during shift. Easy to arouse. Turning/repositioning. LLE red/hot/shiny. Pt states he is only in pain when he moves too much. No attempts at unassisted self transfers. Safety maintained.

## 2022-10-03 NOTE — SIGNIFICANT NOTE
10/03/22 1539   OTHER   Discipline physical therapist   Rehab Time/Intention   Session Not Performed other (see comments)  (Pt undressed in bed, needs to cleaned up/ changed. Will follow up as time allows)   Therapy Assessment/Plan (PT)   Criteria for Skilled Interventions Met (PT) other (see comments)   Recommendation   PT - Next Appointment 10/04/22

## 2022-10-03 NOTE — DISCHARGE PLACEMENT REQUEST
"Yakov Dubon (77 y.o. Male)             Date of Birth   1945    Social Security Number       Address   [address] [place] [address]    Home Phone   [phone]    MRN   [id number]       [employer]   [employer]    Marital Status                               Admission Date   10/1/22    Admission Type   Emergency    Admitting Provider   Jean العراقي MD    Attending Provider   Jean العراقي MD    Department, Room/Bed   [address], [address]       Discharge Date       Discharge Disposition       Discharge Destination                               Attending Provider: Jean العراقي MD    Allergies: Bacitracin, Gentamycin [Gentamicin], Neosporin [Neomycin-bacitracin Zn-polymyx], Nsaids, Tobramycin    Isolation: None   Infection: None   Code Status: No CPR   Advance Care Planning Activity    Ht: 182 cm (71.65\")   Wt: 116 kg (255 lb 11.7 oz)    Admission Cmt: None   Principal Problem: None                Active Insurance as of 10/1/2022     Primary Coverage     Payor Plan Insurance Group Employer/Plan Group    MEDICARE MEDICARE A & B      Payor Plan Address Payor Plan Phone Number Payor Plan Fax Number Effective Dates    [address] [phone]  9/1/2004 - None Entered    [facility] [id number]       Subscriber Name Subscriber Birth Date Member ID       YAKOV DUBON R 1945 [id number]           Secondary Coverage     Payor Plan Insurance Group Employer/Plan Group    ANTHE[employer] BLUE CROSS [id number]     Payor Plan Address Payor Plan Phone Number Payor Plan Fax Number Effective Dates    [address]   1/1/2002 - None Entered    [facility] [id number]       Subscriber Name Subscriber Birth Date Member ID       YAKOV DUBON R 1945 [id number]           Tertiary Coverage     Payor Plan Insurance Group Employer/Plan Group    [place]      Payor Plan Address Payor Plan Phone Number Payor Plan Fax Number Effective Dates    [address] [phone]  8/11/2022 - None Entered    [place] [id number]       Subscriber Name Subscriber Birth Date Member ID       JERMAIN HICKS 1945 [id number]                 Emergency Contacts      (Rel.) Home Phone Work Phone Mobile Phone    Mitzy Sheffield (Guardian) [phone] -- --

## 2022-10-03 NOTE — PLAN OF CARE
Goal Outcome Evaluation:  Plan of Care Reviewed With: patient        Progress: no change  Outcome Evaluation: Pt VSS overnight, remains on 1L oxygen, IV fluids and ABX con't, resting comfortably, plan to go back to [facility], safety maintained, will CTM cl

## 2022-10-03 NOTE — PROGRESS NOTES
Infectious Diseases Progress Note    Miguel Anderson MD     [facility]  [place]: 1 day  Patient Identification:  Name: Yakov Dubon  Age: 77 y.o.  Sex: male  :  1945  MRN: [id number]         Primary Care Physician: Melissa Steven MD            Subjective: still have discomfort in the left leg when he moves.  Interval History: See consultation note.     Objective:    Scheduled Meds:aspirin, 81 mg, Oral, Daily  carvedilol, 3.125 mg, Oral, BID With Meals  cefepime, 2 g, Intravenous, Q12H  famotidine, 20 mg, Oral, BID  insulin glargine, 10 Units, Subcutaneous, Nightly  insulin lispro, 0-7 Units, Subcutaneous, 4x Daily With Meals & Nightly  polyethylene glycol, 17 g, Oral, Daily  rosuvastatin, 5 mg, Oral, Nightly  senna, 1 tablet, Oral, Daily  sertraline, 75 mg, Oral, Nightly  tamsulosin, 0.4 mg, Oral, Nightly  Vancomycin Pharmacy Intermittent/Pulse Dosing, , Does not apply, Daily      Continuous Infusions:Pharmacy to dose vancomycin,   sodium chloride, 75 mL/hr, Last Rate: 75 mL/hr (10/02/22 1038)        Vital signs in last 24 hours:  Temp:  [98.1 °F (36.7 °C)-98.8 °F (37.1 °C)] 98.8 °F (37.1 °C)  Heart Rate:  [55-79] 58  Resp:  [18-20] 18  BP: (122-150)/(54-66) 150/54    Intake/Output:    Intake/Output Summary (Last 24 hours) at 10/2/2022 2120  Last data filed at 10/2/2022 1819  Gross per 24 hour   Intake 2210 ml   Output 750 ml   Net 1460 ml       Exam:  /54 (BP Location: Right arm, Patient Position: Lying)   Pulse 58   Temp 98.8 °F (37.1 °C) (Oral)   Resp 18   Wt 116 kg (255 lb 11.7 oz)   SpO2 97%   BMI 35.02 kg/m²   Patient is examined using the personal protective equipment as per guidelines from infection control for this particular patient as enacted.  Hand washing was performed before and after patient interaction.  General Appearance:    Alert, cooperative, no distress, AAOx3                          Head:    Normocephalic, without obvious abnormality, atraumatic                            Eyes:    PERRL, conjunctivae/corneas clear, EOM's intact, both eyes                         Throat:   Lips, tongue, gums normal; oral mucosa pink and moist                           Neck:   Supple, symmetrical, trachea midline, no JVD                         Lungs:    Clear to auscultation bilaterally, respirations unlabored                 Chest Wall:    No tenderness or deformity                          Heart:    Regular rate and rhythm, S1 and S2 normal, no murmur, no rub                                         or gallop                  Abdomen:     Soft, non-tender, bowel sounds active, no masses, no                                                        organomegaly                  Extremities:                                           Skin: Cellulitic changes involving bilateral lower extremity left greater than right.                  Neurologic: At times lethargic and confused       Data Review:    I reviewed the patient's new clinical results.  Results from last 7 days   Lab Units 10/02/22  0436 10/01/22  1201   WBC 10*3/mm3 11.04* 12.40*   HEMOGLOBIN g/dL 8.9* 11.1*   PLATELETS 10*3/mm3 233 349     Results from last 7 days   Lab Units 10/02/22  0435 10/01/22  1201   SODIUM mmol/L 139 143   POTASSIUM mmol/L 4.0 4.5   CHLORIDE mmol/L 103 100   CO2 mmol/L 30.0* 32.0*   BUN mg/dL 33* 37*   CREATININE mg/dL 1.94* 2.38*   CALCIUM mg/dL 8.6 9.7   GLUCOSE mg/dL 153* 115*     Microbiology Results (last 10 days)     Procedure Component Value - Date/Time    Urine Culture - Urine, Urine, Clean Catch [[id number]]  (Abnormal) Collected: 10/01/22 1317    Lab Status: Preliminary result Specimen: Urine, Clean Catch Updated: 10/02/22 2011     Urine Culture >100,000 CFU/mL Lactose     Narrative:      Colonization of the urinary tract without infection is common. Treatment is discouraged unless the patient is symptomatic, pregnant, or undergoing an invasive urologic procedure.    Blood Culture - Blood,  Arm, Left [[id number]]  (Normal) Collected: 10/01/22 1216    Lab Status: Preliminary result Specimen: Blood from Arm, Left Updated: 10/02/22 1304     Blood Culture No growth at 24 hours    Blood Culture - Blood, Arm, Right [[id number]]  (Normal) Collected: 10/01/22 1201    Lab Status: Preliminary result Specimen: Blood from Arm, Right Updated: 10/02/22 1218     Blood Culture No growth at 24 hours        CT Head Without Contrast    Result Date: 10/1/2022  No acute intracranial hemorrhage. At least mild small vessel ischemic disease, progressed from 2012. If there is clinical concern for acute infarction, this would be better assessed with MRI.  Discussed with Dr. Marvin at 12:58 PM.  This report was finalized on 10/1/2022 12:59 PM by Dr. Ping Bone M.D.      XR Chest 1 View    Result Date: 10/1/2022  Stable bibasilar atelectasis or scarring  This report was finalized on 10/1/2022 12:34 PM by Dr. Jarrett Pacheco M.D.            Assessment:    Left leg cellulitis  1-toxic metabolic encephalopathy secondary to  2-urinary tract infection with superimposed cellulitis of left lower extremity with lymphangitis in the setting of  3-chronic venous stasis changes and edema of the lower extremity along with immobility  4-other diagnosis per primary team.     Recommendations/Discussions:  · Continue present antibiotics  · Continue with elevation of lower extremities  · Follow-up on blood culture results and urine culture results  · Monitor renal function and adjust antibiotic treatment based on the urine culture results.  Miguel Anderson MD  10/2/2022  21:20 EDT    Much of this encounter note is an electronic transcription/translation of spoken language to printed text. The electronic translation of spoken language may permit erroneous, or at times, nonsensical words or phrases to be inadvertently transcribed; Although I have reviewed the note for such errors, some may still exist

## 2022-10-03 NOTE — PROGRESS NOTES
Daily progress note    Primary care physician      Chief complaint  Doing better this morning with no new complaints and denies any fever chills.    History of present illness  77-year-old white male with very complex past medical history including chronic hypoxic hypercapnic respiratory failure diabetes mellitus hypertension diastolic CHF obstructive sleep apnea and chronic kidney disease stage III who is a nursing home resident brought to the emergency room with altered mental status.  Patient in no respite distress but very weak and lethargic and answer questions slowly.  Patient work-up in ER revealed and found to have sepsis with cellulitis and UTI and also found to have acute kidney injury admit for management.  Patient is DNR per his wishes.     REVIEW OF SYSTEMS  Unremarkable except for weakness.  PHYSICAL EXAM  Blood pressure 178/78, pulse 71, temperature 98.5 °F (36.9 °C), temperature source Oral, resp. rate 18, weight 116 kg (255 lb 11.7 oz), SpO2 92 %.    Constitutional:       General: Awake and alert in no distress.  HENT:      Head: Normocephalic and atraumatic.   Eyes:      Pupils: Pupils are equal, round, and reactive to light.   Cardiovascular:      Rate and Rhythm: Normal rate and regular rhythm.      Pulses:           Posterior tibial pulses are 2+ on the right side and 2+ on the left side.      Heart sounds: Normal heart sounds. No murmur heard.  Pulmonary:      Effort: Pulmonary effort is normal. No respiratory distress.      Breath sounds: Normal breath sounds. No wheezing.   Abdominal:      General: Bowel sounds are normal.      Palpations: Abdomen is soft.      Tenderness: There is no abdominal tenderness. There is no guarding or rebound.   Musculoskeletal:         General: Swelling and tenderness ( Left leg) present. Normal range of motion.      Cervical back: Normal range of motion.   Skin:     General: Skin is warm and dry.      Findings: Erythema ( Left lower leg and posterior  thigh) present.   Neurological:      Mental Status: He is confused.      GCS: GCS eye subscore is 1. GCS verbal subscore is 4. GCS motor subscore is 6.      Cranial Nerves: No cranial nerve deficit.      Sensory: No sensory deficit.      Comments: Patient with frequent irregular, nonrhythmic, myoclonic jerks throughout limbs and torso   Psychiatric:         Mood and Affect: Affect normal.      LAB RESULTS   Lab Results (last 24 hours)     Procedure Component Value Units Date/Time    Blood Culture - Blood, Arm, Right [[id number]]  (Normal) Collected: 10/01/22 1201    Specimen: Blood from Arm, Right Updated: 10/03/22 1217     Blood Culture No growth at 2 days    POC Glucose Once [[id number]]  (Abnormal) Collected: 10/03/22 1150    Specimen: Blood Updated: 10/03/22 1152     Glucose 272 mg/dL      Comment: Meter: [id number] : [id number] Clay SEYMOUR       Urine Culture - Urine, Urine, Clean Catch [[id number]]  (Abnormal)  (Susceptibility) Collected: 10/01/22 1317    Specimen: Urine, Clean Catch Updated: 10/03/22 1047     Urine Culture >100,000 CFU/mL Escherichia coli    Narrative:      Colonization of the urinary tract without infection is common. Treatment is discouraged unless the patient is symptomatic, pregnant, or undergoing an invasive urologic procedure.    Susceptibility      Escherichia coli      COURTNEY      Ampicillin Resistant     Ampicillin + Sulbactam Intermediate      Cefazolin Susceptible      Cefepime Susceptible      Ceftazidime Susceptible      Ceftriaxone Susceptible      Gentamicin Susceptible      Levofloxacin Resistant     Nitrofurantoin Susceptible      Piperacillin + Tazobactam Susceptible      Trimethoprim + Sulfamethoxazole Susceptible                    Linear View                   Blood Culture ID, PCR - Blood, Arm, Left [[id number]]  (Abnormal) Collected: 10/01/22 1216    Specimen: Blood from Arm, Left Updated: 10/03/22 0815     BCID, PCR Eschericia coli. Identification by BCID2  PCR.    Blood Culture - Blood, Arm, Left [[id number]]  (Abnormal) Collected: 10/01/22 1216    Specimen: Blood from Arm, Left Updated: 10/03/22 0655     Blood Culture Gram Negative Bacilli     Isolated from Anaerobic Bottle     Gram Stain Anaerobic Bottle Gram negative bacilli    POC Glucose Once [[id number]]  (Normal) Collected: 10/03/22 0636    Specimen: Blood Updated: 10/03/22 0637     Glucose 130 mg/dL      Comment: Meter: [id number] : [id number] Lamont SEYMOUR       Vancomycin, Random [[id number]]  (Normal) Collected: 10/03/22 0352    Specimen: Blood Updated: 10/03/22 0449     Vancomycin Random 18.00 mcg/mL     Narrative:      Therapeutic Ranges for Vancomycin    Vancomycin Random   5.0-40.0 mcg/mL  Vancomycin Trough   5.0-20.0 mcg/mL  Vancomycin Peak     20.0-40.0 mcg/mL    Basic Metabolic Panel [[id number]]  (Abnormal) Collected: 10/03/22 0352    Specimen: Blood Updated: 10/03/22 0449     Glucose 137 mg/dL      BUN 29 mg/dL      Creatinine 1.81 mg/dL      Sodium 139 mmol/L      Potassium 4.3 mmol/L      Chloride 101 mmol/L      CO2 31.3 mmol/L      Calcium 8.4 mg/dL      BUN/Creatinine Ratio 16.0     Anion Gap 6.7 mmol/L      eGFR 37.8 mL/min/1.73      Comment: National Kidney Foundation and American Society of Nephrology (ASN) Task Force recommended calculation based on the Chronic Kidney Disease Epidemiology Collaboration (CKD-EPI) equation refit without adjustment for race.       Narrative:      GFR Normal >60  Chronic Kidney Disease <60  Kidney Failure <15      CBC & Differential [[id number]]  (Abnormal) Collected: 10/03/22 0352    Specimen: Blood Updated: 10/03/22 0429    Narrative:      The following orders were created for panel order CBC & Differential.  Procedure                               Abnormality         Status                     ---------                               -----------         ------                     CBC Auto Differential[[id number]]        Abnormal            Final result                  Please view results for these tests on the individual orders.    CBC Auto Differential [[id number]]  (Abnormal) Collected: 10/03/22 0352    Specimen: Blood Updated: 10/03/22 0429     WBC 12.06 10*3/mm3      RBC 2.85 10*6/mm3      Hemoglobin 8.7 g/dL      Hematocrit 27.2 %      MCV 95.4 fL      MCH 30.5 pg      MCHC 32.0 g/dL      RDW 13.9 %      RDW-SD 49.4 fl      MPV 9.5 fL      Platelets 234 10*3/mm3      Neutrophil % 89.5 %      Lymphocyte % 6.7 %      Monocyte % 2.5 %      Eosinophil % 0.6 %      Basophil % 0.2 %      Immature Grans % 0.5 %      Neutrophils, Absolute 10.80 10*3/mm3      Lymphocytes, Absolute 0.81 10*3/mm3      Monocytes, Absolute 0.30 10*3/mm3      Eosinophils, Absolute 0.07 10*3/mm3      Basophils, Absolute 0.02 10*3/mm3      Immature Grans, Absolute 0.06 10*3/mm3      nRBC 0.0 /100 WBC     POC Glucose Once [[id number]]  (Abnormal) Collected: 10/02/22 2101    Specimen: Blood Updated: 10/02/22 2103     Glucose 150 mg/dL      Comment: Meter: [id number] : [id number] Lamont SEYMOUR       POC Glucose Once [[id number]]  (Abnormal) Collected: 10/02/22 1637    Specimen: Blood Updated: 10/02/22 1640     Glucose 134 mg/dL      Comment: Meter: [id number] : [id number] José Miguel SEYMOUR           Imaging Results (Last 24 Hours)     ** No results found for the last 24 hours. **          Current Facility-Administered Medications:   •  acetaminophen (TYLENOL) tablet 650 mg, 650 mg, Oral, Q6H PRN, Jean العراقي MD, 650 mg at 10/03/22 0818  •  aspirin chewable tablet 81 mg, 81 mg, Oral, Daily, Jean العراقي MD, 81 mg at 10/03/22 0814  •  carvedilol (COREG) tablet 3.125 mg, 3.125 mg, Oral, BID With Meals, Jean العراقي MD, 3.125 mg at 10/03/22 0815  •  cefepime 2 gm IVPB in 100 ml NS (VTB), 2 g, Intravenous, Q12H, Miguel Anderson MD, 2 g at 10/03/22 0449  •  famotidine (PEPCID) tablet 20 mg, 20 mg, Oral, BID, Jean العراقي MD, 20 mg at 10/03/22 0815  •  insulin glargine (LANTUS, SEMGLEE) injection 10  Units, 10 Units, Subcutaneous, Nightly, Tato العراقي MD, 10 Units at 10/02/22 2113  •  insulin lispro (ADMELOG) injection 0-7 Units, 0-7 Units, Subcutaneous, 4x Daily With Meals & Nightly, Tato العراقي MD, 4 Units at 10/03/22 1248  •  Pharmacy to dose vancomycin, , Does not apply, Continuous PRN, Tato العراقي MD  •  polyethylene glycol (MIRALAX) packet 17 g, 17 g, Oral, Daily, Tato العراقي MD, 17 g at 10/03/22 0814  •  rosuvastatin (CRESTOR) tablet 5 mg, 5 mg, Oral, Nightly, Tato العراقي MD, 5 mg at 10/02/22 2112  •  senna (SENOKOT) tablet 1 tablet, 1 tablet, Oral, Daily, Tato العراقي MD, 1 tablet at 10/03/22 0814  •  sertraline (ZOLOFT) tablet 75 mg, 75 mg, Oral, Nightly, Tato العراقي MD, 75 mg at 10/02/22 2113  •  sodium chloride 0.9 % flush 10 mL, 10 mL, Intravenous, PRN, YonRachna MD, 10 mL at 10/01/22 2242  •  tamsulosin (FLOMAX) 24 hr capsule 0.4 mg, 0.4 mg, Oral, Nightly, Tato العراقي MD, 0.4 mg at 10/02/22 2112  •  vancomycin 1250 mg/250 mL 0.9% NS IVPB ([facility]), 1,250 mg, Intravenous, Q24H, Tato العراقي MD, 1,250 mg at 10/03/22 1248     ASSESSMENT  E. coli bacteremia with sepsis  Acute E. coli UTI   Left lower extremity cellulitis   Acute kidney injury  Chronic kidney disease stage III  Diabetes mellitus  Hypertension  Hyperlipidemia  Chronic hypoxic hypercapnic respiratory failure  Obstructive sleep apnea  BPH  Immobility syndrome  Gastroesophageal reflux disease    PLAN  CPM  Discontinue IVF  Continue IV antibiotics    Repeat blood cultures  Adjust nursing home medications  Stress ulcer DVT prophylaxis  Nephrology consult appreciated  Infectious disease to follow patient  Supportive care  DNR  PT OT  Discussed with nursing staff  Follow closely further recommendation about hospital course    TATO العراقي MD

## 2022-10-03 NOTE — NURSING NOTE
CWOCN- consult for BLE. Patient with cellulitis to the LLE. The leg is very red and skin is dry but thin. Patient reports that at times he does wear compression. I don't think wrapping his leg is an option at this time as the skin will tear but I will follow up in a few days to reassess. In the mean time, the leg is elevated in bed and patient is on antibiotics.

## 2022-10-03 NOTE — PROGRESS NOTES
[facility] Clinical Pharmacy Services: Vancomycin Monitoring Note    Yakov Dubon is a 77 y.o. male who is on day 3/5 of pharmacy to dose vancomycin for sepsis/cellulitis .    Previous Vancomycin Dose: Intermittent   Updated Cultures and Sensitivities:   10/1 Blood cx 1/2: e coli sens pending  10/1 urine cx: e coli  Results from last 7 days   Lab Units 10/03/22  0352 10/02/22  0435   VANCOMYCIN RM mcg/mL 18.00 17.50     Vitals/Labs  Ht:  ; Wt: 116 kg (255 lb 11.7 oz)   Temp Readings from Last 1 Encounters:   10/03/22 98.3 °F (36.8 °C) (Oral)     Estimated Creatinine Clearance: 44 mL/min (A) (by C-G formula based on SCr of 1.81 mg/dL (H)).     Results from last 7 days   Lab Units 10/03/22  0352 10/02/22  0436 10/02/22  0435 10/01/22  1201   CREATININE mg/dL 1.81*  --  1.94* 2.38*   WBC 10*3/mm3 12.06* 11.04*  --  12.40*     Assessment/Plan    Vancomycin Dose: 1250 mg q24h, predicted . SCr continues to improve.   Next Level Date and Time: Will schedule when appropriate based on ID recommendations for abx plan  Will reach out to ID for abx plan  We will continue to monitor patient changes and renal function     Thank you for involving pharmacy in this patient's care. Please contact pharmacy with any questions or concerns.       Karina Barraza, PharmD  Pharmacy Resident

## 2022-10-03 NOTE — PROGRESS NOTES
Infectious Diseases Progress Note    Miguel Anderson MD     [facility]  Los: 2 days  Patient Identification:  Name: Yakov Dubon  Age: 77 y.o.  Sex: male  :  1945  MRN: [id number]         Primary Care Physician: Melissa Steven MD            Subjective: Feels slightly better.  Still have discomfort in the left leg but slightly improved.  Interval History: See consultation note.     Objective:    Scheduled Meds:amLODIPine, 5 mg, Oral, Q24H  aspirin, 81 mg, Oral, Daily  carvedilol, 3.125 mg, Oral, BID With Meals  cefTRIAXone, 2 g, Intravenous, Q24H  famotidine, 20 mg, Oral, BID  insulin glargine, 10 Units, Subcutaneous, Nightly  insulin lispro, 0-7 Units, Subcutaneous, 4x Daily With Meals & Nightly  polyethylene glycol, 17 g, Oral, Daily  rosuvastatin, 5 mg, Oral, Nightly  senna, 1 tablet, Oral, Daily  sertraline, 75 mg, Oral, Nightly  tamsulosin, 0.4 mg, Oral, Nightly  vancomycin, 1,250 mg, Intravenous, Q24H      Continuous Infusions:Pharmacy to dose vancomycin,         Vital signs in last 24 hours:  Temp:  [98.1 °F (36.7 °C)-98.8 °F (37.1 °C)] 98.5 °F (36.9 °C)  Heart Rate:  [53-74] 71  Resp:  [18] 18  BP: (145-178)/(54-78) 178/78    Intake/Output:    Intake/Output Summary (Last 24 hours) at 10/3/2022 1559  Last data filed at 10/3/2022 1323  Gross per 24 hour   Intake 860 ml   Output --   Net 860 ml       Exam:  /78 (BP Location: Right arm, Patient Position: Lying)   Pulse 71   Temp 98.5 °F (36.9 °C) (Oral)   Resp 18   Wt 116 kg (255 lb 11.7 oz)   SpO2 92%   BMI 35.02 kg/m²   Patient is examined using the personal protective equipment as per guidelines from infection control for this particular patient as enacted.  Hand washing was performed before and after patient interaction.  General Appearance:    Alert, cooperative, no distress, AAOx3                          Head:    Normocephalic, without obvious abnormality, atraumatic                           Eyes:    PERRL,  conjunctivae/corneas clear, EOM's intact, both eyes                         Throat:   Lips, tongue, gums normal; oral mucosa pink and moist                           Neck:   Supple, symmetrical, trachea midline, no JVD                         Lungs:    Clear to auscultation bilaterally, respirations unlabored                 Chest Wall:    No tenderness or deformity                          Heart:    Regular rate and rhythm, S1 and S2 normal, no murmur, no rub                                         or gallop                  Abdomen:     Soft, non-tender, bowel sounds active, no masses, no                                                        organomegaly                  Extremities:                                         Skin: Cellulitic changes involving bilateral lower extremity left greater than right.                  Neurologic: At times lethargic and confused       Data Review:    I reviewed the patient's new clinical results.  Results from last 7 days   Lab Units 10/03/22  0352 10/02/22  0436 10/01/22  1201   WBC 10*3/mm3 12.06* 11.04* 12.40*   HEMOGLOBIN g/dL 8.7* 8.9* 11.1*   PLATELETS 10*3/mm3 234 233 349     Results from last 7 days   Lab Units 10/03/22  0352 10/02/22  0435 10/01/22  1201   SODIUM mmol/L 139 139 143   POTASSIUM mmol/L 4.3 4.0 4.5   CHLORIDE mmol/L 101 103 100   CO2 mmol/L 31.3* 30.0* 32.0*   BUN mg/dL 29* 33* 37*   CREATININE mg/dL 1.81* 1.94* 2.38*   CALCIUM mg/dL 8.4* 8.6 9.7   GLUCOSE mg/dL 137* 153* 115*     Microbiology Results (last 10 days)     Procedure Component Value - Date/Time    Urine Culture - Urine, Urine, Clean Catch [[id number]]  (Abnormal)  (Susceptibility) Collected: 10/01/22 1317    Lab Status: Final result Specimen: Urine, Clean Catch Updated: 10/03/22 1047     Urine Culture >100,000 CFU/mL Escherichia coli    Narrative:      Colonization of the urinary tract without infection is common. Treatment is discouraged unless the patient is symptomatic, pregnant, or  undergoing an invasive urologic procedure.    Susceptibility      Escherichia coli      COURTNEY      Ampicillin Resistant     Ampicillin + Sulbactam Intermediate      Cefazolin Susceptible      Cefepime Susceptible      Ceftazidime Susceptible      Ceftriaxone Susceptible      Gentamicin Susceptible      Levofloxacin Resistant     Nitrofurantoin Susceptible      Piperacillin + Tazobactam Susceptible      Trimethoprim + Sulfamethoxazole Susceptible                           Blood Culture - Blood, Arm, Left [[id number]]  (Abnormal) Collected: 10/01/22 1216    Lab Status: Preliminary result Specimen: Blood from Arm, Left Updated: 10/03/22 0655     Blood Culture Gram Negative Bacilli     Isolated from Anaerobic Bottle     Gram Stain Anaerobic Bottle Gram negative bacilli    Blood Culture ID, PCR - Blood, Arm, Left [[id number]]  (Abnormal) Collected: 10/01/22 1216    Lab Status: Final result Specimen: Blood from Arm, Left Updated: 10/03/22 0815     BCID, PCR Eschericia coli. Identification by BCID2 PCR.    Blood Culture - Blood, Arm, Right [[id number]]  (Normal) Collected: 10/01/22 1201    Lab Status: Preliminary result Specimen: Blood from Arm, Right Updated: 10/03/22 1217     Blood Culture No growth at 2 days        No radiology results for the last day        Assessment:    Left leg cellulitis  1-toxic metabolic encephalopathy secondary to  2-bacteremic urinary tract infection with superimposed cellulitis of left lower extremity with lymphangitis in the setting of  3-chronic venous stasis changes and edema of the lower extremity along with immobility  4-other diagnosis per primary team.     Recommendations/Discussions:  · De-escalate antibiotics to ceftriaxone based on the sensitivity of the E. coli in the urine and blood culture.  · Continue IV vancomycin  · If he shows persistent renal dysfunction and worsening leukocytosis and he may require CT scan of the abdomen and pelvis without contrast to rule out  tract  abnormalities to explain bacteremic UTI.  · Continue local care and apply Lotrisone cream to his lower extremities.  Miguel Anderson MD  10/3/2022  15:59 EDT    Much of this encounter note is an electronic transcription/translation of spoken language to printed text. The electronic translation of spoken language may permit erroneous, or at times, nonsensical words or phrases to be inadvertently transcribed; Although I have reviewed the note for such errors, some may still exist

## 2022-10-04 LAB
ANION GAP SERPL CALCULATED.3IONS-SCNC: 9 MMOL/L (ref 5–15)
BACTERIA SPEC AEROBE CULT: ABNORMAL
BASOPHILS # BLD AUTO: 0.02 10*3/MM3 (ref 0–0.2)
BASOPHILS NFR BLD AUTO: 0.1 % (ref 0–1.5)
BUN SERPL-MCNC: 26 MG/DL (ref 8–23)
BUN/CREAT SERPL: 17.8 (ref 7–25)
CALCIUM SPEC-SCNC: 9 MG/DL (ref 8.6–10.5)
CHLORIDE SERPL-SCNC: 101 MMOL/L (ref 98–107)
CO2 SERPL-SCNC: 28 MMOL/L (ref 22–29)
CREAT SERPL-MCNC: 1.46 MG/DL (ref 0.76–1.27)
DEPRECATED RDW RBC AUTO: 45.7 FL (ref 37–54)
EGFRCR SERPLBLD CKD-EPI 2021: 48.9 ML/MIN/1.73
EOSINOPHIL # BLD AUTO: 0.02 10*3/MM3 (ref 0–0.4)
EOSINOPHIL NFR BLD AUTO: 0.1 % (ref 0.3–6.2)
ERYTHROCYTE [DISTWIDTH] IN BLOOD BY AUTOMATED COUNT: 13.4 % (ref 12.3–15.4)
GLUCOSE BLDC GLUCOMTR-MCNC: 104 MG/DL (ref 70–130)
GLUCOSE BLDC GLUCOMTR-MCNC: 114 MG/DL (ref 70–130)
GLUCOSE BLDC GLUCOMTR-MCNC: 117 MG/DL (ref 70–130)
GLUCOSE BLDC GLUCOMTR-MCNC: 125 MG/DL (ref 70–130)
GLUCOSE SERPL-MCNC: 117 MG/DL (ref 65–99)
GRAM STN SPEC: ABNORMAL
HCT VFR BLD AUTO: 30.7 % (ref 37.5–51)
HGB BLD-MCNC: 10.1 G/DL (ref 13–17.7)
IMM GRANULOCYTES # BLD AUTO: 0.06 10*3/MM3 (ref 0–0.05)
IMM GRANULOCYTES NFR BLD AUTO: 0.4 % (ref 0–0.5)
ISOLATED FROM: ABNORMAL
LYMPHOCYTES # BLD AUTO: 0.83 10*3/MM3 (ref 0.7–3.1)
LYMPHOCYTES NFR BLD AUTO: 6.1 % (ref 19.6–45.3)
MCH RBC QN AUTO: 30.3 PG (ref 26.6–33)
MCHC RBC AUTO-ENTMCNC: 32.9 G/DL (ref 31.5–35.7)
MCV RBC AUTO: 92.2 FL (ref 79–97)
MONOCYTES # BLD AUTO: 0.37 10*3/MM3 (ref 0.1–0.9)
MONOCYTES NFR BLD AUTO: 2.7 % (ref 5–12)
NEUTROPHILS NFR BLD AUTO: 12.39 10*3/MM3 (ref 1.7–7)
NEUTROPHILS NFR BLD AUTO: 90.6 % (ref 42.7–76)
NRBC BLD AUTO-RTO: 0 /100 WBC (ref 0–0.2)
PLATELET # BLD AUTO: 286 10*3/MM3 (ref 140–450)
PMV BLD AUTO: 9.2 FL (ref 6–12)
POTASSIUM SERPL-SCNC: 4.3 MMOL/L (ref 3.5–5.2)
RBC # BLD AUTO: 3.33 10*6/MM3 (ref 4.14–5.8)
SODIUM SERPL-SCNC: 138 MMOL/L (ref 136–145)
WBC NRBC COR # BLD: 13.69 10*3/MM3 (ref 3.4–10.8)

## 2022-10-04 PROCEDURE — 80048 BASIC METABOLIC PNL TOTAL CA: CPT | Performed by: HOSPITALIST

## 2022-10-04 PROCEDURE — 97162 PT EVAL MOD COMPLEX 30 MIN: CPT

## 2022-10-04 PROCEDURE — 85025 COMPLETE CBC W/AUTO DIFF WBC: CPT | Performed by: HOSPITALIST

## 2022-10-04 PROCEDURE — 82962 GLUCOSE BLOOD TEST: CPT

## 2022-10-04 PROCEDURE — 97530 THERAPEUTIC ACTIVITIES: CPT

## 2022-10-04 PROCEDURE — 25010000002 VANCOMYCIN 10 G RECONSTITUTED SOLUTION: Performed by: HOSPITALIST

## 2022-10-04 PROCEDURE — 25010000002 CEFTRIAXONE PER 250 MG: Performed by: INTERNAL MEDICINE

## 2022-10-04 PROCEDURE — 25010000002 ONDANSETRON PER 1 MG: Performed by: HOSPITALIST

## 2022-10-04 RX ORDER — AMLODIPINE BESYLATE 10 MG/1
10 TABLET ORAL
Status: DISCONTINUED | OUTPATIENT
Start: 2022-10-05 | End: 2022-10-11 | Stop reason: HOSPADM

## 2022-10-04 RX ORDER — CARVEDILOL 6.25 MG/1
6.25 TABLET ORAL 2 TIMES DAILY WITH MEALS
Status: DISCONTINUED | OUTPATIENT
Start: 2022-10-04 | End: 2022-10-11 | Stop reason: HOSPADM

## 2022-10-04 RX ORDER — ONDANSETRON 2 MG/ML
4 INJECTION INTRAMUSCULAR; INTRAVENOUS EVERY 4 HOURS PRN
Status: DISCONTINUED | OUTPATIENT
Start: 2022-10-04 | End: 2022-10-11

## 2022-10-04 RX ADMIN — ASPIRIN 81 MG: 81 TABLET, CHEWABLE ORAL at 08:30

## 2022-10-04 RX ADMIN — AMLODIPINE BESYLATE 5 MG: 5 TABLET ORAL at 08:30

## 2022-10-04 RX ADMIN — SENNOSIDES 1 TABLET: 8.6 TABLET, FILM COATED ORAL at 08:30

## 2022-10-04 RX ADMIN — CARVEDILOL 3.12 MG: 3.12 TABLET, FILM COATED ORAL at 08:30

## 2022-10-04 RX ADMIN — CLOTRIMAZOLE AND BETAMETHASONE DIPROPIONATE 1 APPLICATION: 10; .5 CREAM TOPICAL at 08:31

## 2022-10-04 RX ADMIN — ONDANSETRON 4 MG: 2 INJECTION INTRAMUSCULAR; INTRAVENOUS at 20:11

## 2022-10-04 RX ADMIN — CLOTRIMAZOLE AND BETAMETHASONE DIPROPIONATE 1 APPLICATION: 10; .5 CREAM TOPICAL at 00:50

## 2022-10-04 RX ADMIN — SERTRALINE 75 MG: 25 TABLET, FILM COATED ORAL at 20:11

## 2022-10-04 RX ADMIN — FAMOTIDINE 20 MG: 20 TABLET ORAL at 08:30

## 2022-10-04 RX ADMIN — INSULIN GLARGINE-YFGN 10 UNITS: 100 INJECTION, SOLUTION SUBCUTANEOUS at 22:16

## 2022-10-04 RX ADMIN — CARVEDILOL 6.25 MG: 6.25 TABLET, FILM COATED ORAL at 17:15

## 2022-10-04 RX ADMIN — VANCOMYCIN HYDROCHLORIDE 1250 MG: 10 INJECTION, POWDER, LYOPHILIZED, FOR SOLUTION INTRAVENOUS at 11:57

## 2022-10-04 RX ADMIN — CLOTRIMAZOLE AND BETAMETHASONE DIPROPIONATE 1 APPLICATION: 10; .5 CREAM TOPICAL at 20:11

## 2022-10-04 RX ADMIN — CEFTRIAXONE 2 G: 2 INJECTION, POWDER, FOR SOLUTION INTRAMUSCULAR; INTRAVENOUS at 20:11

## 2022-10-04 RX ADMIN — POLYETHYLENE GLYCOL 3350 17 G: 17 POWDER, FOR SOLUTION ORAL at 08:30

## 2022-10-04 RX ADMIN — TAMSULOSIN HYDROCHLORIDE 0.4 MG: 0.4 CAPSULE ORAL at 20:11

## 2022-10-04 RX ADMIN — FAMOTIDINE 20 MG: 20 TABLET ORAL at 20:11

## 2022-10-04 RX ADMIN — ROSUVASTATIN CALCIUM 5 MG: 5 TABLET, FILM COATED ORAL at 20:11

## 2022-10-04 NOTE — PROGRESS NOTES
Infectious Diseases Progress Note    Miguel Anderson MD     [facility]  [place]: 3 days  Patient Identification:  Name: Yakov Dubon  Age: 77 y.o.  Sex: male  :  1945  MRN: [id number]         Primary Care Physician: Melissa Steven MD            Subjective: Feeling better and improved with decreased discomfort in the left leg.  Interval History: See consultation note.     Objective:    Scheduled Meds:amLODIPine, 5 mg, Oral, Q24H  aspirin, 81 mg, Oral, Daily  carvedilol, 3.125 mg, Oral, BID With Meals  cefTRIAXone, 2 g, Intravenous, Q24H  clotrimazole-betamethasone, 1 application, Topical, Q12H  famotidine, 20 mg, Oral, BID  insulin glargine, 10 Units, Subcutaneous, Nightly  insulin lispro, 0-7 Units, Subcutaneous, 4x Daily With Meals & Nightly  polyethylene glycol, 17 g, Oral, Daily  rosuvastatin, 5 mg, Oral, Nightly  senna, 1 tablet, Oral, Daily  sertraline, 75 mg, Oral, Nightly  tamsulosin, 0.4 mg, Oral, Nightly  vancomycin, 1,250 mg, Intravenous, Q24H      Continuous Infusions:Pharmacy to dose vancomycin,         Vital signs in last 24 hours:  Temp:  [97.6 °F (36.4 °C)-99 °F (37.2 °C)] 97.6 °F (36.4 °C)  Heart Rate:  [64-82] 64  Resp:  [18] 18  BP: (175-191)/(78-93) 191/93    Intake/Output:    Intake/Output Summary (Last 24 hours) at 10/4/2022 0815  Last data filed at 10/3/2022 2002  Gross per 24 hour   Intake 900 ml   Output --   Net 900 ml       Exam:  BP (!) 191/93 (BP Location: Right arm, Patient Position: Lying)   Pulse 64   Temp 97.6 °F (36.4 °C) (Oral)   Resp 18   Wt 116 kg (255 lb 11.7 oz)   SpO2 90%   BMI 35.02 kg/m²   Patient is examined using the personal protective equipment as per guidelines from infection control for this particular patient as enacted.  Hand washing was performed before and after patient interaction.  General Appearance:    Alert, cooperative, no distress, AAOx3                          Head:    Normocephalic, without obvious abnormality, atraumatic                            Eyes:    PERRL, conjunctivae/corneas clear, EOM's intact, both eyes                         Throat:   Lips, tongue, gums normal; oral mucosa pink and moist                           Neck:   Supple, symmetrical, trachea midline, no JVD                         Lungs:    Clear to auscultation bilaterally, respirations unlabored                 Chest Wall:    No tenderness or deformity                          Heart:    Regular rate and rhythm, S1 and S2 normal, no murmur, no rub                                         or gallop                  Abdomen:     Soft, non-tender, bowel sounds active, no masses, no                                                        organomegaly                  Extremities:                                         Skin: Cellulitic changes involving bilateral lower extremity left greater than right.                  Neurologic: At times lethargic and confused       Data Review:    I reviewed the patient's new clinical results.  Results from last 7 days   Lab Units 10/04/22  0423 10/03/22  0352 10/02/22  0436 10/01/22  1201   WBC 10*3/mm3 13.69* 12.06* 11.04* 12.40*   HEMOGLOBIN g/dL 10.1* 8.7* 8.9* 11.1*   PLATELETS 10*3/mm3 286 234 233 349     Results from last 7 days   Lab Units 10/04/22  0423 10/03/22  0352 10/02/22  0435 10/01/22  1201   SODIUM mmol/L 138 139 139 143   POTASSIUM mmol/L 4.3 4.3 4.0 4.5   CHLORIDE mmol/L 101 101 103 100   CO2 mmol/L 28.0 31.3* 30.0* 32.0*   BUN mg/dL 26* 29* 33* 37*   CREATININE mg/dL 1.46* 1.81* 1.94* 2.38*   CALCIUM mg/dL 9.0 8.4* 8.6 9.7   GLUCOSE mg/dL 117* 137* 153* 115*     Microbiology Results (last 10 days)     Procedure Component Value - Date/Time    Urine Culture - Urine, Urine, Clean Catch [[id number]]  (Abnormal)  (Susceptibility) Collected: 10/01/22 1317    Lab Status: Final result Specimen: Urine, Clean Catch Updated: 10/03/22 1047     Urine Culture >100,000 CFU/mL Escherichia coli    Narrative:      Colonization of the  urinary tract without infection is common. Treatment is discouraged unless the patient is symptomatic, pregnant, or undergoing an invasive urologic procedure.    Susceptibility      Escherichia coli      COURTNEY      Ampicillin Resistant     Ampicillin + Sulbactam Intermediate      Cefazolin Susceptible      Cefepime Susceptible      Ceftazidime Susceptible      Ceftriaxone Susceptible      Gentamicin Susceptible      Levofloxacin Resistant     Nitrofurantoin Susceptible      Piperacillin + Tazobactam Susceptible      Trimethoprim + Sulfamethoxazole Susceptible                           Blood Culture - Blood, Arm, Left [[id number]]  (Abnormal)  (Susceptibility) Collected: 10/01/22 1216    Lab Status: Final result Specimen: Blood from Arm, Left Updated: 10/04/22 0613     Blood Culture Escherichia coli     Isolated from Anaerobic Bottle     Gram Stain Anaerobic Bottle Gram negative bacilli    Susceptibility      Escherichia coli      COURTNEY      Ampicillin Resistant     Ampicillin + Sulbactam Intermediate      Cefepime Susceptible      Ceftazidime Susceptible      Ceftriaxone Susceptible      Gentamicin Susceptible      Levofloxacin Resistant     Piperacillin + Tazobactam Susceptible      Trimethoprim + Sulfamethoxazole Susceptible                       Susceptibility Comments     Escherichia coli    Cefazolin sensitivity will not be reported for Enterobacteriaceae in non-urine isolates. If cefazolin is preferred, please call the microbiology lab to request an E-test.  With the exception of urinary-sourced infections, aminoglycosides should not be used as monotherapy.             Blood Culture ID, PCR - Blood, Arm, Left [[id number]]  (Abnormal) Collected: 10/01/22 1216    Lab Status: Final result Specimen: Blood from Arm, Left Updated: 10/03/22 0815     BCID, PCR Eschericia coli. Identification by BCID2 PCR.    Blood Culture - Blood, Arm, Right [[id number]]  (Normal) Collected: 10/01/22 1201    Lab Status: Preliminary result  Specimen: Blood from Arm, Right Updated: 10/03/22 1217     Blood Culture No growth at 2 days        No radiology results for the last day        Assessment:    Left leg cellulitis  1-toxic metabolic encephalopathy secondary to  2-bacteremic urinary tract infection with superimposed cellulitis of left lower extremity with lymphangitis in the setting of  3-chronic venous stasis changes and edema of the lower extremity along with immobility  4-other diagnosis per primary team.     Recommendations/Discussions:  · De-escalate antibiotics to ceftriaxone based on the sensitivity of the E. coli in the urine and blood culture.  · Continue IV vancomycin  · If he shows persistent renal dysfunction and worsening leukocytosis and he may require CT scan of the abdomen and pelvis without contrast to rule out  tract abnormalities to explain bacteremic UTI.  · Continue local care and apply Lotrisone cream to his lower extremities.  Miguel Anderson MD  10/4/2022  08:15 EDT    Much of this encounter note is an electronic transcription/translation of spoken language to printed text. The electronic translation of spoken language may permit erroneous, or at times, nonsensical words or phrases to be inadvertently transcribed; Although I have reviewed the note for such errors, some may still exist

## 2022-10-04 NOTE — PLAN OF CARE
Goal Outcome Evaluation:  Plan of Care Reviewed With: patient, daughter        Progress: no change  Outcome Evaluation: increase coreg and amlodipine. diuretic on hold. pt remains on RA to keep sats >90%. pt incont, q2 turn. cont IV rocpehin and vanc. poss d/c to facility thurs/friday. cont cream to left leg per ID. encourage pt to eat and drink, pt has been nauseous today. PRN IV zofran ordered.

## 2022-10-04 NOTE — PLAN OF CARE
Goal Outcome Evaluation:           Progress: improving  Outcome Evaluation: Pt was on room air this shift. ID ordered cream for left leg. Pt did not want anything for pain. Pt rested well. At times will cough loudly and lean over the railing and alarm will go off. Pt turned repositioned. CTM, safety maintained.

## 2022-10-04 NOTE — PROGRESS NOTES
[facility] Clinical Pharmacy Services: Vancomycin Monitoring Note    Yakov Dubon is a 77 y.o. male who is on day 4/5 of pharmacy to dose vancomycin for sepsis/cellulitis .    Previous Vancomycin Dose: Intermittent   Updated Cultures and Sensitivities:   10/1 Blood cx 1/2: e coli (resistant to ampicillin and levofloxacin)  10/1 urine cx: e coli  Results from last 7 days   Lab Units 10/03/22  0352 10/02/22  0435   VANCOMYCIN RM mcg/mL 18.00 17.50     Vitals/Labs  Ht:  ; Wt: 116 kg (255 lb 11.7 oz)   Temp Readings from Last 1 Encounters:   10/04/22 97.6 °F (36.4 °C) (Oral)     Estimated Creatinine Clearance: 54.6 mL/min (A) (by C-G formula based on SCr of 1.46 mg/dL (H)).     Results from last 7 days   Lab Units 10/04/22  0423 10/03/22  0352 10/02/22  0436 10/02/22  0435   CREATININE mg/dL 1.46* 1.81*  --  1.94*   WBC 10*3/mm3 13.69* 12.06* 11.04*  --      Assessment/Plan    Vancomycin Dose: 1250 mg q24h, predicted . SCr continues to improve.   Next Level Date and Time: Per ID continue vancomycin for SSTI, will not schedule another level unless the duration of therapy is extended by ID. Last dose of vancomycin is scheduled for 10/5.  We will continue to monitor patient changes and renal function     Thank you for involving pharmacy in this patient's care. Please contact pharmacy with any questions or concerns.       Karina Barraza, PharmD  Pharmacy Resident

## 2022-10-04 NOTE — THERAPY EVALUATION
"Patient Name: Yakov Dubon  : 1945    MRN: [id number]                              Today's Date: 10/4/2022       Admit Date: 10/1/2022    Visit Dx:     ICD-10-CM ICD-9-CM   1. Left leg cellulitis  L03.116 682.6   2. Urinary tract infection, acute  N39.0 599.0   3. Acute on chronic renal insufficiency  N28.9 593.9    N18.9 585.9   4. Altered mental status, unspecified altered mental status type  R41.82 780.97     Patient Active Problem List   Diagnosis   • HALIMA (obstructive sleep apnea)   • CSA (central sleep apnea)   • REM behavioral disorder   • Hypersomnia due to medical condition   • Periodic breathing   • Cellulitis of right lower extremity   • HTN (hypertension)   • Type 2 diabetes mellitus with stage 3b chronic kidney disease (HCC)   • Elevated troponin   • RBBB   • Pulmonary vascular congestion   • Diastolic CHF, acute ([facility])   • Elevated LFTs   • Acute respiratory failure with hypoxia ([facility])   • Acute on chronic respiratory failure with hypercapnia ([facility])   • Anemia of chronic disease   • Sepsis due to Gram negative bacteria ([facility])   • Left leg cellulitis     Past Medical History:   Diagnosis Date   • Anxiety    • Back pain    • Depression    • Diabetes mellitus ([facility])     \"BORDERLINE\"   • High cholesterol    • Hypertension    • Kidney disease, chronic, stage III (GFR 30-59 ml/min) ([facility])    • Reflux esophagitis    • Sleep apnea      Past Surgical History:   Procedure Laterality Date   • APPENDECTOMY     • CARPAL TUNNEL RELEASE     • CATARACT EXTRACTION     • HAMMER TOE REPAIR     • LUMBAR SPINE SURGERY     • PAIN PUMP INSERTION/REVISION Right 2016    Procedure: RT PAIN PUMP REPLACEMENT;  Surgeon: Chris Messer MD;  Location: [facility];  Service:    • TOTAL KNEE ARTHROPLASTY Left       General Information     Row Name 10/04/22 1546          Physical Therapy Time and Intention    Document Type evaluation (P)   -SM     Mode of Treatment individual therapy;physical therapy (P)   -SM     Row Name " 10/04/22 1546          General Information    Patient Profile Reviewed yes (P)   -SM     Prior Level of Function independent:;all household mobility;ADL's (P)   -SM     Existing Precautions/Restrictions fall (P)   -SM     Barriers to Rehab medically complex (P)   -     Row Name 10/04/22 1546          Living Environment    People in Home other (see comments) (P)   facility resident  -     Row Name 10/04/22 1546          Home Main Entrance    Number of Stairs, Main Entrance none (P)   -SM     Row Name 10/04/22 1546          Cognition    Orientation Status (Cognition) oriented to;person (P)   -SM     Row Name 10/04/22 1546          Safety Issues, Functional Mobility    Safety Issues Affecting Function (Mobility) ability to follow commands;awareness of need for assistance;safety precaution awareness;impulsivity;insight into deficits/self-awareness (P)   -SM     Impairments Affecting Function (Mobility) balance;cognition;endurance/activity tolerance;strength (P)   -SM     Cognitive Impairments, Mobility Safety/Performance attention;awareness, need for assistance;insight into deficits/self-awareness;safety precaution awareness (P)   -SM           User Key  (r) = Recorded By, (t) = Taken By, (c) = Cosigned By    Initials Name Provider Type    Will Moseley, PT Student PT Student               Mobility     Row Name 10/04/22 1551          Bed Mobility    Bed Mobility supine-sit;sit-supine (P)   -SM     Supine-Sit [place] (Bed Mobility) contact guard;1 person assist;verbal cues;nonverbal cues (demo/gesture) (P)   -     Sit-Supine [place] (Bed Mobility) contact guard;1 person assist;verbal cues;nonverbal cues (demo/gesture) (P)   -SM     Comment, (Bed Mobility) Pt needed multiple VC for movement initiation. (P)   -     Row Name 10/04/22 1551          Transfers    Comment, (Transfers) Pt very slow to initiate transfers. (P)   -     Row Name 10/04/22 1551          Sit-Stand Transfer    Sit-Stand  [place] (Transfers) minimum assist (75% patient effort);2 person assist;verbal cues;nonverbal cues (demo/gesture);moderate assist (50% patient effort) (P)   -SM     Assistive Device (Sit-Stand Transfers) walker, front-wheeled (P)   -SM     Comment, (Sit-Stand Transfer) x3 attempted (P)   -SM     Row Name 10/04/22 1551          Gait/Stairs (Locomotion)    [place] Level (Gait) minimum assist (75% patient effort);2 person assist;moderate assist (50% patient effort);verbal cues;nonverbal cues (demo/gesture) (P)   -SM     Assistive Device (Gait) walker, front-wheeled (P)   -SM     Distance in Feet (Gait) 3ft side stepping towards HOB (P)   -SM     Deviations/Abnormal Patterns (Gait) [place]/shuffling;weight shifting decreased;stride length decreased (P)   -SM     Bilateral Gait Deviations forward flexed posture (P)   -SM     Comment, (Gait/Stairs) Pt very slow to initiate movement and needed many VC for safe weight shifting. (P)   -           User Key  (r) = Recorded By, (t) = Taken By, (c) = Cosigned By    Initials Name Provider Type    Will Moseley, PT Student PT Student               Obj/Interventions     Row Name 10/04/22 1554          Range of Motion Comprehensive    General Range of Motion bilateral lower extremity ROM WFL;bilateral upper extremity ROM WFL (P)   -     Row Name 10/04/22 1556          Strength Comprehensive (MMT)    Comment, General Manual Muscle Testing (MMT) Assessment Grossly BLE strength >/=3+/5 (P)   -     Row Name 10/04/22 1556          Balance    Balance Assessment sitting static balance;sit to stand dynamic balance;standing static balance (P)   -     Static Sitting Balance minimal assist;1-person assist (P)   -     Position, Sitting Balance supported;sitting edge of bed (P)   -     Sit to Stand Dynamic Balance minimal assist;moderate assist;2-person assist (P)   -SM     Static Standing Balance minimal assist;moderate assist;2-person assist (P)   -SM      Position/Device Used, Standing Balance supported;walker, front-wheeled (P)   -SM     Balance Interventions sitting;standing;supported;static;minimal challenge;moderate challenge (P)   -SM           User Key  (r) = Recorded By, (t) = Taken By, (c) = Cosigned By    Initials Name Provider Type    Will Moseley, PT Student PT Student               Goals/Plan     Row Name 10/04/22 1606          Bed Mobility Goal 1 (PT)    Activity/Assistive Device (Bed Mobility Goal 1, PT) bed mobility activities, all (P)   -SM     [place] Level/Cues Needed (Bed Mobility Goal 1, PT) standby assist (P)   -SM     Time Frame (Bed Mobility Goal 1, PT) 1 week (P)   -SM     Row Name 10/04/22 1606          Transfer Goal 1 (PT)    Activity/Assistive Device (Transfer Goal 1, PT) transfers, all (P)   -SM     [place] Level/Cues Needed (Transfer Goal 1, PT) standby assist (P)   -SM     Time Frame (Transfer Goal 1, PT) 1 week (P)   -SM     Row Name 10/04/22 1606          Gait Training Goal 1 (PT)    Activity/Assistive Device (Gait Training Goal 1, PT) gait (walking locomotion);assistive device use;walker, rolling;decrease fall risk;improve balance and speed (P)   -SM     [place] Level (Gait Training Goal 1, PT) contact guard required (P)   -SM     Distance (Gait Training Goal 1, PT) 50ft (P)   -SM     Time Frame (Gait Training Goal 1, PT) 1 week (P)   -SM     Row Name 10/04/22 1606          Therapy Assessment/Plan (PT)    Planned Therapy Interventions (PT) balance training;bed mobility training;gait training;home exercise program;patient/family education;transfer training;strengthening (P)   -SM           User Key  (r) = Recorded By, (t) = Taken By, (c) = Cosigned By    Initials Name Provider Type    Will Moseley, PT Student PT Student               Clinical Impression     Row Name 10/04/22 1551          Pain    Pretreatment Pain Rating 0/10 - no pain (P)   -SM     Posttreatment Pain Rating 0/10 - no pain (P)   -SM     Pain  Intervention(s) Repositioned (P)   -SM     Row Name 10/04/22 3677          Plan of Care Review    Plan of Care Reviewed With patient (P)   -SM     Progress no change (P)   -SM     Outcome Evaluation Pt is a 78 yo M presenting to PT following admission due to sepsis, UTI, and cellulitis BLE. Pt was found lying in bed A/O to self only. Pt would answer questions only after his name was said. Pt reports using walker prior to admission, reports no stairs at home. Pt was found to need extended time for all activities performed this date with many verbal cues needed for initiation of movements. Pt was able to complete bed mobility CGA x1, transfered and ambulated min/mod assist x2. Pt was able to side step 3 ft to HOB but noted to have difficulty weight shifting. At this time PT services may benefit the patient by focusing on balance, safety, gait, functional mobility/endurance. PT currently recommending SNF after D/C from facility. (P)   -SM     Row Name 10/04/22 6566          Therapy Assessment/Plan (PT)    Patient/Family Therapy Goals Statement (PT) return to PLOF (P)   -SM     Rehab Potential (PT) fair, will monitor progress closely (P)   -SM     Criteria for Skilled Interventions Met (PT) yes (P)   -SM     Therapy Frequency (PT) 5 times/wk (P)   -SM     Row Name 10/04/22 9704          Positioning and Restraints    Pre-Treatment Position in bed (P)   -SM     Post Treatment Position bed (P)   -SM     In Bed supine;call light within reach;encouraged to call for assist;notified nsg;exit alarm on (P)   -SM           User Key  (r) = Recorded By, (t) = Taken By, (c) = Cosigned By    Initials Name Provider Type    Will Moseley, PT Student PT Student               Outcome Measures     Row Name 10/04/22 0481          How much help from another person do you currently need...    Turning from your back to your side while in flat bed without using bedrails? 3 (P)   -SM     Moving from lying on back to sitting on the side of a  flat bed without bedrails? 3 (P)   -SM     Moving to and from a bed to a chair (including a wheelchair)? 2 (P)   -SM     Standing up from a chair using your arms (e.g., wheelchair, bedside chair)? 2 (P)   -SM     Climbing 3-5 steps with a railing? 2 (P)   -SM     To walk in hospital room? 2 (P)   -SM     AM-PAC 6 Clicks Score (PT) 14 (P)   -SM     Highest level of mobility 4 --> Transferred to chair/commode (P)   -     Row Name 10/04/22 1607          Functional Assessment    Outcome Measure Options AM-PAC 6 Clicks Basic Mobility (PT) (P)   -           User Key  (r) = Recorded By, (t) = Taken By, (c) = Cosigned By    Initials Name Provider Type     Will Hernandez, PT Student PT Student                             Physical Therapy Education                 Title: PT OT SLP Therapies (In Progress)     Topic: Physical Therapy (In Progress)     Point: Mobility training (In Progress)     Learning Progress Summary           Patient Acceptance, E, NR by  at 10/4/2022 1607                   Point: Home exercise program (In Progress)     Learning Progress Summary           Patient Acceptance, E, NR by  at 10/4/2022 1607                   Point: Body mechanics (In Progress)     Learning Progress Summary           Patient Acceptance, E, NR by  at 10/4/2022 1607                   Point: Precautions (In Progress)     Learning Progress Summary           Patient Acceptance, E, NR by  at 10/4/2022 1607                               User Key     Initials Effective Dates Name Provider Type Discipline     08/15/22 -  Will Hernandez, PT Student PT Student PT              PT Recommendation and Plan  Planned Therapy Interventions (PT): (P) balance training, bed mobility training, gait training, home exercise program, patient/family education, transfer training, strengthening  Plan of Care Reviewed With: (P) patient  Progress: (P) no change  Outcome Evaluation: (P) Pt is a 76 yo M presenting to PT following admission due to  sepsis, UTI, and cellulitis BLE. Pt was found lying in bed A/O to self only. Pt would answer questions only after his name was said. Pt reports using walker prior to admission, reports no stairs at home. Pt was found to need extended time for all activities performed this date with many verbal cues needed for initiation of movements. Pt was able to complete bed mobility CGA x1, transfered and ambulated min/mod assist x2. Pt was able to side step 3 ft to HOB but noted to have difficulty weight shifting. At this time PT services may benefit the patient by focusing on balance, safety, gait, functional mobility/endurance. PT currently recommending SNF after D/C from facility.     Time Calculation:    PT Charges     Row Name 10/04/22 1608             Time Calculation    Start Time 0211 (P)   -      Stop Time 0228 (P)   -      Time Calculation (min) 17 min (P)   -      PT Received On 10/04/22 (P)   -      PT - Next Appointment 10/05/22 (P)   -      PT Goal Re-Cert Due Date 10/11/22 (P)   -              Time Calculation- PT    Total Timed Code Minutes- PT 12 minute(s) (P)   -              Timed Charges    36381 - PT Therapeutic Activity Minutes 12 (P)   -              Total Minutes    Timed Charges Total Minutes 12 (P)   -SM       Total Minutes 12 (P)   -SM            User Key  (r) = Recorded By, (t) = Taken By, (c) = Cosigned By    Initials Name Provider Type     Will Hernandez, PT Student PT Student              Therapy Charges for Today     Code Description Service Date Service Provider Modifiers Qty    [id number]  PT THERAPEUTIC ACT EA 15 MIN 10/4/2022 Will Hernandez, PT Student GP 1    [id number]  PT EVAL MOD COMPLEXITY 2 10/4/2022 Will Hernandez, PT Student GP 1          PT G-Codes  Outcome Measure Options: (P) AM-PAC 6 Clicks Basic Mobility (PT)  AM-PAC 6 Clicks Score (PT): (P) 14    Will Hernandez PT Student  10/4/2022

## 2022-10-04 NOTE — PROGRESS NOTES
Daily progress note    Primary care physician      Chief complaint  Doing same with no new complaints.  Patient making slow progress    History of present illness  77-year-old white male with very complex past medical history including chronic hypoxic hypercapnic respiratory failure diabetes mellitus hypertension diastolic CHF obstructive sleep apnea and chronic kidney disease stage III who is a nursing home resident brought to the emergency room with altered mental status.  Patient in no respite distress but very weak and lethargic and answer questions slowly.  Patient work-up in ER revealed and found to have sepsis with cellulitis and UTI and also found to have acute kidney injury admit for management.  Patient is DNR per his wishes.     REVIEW OF SYSTEMS  Unremarkable except for weakness.    PHYSICAL EXAM  Blood pressure 176/83, pulse 63, temperature 97.6 °F (36.4 °C), temperature source Oral, resp. rate 18, weight 116 kg (255 lb 11.7 oz), SpO2 94 %.    Constitutional:       General: Awake and alert in no distress.  HENT:      Head: Normocephalic and atraumatic.   Eyes:      Pupils: Pupils are equal, round, and reactive to light.   Cardiovascular:      Rate and Rhythm: Normal rate and regular rhythm.      Pulses:           Posterior tibial pulses are 2+ on the right side and 2+ on the left side.      Heart sounds: Normal heart sounds. No murmur heard.  Pulmonary:      Effort: Pulmonary effort is normal. No respiratory distress.      Breath sounds: Normal breath sounds. No wheezing.   Abdominal:      General: Bowel sounds are normal.      Palpations: Abdomen is soft.      Tenderness: There is no abdominal tenderness. There is no guarding or rebound.   Musculoskeletal:         General: Swelling and tenderness ( Left leg) present. Normal range of motion.      Cervical back: Normal range of motion.   Skin:     General: Skin is warm and dry.      Findings: Erythema ( Left lower leg and posterior thigh)  present.   Neurological:      Mental Status: He is confused.      GCS: GCS eye subscore is 1. GCS verbal subscore is 4. GCS motor subscore is 6.      Cranial Nerves: No cranial nerve deficit.      Sensory: No sensory deficit.      Comments: Patient with frequent irregular, nonrhythmic, myoclonic jerks throughout limbs and torso   Psychiatric:         Mood and Affect: Affect normal.      LAB RESULTS   Lab Results (last 24 hours)     Procedure Component Value Units Date/Time    Blood Culture - Blood, Arm, Left [[id number]]  (Abnormal)  (Susceptibility) Collected: 10/01/22 1216    Specimen: Blood from Arm, Left Updated: 10/04/22 0613     Blood Culture Escherichia coli     Isolated from Anaerobic Bottle     Gram Stain Anaerobic Bottle Gram negative bacilli    Susceptibility      Escherichia coli      COURTNEY      Ampicillin Resistant     Ampicillin + Sulbactam Intermediate      Cefepime Susceptible      Ceftazidime Susceptible      Ceftriaxone Susceptible      Gentamicin Susceptible      Levofloxacin Resistant     Piperacillin + Tazobactam Susceptible      Trimethoprim + Sulfamethoxazole Susceptible                    Linear View               Susceptibility Comments     Escherichia coli    Cefazolin sensitivity will not be reported for Enterobacteriaceae in non-urine isolates. If cefazolin is preferred, please call the microbiology lab to request an E-test.  With the exception of urinary-sourced infections, aminoglycosides should not be used as monotherapy.             POC Glucose Once [[id number]]  (Normal) Collected: 10/04/22 0609    Specimen: Blood Updated: 10/04/22 0611     Glucose 125 mg/dL      Comment: Meter: [id number] : [id number] Yari SEYMOUR       Basic Metabolic Panel [[id number]]  (Abnormal) Collected: 10/04/22 0423    Specimen: Blood Updated: 10/04/22 0518     Glucose 117 mg/dL      BUN 26 mg/dL      Creatinine 1.46 mg/dL      Sodium 138 mmol/L      Potassium 4.3 mmol/L      Chloride 101 mmol/L      CO2  28.0 mmol/L      Calcium 9.0 mg/dL      BUN/Creatinine Ratio 17.8     Anion Gap 9.0 mmol/L      eGFR 48.9 mL/min/1.73      Comment: National Kidney Foundation and American Society of Nephrology (ASN) Task Force recommended calculation based on the Chronic Kidney Disease Epidemiology Collaboration (CKD-EPI) equation refit without adjustment for race.       Narrative:      GFR Normal >60  Chronic Kidney Disease <60  Kidney Failure <15      CBC & Differential [[id number]]  (Abnormal) Collected: 10/04/22 0423    Specimen: Blood Updated: 10/04/22 0508    Narrative:      The following orders were created for panel order CBC & Differential.  Procedure                               Abnormality         Status                     ---------                               -----------         ------                     CBC Auto Differential[[id number]]        Abnormal            Final result                 Please view results for these tests on the individual orders.    CBC Auto Differential [[id number]]  (Abnormal) Collected: 10/04/22 0423    Specimen: Blood Updated: 10/04/22 0508     WBC 13.69 10*3/mm3      RBC 3.33 10*6/mm3      Hemoglobin 10.1 g/dL      Hematocrit 30.7 %      MCV 92.2 fL      MCH 30.3 pg      MCHC 32.9 g/dL      RDW 13.4 %      RDW-SD 45.7 fl      MPV 9.2 fL      Platelets 286 10*3/mm3      Neutrophil % 90.6 %      Lymphocyte % 6.1 %      Monocyte % 2.7 %      Eosinophil % 0.1 %      Basophil % 0.1 %      Immature Grans % 0.4 %      Neutrophils, Absolute 12.39 10*3/mm3      Lymphocytes, Absolute 0.83 10*3/mm3      Monocytes, Absolute 0.37 10*3/mm3      Eosinophils, Absolute 0.02 10*3/mm3      Basophils, Absolute 0.02 10*3/mm3      Immature Grans, Absolute 0.06 10*3/mm3      nRBC 0.0 /100 WBC     Blood Culture - Blood, Arm, Left [[id number]] Collected: 10/03/22 1624    Specimen: Blood from Arm, Left Updated: 10/03/22 2204    POC Glucose Once [[id number]]  (Normal) Collected: 10/03/22 2054    Specimen: Blood  Updated: 10/03/22 2055     Glucose 118 mg/dL      Comment: Meter: [id number] : [id number] AugustinaConner Venecia DAWN       Blood Culture - Blood, Arm, Left [[id number]] Collected: 10/03/22 1624    Specimen: Blood from Arm, Left Updated: 10/03/22 1804    POC Glucose Once [[id number]]  (Normal) Collected: 10/03/22 1640    Specimen: Blood Updated: 10/03/22 1647     Glucose 98 mg/dL      Comment: Meter: [id number] : [id number] Clay SEYMOUR       Blood Culture - Blood, Arm, Right [[id number]]  (Normal) Collected: 10/01/22 1201    Specimen: Blood from Arm, Right Updated: 10/03/22 1217     Blood Culture No growth at 2 days    POC Glucose Once [[id number]]  (Abnormal) Collected: 10/03/22 1150    Specimen: Blood Updated: 10/03/22 1152     Glucose 272 mg/dL      Comment: Meter: [id number] : [id number] Clay SEYMOUR           Imaging Results (Last 24 Hours)     ** No results found for the last 24 hours. **          Current Facility-Administered Medications:   •  acetaminophen (TYLENOL) tablet 650 mg, 650 mg, Oral, Q6H PRN, Jean العراقي MD, 650 mg at 10/03/22 0818  •  amLODIPine (NORVASC) tablet 5 mg, 5 mg, Oral, Q24H, Jean العراقي MD, 5 mg at 10/04/22 0830  •  aspirin chewable tablet 81 mg, 81 mg, Oral, Daily, Jean العراقي MD, 81 mg at 10/04/22 0830  •  carvedilol (COREG) tablet 3.125 mg, 3.125 mg, Oral, BID With Meals, Jean العراقي MD, 3.125 mg at 10/04/22 0830  •  cefTRIAXone (ROCEPHIN) 2 g in sodium chloride 0.9 % 100 mL IVPB-VTB, 2 g, Intravenous, Q24H, Miguel Anderson MD, Last Rate: 200 mL/hr at 10/03/22 2002, 2 g at 10/03/22 2002  •  clotrimazole-betamethasone (LOTRISONE) 1-0.05 % cream 1 application, 1 application, Topical, Q12H, Miguel Anderson MD, 1 application at 10/04/22 0831  •  famotidine (PEPCID) tablet 20 mg, 20 mg, Oral, BID, Jean العراقي MD, 20 mg at 10/04/22 0830  •  insulin glargine (LANTUS, SEMGLEE) injection 10 Units, 10 Units, Subcutaneous, Nightly, Jean العراقي MD,  10 Units at 10/03/22 2307  •  insulin lispro (ADMELOG) injection 0-7 Units, 0-7 Units, Subcutaneous, 4x Daily With Meals & Nightly, Tato العراقي MD, 4 Units at 10/03/22 1248  •  Pharmacy to dose vancomycin, , Does not apply, Continuous PRN, Tato العراقي MD  •  polyethylene glycol (MIRALAX) packet 17 g, 17 g, Oral, Daily, Tato العراقي MD, 17 g at 10/04/22 0830  •  rosuvastatin (CRESTOR) tablet 5 mg, 5 mg, Oral, Nightly, Tato العراقي MD, 5 mg at 10/03/22 2003  •  senna (SENOKOT) tablet 1 tablet, 1 tablet, Oral, Daily, Tato العراقي MD, 1 tablet at 10/04/22 0830  •  sertraline (ZOLOFT) tablet 75 mg, 75 mg, Oral, Nightly, Tato العراقي MD, 75 mg at 10/03/22 2007  •  sodium chloride 0.9 % flush 10 mL, 10 mL, Intravenous, PRN, Rachna Marvin MD, 10 mL at 10/01/22 2242  •  tamsulosin (FLOMAX) 24 hr capsule 0.4 mg, 0.4 mg, Oral, Nightly, Tato العراقي MD, 0.4 mg at 10/03/22 2003  •  vancomycin 1250 mg/250 mL 0.9% NS IVPB ([facility]), 1,250 mg, Intravenous, Q24H, Tato العراقي MD, 1,250 mg at 10/03/22 1248     ASSESSMENT  E. coli bacteremia with sepsis  Acute E. coli UTI   Left lower extremity cellulitis   Acute kidney injury  Chronic kidney disease stage III  Diabetes mellitus  Hypertension  Hyperlipidemia  Chronic hypoxic hypercapnic respiratory failure  Obstructive sleep apnea  BPH  Immobility syndrome  Gastroesophageal reflux disease    PLAN  CPM  Discontinue IVF  Continue IV antibiotics    Repeat blood cultures  Adjust nursing home medications  Stress ulcer DVT prophylaxis  Nephrology consult appreciated  Infectious disease to follow patient  Supportive care  DNR  PT OT  Discussed with nursing staff  Follow closely further recommendation about hospital course    TATO العراقي MD

## 2022-10-04 NOTE — PROGRESS NOTES
[facility] Progress Note      Patient Name: Yakov Dubon  : 1945  MRN: [id number]  Primary Care Physician:  Melissa Steven MD  Date of admission: 10/1/2022    Subjective     Interval History:   Follow up CARLOS on CKD III. Pain in left leg,  No appetite and does not like the food.       Review of Systems:   As noted above    Objective     Vitals:   Temp:  [97.6 °F (36.4 °C)-99 °F (37.2 °C)] 97.8 °F (36.6 °C)  Heart Rate:  [63-82] 72  Resp:  [18] 18  BP: (173-191)/(83-93) 173/88    Intake/Output Summary (Last 24 hours) at 10/4/2022 1356  Last data filed at 10/3/2022 2002  Gross per 24 hour   Intake 340 ml   Output --   Net 340 ml       Physical Exam:    General Appearance: alert, lying flat in bed. Very slow to answer.  no acute distress   Skin: warm and dry  HEENT: oral mucosa normal, nonicteric sclera  Neck: supple, no JVD  Lungs: Clear to auscultation  Heart: RRR, normal S1 and S2  Abdomen: soft, nontender, nondistended. + bs  : no palpable bladder  Extremities: bilateral lower ext erythema, LLE much more intense, warm tender.   Neuro: slow to answer but appopriate.  Moves all 4 ext .Scheduled Meds:     [START ON 10/5/2022] amLODIPine, 10 mg, Oral, Q24H  aspirin, 81 mg, Oral, Daily  carvedilol, 3.125 mg, Oral, BID With Meals  cefTRIAXone, 2 g, Intravenous, Q24H  clotrimazole-betamethasone, 1 application, Topical, Q12H  famotidine, 20 mg, Oral, BID  insulin glargine, 10 Units, Subcutaneous, Nightly  insulin lispro, 0-7 Units, Subcutaneous, 4x Daily With Meals & Nightly  polyethylene glycol, 17 g, Oral, Daily  rosuvastatin, 5 mg, Oral, Nightly  senna, 1 tablet, Oral, Daily  sertraline, 75 mg, Oral, Nightly  tamsulosin, 0.4 mg, Oral, Nightly  vancomycin, 1,250 mg, Intravenous, Q24H      IV Meds:   Pharmacy to dose vancomycin,         Results Reviewed:   I have personally reviewed the results from the time of this admission to 10/4/2022 13:56 EDT     Results from last 7 days    Lab Units 10/04/22  0423 10/03/22  0352 10/02/22  0435 10/01/22  1201   SODIUM mmol/L 138 139 139 143   POTASSIUM mmol/L 4.3 4.3 4.0 4.5   CHLORIDE mmol/L 101 101 103 100   CO2 mmol/L 28.0 31.3* 30.0* 32.0*   BUN mg/dL 26* 29* 33* 37*   CREATININE mg/dL 1.46* 1.81* 1.94* 2.38*   CALCIUM mg/dL 9.0 8.4* 8.6 9.7   BILIRUBIN mg/dL  --   --  0.4 0.3   ALK PHOS U/L  --   --  49 56   ALT (SGPT) U/L  --   --  13 6   AST (SGOT) U/L  --   --  21 13   GLUCOSE mg/dL 117* 137* 153* 115*       Estimated Creatinine Clearance: 54.6 mL/min (A) (by C-G formula based on SCr of 1.46 mg/dL (H)).    Results from last 7 days   Lab Units 10/01/22  1201   MAGNESIUM mg/dL 2.1             Results from last 7 days   Lab Units 10/04/22  0423 10/03/22  0352 10/02/22  0436 10/01/22  1201   WBC 10*3/mm3 13.69* 12.06* 11.04* 12.40*   HEMOGLOBIN g/dL 10.1* 8.7* 8.9* 11.1*   PLATELETS 10*3/mm3 286 234 233 349             Assessment / Plan     ASSESSMENT:  1. Uyen on CKD III, baseline 1.5.  Back to baseline today after IVF.  K normal, bicarb improved.  2. HTN not controlled. Norvasc and coreg increased. Volume by exam    3. LLE cellulitis.  On ceftriaxone.   PLAN:  1. Increase coreg to 6.25 mg bid  2. Add back diuretic tomorrow.     Lexie Rao MD  10/04/22  13:56 EDT    [facility]  [phone]

## 2022-10-05 LAB
ANION GAP SERPL CALCULATED.3IONS-SCNC: 7 MMOL/L (ref 5–15)
BASOPHILS # BLD AUTO: 0.03 10*3/MM3 (ref 0–0.2)
BASOPHILS NFR BLD AUTO: 0.3 % (ref 0–1.5)
BUN SERPL-MCNC: 26 MG/DL (ref 8–23)
BUN/CREAT SERPL: 19 (ref 7–25)
CALCIUM SPEC-SCNC: 9.1 MG/DL (ref 8.6–10.5)
CHLORIDE SERPL-SCNC: 101 MMOL/L (ref 98–107)
CO2 SERPL-SCNC: 31 MMOL/L (ref 22–29)
CREAT SERPL-MCNC: 1.37 MG/DL (ref 0.76–1.27)
DEPRECATED RDW RBC AUTO: 46.1 FL (ref 37–54)
EGFRCR SERPLBLD CKD-EPI 2021: 52.8 ML/MIN/1.73
EOSINOPHIL # BLD AUTO: 0.09 10*3/MM3 (ref 0–0.4)
EOSINOPHIL NFR BLD AUTO: 1 % (ref 0.3–6.2)
ERYTHROCYTE [DISTWIDTH] IN BLOOD BY AUTOMATED COUNT: 13.6 % (ref 12.3–15.4)
GLUCOSE BLDC GLUCOMTR-MCNC: 101 MG/DL (ref 70–130)
GLUCOSE BLDC GLUCOMTR-MCNC: 102 MG/DL (ref 70–130)
GLUCOSE BLDC GLUCOMTR-MCNC: 81 MG/DL (ref 70–130)
GLUCOSE BLDC GLUCOMTR-MCNC: 99 MG/DL (ref 70–130)
GLUCOSE SERPL-MCNC: 83 MG/DL (ref 65–99)
HCT VFR BLD AUTO: 31.3 % (ref 37.5–51)
HGB BLD-MCNC: 10.1 G/DL (ref 13–17.7)
IMM GRANULOCYTES # BLD AUTO: 0.04 10*3/MM3 (ref 0–0.05)
IMM GRANULOCYTES NFR BLD AUTO: 0.4 % (ref 0–0.5)
LYMPHOCYTES # BLD AUTO: 1.13 10*3/MM3 (ref 0.7–3.1)
LYMPHOCYTES NFR BLD AUTO: 12.2 % (ref 19.6–45.3)
MCH RBC QN AUTO: 30.1 PG (ref 26.6–33)
MCHC RBC AUTO-ENTMCNC: 32.3 G/DL (ref 31.5–35.7)
MCV RBC AUTO: 93.2 FL (ref 79–97)
MONOCYTES # BLD AUTO: 0.42 10*3/MM3 (ref 0.1–0.9)
MONOCYTES NFR BLD AUTO: 4.5 % (ref 5–12)
NEUTROPHILS NFR BLD AUTO: 7.54 10*3/MM3 (ref 1.7–7)
NEUTROPHILS NFR BLD AUTO: 81.6 % (ref 42.7–76)
NRBC BLD AUTO-RTO: 0 /100 WBC (ref 0–0.2)
PLATELET # BLD AUTO: 322 10*3/MM3 (ref 140–450)
PMV BLD AUTO: 9.4 FL (ref 6–12)
POTASSIUM SERPL-SCNC: 4.4 MMOL/L (ref 3.5–5.2)
RBC # BLD AUTO: 3.36 10*6/MM3 (ref 4.14–5.8)
SODIUM SERPL-SCNC: 139 MMOL/L (ref 136–145)
WBC NRBC COR # BLD: 9.25 10*3/MM3 (ref 3.4–10.8)

## 2022-10-05 PROCEDURE — 82962 GLUCOSE BLOOD TEST: CPT

## 2022-10-05 PROCEDURE — 97166 OT EVAL MOD COMPLEX 45 MIN: CPT

## 2022-10-05 PROCEDURE — 85025 COMPLETE CBC W/AUTO DIFF WBC: CPT | Performed by: HOSPITALIST

## 2022-10-05 PROCEDURE — 97530 THERAPEUTIC ACTIVITIES: CPT

## 2022-10-05 PROCEDURE — 97535 SELF CARE MNGMENT TRAINING: CPT

## 2022-10-05 PROCEDURE — 80048 BASIC METABOLIC PNL TOTAL CA: CPT | Performed by: HOSPITALIST

## 2022-10-05 PROCEDURE — 25010000002 CEFTRIAXONE PER 250 MG: Performed by: INTERNAL MEDICINE

## 2022-10-05 PROCEDURE — 25010000002 VANCOMYCIN 10 G RECONSTITUTED SOLUTION: Performed by: HOSPITALIST

## 2022-10-05 RX ORDER — FUROSEMIDE 40 MG/1
40 TABLET ORAL DAILY
Status: DISCONTINUED | OUTPATIENT
Start: 2022-10-05 | End: 2022-10-07

## 2022-10-05 RX ADMIN — CEFTRIAXONE 2 G: 2 INJECTION, POWDER, FOR SOLUTION INTRAMUSCULAR; INTRAVENOUS at 19:57

## 2022-10-05 RX ADMIN — FAMOTIDINE 20 MG: 20 TABLET ORAL at 09:05

## 2022-10-05 RX ADMIN — TAMSULOSIN HYDROCHLORIDE 0.4 MG: 0.4 CAPSULE ORAL at 21:34

## 2022-10-05 RX ADMIN — CARVEDILOL 6.25 MG: 6.25 TABLET, FILM COATED ORAL at 09:05

## 2022-10-05 RX ADMIN — FAMOTIDINE 20 MG: 20 TABLET ORAL at 21:35

## 2022-10-05 RX ADMIN — SERTRALINE 75 MG: 25 TABLET, FILM COATED ORAL at 21:34

## 2022-10-05 RX ADMIN — CLOTRIMAZOLE AND BETAMETHASONE DIPROPIONATE 1 APPLICATION: 10; .5 CREAM TOPICAL at 09:05

## 2022-10-05 RX ADMIN — VANCOMYCIN HYDROCHLORIDE 1250 MG: 10 INJECTION, POWDER, LYOPHILIZED, FOR SOLUTION INTRAVENOUS at 11:53

## 2022-10-05 RX ADMIN — ROSUVASTATIN CALCIUM 5 MG: 5 TABLET, FILM COATED ORAL at 21:34

## 2022-10-05 RX ADMIN — ASPIRIN 81 MG: 81 TABLET, CHEWABLE ORAL at 09:05

## 2022-10-05 RX ADMIN — POLYETHYLENE GLYCOL 3350 17 G: 17 POWDER, FOR SOLUTION ORAL at 09:05

## 2022-10-05 RX ADMIN — AMLODIPINE BESYLATE 10 MG: 10 TABLET ORAL at 09:04

## 2022-10-05 RX ADMIN — FUROSEMIDE 40 MG: 20 TABLET ORAL at 09:07

## 2022-10-05 RX ADMIN — CARVEDILOL 6.25 MG: 6.25 TABLET, FILM COATED ORAL at 17:49

## 2022-10-05 RX ADMIN — CLOTRIMAZOLE AND BETAMETHASONE DIPROPIONATE 1 APPLICATION: 10; .5 CREAM TOPICAL at 21:36

## 2022-10-05 RX ADMIN — SENNOSIDES 1 TABLET: 8.6 TABLET, FILM COATED ORAL at 09:04

## 2022-10-05 NOTE — PLAN OF CARE
Goal Outcome Evaluation:           Progress: improving  Outcome Evaluation: Pt received zofran tonight and rested much better. Pt light/ call light not working in the room. Turned/ repositioned patient. medicated cream applied to left leg. CTM, safety maintained.

## 2022-10-05 NOTE — PLAN OF CARE
Goal Outcome Evaluation:  Plan of Care Reviewed With: patient        Progress: no change  Outcome Evaluation: Pt seen by OT this date for evaluation. Pt is a 78 y/o male admit to [facility] from nursing home for AMS. PMHx significant for chronic hypoxic hypercapnic, respiratory failure, diabetes mellitus, hypertension, diastolic CHF, obstructive sleep apne,a and chronic kidney disease stage III. Pt reports that he does require a lot of assistance with ADLs at baseline and it is unclear whether or not pt is ambulatory at baseline as pt reports initially that he does ambulate with cane but then later in session when asked again pt reports that he just transfers from bed>w/c and doesn't walk. Upon arrival pt lying supine in bed, no c/o pain, A&O x2. Pt requiring Mod A for sup>sit>sup transition. Once sitting EOB pt dependent for LB dressing task sitting EOB. Pt requiring Max A x2 for sit>stand transition demonstrating forward flexed posture with inability to self correct into upright posture. Pt unable to stand long enough to change brief as pt noted to be soiled. OT and aide assisted pt back into bed for clean up. Pt Max A x2 for rolling L<>R in bed and dependent for pericare and brief management this date in supine. Pt dependent x2 for scooting in bed. Pt left in supine, needs in reach, alarm on, aide present. OT rec rehab at D/C. OT wore mask, gloves, glasses, hand hygiene performed.

## 2022-10-05 NOTE — PROGRESS NOTES
[facility] Progress Note      Patient Name: Yakov Dubon  : 1945  MRN: [id number]  Primary Care Physician:  Melissa Steven MD  Date of admission: 10/1/2022    Subjective     Interval History:   Follow up CARLOS on CKD III. Urine not measured due to incontinence. BM 2.  Left leg not as painful.  Not eating.   Review of Systems:   As noted above    Objective     Vitals:   Temp:  [97.8 °F (36.6 °C)-98.6 °F (37 °C)] 98.1 °F (36.7 °C)  Heart Rate:  [59-72] 66  Resp:  [18] 18  BP: (151-176)/(69-88) 161/78  Flow (L/min):  [2] 2    Intake/Output Summary (Last 24 hours) at 10/5/2022 0826  Last data filed at 10/4/2022 1723  Gross per 24 hour   Intake 0 ml   Output --   Net 0 ml       Physical Exam:    General Appearance: alert, lying flat in bed. Very slow to answer, but answers appropriately.  no acute distress   Skin: warm and dry  HEENT: oral mucosa normal, nonicteric sclera  Neck: supple, no JVD  Lungs: Clear to auscultation  Heart: RRR, normal S1 and S2  Abdomen: soft, nontender, nondistended. + bs  : no palpable bladder  Extremities: bilateral lower ext erythema, LLE much more intense than right leg, warm, tender.   Neuro: slow to answer but appopriate.  Moves all 4 ext   .Scheduled Meds:     amLODIPine, 10 mg, Oral, Q24H  aspirin, 81 mg, Oral, Daily  carvedilol, 6.25 mg, Oral, BID With Meals  cefTRIAXone, 2 g, Intravenous, Q24H  clotrimazole-betamethasone, 1 application, Topical, Q12H  famotidine, 20 mg, Oral, BID  insulin glargine, 10 Units, Subcutaneous, Nightly  insulin lispro, 0-7 Units, Subcutaneous, 4x Daily With Meals & Nightly  polyethylene glycol, 17 g, Oral, Daily  rosuvastatin, 5 mg, Oral, Nightly  senna, 1 tablet, Oral, Daily  sertraline, 75 mg, Oral, Nightly  tamsulosin, 0.4 mg, Oral, Nightly  vancomycin, 1,250 mg, Intravenous, Q24H      IV Meds:   Pharmacy to dose vancomycin,         Results Reviewed:   I have personally reviewed the results from the time of this  admission to 10/5/2022 08:26 EDT     Results from last 7 days   Lab Units 10/05/22  0434 10/04/22  0423 10/03/22  0352 10/02/22  0435 10/01/22  1201   SODIUM mmol/L 139 138 139 139 143   POTASSIUM mmol/L 4.4 4.3 4.3 4.0 4.5   CHLORIDE mmol/L 101 101 101 103 100   CO2 mmol/L 31.0* 28.0 31.3* 30.0* 32.0*   BUN mg/dL 26* 26* 29* 33* 37*   CREATININE mg/dL 1.37* 1.46* 1.81* 1.94* 2.38*   CALCIUM mg/dL 9.1 9.0 8.4* 8.6 9.7   BILIRUBIN mg/dL  --   --   --  0.4 0.3   ALK PHOS U/L  --   --   --  49 56   ALT (SGPT) U/L  --   --   --  13 6   AST (SGOT) U/L  --   --   --  21 13   GLUCOSE mg/dL 83 117* 137* 153* 115*       Estimated Creatinine Clearance: 57.9 mL/min (A) (by C-G formula based on SCr of 1.37 mg/dL (H)).    Results from last 7 days   Lab Units 10/01/22  1201   MAGNESIUM mg/dL 2.1             Results from last 7 days   Lab Units 10/05/22  0434 10/04/22  0423 10/03/22  0352 10/02/22  0436 10/01/22  1201   WBC 10*3/mm3 9.25 13.69* 12.06* 11.04* 12.40*   HEMOGLOBIN g/dL 10.1* 10.1* 8.7* 8.9* 11.1*   PLATELETS 10*3/mm3 322 286 234 233 349             Assessment / Plan     ASSESSMENT:  1. Uyen on CKD III, baseline 1.5.  Back to baseline.   2. HTN not controlled. Norvasc and coreg increased yesterday. Volume by exam mild excess.  Resume lasix today .  3. LLE cellulitis.  On ceftriaxone. WBC down .  4. Anemia.   PLAN:  1. Lasix 40 mg daily.   Lexie Rao MD  10/05/22  08:26 EDT    [facility]  [phone]

## 2022-10-05 NOTE — PROGRESS NOTES
Daily progress note    Primary care physician      Chief complaint  Doing same with no new complaints.  Patient making slow progress    History of present illness  77-year-old white male with very complex past medical history including chronic hypoxic hypercapnic respiratory failure diabetes mellitus hypertension diastolic CHF obstructive sleep apnea and chronic kidney disease stage III who is a nursing home resident brought to the emergency room with altered mental status.  Patient in no respite distress but very weak and lethargic and answer questions slowly.  Patient work-up in ER revealed and found to have sepsis with cellulitis and UTI and also found to have acute kidney injury admit for management.  Patient is DNR per his wishes.     REVIEW OF SYSTEMS  Unremarkable except for weakness.    PHYSICAL EXAM  Blood pressure 157/90, pulse 67, temperature 98 °F (36.7 °C), temperature source Oral, resp. rate 18, weight 115 kg (252 lb 10.4 oz), SpO2 90 %.    Constitutional:       General: Awake and alert in no distress.  HENT:      Head: Normocephalic and atraumatic.   Eyes:      Pupils: Pupils are equal, round, and reactive to light.   Cardiovascular:      Rate and Rhythm: Normal rate and regular rhythm.      Pulses:           Posterior tibial pulses are 2+ on the right side and 2+ on the left side.      Heart sounds: Normal heart sounds. No murmur heard.  Pulmonary:      Effort: Pulmonary effort is normal. No respiratory distress.      Breath sounds: Normal breath sounds. No wheezing.   Abdominal:      General: Bowel sounds are normal.      Palpations: Abdomen is soft.      Tenderness: There is no abdominal tenderness. There is no guarding or rebound.   Musculoskeletal:         General: Swelling and tenderness ( Left leg) present. Normal range of motion.      Cervical back: Normal range of motion.   Skin:     General: Skin is warm and dry.      Findings: Erythema ( Left lower leg and posterior thigh) present.    Neurological:      Mental Status: He is confused.      GCS: GCS eye subscore is 1. GCS verbal subscore is 4. GCS motor subscore is 6.      Cranial Nerves: No cranial nerve deficit.      Sensory: No sensory deficit.      Comments: Patient with frequent irregular, nonrhythmic, myoclonic jerks throughout limbs and torso   Psychiatric:         Mood and Affect: Affect normal.      LAB RESULTS   Lab Results (last 24 hours)     Procedure Component Value Units Date/Time    Blood Culture - Blood, Arm, Right [[id number]]  (Normal) Collected: 10/01/22 1201    Specimen: Blood from Arm, Right Updated: 10/05/22 1218     Blood Culture No growth at 4 days    POC Glucose Once [[id number]]  (Normal) Collected: 10/05/22 1105    Specimen: Blood Updated: 10/05/22 1107     Glucose 102 mg/dL      Comment: Meter: [id number] : [id number] Elmira SEYMOUR       POC Glucose Once [[id number]]  (Normal) Collected: 10/05/22 0604    Specimen: Blood Updated: 10/05/22 0605     Glucose 81 mg/dL      Comment: Meter: [id number] : [id number] Yari SEYMOUR       Basic Metabolic Panel [[id number]]  (Abnormal) Collected: 10/05/22 0434    Specimen: Blood Updated: 10/05/22 0537     Glucose 83 mg/dL      BUN 26 mg/dL      Creatinine 1.37 mg/dL      Sodium 139 mmol/L      Potassium 4.4 mmol/L      Chloride 101 mmol/L      CO2 31.0 mmol/L      Calcium 9.1 mg/dL      BUN/Creatinine Ratio 19.0     Anion Gap 7.0 mmol/L      eGFR 52.8 mL/min/1.73      Comment: National Kidney Foundation and American Society of Nephrology (ASN) Task Force recommended calculation based on the Chronic Kidney Disease Epidemiology Collaboration (CKD-EPI) equation refit without adjustment for race.       Narrative:      GFR Normal >60  Chronic Kidney Disease <60  Kidney Failure <15      CBC & Differential [[id number]]  (Abnormal) Collected: 10/05/22 0434    Specimen: Blood Updated: 10/05/22 0520    Narrative:      The following orders were created for panel order CBC &  Differential.  Procedure                               Abnormality         Status                     ---------                               -----------         ------                     CBC Auto Differential[[id number]]        Abnormal            Final result                 Please view results for these tests on the individual orders.    CBC Auto Differential [[id number]]  (Abnormal) Collected: 10/05/22 0434    Specimen: Blood Updated: 10/05/22 0520     WBC 9.25 10*3/mm3      RBC 3.36 10*6/mm3      Hemoglobin 10.1 g/dL      Hematocrit 31.3 %      MCV 93.2 fL      MCH 30.1 pg      MCHC 32.3 g/dL      RDW 13.6 %      RDW-SD 46.1 fl      MPV 9.4 fL      Platelets 322 10*3/mm3      Neutrophil % 81.6 %      Lymphocyte % 12.2 %      Monocyte % 4.5 %      Eosinophil % 1.0 %      Basophil % 0.3 %      Immature Grans % 0.4 %      Neutrophils, Absolute 7.54 10*3/mm3      Lymphocytes, Absolute 1.13 10*3/mm3      Monocytes, Absolute 0.42 10*3/mm3      Eosinophils, Absolute 0.09 10*3/mm3      Basophils, Absolute 0.03 10*3/mm3      Immature Grans, Absolute 0.04 10*3/mm3      nRBC 0.0 /100 WBC     Blood Culture - Blood, Arm, Left [[id number]]  (Normal) Collected: 10/03/22 1624    Specimen: Blood from Arm, Left Updated: 10/04/22 2217     Blood Culture No growth at 24 hours    POC Glucose Once [[id number]]  (Normal) Collected: 10/04/22 1940    Specimen: Blood Updated: 10/04/22 1941     Glucose 104 mg/dL      Comment: Meter: [id number] : [id number] Yari SEYMOUR       Blood Culture - Blood, Arm, Left [[id number]]  (Normal) Collected: 10/03/22 1624    Specimen: Blood from Arm, Left Updated: 10/04/22 1817     Blood Culture No growth at 24 hours    POC Glucose Once [[id number]]  (Normal) Collected: 10/04/22 1618    Specimen: Blood Updated: 10/04/22 1619     Glucose 114 mg/dL      Comment: Meter: [id number] : [id number] Poornima SEYMOUR           Imaging Results (Last 24 Hours)     ** No results found for the last 24 hours.  **          Current Facility-Administered Medications:   •  acetaminophen (TYLENOL) tablet 650 mg, 650 mg, Oral, Q6H PRN, Jean العراقي MD, 650 mg at 10/03/22 0818  •  amLODIPine (NORVASC) tablet 10 mg, 10 mg, Oral, Q24H, Jean العراقي MD, 10 mg at 10/05/22 0904  •  aspirin chewable tablet 81 mg, 81 mg, Oral, Daily, Jean العراقي MD, 81 mg at 10/05/22 0905  •  carvedilol (COREG) tablet 6.25 mg, 6.25 mg, Oral, BID With Meals, Lexie Rao MD, 6.25 mg at 10/05/22 0905  •  cefTRIAXone (ROCEPHIN) 2 g in sodium chloride 0.9 % 100 mL IVPB-VTB, 2 g, Intravenous, Q24H, Miguel Anderson MD, Last Rate: 200 mL/hr at 10/04/22 2011, 2 g at 10/04/22 2011  •  clotrimazole-betamethasone (LOTRISONE) 1-0.05 % cream 1 application, 1 application, Topical, Q12H, Miguel Anderson MD, 1 application at 10/05/22 0905  •  famotidine (PEPCID) tablet 20 mg, 20 mg, Oral, BID, Jean العراقي MD, 20 mg at 10/05/22 0905  •  furosemide (LASIX) tablet 40 mg, 40 mg, Oral, Daily, Lexie Rao MD, 40 mg at 10/05/22 0907  •  insulin glargine (LANTUS, SEMGLEE) injection 10 Units, 10 Units, Subcutaneous, Nightly, Jean العراقي MD, 10 Units at 10/04/22 2216  •  insulin lispro (ADMELOG) injection 0-7 Units, 0-7 Units, Subcutaneous, 4x Daily With Meals & Nightly, Jean العراقي MD, 4 Units at 10/03/22 1248  •  ondansetron (ZOFRAN) injection 4 mg, 4 mg, Intravenous, Q4H PRN, Jean العراقي MD, 4 mg at 10/04/22 2011  •  Pharmacy to dose vancomycin, , Does not apply, Continuous PRN, Jean العراقي MD  •  polyethylene glycol (MIRALAX) packet 17 g, 17 g, Oral, Daily, Jean العراقي MD, 17 g at 10/05/22 0905  •  rosuvastatin (CRESTOR) tablet 5 mg, 5 mg, Oral, Nightly, Jean العراقي MD, 5 mg at 10/04/22 2011  •  senna (SENOKOT) tablet 1 tablet, 1 tablet, Oral, Daily, Jean العراقي MD, 1 tablet at 10/05/22 0904  •  sertraline (ZOLOFT) tablet 75 mg, 75 mg, Oral, Nightly, Jean العراقي MD, 75 mg at 10/04/22 2011  •  sodium chloride 0.9 % flush 10 mL, 10 mL,  Intravenous, PRN, Rachna Marvin MD, 10 mL at 10/01/22 3053  •  tamsulosin (FLOMAX) 24 hr capsule 0.4 mg, 0.4 mg, Oral, Nightly, Tato العراقي MD, 0.4 mg at 10/04/22 2011     ASSESSMENT  E. coli bacteremia with sepsis  Acute E. coli UTI   Left lower extremity cellulitis   Acute kidney injury  Chronic kidney disease stage III  Diabetes mellitus  Hypertension  Hyperlipidemia  Chronic hypoxic hypercapnic respiratory failure  Obstructive sleep apnea  BPH  Immobility syndrome  Gastroesophageal reflux disease    PLAN  CPM  Continue IV antibiotics    Local wound care  Adjust nursing home medications  Stress ulcer DVT prophylaxis  Nephrology consult appreciated  Infectious disease to follow patient  Supportive care  DNR  PT OT  Discussed with nursing staff  Discharge planning    TATO العراقي MD

## 2022-10-05 NOTE — THERAPY EVALUATION
"Patient Name: Yakov Dubon  : 1945    MRN: [id number]                              Today's Date: 10/5/2022       Admit Date: 10/1/2022    Visit Dx:     ICD-10-CM ICD-9-CM   1. Left leg cellulitis  L03.116 682.6   2. Urinary tract infection, acute  N39.0 599.0   3. Acute on chronic renal insufficiency  N28.9 593.9    N18.9 585.9   4. Altered mental status, unspecified altered mental status type  R41.82 780.97     Patient Active Problem List   Diagnosis   • HALIMA (obstructive sleep apnea)   • CSA (central sleep apnea)   • REM behavioral disorder   • Hypersomnia due to medical condition   • Periodic breathing   • Cellulitis of right lower extremity   • HTN (hypertension)   • Type 2 diabetes mellitus with stage 3b chronic kidney disease ([facility])   • Elevated troponin   • RBBB   • Pulmonary vascular congestion   • Diastolic CHF, acute ([facility])   • Elevated LFTs   • Acute respiratory failure with hypoxia ([facility])   • Acute on chronic respiratory failure with hypercapnia ([facility])   • Anemia of chronic disease   • Sepsis due to Gram negative bacteria ([facility])   • Left leg cellulitis     Past Medical History:   Diagnosis Date   • Anxiety    • Back pain    • Depression    • Diabetes mellitus ([facility])     \"BORDERLINE\"   • High cholesterol    • Hypertension    • Kidney disease, chronic, stage III (GFR 30-59 ml/min) ([facility])    • Reflux esophagitis    • Sleep apnea      Past Surgical History:   Procedure Laterality Date   • APPENDECTOMY     • CARPAL TUNNEL RELEASE     • CATARACT EXTRACTION     • HAMMER TOE REPAIR     • LUMBAR SPINE SURGERY     • PAIN PUMP INSERTION/REVISION Right 2016    Procedure: RT PAIN PUMP REPLACEMENT;  Surgeon: Chris Messer MD;  Location: [facility];  Service:    • TOTAL KNEE ARTHROPLASTY Left       General Information     Row Name 10/05/22 1332          OT Time and Intention    Document Type evaluation  -BL     Mode of Treatment individual therapy;occupational therapy  -BL     Row Name 10/05/22 1332       "    General Information    Patient Profile Reviewed yes  -     Prior Level of Function ADL's;mod assist:  -     Existing Precautions/Restrictions fall  -     Barriers to Rehab medically complex;cognitive status;previous functional deficit  -     Row Name 10/05/22 1332          Living Environment    People in Home facility resident  -     Row Name 10/05/22 1332          Cognition    Orientation Status (Cognition) oriented to;person;time  -     Row Name 10/05/22 1332          Safety Issues, Functional Mobility    Safety Issues Affecting Function (Mobility) awareness of need for assistance;insight into deficits/self-awareness;safety precaution awareness;sequencing abilities  -     Impairments Affecting Function (Mobility) balance;cognition;endurance/activity tolerance;strength;postural/trunk control;range of motion (ROM)  -     Cognitive Impairments, Mobility Safety/Performance awareness, need for assistance;impulsivity;insight into deficits/self-awareness;judgment;problem-solving/reasoning;sequencing abilities  -           User Key  (r) = Recorded By, (t) = Taken By, (c) = Cosigned By    Initials Name Provider Type     Camilo Barraza OT Occupational Therapist                 Mobility/ADL's     Row Name 10/05/22 1332          Bed Mobility    Bed Mobility supine-sit;sit-supine  -     Supine-Sit [place] (Bed Mobility) minimum assist (75% patient effort);verbal cues  -     Sit-Supine [place] (Bed Mobility) moderate assist (50% patient effort);verbal cues  -     Assistive Device (Bed Mobility) bed rails;head of bed elevated  -     Row Name 10/05/22 1335          Transfers    Transfers sit-stand transfer;stand-sit transfer  -     Sit-Stand [place] (Transfers) maximum assist (25% patient effort);verbal cues;2 person assist  -     Stand-Sit [place] (Transfers) moderate assist (50% patient effort);2 person assist;verbal cues  -     Row Name 10/05/22 3061           Sit-Stand Transfer    Comment, (Sit-Stand Transfer) HHA x2  -BL     Row Name 10/05/22 1333          Activities of Daily Living    BADL Assessment/Intervention lower body dressing;toileting  -BL     Row Name 10/05/22 1333          Lower Body Dressing Assessment/Training    [place] Level (Lower Body Dressing) don;socks;dependent (less than 25% patient effort)  -BL     Position (Lower Body Dressing) edge of bed sitting  -BL     Row Name 10/05/22 1333          Toileting Assessment/Training    [place] Level (Toileting) adjust/manage clothing;change pad/brief;perform perineal hygiene;dependent (less than 25% patient effort)  -BL     Position (Toileting) supine;supported standing  -BL           User Key  (r) = Recorded By, (t) = Taken By, (c) = Cosigned By    Initials Name Provider Type    Camilo Pablo OT Occupational Therapist               Obj/Interventions     Row Name 10/05/22 1334          Sensory Assessment (Somatosensory)    Sensory Assessment (Somatosensory) UE sensation intact  -BL     Row Name 10/05/22 1334          Vision Assessment/Intervention    Visual Impairment/Limitations WFL  -BL     Row Name 10/05/22 1334          Range of Motion Comprehensive    Comment, General Range of Motion BUE 50% AROM at shoulder  -BL     Row Name 10/05/22 1334          Strength Comprehensive (MMT)    Comment, General Manual Muscle Testing (MMT) Assessment BUE 3/5  -BL     Row Name 10/05/22 1334          Balance    Balance Assessment sitting static balance;sitting dynamic balance;sit to stand dynamic balance;standing static balance  -BL     Static Sitting Balance contact guard  -BL     Dynamic Sitting Balance minimal assist  -BL     Position, Sitting Balance unsupported;sitting edge of bed  -BL     Sit to Stand Dynamic Balance maximum assist;2-person assist  -BL     Static Standing Balance moderate assist;2-person assist  -BL     Position/Device Used, Standing Balance supported  -BL     Balance Interventions  sitting;standing;sit to stand;supported;static;dynamic;occupation based/functional task  -BL     Comment, Balance Pt unable to come into upright positioning standing  -BL           User Key  (r) = Recorded By, (t) = Taken By, (c) = Cosigned By    Initials Name Provider Type    BL Camilo Barraza, OT Occupational Therapist               Goals/Plan     Row Name 10/05/22 1348          Bed Mobility Goal 1 (OT)    Activity/Assistive Device (Bed Mobility Goal 1, OT) bed mobility activities, all  -BL     [place] Level/Cues Needed (Bed Mobility Goal 1, OT) minimum assist (75% or more patient effort)  -BL     Time Frame (Bed Mobility Goal 1, OT) short term goal (STG);2 weeks  -BL     Progress/Outcomes (Bed Mobility Goal 1, OT) continuing progress toward goal  -BL     Row Name 10/05/22 1348          Transfer Goal 1 (OT)    Activity/Assistive Device (Transfer Goal 1, OT) sit-to-stand/stand-to-sit;bed-to-chair/chair-to-bed  -BL     [place] Level/Cues Needed (Transfer Goal 1, OT) moderate assist (50-74% patient effort)  -BL     Time Frame (Transfer Goal 1, OT) short term goal (STG);2 weeks  -BL     Progress/Outcome (Transfer Goal 1, OT) continuing progress toward goal  -BL     Row Name 10/05/22 1348          Dressing Goal 1 (OT)    Activity/Device (Dressing Goal 1, OT) dressing skills, all  -BL     [place]/Cues Needed (Dressing Goal 1, OT) maximum assist (25-49% patient effort)  -BL     Time Frame (Dressing Goal 1, OT) short term goal (STG);2 weeks  -BL     Progress/Outcome (Dressing Goal 1, OT) continuing progress toward goal  -BL     Row Name 10/05/22 1348          Grooming Goal 1 (OT)    Activity/Device (Grooming Goal 1, OT) grooming skills, all  -BL     [place] (Grooming Goal 1, OT) set-up required  -BL     Time Frame (Grooming Goal 1, OT) short term goal (STG);2 weeks  -BL     Progress/Outcome (Grooming Goal 1, OT) continuing progress toward goal  -BL     Row Name 10/05/22 1348          ROM Goal 1 (OT)     ROM Goal 1 (OT) Pt will be (I) with HEP for BUE prior to D/C.  -BL     Time Frame (ROM Goal 1, OT) short term goal (STG);2 weeks  -BL     Progress/Outcome (ROM Goal 1, OT) continuing progress toward goal  -BL     Row Name 10/05/22 134          Therapy Assessment/Plan (OT)    Planned Therapy Interventions (OT) BADL retraining;IADL retraining;occupation/activity based interventions;strengthening exercise;transfer/mobility retraining;patient/caregiver education/training  -BL           User Key  (r) = Recorded By, (t) = Taken By, (c) = Cosigned By    Initials Name Provider Type    BL Camilo Barraza OT Occupational Therapist               Clinical Impression     Row Name 10/05/22 1336          Pain Assessment    Pretreatment Pain Rating 0/10 - no pain  -BL     Posttreatment Pain Rating 0/10 - no pain  -BL     Row Name 10/05/22 1336          Plan of Care Review    Plan of Care Reviewed With patient  -BL     Progress no change  -BL     Outcome Evaluation Pt seen by OT this date for evaluation. Pt is a 78 y/o male admit to [facility] from nursing home for AMS. PMHx significant for chronic hypoxic hypercapnic, respiratory failure, diabetes mellitus, hypertension, diastolic CHF, obstructive sleep apne,a and chronic kidney disease stage III. Pt reports that he does require a lot of assistance with ADLs at baseline and it is unclear whether or not pt is ambulatory at baseline as pt reports initially that he does ambulate with cane but then later in session when asked again pt reports that he just transfers from bed>w/c and doesn't walk. Upon arrival pt lying supine in bed, no c/o pain, A&O x2. Pt requiring Mod A for sup>sit>sup transition. Once sitting EOB pt dependent for LB dressing task sitting EOB. Pt requiring Max A x2 for sit>stand transition demonstrating forward flexed posture with inability to self correct into upright posture. Pt unable to stand long enough to change brief as pt noted to be soiled. OT and aide  assisted pt back into bed for clean up. Pt Max A x2 for rolling L<>R in bed and dependent for pericare and brief management this date in supine. Pt dependent x2 for scooting in bed. Pt left in supine, needs in reach, alarm on, aide present. OT rec rehab at D/C. OT wore mask, gloves, glasses, hand hygiene performed.  -     Row Name 10/05/22 1336          Therapy Assessment/Plan (OT)    Rehab Potential (OT) fair, will monitor progress closely  -BL     Criteria for Skilled Therapeutic Interventions Met (OT) yes;skilled treatment is necessary  -     Therapy Frequency (OT) 3 times/wk  -BL     Row Name 10/05/22 1336          Therapy Plan Review/Discharge Plan (OT)    Anticipated Discharge Disposition (OT) [facility] nursing facility  -     Row Name 10/05/22 1336          Vital Signs    Pre Patient Position Supine  -BL     Intra Patient Position Standing  -BL     Post Patient Position Supine  -BL     Row Name 10/05/22 1336          Positioning and Restraints    Pre-Treatment Position in bed  -BL     Post Treatment Position bed  -BL     In Bed supine;call light within reach;encouraged to call for assist;exit alarm on  -BL           User Key  (r) = Recorded By, (t) = Taken By, (c) = Cosigned By    Initials Name Provider Type    BL Camilo Barraza, ANNETTE Occupational Therapist               Outcome Measures     Row Name 10/05/22 8802          How much help from another is currently needed...    Putting on and taking off regular lower body clothing? 1  -BL     Bathing (including washing, rinsing, and drying) 1  -BL     Toileting (which includes using toilet bed pan or urinal) 1  -BL     Putting on and taking off regular upper body clothing 2  -BL     Taking care of personal grooming (such as brushing teeth) 3  -BL     Eating meals 3  -BL     AM-PAC 6 Clicks Score (OT) 11  -BL     Row Name 10/05/22 1348          Modified [place] Scale    Modified [place] Scale 4 - Moderately severe disability.  Unable to walk without assistance,  and unable to attend to own bodily needs without assistance.  -     Row Name 10/05/22 1349          Functional Assessment    Outcome Measure Options AM-PAC 6 Clicks Daily Activity (OT);Modified [place]  -           User Key  (r) = Recorded By, (t) = Taken By, (c) = Cosigned By    Initials Name Provider Type     Camilo Barraza OT Occupational Therapist                Occupational Therapy Education                 Title: PT OT SLP Therapies (In Progress)     Topic: Occupational Therapy (In Progress)     Point: ADL training (Done)     Description:   Instruct learner(s) on proper safety adaptation and remediation techniques during self care or transfers.   Instruct in proper use of assistive devices.              Learning Progress Summary           Patient Acceptance, E, VU by  at 10/5/2022 1350    Comment: the role of OT                   Point: Home exercise program (Not Started)     Description:   Instruct learner(s) on appropriate technique for monitoring, assisting and/or progressing therapeutic exercises/activities.              Learner Progress:  Not documented in this visit.          Point: Precautions (Not Started)     Description:   Instruct learner(s) on prescribed precautions during self-care and functional transfers.              Learner Progress:  Not documented in this visit.          Point: Body mechanics (Not Started)     Description:   Instruct learner(s) on proper positioning and spine alignment during self-care, functional mobility activities and/or exercises.              Learner Progress:  Not documented in this visit.                      User Key     Initials Effective Dates Name Provider Type [facility] 01/05/21 -  Camilo Barraza OT Occupational Therapist OT              OT Recommendation and Plan  Planned Therapy Interventions (OT): BADL retraining, IADL retraining, occupation/activity based interventions, strengthening exercise, transfer/mobility retraining,  patient/caregiver education/training  Therapy Frequency (OT): 3 times/wk  Plan of Care Review  Plan of Care Reviewed With: patient  Progress: no change  Outcome Evaluation: Pt seen by OT this date for evaluation. Pt is a 78 y/o male admit to [facility] from nursing home for AMS. PMHx significant for chronic hypoxic hypercapnic, respiratory failure, diabetes mellitus, hypertension, diastolic CHF, obstructive sleep apne,a and chronic kidney disease stage III. Pt reports that he does require a lot of assistance with ADLs at baseline and it is unclear whether or not pt is ambulatory at baseline as pt reports initially that he does ambulate with cane but then later in session when asked again pt reports that he just transfers from bed>w/c and doesn't walk. Upon arrival pt lying supine in bed, no c/o pain, A&O x2. Pt requiring Mod A for sup>sit>sup transition. Once sitting EOB pt dependent for LB dressing task sitting EOB. Pt requiring Max A x2 for sit>stand transition demonstrating forward flexed posture with inability to self correct into upright posture. Pt unable to stand long enough to change brief as pt noted to be soiled. OT and aide assisted pt back into bed for clean up. Pt Max A x2 for rolling L<>R in bed and dependent for pericare and brief management this date in supine. Pt dependent x2 for scooting in bed. Pt left in supine, needs in reach, alarm on, aide present. OT rec rehab at D/C. OT wore mask, gloves, glasses, hand hygiene performed.     Time Calculation:    Time Calculation- OT     Row Name 10/05/22 1351             Time Calculation- OT    OT Start Time 1139  -BL      OT Stop Time 1210  -BL      OT Time Calculation (min) 31 min  -BL      Total Timed Code Minutes- OT 23 minute(s)  -BL      OT Received On 10/05/22  -BL      OT - Next Appointment 10/07/22  -BL      OT Goal Re-Cert Due Date 10/19/22  -BL              Timed Charges    43987 - OT Therapeutic Activity Minutes 8  -BL      15465 - OT Self Care/Mgmt  Minutes 15  -BL              Untimed Charges    OT Eval/Re-eval Minutes 8  -BL              Total Minutes    Timed Charges Total Minutes 23  -BL      Untimed Charges Total Minutes 8  -BL       Total Minutes 31  -BL            User Key  (r) = Recorded By, (t) = Taken By, (c) = Cosigned By    Initials Name Provider Type    Camilo Pablo OT Occupational Therapist              Therapy Charges for Today     Code Description Service Date Service Provider Modifiers Qty    03183929912 HC OT THERAPEUTIC ACT EA 15 MIN 10/5/2022 Camilo Barraza OT GO 1    26092148635 HC OT SELF CARE/MGMT/TRAIN EA 15 MIN 10/5/2022 Camilo Barraza OT GO 1    49889253726 HC OT EVAL MOD COMPLEXITY 2 10/5/2022 Camilo Barraza OT GO 1               Camilo Barraza OT  10/5/2022

## 2022-10-05 NOTE — PLAN OF CARE
Goal Outcome Evaluation:           Progress: improving     Pt not eating much. Was able to get him to drink a boost at breakfast and chili at lunch. Sister sent lizett fernandez to patient and he refused to eat it and his dinner. Therapy had a difficult time with him today. I was not able to get him to do anything all he wants to do is sleep. Leg still red and swollen. Will cont to monitor.

## 2022-10-05 NOTE — PROGRESS NOTES
Continued Stay Note  [facility]     Patient Name: Yakov Dubon  MRN: [id number]  Today's Date: 10/5/2022    Admit Date: 10/1/2022    Plan: Return to [facility]   Discharge Plan     Row Name 10/05/22 1301       Plan    Plan Return to [facility]    Plan Comments DC plan confirmed with pt's POA/sister-in-law, Mitzy Sheffield, [phone]; Plan is to return to [facility]. CCP will notify POA day of dc. POA states pt has private pay caregiver that will accompany him to rehab. If a stretcher is required she will ride along or follow depending on stretcher company. She will assist him with getting settled in his room. POA prefers to be notified of dc and she/POA will call the caregiver on day of dc. Caregiver is Mirna Montelongo with [facility]. [facility] pharmacy is selected in [employer].               Discharge Codes    No documentation.               Expected Discharge Date and Time     Expected Discharge Date Expected Discharge Time    Oct 7, 2022             Shharzad Robertson RN

## 2022-10-06 LAB
ALBUMIN SERPL-MCNC: 2.8 G/DL (ref 3.5–5.2)
ALBUMIN/GLOB SERPL: 0.8 G/DL
ALP SERPL-CCNC: 61 U/L (ref 39–117)
ALT SERPL W P-5'-P-CCNC: 9 U/L (ref 1–41)
ANION GAP SERPL CALCULATED.3IONS-SCNC: 8.8 MMOL/L (ref 5–15)
AST SERPL-CCNC: 10 U/L (ref 1–40)
BACTERIA SPEC AEROBE CULT: NORMAL
BILIRUB SERPL-MCNC: <0.2 MG/DL (ref 0–1.2)
BUN SERPL-MCNC: 26 MG/DL (ref 8–23)
BUN/CREAT SERPL: 18.4 (ref 7–25)
CALCIUM SPEC-SCNC: 9.2 MG/DL (ref 8.6–10.5)
CHLORIDE SERPL-SCNC: 99 MMOL/L (ref 98–107)
CO2 SERPL-SCNC: 32.2 MMOL/L (ref 22–29)
CREAT SERPL-MCNC: 1.41 MG/DL (ref 0.76–1.27)
DEPRECATED RDW RBC AUTO: 44.5 FL (ref 37–54)
EGFRCR SERPLBLD CKD-EPI 2021: 51 ML/MIN/1.73
ERYTHROCYTE [DISTWIDTH] IN BLOOD BY AUTOMATED COUNT: 13.2 % (ref 12.3–15.4)
GLOBULIN UR ELPH-MCNC: 3.4 GM/DL
GLUCOSE BLDC GLUCOMTR-MCNC: 101 MG/DL (ref 70–130)
GLUCOSE BLDC GLUCOMTR-MCNC: 117 MG/DL (ref 70–130)
GLUCOSE BLDC GLUCOMTR-MCNC: 135 MG/DL (ref 70–130)
GLUCOSE BLDC GLUCOMTR-MCNC: 99 MG/DL (ref 70–130)
GLUCOSE SERPL-MCNC: 91 MG/DL (ref 65–99)
HCT VFR BLD AUTO: 33.6 % (ref 37.5–51)
HGB BLD-MCNC: 11 G/DL (ref 13–17.7)
MCH RBC QN AUTO: 30.1 PG (ref 26.6–33)
MCHC RBC AUTO-ENTMCNC: 32.7 G/DL (ref 31.5–35.7)
MCV RBC AUTO: 92.1 FL (ref 79–97)
PLATELET # BLD AUTO: 313 10*3/MM3 (ref 140–450)
PMV BLD AUTO: 9 FL (ref 6–12)
POTASSIUM SERPL-SCNC: 4.5 MMOL/L (ref 3.5–5.2)
PROT SERPL-MCNC: 6.2 G/DL (ref 6–8.5)
RBC # BLD AUTO: 3.65 10*6/MM3 (ref 4.14–5.8)
SODIUM SERPL-SCNC: 140 MMOL/L (ref 136–145)
WBC NRBC COR # BLD: 7.94 10*3/MM3 (ref 3.4–10.8)

## 2022-10-06 PROCEDURE — 25010000002 CEFTRIAXONE PER 250 MG: Performed by: INTERNAL MEDICINE

## 2022-10-06 PROCEDURE — 80053 COMPREHEN METABOLIC PANEL: CPT | Performed by: INTERNAL MEDICINE

## 2022-10-06 PROCEDURE — 82962 GLUCOSE BLOOD TEST: CPT

## 2022-10-06 PROCEDURE — 97110 THERAPEUTIC EXERCISES: CPT

## 2022-10-06 PROCEDURE — 85027 COMPLETE CBC AUTOMATED: CPT | Performed by: INTERNAL MEDICINE

## 2022-10-06 RX ADMIN — ROSUVASTATIN CALCIUM 5 MG: 5 TABLET, FILM COATED ORAL at 21:47

## 2022-10-06 RX ADMIN — AMLODIPINE BESYLATE 10 MG: 10 TABLET ORAL at 10:04

## 2022-10-06 RX ADMIN — CARVEDILOL 6.25 MG: 6.25 TABLET, FILM COATED ORAL at 10:00

## 2022-10-06 RX ADMIN — ACETAMINOPHEN 650 MG: 325 TABLET, FILM COATED ORAL at 17:59

## 2022-10-06 RX ADMIN — POLYETHYLENE GLYCOL 3350 17 G: 17 POWDER, FOR SOLUTION ORAL at 10:03

## 2022-10-06 RX ADMIN — ASPIRIN 81 MG: 81 TABLET, CHEWABLE ORAL at 10:01

## 2022-10-06 RX ADMIN — CLOTRIMAZOLE AND BETAMETHASONE DIPROPIONATE 1 APPLICATION: 10; .5 CREAM TOPICAL at 10:24

## 2022-10-06 RX ADMIN — FUROSEMIDE 40 MG: 20 TABLET ORAL at 10:01

## 2022-10-06 RX ADMIN — CEFTRIAXONE 2 G: 2 INJECTION, POWDER, FOR SOLUTION INTRAMUSCULAR; INTRAVENOUS at 21:47

## 2022-10-06 RX ADMIN — TAMSULOSIN HYDROCHLORIDE 0.4 MG: 0.4 CAPSULE ORAL at 21:47

## 2022-10-06 RX ADMIN — FAMOTIDINE 20 MG: 20 TABLET ORAL at 21:47

## 2022-10-06 RX ADMIN — FAMOTIDINE 20 MG: 20 TABLET ORAL at 10:00

## 2022-10-06 RX ADMIN — CLOTRIMAZOLE AND BETAMETHASONE DIPROPIONATE 1 APPLICATION: 10; .5 CREAM TOPICAL at 21:49

## 2022-10-06 RX ADMIN — SERTRALINE 75 MG: 25 TABLET, FILM COATED ORAL at 21:47

## 2022-10-06 RX ADMIN — SENNOSIDES 1 TABLET: 8.6 TABLET, FILM COATED ORAL at 10:04

## 2022-10-06 RX ADMIN — CARVEDILOL 6.25 MG: 6.25 TABLET, FILM COATED ORAL at 17:53

## 2022-10-06 NOTE — NURSING NOTE
"CWOCN- follow up on LLE. Patient continues with pitting edema and redness but all improved from Monday. Skin less fragile now that edema is better. There is 1 scab to the LLE that is dry, with red periwound. There is a soft bruise on his left great toe- this was present Monday as well. Staff applying lotrisone cream to LLE. Pulses are palpable. Patient could benefit from ACE wraps to LLE at this time. Leg is also less tender to touch.   I placed kerlix to LLE and then 2, 4\" ACE wraps from toes to knee. Patient reports it felt fine, no pain noted. Recommend to change daily however ointment is to be applied BID so it will have to be removed for topical treatment. Notified RN of ACE.  "

## 2022-10-06 NOTE — PLAN OF CARE
Goal Outcome Evaluation:  Plan of Care Reviewed With: patient        Progress: improving  Outcome Evaluation: Pt was not cooperating with care at start of shift. Pt did not want to be touched did not want to take any meds. Pt sister was able to talk to pt over the phone and patient a little more cooperative aafter phone call. Pt has no c/o pain or discomfort, however doesnt want to eat or have BP or BG taken. Pt just want to be left alone. Food from [email or web address] in the fridge in breakroom. Hopefully patient will eat better today. Did not give any insulin d/t patient not eating yesterday. Sister AVI lives in [facility]. She is very helpful and encouraging to the patient. CTM, safety maintained.

## 2022-10-06 NOTE — PLAN OF CARE
Goal Outcome Evaluation:  Plan of Care Reviewed With: patient        Progress: no change  Outcome Evaluation: Pt not really responding to verbal cues so used non-verbal cues to initiate assisted movement, pt chiara sitting EOB ~ 8 min, perf LAQs 10-12 reps , rest break to complete, very little conversation this session; pt requires extra time and cues for all mobility, resides in facility, did c/o pain in LLE and back but unable to rate number    Patient was not wearing a face mask during this therapy encounter. Therapist used appropriate personal protective equipment including eye protection, mask, and gloves.  Mask used was standard procedure mask. Appropriate PPE was worn during the entire therapy session. Hand hygiene was completed before and after therapy session. Patient is not in enhanced droplet precautions.      Benedict Cummings, PT Tech present

## 2022-10-06 NOTE — THERAPY TREATMENT NOTE
"Patient Name: Yakov Dubon  : 1945    MRN: [id number]                              Today's Date: 10/6/2022       Admit Date: 10/1/2022    Visit Dx:     ICD-10-CM ICD-9-CM   1. Left leg cellulitis  L03.116 682.6   2. Urinary tract infection, acute  N39.0 599.0   3. Acute on chronic renal insufficiency  N28.9 593.9    N18.9 585.9   4. Altered mental status, unspecified altered mental status type  R41.82 780.97     Patient Active Problem List   Diagnosis   • HALIMA (obstructive sleep apnea)   • CSA (central sleep apnea)   • REM behavioral disorder   • Hypersomnia due to medical condition   • Periodic breathing   • Cellulitis of right lower extremity   • HTN (hypertension)   • Type 2 diabetes mellitus with stage 3b chronic kidney disease (HCC)   • Elevated troponin   • RBBB   • Pulmonary vascular congestion   • Diastolic CHF, acute ([facility])   • Elevated LFTs   • Acute respiratory failure with hypoxia ([facility])   • Acute on chronic respiratory failure with hypercapnia ([facility])   • Anemia of chronic disease   • Sepsis due to Gram negative bacteria ([facility])   • Left leg cellulitis     Past Medical History:   Diagnosis Date   • Anxiety    • Back pain    • Depression    • Diabetes mellitus ([facility])     \"BORDERLINE\"   • High cholesterol    • Hypertension    • Kidney disease, chronic, stage III (GFR 30-59 ml/min) ([facility])    • Reflux esophagitis    • Sleep apnea      Past Surgical History:   Procedure Laterality Date   • APPENDECTOMY     • CARPAL TUNNEL RELEASE     • CATARACT EXTRACTION     • HAMMER TOE REPAIR     • LUMBAR SPINE SURGERY     • PAIN PUMP INSERTION/REVISION Right 2016    Procedure: RT PAIN PUMP REPLACEMENT;  Surgeon: Chris Messer MD;  Location: [facility];  Service:    • TOTAL KNEE ARTHROPLASTY Left       General Information     Row Name 10/06/22 1454          Physical Therapy Time and Intention    Document Type therapy note (daily note)  -     Mode of Treatment individual therapy;physical therapy  -     Row " Name 10/06/22 1454          General Information    Patient Profile Reviewed yes  -     Existing Precautions/Restrictions fall  -     Row Name 10/06/22 1454          Living Environment    People in Home facility resident  -     Row Name 10/06/22 1454          Cognition    Orientation Status (Cognition) oriented to;person  -     Row Name 10/06/22 1454          Safety Issues, Functional Mobility    Safety Issues Affecting Function (Mobility) ability to follow commands;insight into deficits/self-awareness;judgment  -     Impairments Affecting Function (Mobility) cognition;endurance/activity tolerance;postural/trunk control;pain;strength  -     Cognitive Impairments, Mobility Safety/Performance insight into deficits/self-awareness;judgment;problem-solving/reasoning;safety precaution awareness  -           User Key  (r) = Recorded By, (t) = Taken By, (c) = Cosigned By    Initials Name Provider Type    Hellen Mason PTA Physical Therapist Assistant               Mobility     Row Name 10/06/22 1456          Bed Mobility    Supine-Sit [place] (Bed Mobility) 2 person assist;minimum assist (75% patient effort);verbal cues;nonverbal cues (demo/gesture)  -     Sit-Supine [place] (Bed Mobility) minimum assist (75% patient effort);verbal cues  -     Assistive Device (Bed Mobility) bed rails;head of bed elevated  -     Comment, (Bed Mobility) needs extra time and cues for all activity, very little conversation  -     Row Name 10/06/22 1456          Sit-Stand Transfer    Sit-Stand [place] (Transfers) unable to assess  -     Comment, (Sit-Stand Transfer) pt asked to stand, but just starred straight ahead, finally asked to lie back down and he did after a few min of silence  -     Row Name 10/06/22 1456          Gait/Stairs (Locomotion)    [place] Level (Gait) unable to assess  -           User Key  (r) = Recorded By, (t) = Taken By, (c) = Cosigned By    Initials Name Provider  Type    Hellen Mason PTA Physical Therapist Assistant               Obj/Interventions     Row Name 10/06/22 1458          Motor Skills    Therapeutic Exercise --  LAQS x10-12 reps, rests to complete, did not perf when asked , delayed response  -           User Key  (r) = Recorded By, (t) = Taken By, (c) = Cosigned By    Initials Name Provider Type    Hellen Mason PTA Physical Therapist Assistant               Goals/Plan    No documentation.                Clinical Impression     Row Name 10/06/22 1459          Pain    Pre/Posttreatment Pain Comment not rated, but c/o LLE pain and back pain  -     Pain Intervention(s) Repositioned;Rest  -     Row Name 10/06/22 1459          Plan of Care Review    Plan of Care Reviewed With patient  -     Progress no change  -     Outcome Evaluation Pt not really responding to verbal cues so used non-verbal cues to initiate assisted movement, pt chiara sitting EOB ~ 8 min, perf LAQs 10-12 reps , rest break to complete, very little conversation this session; pt requires extra time and cues for all mobility, resides in facility, did c/o pain in LLE and back but unable to rate number  -     Row Name 10/06/22 1459          Therapy Assessment/Plan (PT)    Rehab Potential (PT) fair, will monitor progress closely  -     Criteria for Skilled Interventions Met (PT) yes  -     Row Name 10/06/22 1459          Vital Signs    O2 Delivery Pre Treatment room air  -[facility] Name 10/06/22 1459          Positioning and Restraints    Pre-Treatment Position in bed  -     Post Treatment Position bed  -     In Bed fowlers;call light within reach;encouraged to call for assist;exit alarm on;notified nsg;heels elevated  -           User Key  (r) = Recorded By, (t) = Taken By, (c) = Cosigned By    Initials Name Provider Type    Hellen Mason PTA Physical Therapist Assistant               Outcome Measures     Row Name 10/06/22 1760          How much help from another  person do you currently need...    Turning from your back to your side while in flat bed without using bedrails? 2  -JM     Moving from lying on back to sitting on the side of a flat bed without bedrails? 2  -JM     Moving to and from a bed to a chair (including a wheelchair)? 1  -JM     Standing up from a chair using your arms (e.g., wheelchair, bedside chair)? 1  -JM     Climbing 3-5 steps with a railing? 1  -JM     To walk in hospital room? 1  -JM     AM-PAC 6 Clicks Score (PT) 8  -JM     Highest level of mobility 3 --> Sat at edge of bed  -           User Key  (r) = Recorded By, (t) = Taken By, (c) = Cosigned By    Initials Name Provider Type    Hellen Mason PTA Physical Therapist Assistant                             Physical Therapy Education                 Title: PT OT SLP Therapies (In Progress)     Topic: Physical Therapy (In Progress)     Point: Mobility training (In Progress)     Learning Progress Summary           Patient Acceptance, E,D, NR by  at 10/6/2022 1505    Acceptance, E, NR by  at 10/4/2022 1607                   Point: Home exercise program (In Progress)     Learning Progress Summary           Patient Acceptance, E,D, NR by  at 10/6/2022 1505    Acceptance, E, NR by  at 10/4/2022 1607                   Point: Body mechanics (In Progress)     Learning Progress Summary           Patient Acceptance, E,D, NR by  at 10/6/2022 1505    Acceptance, E, NR by  at 10/4/2022 1607                   Point: Precautions (In Progress)     Learning Progress Summary           Patient Acceptance, E,D, NR by  at 10/6/2022 1505    Acceptance, E, NR by  at 10/4/2022 1607                               User Key     Initials Effective Dates Name Provider Type Discipline     03/07/18 -  Hellen Robbins PTA Physical Therapist Assistant PT     08/15/22 -  Will Hernandez PT Student PT Student PT              PT Recommendation and Plan     Plan of Care Reviewed With: patient  Progress: no  change  Outcome Evaluation: Pt not really responding to verbal cues so used non-verbal cues to initiate assisted movement, pt chiara sitting EOB ~ 8 min, perf LAQs 10-12 reps , rest break to complete, very little conversation this session; pt requires extra time and cues for all mobility, resides in facility, did c/o pain in LLE and back but unable to rate number     Time Calculation:    PT Charges     Row Name 10/06/22 1454             Time Calculation    Start Time 1421  -      Stop Time 1432  -      Time Calculation (min) 11 min  -      PT Received On 10/06/22  -SOPHIA      PT - Next Appointment 10/07/22  -SOPHIA            User Key  (r) = Recorded By, (t) = Taken By, (c) = Cosigned By    Initials Name Provider Type    Hellen Mason PTA Physical Therapist Assistant              Therapy Charges for Today     Code Description Service Date Service Provider Modifiers Qty    52558711623 HC PT THER PROC EA 15 MIN 10/6/2022 Hellen Robbins PTA GP 1    97280539555 HC PT THER SUPP EA 15 MIN 10/6/2022 Hellen Robbins PTA GP 1          PT G-Codes  Outcome Measure Options: AM-PAC 6 Clicks Daily Activity (OT), Modified [place]  AM-PAC 6 Clicks Score (PT): 8  AM-PAC 6 Clicks Score (OT): 11  Modified [place] Scale: 4 - Moderately severe disability.  Unable to walk without assistance, and unable to attend to own bodily needs without assistance.    Hellen Robbins PTA  10/6/2022

## 2022-10-06 NOTE — PROGRESS NOTES
Infectious Diseases Progress Note    Miguel Anderson MD     [facility]  [place]: 4 days  Patient Identification:  Name: Yakov Dubon  Age: 77 y.o.  Sex: male  :  1945  MRN: [id number]         Primary Care Physician: Melissa Steven MD            Subjective: Feeling better decreased discomfort in the leg.  Interval History: See consultation note.     Objective:    Scheduled Meds:amLODIPine, 10 mg, Oral, Q24H  aspirin, 81 mg, Oral, Daily  carvedilol, 6.25 mg, Oral, BID With Meals  cefTRIAXone, 2 g, Intravenous, Q24H  clotrimazole-betamethasone, 1 application, Topical, Q12H  famotidine, 20 mg, Oral, BID  furosemide, 40 mg, Oral, Daily  insulin glargine, 10 Units, Subcutaneous, Nightly  insulin lispro, 0-7 Units, Subcutaneous, 4x Daily With Meals & Nightly  polyethylene glycol, 17 g, Oral, Daily  rosuvastatin, 5 mg, Oral, Nightly  senna, 1 tablet, Oral, Daily  sertraline, 75 mg, Oral, Nightly  tamsulosin, 0.4 mg, Oral, Nightly      Continuous Infusions:Pharmacy to dose vancomycin,         Vital signs in last 24 hours:  Temp:  [98 °F (36.7 °C)-98.1 °F (36.7 °C)] 98 °F (36.7 °C)  Heart Rate:  [65-67] 67  Resp:  [18] 18  BP: (151-161)/(69-90) 157/90    Intake/Output:    Intake/Output Summary (Last 24 hours) at 10/5/2022 2026  Last data filed at 10/5/2022 1304  Gross per 24 hour   Intake 80 ml   Output --   Net 80 ml       Exam:  /90 (BP Location: Right arm, Patient Position: Lying)   Pulse 67   Temp 98 °F (36.7 °C) (Oral)   Resp 18   Wt 115 kg (252 lb 10.4 oz)   SpO2 90%   BMI 34.60 kg/m²   Patient is examined using the personal protective equipment as per guidelines from infection control for this particular patient as enacted.  Hand washing was performed before and after patient interaction.  General Appearance:    Alert, cooperative, no distress, AAOx3                          Head:    Normocephalic, without obvious abnormality, atraumatic                           Eyes:    PERRL,  conjunctivae/corneas clear, EOM's intact, both eyes                         Throat:   Lips, tongue, gums normal; oral mucosa pink and moist                           Neck:   Supple, symmetrical, trachea midline, no JVD                         Lungs:    Clear to auscultation bilaterally, respirations unlabored                 Chest Wall:    No tenderness or deformity                          Heart:    Regular rate and rhythm, S1 and S2 normal, no murmur, no rub                                         or gallop                  Abdomen:     Soft, non-tender, bowel sounds active, no masses, no                                                        organomegaly                  Extremities:                                         Skin: Decreased erythema on the left leg.                  Neurologic: At times lethargic and confused       Data Review:    I reviewed the patient's new clinical results.  Results from last 7 days   Lab Units 10/05/22  0434 10/04/22  0423 10/03/22  0352 10/02/22  0436 10/01/22  1201   WBC 10*3/mm3 9.25 13.69* 12.06* 11.04* 12.40*   HEMOGLOBIN g/dL 10.1* 10.1* 8.7* 8.9* 11.1*   PLATELETS 10*3/mm3 322 286 234 233 349     Results from last 7 days   Lab Units 10/05/22  0434 10/04/22  0423 10/03/22  0352 10/02/22  0435 10/01/22  1201   SODIUM mmol/L 139 138 139 139 143   POTASSIUM mmol/L 4.4 4.3 4.3 4.0 4.5   CHLORIDE mmol/L 101 101 101 103 100   CO2 mmol/L 31.0* 28.0 31.3* 30.0* 32.0*   BUN mg/dL 26* 26* 29* 33* 37*   CREATININE mg/dL 1.37* 1.46* 1.81* 1.94* 2.38*   CALCIUM mg/dL 9.1 9.0 8.4* 8.6 9.7   GLUCOSE mg/dL 83 117* 137* 153* 115*     Microbiology Results (last 10 days)     Procedure Component Value - Date/Time    Blood Culture - Blood, Arm, Left [[id number]]  (Normal) Collected: 10/03/22 1624    Lab Status: Preliminary result Specimen: Blood from Arm, Left Updated: 10/05/22 1815     Blood Culture No growth at 2 days    Blood Culture - Blood, Arm, Left [[id number]]  (Normal) Collected:  10/03/22 1624    Lab Status: Preliminary result Specimen: Blood from Arm, Left Updated: 10/04/22 2217     Blood Culture No growth at 24 hours    Urine Culture - Urine, Urine, Clean Catch [[id number]]  (Abnormal)  (Susceptibility) Collected: 10/01/22 1317    Lab Status: Final result Specimen: Urine, Clean Catch Updated: 10/03/22 1047     Urine Culture >100,000 CFU/mL Escherichia coli    Narrative:      Colonization of the urinary tract without infection is common. Treatment is discouraged unless the patient is symptomatic, pregnant, or undergoing an invasive urologic procedure.    Susceptibility      Escherichia coli      COURTNEY      Ampicillin Resistant     Ampicillin + Sulbactam Intermediate      Cefazolin Susceptible      Cefepime Susceptible      Ceftazidime Susceptible      Ceftriaxone Susceptible      Gentamicin Susceptible      Levofloxacin Resistant     Nitrofurantoin Susceptible      Piperacillin + Tazobactam Susceptible      Trimethoprim + Sulfamethoxazole Susceptible                           Blood Culture - Blood, Arm, Left [[id number]]  (Abnormal)  (Susceptibility) Collected: 10/01/22 1216    Lab Status: Final result Specimen: Blood from Arm, Left Updated: 10/04/22 0613     Blood Culture Escherichia coli     Isolated from Anaerobic Bottle     Gram Stain Anaerobic Bottle Gram negative bacilli    Susceptibility      Escherichia coli      COURTNEY      Ampicillin Resistant     Ampicillin + Sulbactam Intermediate      Cefepime Susceptible      Ceftazidime Susceptible      Ceftriaxone Susceptible      Gentamicin Susceptible      Levofloxacin Resistant     Piperacillin + Tazobactam Susceptible      Trimethoprim + Sulfamethoxazole Susceptible                       Susceptibility Comments     Escherichia coli    Cefazolin sensitivity will not be reported for Enterobacteriaceae in non-urine isolates. If cefazolin is preferred, please call the microbiology lab to request an E-test.  With the exception of urinary-sourced  infections, aminoglycosides should not be used as monotherapy.             Blood Culture ID, PCR - Blood, Arm, Left [[id number]]  (Abnormal) Collected: 10/01/22 1216    Lab Status: Final result Specimen: Blood from Arm, Left Updated: 10/03/22 0815     BCID, PCR Eschericia coli. Identification by BCID2 PCR.    Blood Culture - Blood, Arm, Right [[id number]]  (Normal) Collected: 10/01/22 1201    Lab Status: Preliminary result Specimen: Blood from Arm, Right Updated: 10/05/22 1218     Blood Culture No growth at 4 days        No radiology results for the last day        Assessment:    Left leg cellulitis  1-toxic metabolic encephalopathy secondary to  2-bacteremic urinary tract infection with superimposed cellulitis of left lower extremity with lymphangitis in the setting of  3-chronic venous stasis changes and edema of the lower extremity along with immobility  4-other diagnosis per primary team.     Recommendations/Discussions:  · De-escalate antibiotics to ceftriaxone based on the sensitivity of the E. coli in the urine and blood culture.  · Continue IV vancomycin  · If he shows persistent renal dysfunction and worsening leukocytosis and he may require CT scan of the abdomen and pelvis without contrast to rule out  tract abnormalities to explain bacteremic UTI.  · Continue local care and apply Lotrisone cream to his lower extremities.  Miguel Anderson MD  10/5/2022  20:26 EDT    Much of this encounter note is an electronic transcription/translation of spoken language to printed text. The electronic translation of spoken language may permit erroneous, or at times, nonsensical words or phrases to be inadvertently transcribed; Although I have reviewed the note for such errors, some may still exist

## 2022-10-06 NOTE — PROGRESS NOTES
Daily progress note    Primary care physician      Chief complaint  Doing same with no new complaints.  Patient making slow progress    History of present illness  77-year-old white male with very complex past medical history including chronic hypoxic hypercapnic respiratory failure diabetes mellitus hypertension diastolic CHF obstructive sleep apnea and chronic kidney disease stage III who is a nursing home resident brought to the emergency room with altered mental status.  Patient in no respite distress but very weak and lethargic and answer questions slowly.  Patient work-up in ER revealed and found to have sepsis with cellulitis and UTI and also found to have acute kidney injury admit for management.  Patient is DNR per his wishes.     REVIEW OF SYSTEMS  Unremarkable except for weakness.    PHYSICAL EXAM  Blood pressure 148/90, pulse 60, temperature 98.3 °F (36.8 °C), temperature source Oral, resp. rate 16, weight 108 kg (237 lb 1.6 oz), SpO2 90 %.    Constitutional:       General: Awake and alert in no distress.  HENT:      Head: Normocephalic and atraumatic.   Eyes:      Pupils: Pupils are equal, round, and reactive to light.   Cardiovascular:      Rate and Rhythm: Normal rate and regular rhythm.      Pulses:           Posterior tibial pulses are 2+ on the right side and 2+ on the left side.      Heart sounds: Normal heart sounds. No murmur heard.  Pulmonary:      Effort: Pulmonary effort is normal. No respiratory distress.      Breath sounds: Normal breath sounds. No wheezing.   Abdominal:      General: Bowel sounds are normal.      Palpations: Abdomen is soft.      Tenderness: There is no abdominal tenderness. There is no guarding or rebound.   Musculoskeletal:         General: Swelling and tenderness ( Left leg) present. Normal range of motion.      Cervical back: Normal range of motion.   Skin:     General: Skin is warm and dry.      Findings: Erythema ( Left lower leg and posterior thigh) present.    Neurological:      Mental Status: He is confused.      GCS: GCS eye subscore is 1. GCS verbal subscore is 4. GCS motor subscore is 6.      Cranial Nerves: No cranial nerve deficit.      Sensory: No sensory deficit.      Comments: Patient with frequent irregular, nonrhythmic, myoclonic jerks throughout limbs and torso   Psychiatric:         Mood and Affect: Affect normal.      LAB RESULTS   Lab Results (last 24 hours)     Procedure Component Value Units Date/Time    Blood Culture - Blood, Arm, Right [[id number]]  (Normal) Collected: 10/01/22 1201    Specimen: Blood from Arm, Right Updated: 10/06/22 1218     Blood Culture No growth at 5 days    POC Glucose Once [[id number]]  (Abnormal) Collected: 10/06/22 1106    Specimen: Blood Updated: 10/06/22 1108     Glucose 135 mg/dL      Comment: Meter: [id number] : [id number] Yaron Feng       POC Glucose Once [[id number]]  (Normal) Collected: 10/06/22 0602    Specimen: Blood Updated: 10/06/22 0603     Glucose 101 mg/dL      Comment: Meter: [id number] : [id number] Yari SEYMOUR       Comprehensive Metabolic Panel [[id number]]  (Abnormal) Collected: 10/06/22 0429    Specimen: Blood Updated: 10/06/22 0509     Glucose 91 mg/dL      BUN 26 mg/dL      Creatinine 1.41 mg/dL      Sodium 140 mmol/L      Potassium 4.5 mmol/L      Chloride 99 mmol/L      CO2 32.2 mmol/L      Calcium 9.2 mg/dL      Total Protein 6.2 g/dL      Albumin 2.80 g/dL      ALT (SGPT) 9 U/L      AST (SGOT) 10 U/L      Alkaline Phosphatase 61 U/L      Total Bilirubin <0.2 mg/dL      Globulin 3.4 gm/dL      A/G Ratio 0.8 g/dL      BUN/Creatinine Ratio 18.4     Anion Gap 8.8 mmol/L      eGFR 51.0 mL/min/1.73      Comment: National Kidney Foundation and American Society of Nephrology (ASN) Task Force recommended calculation based on the Chronic Kidney Disease Epidemiology Collaboration (CKD-EPI) equation refit without adjustment for race.       Narrative:      GFR Normal >60  Chronic Kidney Disease  <60  Kidney Failure <15      CBC (No Diff) [[id number]]  (Abnormal) Collected: 10/06/22 0429    Specimen: Blood Updated: 10/06/22 0452     WBC 7.94 10*3/mm3      RBC 3.65 10*6/mm3      Hemoglobin 11.0 g/dL      Hematocrit 33.6 %      MCV 92.1 fL      MCH 30.1 pg      MCHC 32.7 g/dL      RDW 13.2 %      RDW-SD 44.5 fl      MPV 9.0 fL      Platelets 313 10*3/mm3     Blood Culture - Blood, Arm, Left [[id number]]  (Normal) Collected: 10/03/22 1624    Specimen: Blood from Arm, Left Updated: 10/05/22 2215     Blood Culture No growth at 2 days    POC Glucose Once [[id number]]  (Normal) Collected: 10/05/22 2133    Specimen: Blood Updated: 10/05/22 2134     Glucose 101 mg/dL      Comment: Meter: [id number] : [id number] Victor M Rick RN       Blood Culture - Blood, Arm, Left [[id number]]  (Normal) Collected: 10/03/22 1624    Specimen: Blood from Arm, Left Updated: 10/05/22 1815     Blood Culture No growth at 2 days    POC Glucose Once [[id number]]  (Normal) Collected: 10/05/22 1602    Specimen: Blood Updated: 10/05/22 1604     Glucose 99 mg/dL      Comment: Meter: [id number] : [id number] Rosendo SEYMOUR           Imaging Results (Last 24 Hours)     ** No results found for the last 24 hours. **          Current Facility-Administered Medications:   •  acetaminophen (TYLENOL) tablet 650 mg, 650 mg, Oral, Q6H PRN, Jean العراقي MD, 650 mg at 10/03/22 0818  •  amLODIPine (NORVASC) tablet 10 mg, 10 mg, Oral, Q24H, Jean العراقي MD, 10 mg at 10/06/22 1004  •  aspirin chewable tablet 81 mg, 81 mg, Oral, Daily, Jean العراقي MD, 81 mg at 10/06/22 1001  •  carvedilol (COREG) tablet 6.25 mg, 6.25 mg, Oral, BID With Meals, Lexie Rao MD, 6.25 mg at 10/06/22 1000  •  cefTRIAXone (ROCEPHIN) 2 g in sodium chloride 0.9 % 100 mL IVPB-VTB, 2 g, Intravenous, Q24H, Miguel Anderson MD, Last Rate: 200 mL/hr at 10/05/22 1957, 2 g at 10/05/22 1957  •  clotrimazole-betamethasone (LOTRISONE) 1-0.05 % cream 1 application, 1 application,  Topical, Q12H, Miguel Anderson MD, 1 application at 10/06/22 1024  •  famotidine (PEPCID) tablet 20 mg, 20 mg, Oral, BID, Tato العراقي MD, 20 mg at 10/06/22 1000  •  furosemide (LASIX) tablet 40 mg, 40 mg, Oral, Daily, Lexie Rao MD, 40 mg at 10/06/22 1001  •  insulin glargine (LANTUS, SEMGLEE) injection 10 Units, 10 Units, Subcutaneous, Nightly, Tato العراقي MD, 10 Units at 10/04/22 2216  •  insulin lispro (ADMELOG) injection 0-7 Units, 0-7 Units, Subcutaneous, 4x Daily With Meals & Nightly, Tato العراقي MD, 4 Units at 10/03/22 1248  •  ondansetron (ZOFRAN) injection 4 mg, 4 mg, Intravenous, Q4H PRN, Tato العراقي MD, 4 mg at 10/04/22 2011  •  polyethylene glycol (MIRALAX) packet 17 g, 17 g, Oral, Daily, Tato العراقي MD, 17 g at 10/06/22 1003  •  rosuvastatin (CRESTOR) tablet 5 mg, 5 mg, Oral, Nightly, Tato العراقي MD, 5 mg at 10/05/22 2134  •  senna (SENOKOT) tablet 1 tablet, 1 tablet, Oral, Daily, Tato العراقي MD, 1 tablet at 10/06/22 1004  •  sertraline (ZOLOFT) tablet 75 mg, 75 mg, Oral, Nightly, Tato العراقي MD, 75 mg at 10/05/22 2134  •  sodium chloride 0.9 % flush 10 mL, 10 mL, Intravenous, PRN, Rachna Marvin MD, 10 mL at 10/01/22 2242  •  tamsulosin (FLOMAX) 24 hr capsule 0.4 mg, 0.4 mg, Oral, Nightly, Tato العراقي MD, 0.4 mg at 10/05/22 2134     ASSESSMENT  E. coli bacteremia with sepsis  Acute E. coli UTI   Left lower extremity cellulitis   Acute kidney injury  Chronic kidney disease stage III  Diabetes mellitus  Hypertension  Hyperlipidemia  Chronic hypoxic hypercapnic respiratory failure  Obstructive sleep apnea  BPH  Immobility syndrome  Gastroesophageal reflux disease    PLAN  CPM  Continue IV antibiotics    Local wound care  Adjust nursing home medications  Stress ulcer DVT prophylaxis  Nephrology consult appreciated  Infectious disease to follow patient  Supportive care  DNR  PT OT  Discussed with nursing staff  Discharge planning    TATO العراقي MD

## 2022-10-06 NOTE — PROGRESS NOTES
[facility]acy Services: Vancomycin Monitoring Note    Yakov Dubon is a 77 y.o. male who completed 5 days of pharmacy to dose vancomycin for SSTI.    Previous Vancomycin Dose: 1250 mg IV q24h   Updated Cultures and Sensitivities:   10/1 Blood cx 1/2: e coli (resistant to ampicillin and levofloxacin)  10/1 urine cx: e coli  Results from last 7 days   Lab Units 10/03/22  0352 10/02/22  0435   VANCOMYCIN RM mcg/mL 18.00 17.50     Vitals/Labs  Ht:  ; Wt: 108 kg (237 lb 1.6 oz)   Temp Readings from Last 1 Encounters:   10/06/22 98.4 °F (36.9 °C) (Oral)     Estimated Creatinine Clearance: 54.5 mL/min (A) (by C-G formula based on SCr of 1.41 mg/dL (H)).     Results from last 7 days   Lab Units 10/06/22  0429 10/05/22  0434 10/04/22  0423   CREATININE mg/dL 1.41* 1.37* 1.46*   WBC 10*3/mm3 7.94 9.25 13.69*     Assessment/Plan    Vancomycin Dose: Patient completed the 5 ordered days of vancomycin for SSTI yesterday Oct 5.     Next Level Date and Time: Not needed due to ordered duration of therapy completed. Pharmacy will sign off at this time.     Thank you for involving pharmacy in this patient's care. Please contact pharmacy with any questions or concerns.       Karri Ferreira, PharmCALISTA  10/06/22 09:44 EDT

## 2022-10-07 LAB
ALBUMIN SERPL-MCNC: 3.3 G/DL (ref 3.5–5.2)
ANION GAP SERPL CALCULATED.3IONS-SCNC: 11.1 MMOL/L (ref 5–15)
BASOPHILS # BLD AUTO: 0.05 10*3/MM3 (ref 0–0.2)
BASOPHILS NFR BLD AUTO: 0.7 % (ref 0–1.5)
BUN SERPL-MCNC: 28 MG/DL (ref 8–23)
BUN/CREAT SERPL: 17.3 (ref 7–25)
CALCIUM SPEC-SCNC: 9.2 MG/DL (ref 8.6–10.5)
CHLORIDE SERPL-SCNC: 100 MMOL/L (ref 98–107)
CO2 SERPL-SCNC: 30.9 MMOL/L (ref 22–29)
CREAT SERPL-MCNC: 1.62 MG/DL (ref 0.76–1.27)
DEPRECATED RDW RBC AUTO: 43.9 FL (ref 37–54)
EGFRCR SERPLBLD CKD-EPI 2021: 43.2 ML/MIN/1.73
EOSINOPHIL # BLD AUTO: 0.16 10*3/MM3 (ref 0–0.4)
EOSINOPHIL NFR BLD AUTO: 2.1 % (ref 0.3–6.2)
ERYTHROCYTE [DISTWIDTH] IN BLOOD BY AUTOMATED COUNT: 13.2 % (ref 12.3–15.4)
FOLATE SERPL-MCNC: 12.2 NG/ML (ref 4.78–24.2)
GLUCOSE BLDC GLUCOMTR-MCNC: 103 MG/DL (ref 70–130)
GLUCOSE BLDC GLUCOMTR-MCNC: 105 MG/DL (ref 70–130)
GLUCOSE BLDC GLUCOMTR-MCNC: 113 MG/DL (ref 70–130)
GLUCOSE BLDC GLUCOMTR-MCNC: 97 MG/DL (ref 70–130)
GLUCOSE SERPL-MCNC: 102 MG/DL (ref 65–99)
HCT VFR BLD AUTO: 31.8 % (ref 37.5–51)
HGB BLD-MCNC: 10.8 G/DL (ref 13–17.7)
IMM GRANULOCYTES # BLD AUTO: 0.04 10*3/MM3 (ref 0–0.05)
IMM GRANULOCYTES NFR BLD AUTO: 0.5 % (ref 0–0.5)
LYMPHOCYTES # BLD AUTO: 1.54 10*3/MM3 (ref 0.7–3.1)
LYMPHOCYTES NFR BLD AUTO: 20.3 % (ref 19.6–45.3)
MCH RBC QN AUTO: 30.4 PG (ref 26.6–33)
MCHC RBC AUTO-ENTMCNC: 34 G/DL (ref 31.5–35.7)
MCV RBC AUTO: 89.6 FL (ref 79–97)
MONOCYTES # BLD AUTO: 0.53 10*3/MM3 (ref 0.1–0.9)
MONOCYTES NFR BLD AUTO: 7 % (ref 5–12)
NEUTROPHILS NFR BLD AUTO: 5.25 10*3/MM3 (ref 1.7–7)
NEUTROPHILS NFR BLD AUTO: 69.4 % (ref 42.7–76)
NRBC BLD AUTO-RTO: 0 /100 WBC (ref 0–0.2)
PHOSPHATE SERPL-MCNC: 3.4 MG/DL (ref 2.5–4.5)
PLATELET # BLD AUTO: 282 10*3/MM3 (ref 140–450)
PMV BLD AUTO: 9.2 FL (ref 6–12)
POTASSIUM SERPL-SCNC: 4.2 MMOL/L (ref 3.5–5.2)
RBC # BLD AUTO: 3.55 10*6/MM3 (ref 4.14–5.8)
SODIUM SERPL-SCNC: 142 MMOL/L (ref 136–145)
WBC NRBC COR # BLD: 7.57 10*3/MM3 (ref 3.4–10.8)

## 2022-10-07 PROCEDURE — 82746 ASSAY OF FOLIC ACID SERUM: CPT | Performed by: INTERNAL MEDICINE

## 2022-10-07 PROCEDURE — 80069 RENAL FUNCTION PANEL: CPT | Performed by: INTERNAL MEDICINE

## 2022-10-07 PROCEDURE — 82962 GLUCOSE BLOOD TEST: CPT

## 2022-10-07 PROCEDURE — 25010000002 CEFTRIAXONE PER 250 MG: Performed by: INTERNAL MEDICINE

## 2022-10-07 PROCEDURE — 97110 THERAPEUTIC EXERCISES: CPT

## 2022-10-07 PROCEDURE — 85025 COMPLETE CBC W/AUTO DIFF WBC: CPT | Performed by: HOSPITALIST

## 2022-10-07 RX ADMIN — ASPIRIN 81 MG: 81 TABLET, CHEWABLE ORAL at 09:38

## 2022-10-07 RX ADMIN — CLOTRIMAZOLE AND BETAMETHASONE DIPROPIONATE 1 APPLICATION: 10; .5 CREAM TOPICAL at 20:10

## 2022-10-07 RX ADMIN — FAMOTIDINE 20 MG: 20 TABLET ORAL at 20:06

## 2022-10-07 RX ADMIN — CARVEDILOL 6.25 MG: 6.25 TABLET, FILM COATED ORAL at 17:43

## 2022-10-07 RX ADMIN — CLOTRIMAZOLE AND BETAMETHASONE DIPROPIONATE 1 APPLICATION: 10; .5 CREAM TOPICAL at 09:47

## 2022-10-07 RX ADMIN — TAMSULOSIN HYDROCHLORIDE 0.4 MG: 0.4 CAPSULE ORAL at 20:06

## 2022-10-07 RX ADMIN — AMLODIPINE BESYLATE 10 MG: 10 TABLET ORAL at 09:40

## 2022-10-07 RX ADMIN — CEFTRIAXONE 2 G: 2 INJECTION, POWDER, FOR SOLUTION INTRAMUSCULAR; INTRAVENOUS at 20:06

## 2022-10-07 RX ADMIN — FAMOTIDINE 20 MG: 20 TABLET ORAL at 09:38

## 2022-10-07 RX ADMIN — SENNOSIDES 1 TABLET: 8.6 TABLET, FILM COATED ORAL at 09:40

## 2022-10-07 RX ADMIN — SERTRALINE 75 MG: 25 TABLET, FILM COATED ORAL at 20:06

## 2022-10-07 RX ADMIN — CARVEDILOL 6.25 MG: 6.25 TABLET, FILM COATED ORAL at 09:38

## 2022-10-07 RX ADMIN — POLYETHYLENE GLYCOL 3350 17 G: 17 POWDER, FOR SOLUTION ORAL at 09:35

## 2022-10-07 RX ADMIN — ROSUVASTATIN CALCIUM 5 MG: 5 TABLET, FILM COATED ORAL at 20:06

## 2022-10-07 RX ADMIN — Medication 10 ML: at 20:10

## 2022-10-07 NOTE — THERAPY TREATMENT NOTE
"Patient Name: Yakov Dubon  : 1945    MRN: [id number]                              Today's Date: 10/7/2022       Admit Date: 10/1/2022    Visit Dx:     ICD-10-CM ICD-9-CM   1. Left leg cellulitis  L03.116 682.6   2. Urinary tract infection, acute  N39.0 599.0   3. Acute on chronic renal insufficiency  N28.9 593.9    N18.9 585.9   4. Altered mental status, unspecified altered mental status type  R41.82 780.97     Patient Active Problem List   Diagnosis   • HALIMA (obstructive sleep apnea)   • CSA (central sleep apnea)   • REM behavioral disorder   • Hypersomnia due to medical condition   • Periodic breathing   • Cellulitis of right lower extremity   • HTN (hypertension)   • Type 2 diabetes mellitus with stage 3b chronic kidney disease (HCC)   • Elevated troponin   • RBBB   • Pulmonary vascular congestion   • Diastolic CHF, acute ([facility])   • Elevated LFTs   • Acute respiratory failure with hypoxia ([facility])   • Acute on chronic respiratory failure with hypercapnia ([facility])   • Anemia of chronic disease   • Sepsis due to Gram negative bacteria ([facility])   • Left leg cellulitis     Past Medical History:   Diagnosis Date   • Anxiety    • Back pain    • Depression    • Diabetes mellitus ([facility])     \"BORDERLINE\"   • High cholesterol    • Hypertension    • Kidney disease, chronic, stage III (GFR 30-59 ml/min) ([facility])    • Reflux esophagitis    • Sleep apnea      Past Surgical History:   Procedure Laterality Date   • APPENDECTOMY     • CARPAL TUNNEL RELEASE     • CATARACT EXTRACTION     • HAMMER TOE REPAIR     • LUMBAR SPINE SURGERY     • PAIN PUMP INSERTION/REVISION Right 2016    Procedure: RT PAIN PUMP REPLACEMENT;  Surgeon: Chris Messer MD;  Location: [facility];  Service:    • TOTAL KNEE ARTHROPLASTY Left       General Information     Row Name 10/07/22 1705          Physical Therapy Time and Intention    Document Type therapy note (daily note)  -     Mode of Treatment individual therapy;physical therapy  -     Row " Name 10/07/22 1705          General Information    Patient Profile Reviewed yes  -     Existing Precautions/Restrictions fall  -     Row Name 10/07/22 1705          Cognition    Orientation Status (Cognition) oriented to;person  -     Row Name 10/07/22 1705          Safety Issues, Functional Mobility    Impairments Affecting Function (Mobility) cognition;endurance/activity tolerance;postural/trunk control;pain;strength  -           User Key  (r) = Recorded By, (t) = Taken By, (c) = Cosigned By    Initials Name Provider Type    Hellen Mason PTA Physical Therapist Assistant               Mobility     Row Name 10/07/22 1706          Bed Mobility    Supine-Sit [place] (Bed Mobility) 2 person assist;minimum assist (75% patient effort);verbal cues;nonverbal cues (demo/gesture)  -     Sit-Supine [place] (Bed Mobility) minimum assist (75% patient effort);verbal cues  -     Assistive Device (Bed Mobility) bed rails;head of bed elevated  -     Row Name 10/07/22 1706          Sit-Stand Transfer    Sit-Stand [place] (Transfers) 2 person assist;minimum assist (75% patient effort);contact guard;verbal cues  -     Assistive Device (Sit-Stand Transfers) walker, front-wheeled  -     Comment, (Sit-Stand Transfer) initially declined , but agreed after lots of encouragement  -     Row Name 10/07/22 1706          Gait/Stairs (Locomotion)    [place] Level (Gait) unable to assess  -     Comment, (Gait/Stairs) still slow to process and respond, can do more than he will agree to  -           User Key  (r) = Recorded By, (t) = Taken By, (c) = Cosigned By    Initials Name Provider Type    Hellen Mason PTA Physical Therapist Assistant               Obj/Interventions    No documentation.                Goals/Plan    No documentation.                Clinical Impression     Row Name 10/07/22 1707          Pain    Pre/Posttreatment Pain Comment no # , back and LLE pain, states a little  improved  -     Pain Intervention(s) Repositioned;Rest  -     Row Name 10/07/22 1707          Plan of Care Review    Plan of Care Reviewed With patient  -     Progress no change  -     Outcome Evaluation Pt agreed after much encouragement to perf STS x1 w/min-CGA-2, pt declined exer or amb attempts, slow to respond, unsure what his baseline communication is or his PLOF, plans back to facility at [place]  -     Row Name 10/07/22 1707          Therapy Assessment/Plan (PT)    Rehab Potential (PT) fair, will monitor progress closely  -     Criteria for Skilled Interventions Met (PT) yes  -     Row Name 10/07/22 1707          Vital Signs    O2 Delivery Pre Treatment room air  -     Row Name 10/07/22 1707          Positioning and Restraints    Pre-Treatment Position in bed  -     Post Treatment Position bed  -     In Bed fowlers;call light within reach;encouraged to call for assist;exit alarm on;notified nsg;heels elevated  -           User Key  (r) = Recorded By, (t) = Taken By, (c) = Cosigned By    Initials Name Provider Type    Hellen Mason PTA Physical Therapist Assistant               Outcome Measures     Row Name 10/07/22 1711          How much help from another person do you currently need...    Turning from your back to your side while in flat bed without using bedrails? 2  -JM     Moving from lying on back to sitting on the side of a flat bed without bedrails? 2  -JM     Moving to and from a bed to a chair (including a wheelchair)? 1  -JM     Standing up from a chair using your arms (e.g., wheelchair, bedside chair)? 3  -JM     Climbing 3-5 steps with a railing? 1  -JM     To walk in hospital room? 1  -JM     AM-PAC 6 Clicks Score (PT) 10  -     Highest level of mobility 4 --> Transferred to chair/commode  -           User Key  (r) = Recorded By, (t) = Taken By, (c) = Cosigned By    Initials Name Provider Type    Hellen Mason PTA Physical Therapist Assistant                              Physical Therapy Education                 Title: PT OT SLP Therapies (In Progress)     Topic: Physical Therapy (In Progress)     Point: Mobility training (In Progress)     Learning Progress Summary           Patient Acceptance, E,D, NR by  at 10/7/2022 1711    Acceptance, E,D, NR by  at 10/6/2022 1505    Acceptance, E, NR by  at 10/4/2022 1607                   Point: Home exercise program (In Progress)     Learning Progress Summary           Patient Acceptance, E,D, NR by  at 10/7/2022 1711    Acceptance, E,D, NR by  at 10/6/2022 1505    Acceptance, E, NR by  at 10/4/2022 1607                   Point: Body mechanics (In Progress)     Learning Progress Summary           Patient Acceptance, E,D, NR by  at 10/7/2022 1711    Acceptance, E,D, NR by  at 10/6/2022 1505    Acceptance, E, NR by  at 10/4/2022 1607                   Point: Precautions (In Progress)     Learning Progress Summary           Patient Acceptance, E,D, NR by  at 10/7/2022 1711    Acceptance, E,D, NR by  at 10/6/2022 1505    Acceptance, E, NR by  at 10/4/2022 1607                               User Key     Initials Effective Dates Name Provider Type Discipline     03/07/18 -  Hellen Robibns, PTA Physical Therapist Assistant PT     08/15/22 -  Will Hernandez, JUAN Student PT Student PT              PT Recommendation and Plan     Plan of Care Reviewed With: patient  Progress: no change  Outcome Evaluation: Pt agreed after much encouragement to perf STS x1 w/min-CGA-2, pt declined exer or amb attempts, slow to respond, unsure what his baseline communication is or his PLOF, plans back to facility at [place]     Time Calculation:    PT Charges     Row Name 10/07/22 1713             Time Calculation    Start Time 1610  -      Stop Time 1629  -      Time Calculation (min) 19 min  -      PT Received On 10/07/22  -      PT - Next Appointment 10/10/22  -            User Key  (r) = Recorded By, (t) = Taken By, (c) =  Cosigned By    Initials Name Provider Type    Hellen Mason PTA Physical Therapist Assistant              Therapy Charges for Today     Code Description Service Date Service Provider Modifiers Qty    14559667858 HC PT THER PROC EA 15 MIN 10/6/2022 Hellen Robbins, NOEL GP 1    39485892114 HC PT THER SUPP EA 15 MIN 10/6/2022 Hellen Robbins, NOEL GP 1    83794833939 HC PT THER PROC EA 15 MIN 10/7/2022 Hellen Robbins, NOEL GP 1    53908728355 HC PT THER SUPP EA 15 MIN 10/7/2022 Hellen Robbins, NOEL GP 1          PT G-Codes  Outcome Measure Options: AM-PAC 6 Clicks Daily Activity (OT), Modified [place]  AM-PAC 6 Clicks Score (PT): 10  AM-PAC 6 Clicks Score (OT): 11  Modified [place] Scale: 4 - Moderately severe disability.  Unable to walk without assistance, and unable to attend to own bodily needs without assistance.    Hellen Robbins PTA  10/7/2022

## 2022-10-07 NOTE — PROGRESS NOTES
Infectious Diseases Progress Note    Miguel Anderson MD     [facility]  Los: 6 days  Patient Identification:  Name: Yakov Dubon  Age: 77 y.o.  Sex: male  :  1945  MRN: [id number]         Primary Care Physician: Melissa Steven MD            Subjective: Feeling better and denies any specific complaints..  Interval History: See consultation note.     Objective:    Scheduled Meds:amLODIPine, 10 mg, Oral, Q24H  aspirin, 81 mg, Oral, Daily  carvedilol, 6.25 mg, Oral, BID With Meals  cefTRIAXone, 2 g, Intravenous, Q24H  clotrimazole-betamethasone, 1 application, Topical, Q12H  famotidine, 20 mg, Oral, BID  insulin glargine, 10 Units, Subcutaneous, Nightly  insulin lispro, 0-7 Units, Subcutaneous, 4x Daily With Meals & Nightly  polyethylene glycol, 17 g, Oral, Daily  rosuvastatin, 5 mg, Oral, Nightly  senna, 1 tablet, Oral, Daily  sertraline, 75 mg, Oral, Nightly  tamsulosin, 0.4 mg, Oral, Nightly      Continuous Infusions:     Vital signs in last 24 hours:  Temp:  [97.7 °F (36.5 °C)-98.3 °F (36.8 °C)] 98.3 °F (36.8 °C)  Heart Rate:  [57-66] 66  Resp:  [16] 16  BP: (118-173)/(68-85) 140/71    Intake/Output:    Intake/Output Summary (Last 24 hours) at 10/7/2022 1514  Last data filed at 10/7/2022 0925  Gross per 24 hour   Intake 110 ml   Output --   Net 110 ml       Exam:  /71 (BP Location: Right arm, Patient Position: Lying)   Pulse 66   Temp 98.3 °F (36.8 °C) (Oral)   Resp 16   Wt 105 kg (232 lb 9.4 oz)   SpO2 93%   BMI 31.85 kg/m²   Patient is examined using the personal protective equipment as per guidelines from infection control for this particular patient as enacted.  Hand washing was performed before and after patient interaction.  General Appearance:    Alert, cooperative, no distress, AAOx3                          Head:    Normocephalic, without obvious abnormality, atraumatic                           Eyes:    PERRL, conjunctivae/corneas clear, EOM's intact, both eyes                          Throat:   Lips, tongue, gums normal; oral mucosa pink and moist                           Neck:   Supple, symmetrical, trachea midline, no JVD                         Lungs:    Clear to auscultation bilaterally, respirations unlabored                 Chest Wall:    No tenderness or deformity                          Heart:    Regular rate and rhythm, S1 and S2 normal, no murmur, no rub                                         or gallop                  Abdomen:     Soft, non-tender, bowel sounds active, no masses, no                                                        organomegaly                  Extremities:                                         Skin: Decreased erythema on the left leg.                  Neurologic: At times lethargic and confused       Data Review:    I reviewed the patient's new clinical results.  Results from last 7 days   Lab Units 10/07/22  0418 10/06/22  0429 10/05/22  0434 10/04/22  0423 10/03/22  0352 10/02/22  0436 10/01/22  1201   WBC 10*3/mm3 7.57 7.94 9.25 13.69* 12.06* 11.04* 12.40*   HEMOGLOBIN g/dL 10.8* 11.0* 10.1* 10.1* 8.7* 8.9* 11.1*   PLATELETS 10*3/mm3 282 313 322 286 234 233 349     Results from last 7 days   Lab Units 10/07/22  0418 10/06/22  0429 10/05/22  0434 10/04/22  0423 10/03/22  0352 10/02/22  0435 10/01/22  1201   SODIUM mmol/L 142 140 139 138 139 139 143   POTASSIUM mmol/L 4.2 4.5 4.4 4.3 4.3 4.0 4.5   CHLORIDE mmol/L 100 99 101 101 101 103 100   CO2 mmol/L 30.9* 32.2* 31.0* 28.0 31.3* 30.0* 32.0*   BUN mg/dL 28* 26* 26* 26* 29* 33* 37*   CREATININE mg/dL 1.62* 1.41* 1.37* 1.46* 1.81* 1.94* 2.38*   CALCIUM mg/dL 9.2 9.2 9.1 9.0 8.4* 8.6 9.7   GLUCOSE mg/dL 102* 91 83 117* 137* 153* 115*     Microbiology Results (last 10 days)     Procedure Component Value - Date/Time    Blood Culture - Blood, Arm, Left [[id number]]  (Normal) Collected: 10/03/22 1622    Lab Status: Preliminary result Specimen: Blood from Arm, Left Updated: 10/06/22 1816     Blood  Culture No growth at 3 days    Blood Culture - Blood, Arm, Left [[id number]]  (Normal) Collected: 10/03/22 1624    Lab Status: Preliminary result Specimen: Blood from Arm, Left Updated: 10/06/22 2215     Blood Culture No growth at 3 days    Urine Culture - Urine, Urine, Clean Catch [[id number]]  (Abnormal)  (Susceptibility) Collected: 10/01/22 1317    Lab Status: Final result Specimen: Urine, Clean Catch Updated: 10/03/22 1047     Urine Culture >100,000 CFU/mL Escherichia coli    Narrative:      Colonization of the urinary tract without infection is common. Treatment is discouraged unless the patient is symptomatic, pregnant, or undergoing an invasive urologic procedure.    Susceptibility      Escherichia coli      COURTNEY      Ampicillin Resistant     Ampicillin + Sulbactam Intermediate      Cefazolin Susceptible      Cefepime Susceptible      Ceftazidime Susceptible      Ceftriaxone Susceptible      Gentamicin Susceptible      Levofloxacin Resistant     Nitrofurantoin Susceptible      Piperacillin + Tazobactam Susceptible      Trimethoprim + Sulfamethoxazole Susceptible                           Blood Culture - Blood, Arm, Left [[id number]]  (Abnormal)  (Susceptibility) Collected: 10/01/22 1216    Lab Status: Final result Specimen: Blood from Arm, Left Updated: 10/04/22 0613     Blood Culture Escherichia coli     Isolated from Anaerobic Bottle     Gram Stain Anaerobic Bottle Gram negative bacilli    Susceptibility      Escherichia coli      COURTNEY      Ampicillin Resistant     Ampicillin + Sulbactam Intermediate      Cefepime Susceptible      Ceftazidime Susceptible      Ceftriaxone Susceptible      Gentamicin Susceptible      Levofloxacin Resistant     Piperacillin + Tazobactam Susceptible      Trimethoprim + Sulfamethoxazole Susceptible                       Susceptibility Comments     Escherichia coli    Cefazolin sensitivity will not be reported for Enterobacteriaceae in non-urine isolates. If cefazolin is preferred,  please call the microbiology lab to request an E-test.  With the exception of urinary-sourced infections, aminoglycosides should not be used as monotherapy.             Blood Culture ID, PCR - Blood, Arm, Left [[id number]]  (Abnormal) Collected: 10/01/22 1216    Lab Status: Final result Specimen: Blood from Arm, Left Updated: 10/03/22 0815     BCID, PCR Eschericia coli. Identification by BCID2 PCR.    Blood Culture - Blood, Arm, Right [[id number]]  (Normal) Collected: 10/01/22 1201    Lab Status: Final result Specimen: Blood from Arm, Right Updated: 10/06/22 1218     Blood Culture No growth at 5 days        No radiology results for the last day        Assessment:    Left leg cellulitis  1-toxic metabolic encephalopathy secondary to  2-bacteremic urinary tract infection with superimposed cellulitis of left lower extremity with lymphangitis in the setting of  3-chronic venous stasis changes and edema of the lower extremity along with immobility  4-other diagnosis per primary team.     Recommendations/Discussions:  · Continue ceftriaxone based on the sensitivity of the E. coli in the urine and blood culture.  · Completed last dose of vancomycin on 10/5/2022.  · If he shows persistent renal dysfunction and worsening leukocytosis and he may require CT scan of the abdomen and pelvis without contrast to rule out  tract abnormalities to explain bacteremic UTI.  · Continue local care and apply Lotrisone cream to his lower extremities.  · Would recommend 2 weeks of IV ceftriaxone from last negative blood culture-10/3/2022.  Miguel Anderson MD  10/7/2022  15:14 EDT    Much of this encounter note is an electronic transcription/translation of spoken language to printed text. The electronic translation of spoken language may permit erroneous, or at times, nonsensical words or phrases to be inadvertently transcribed; Although I have reviewed the note for such errors, some may still exist

## 2022-10-07 NOTE — PLAN OF CARE
Goal Outcome Evaluation:  Plan of Care Reviewed With: patient        Progress: no change  Outcome Evaluation: Pt agreed after much encouragement to perf STS x1 w/min-CGA-2, pt declined exer or amb attempts, slow to respond, unsure what his baseline communication is or his PLOF, plans back to facility at [place]    Patient was not wearing a face mask during this therapy encounter. Therapist used appropriate personal protective equipment including eye protection, mask, and gloves.  Mask used was standard procedure mask. Appropriate PPE was worn during the entire therapy session. Hand hygiene was completed before and after therapy session. Patient is not in enhanced droplet precautions.      Tri Cruz, PT Tech present

## 2022-10-07 NOTE — PROGRESS NOTES
[facility] Progress Note      Patient Name: Yakov Dubon  : 1945  MRN: [id number]  Primary Care Physician:  Melissa Steven MD  Date of admission: 10/1/2022    Subjective     Interval History:   Follow up CARLOS on CKD III.   Confused this morning.  Poor oral intake reported by nursing staff.  Urine output has not been recorded.    Review of Systems:   As noted above    Objective     Vitals:   Temp:  [97.7 °F (36.5 °C)-98.3 °F (36.8 °C)] 97.7 °F (36.5 °C)  Heart Rate:  [57-59] 57  Resp:  [16] 16  BP: (118-173)/(68-90) 173/82    Intake/Output Summary (Last 24 hours) at 10/7/2022 0842  Last data filed at 10/6/2022 2134  Gross per 24 hour   Intake 60 ml   Output --   Net 60 ml       Physical Exam:    General Appearance: cooperative. alert, lying flat in bed. Slow to answer, but answers appropriately.  no acute distress   Skin: warm and dry  HEENT: oral mucosa dry. nonicteric sclera  Neck: supple, no JVD  Lungs: Clear to auscultation  Heart: RRR, normal S1 and S2  Abdomen: soft, nontender, nondistended. + bs  : no palpable bladder  Extremities: 1+ bilateral lower ext edema and erythema  Neuro: slow to answer .  Moves all 4 ext   .Scheduled Meds:     amLODIPine, 10 mg, Oral, Q24H  aspirin, 81 mg, Oral, Daily  carvedilol, 6.25 mg, Oral, BID With Meals  cefTRIAXone, 2 g, Intravenous, Q24H  clotrimazole-betamethasone, 1 application, Topical, Q12H  famotidine, 20 mg, Oral, BID  furosemide, 40 mg, Oral, Daily  insulin glargine, 10 Units, Subcutaneous, Nightly  insulin lispro, 0-7 Units, Subcutaneous, 4x Daily With Meals & Nightly  polyethylene glycol, 17 g, Oral, Daily  rosuvastatin, 5 mg, Oral, Nightly  senna, 1 tablet, Oral, Daily  sertraline, 75 mg, Oral, Nightly  tamsulosin, 0.4 mg, Oral, Nightly      IV Meds:        Results Reviewed:   I have personally reviewed the results from the time of this admission to 10/7/2022 08:42 EDT     Results from last 7 days   Lab Units 10/07/22  0418  10/06/22  0429 10/05/22  0434 10/03/22  0352 10/02/22  0435 10/01/22  1201   SODIUM mmol/L 142 140 139   < > 139 143   POTASSIUM mmol/L 4.2 4.5 4.4   < > 4.0 4.5   CHLORIDE mmol/L 100 99 101   < > 103 100   CO2 mmol/L 30.9* 32.2* 31.0*   < > 30.0* 32.0*   BUN mg/dL 28* 26* 26*   < > 33* 37*   CREATININE mg/dL 1.62* 1.41* 1.37*   < > 1.94* 2.38*   CALCIUM mg/dL 9.2 9.2 9.1   < > 8.6 9.7   BILIRUBIN mg/dL  --  <0.2  --   --  0.4 0.3   ALK PHOS U/L  --  61  --   --  49 56   ALT (SGPT) U/L  --  9  --   --  13 6   AST (SGOT) U/L  --  10  --   --  21 13   GLUCOSE mg/dL 102* 91 83   < > 153* 115*    < > = values in this interval not displayed.       Estimated Creatinine Clearance: 47 mL/min (A) (by C-G formula based on SCr of 1.62 mg/dL (H)).    Results from last 7 days   Lab Units 10/07/22  0418 10/01/22  1201   MAGNESIUM mg/dL  --  2.1   PHOSPHORUS mg/dL 3.4  --              Results from last 7 days   Lab Units 10/07/22  0418 10/06/22  0429 10/05/22  0434 10/04/22  0423 10/03/22  0352   WBC 10*3/mm3 7.57 7.94 9.25 13.69* 12.06*   HEMOGLOBIN g/dL 10.8* 11.0* 10.1* 10.1* 8.7*   PLATELETS 10*3/mm3 282 313 322 286 234             Assessment / Plan     ASSESSMENT:  1. Uyen on CKD III, baseline 1.5.  His creatinine is back to baseline  2. HTN not controlled.  Blood pressures is acceptable   3. LLE cellulitis, UTI. E. Coli bacteremia and UTI.  . On ceftriaxone and Vanc .. WBC down further.  4. Anemia. Hg relatively stable.   5. Dementia  6.  Poor oral intake     PLAN:  1.  Given decreased oral intake we will hold Lasix for today.  2.  Continue to monitor creatinine trend trends.  3.  Poor prognosis  Darling Maciel MD  10/07/22  08:42 EDT    [facility]  [phone]

## 2022-10-07 NOTE — PROGRESS NOTES
Infectious Diseases Progress Note    Miguel Anderson MD     [facility]  Los: 5 days  Patient Identification:  Name: Yakov Dubon  Age: 77 y.o.  Sex: male  :  1945  MRN: [id number]         Primary Care Physician: Melissa Steven MD            Subjective: Feeling better denies any fever and chills.  Interval History: See consultation note.     Objective:    Scheduled Meds:amLODIPine, 10 mg, Oral, Q24H  aspirin, 81 mg, Oral, Daily  carvedilol, 6.25 mg, Oral, BID With Meals  cefTRIAXone, 2 g, Intravenous, Q24H  clotrimazole-betamethasone, 1 application, Topical, Q12H  famotidine, 20 mg, Oral, BID  furosemide, 40 mg, Oral, Daily  insulin glargine, 10 Units, Subcutaneous, Nightly  insulin lispro, 0-7 Units, Subcutaneous, 4x Daily With Meals & Nightly  polyethylene glycol, 17 g, Oral, Daily  rosuvastatin, 5 mg, Oral, Nightly  senna, 1 tablet, Oral, Daily  sertraline, 75 mg, Oral, Nightly  tamsulosin, 0.4 mg, Oral, Nightly      Continuous Infusions:     Vital signs in last 24 hours:  Temp:  [97.9 °F (36.6 °C)-98.4 °F (36.9 °C)] 97.9 °F (36.6 °C)  Heart Rate:  [59-82] 59  Resp:  [16-20] 16  BP: (118-181)/(68-91) 118/68    Intake/Output:    Intake/Output Summary (Last 24 hours) at 10/6/2022 2140  Last data filed at 10/6/2022 0728  Gross per 24 hour   Intake --   Output 0 ml   Net 0 ml       Exam:  /68 (BP Location: Right arm, Patient Position: Lying)   Pulse 59   Temp 97.9 °F (36.6 °C) (Oral)   Resp 16   Wt 108 kg (237 lb 1.6 oz)   SpO2 90%   BMI 32.47 kg/m²   Patient is examined using the personal protective equipment as per guidelines from infection control for this particular patient as enacted.  Hand washing was performed before and after patient interaction.  General Appearance:    Alert, cooperative, no distress, AAOx3                          Head:    Normocephalic, without obvious abnormality, atraumatic                           Eyes:    PERRL, conjunctivae/corneas clear, EOM's  intact, both eyes                         Throat:   Lips, tongue, gums normal; oral mucosa pink and moist                           Neck:   Supple, symmetrical, trachea midline, no JVD                         Lungs:    Clear to auscultation bilaterally, respirations unlabored                 Chest Wall:    No tenderness or deformity                          Heart:    Regular rate and rhythm, S1 and S2 normal, no murmur, no rub                                         or gallop                  Abdomen:     Soft, non-tender, bowel sounds active, no masses, no                                                        organomegaly                  Extremities:                                         Skin: Decreased erythema on the left leg.                  Neurologic: At times lethargic and confused       Data Review:    I reviewed the patient's new clinical results.  Results from last 7 days   Lab Units 10/06/22  0429 10/05/22  0434 10/04/22  0423 10/03/22  0352 10/02/22  0436 10/01/22  1201   WBC 10*3/mm3 7.94 9.25 13.69* 12.06* 11.04* 12.40*   HEMOGLOBIN g/dL 11.0* 10.1* 10.1* 8.7* 8.9* 11.1*   PLATELETS 10*3/mm3 313 322 286 234 233 349     Results from last 7 days   Lab Units 10/06/22  0429 10/05/22  0434 10/04/22  0423 10/03/22  0352 10/02/22  0435 10/01/22  1201   SODIUM mmol/L 140 139 138 139 139 143   POTASSIUM mmol/L 4.5 4.4 4.3 4.3 4.0 4.5   CHLORIDE mmol/L 99 101 101 101 103 100   CO2 mmol/L 32.2* 31.0* 28.0 31.3* 30.0* 32.0*   BUN mg/dL 26* 26* 26* 29* 33* 37*   CREATININE mg/dL 1.41* 1.37* 1.46* 1.81* 1.94* 2.38*   CALCIUM mg/dL 9.2 9.1 9.0 8.4* 8.6 9.7   GLUCOSE mg/dL 91 83 117* 137* 153* 115*     Microbiology Results (last 10 days)     Procedure Component Value - Date/Time    Blood Culture - Blood, Arm, Left [[id number]]  (Normal) Collected: 10/03/22 1624    Lab Status: Preliminary result Specimen: Blood from Arm, Left Updated: 10/06/22 1816     Blood Culture No growth at 3 days    Blood Culture - Blood,  Arm, Left [[id number]]  (Normal) Collected: 10/03/22 1624    Lab Status: Preliminary result Specimen: Blood from Arm, Left Updated: 10/05/22 2215     Blood Culture No growth at 2 days    Urine Culture - Urine, Urine, Clean Catch [[id number]]  (Abnormal)  (Susceptibility) Collected: 10/01/22 1317    Lab Status: Final result Specimen: Urine, Clean Catch Updated: 10/03/22 1047     Urine Culture >100,000 CFU/mL Escherichia coli    Narrative:      Colonization of the urinary tract without infection is common. Treatment is discouraged unless the patient is symptomatic, pregnant, or undergoing an invasive urologic procedure.    Susceptibility      Escherichia coli      COURTNEY      Ampicillin Resistant     Ampicillin + Sulbactam Intermediate      Cefazolin Susceptible      Cefepime Susceptible      Ceftazidime Susceptible      Ceftriaxone Susceptible      Gentamicin Susceptible      Levofloxacin Resistant     Nitrofurantoin Susceptible      Piperacillin + Tazobactam Susceptible      Trimethoprim + Sulfamethoxazole Susceptible                           Blood Culture - Blood, Arm, Left [[id number]]  (Abnormal)  (Susceptibility) Collected: 10/01/22 1216    Lab Status: Final result Specimen: Blood from Arm, Left Updated: 10/04/22 0613     Blood Culture Escherichia coli     Isolated from Anaerobic Bottle     Gram Stain Anaerobic Bottle Gram negative bacilli    Susceptibility      Escherichia coli      COURTNEY      Ampicillin Resistant     Ampicillin + Sulbactam Intermediate      Cefepime Susceptible      Ceftazidime Susceptible      Ceftriaxone Susceptible      Gentamicin Susceptible      Levofloxacin Resistant     Piperacillin + Tazobactam Susceptible      Trimethoprim + Sulfamethoxazole Susceptible                       Susceptibility Comments     Escherichia coli    Cefazolin sensitivity will not be reported for Enterobacteriaceae in non-urine isolates. If cefazolin is preferred, please call the microbiology lab to request an  E-test.  With the exception of urinary-sourced infections, aminoglycosides should not be used as monotherapy.             Blood Culture ID, PCR - Blood, Arm, Left [[id number]]  (Abnormal) Collected: 10/01/22 1216    Lab Status: Final result Specimen: Blood from Arm, Left Updated: 10/03/22 0815     BCID, PCR Eschericia coli. Identification by BCID2 PCR.    Blood Culture - Blood, Arm, Right [[id number]]  (Normal) Collected: 10/01/22 1201    Lab Status: Final result Specimen: Blood from Arm, Right Updated: 10/06/22 1218     Blood Culture No growth at 5 days        No radiology results for the last day        Assessment:    Left leg cellulitis  1-toxic metabolic encephalopathy secondary to  2-bacteremic urinary tract infection with superimposed cellulitis of left lower extremity with lymphangitis in the setting of  3-chronic venous stasis changes and edema of the lower extremity along with immobility  4-other diagnosis per primary team.     Recommendations/Discussions:  · Continue ceftriaxone based on the sensitivity of the E. coli in the urine and blood culture.  · Completed last dose of vancomycin on 10/5/2022.  · If he shows persistent renal dysfunction and worsening leukocytosis and he may require CT scan of the abdomen and pelvis without contrast to rule out  tract abnormalities to explain bacteremic UTI.  · Continue local care and apply Lotrisone cream to his lower extremities.  Miguel Anderson MD  10/6/2022  21:40 EDT    Much of this encounter note is an electronic transcription/translation of spoken language to printed text. The electronic translation of spoken language may permit erroneous, or at times, nonsensical words or phrases to be inadvertently transcribed; Although I have reviewed the note for such errors, some may still exist

## 2022-10-07 NOTE — PROGRESS NOTES
Daily progress note    Primary care physician      Chief complaint  Doing same with no new complaints.  Patient making slow progress    History of present illness  77-year-old white male with very complex past medical history including chronic hypoxic hypercapnic respiratory failure diabetes mellitus hypertension diastolic CHF obstructive sleep apnea and chronic kidney disease stage III who is a nursing home resident brought to the emergency room with altered mental status.  Patient in no respite distress but very weak and lethargic and answer questions slowly.  Patient work-up in ER revealed and found to have sepsis with cellulitis and UTI and also found to have acute kidney injury admit for management.  Patient is DNR per his wishes.     REVIEW OF SYSTEMS  Unremarkable except for weakness.    PHYSICAL EXAM  Blood pressure 140/71, pulse 66, temperature 98.3 °F (36.8 °C), temperature source Oral, resp. rate 16, weight 105 kg (232 lb 9.4 oz), SpO2 93 %.    Constitutional:       General: Awake and alert in no distress.  HENT:      Head: Normocephalic and atraumatic.   Eyes:      Pupils: Pupils are equal, round, and reactive to light.   Cardiovascular:      Rate and Rhythm: Normal rate and regular rhythm.      Pulses:           Posterior tibial pulses are 2+ on the right side and 2+ on the left side.      Heart sounds: Normal heart sounds. No murmur heard.  Pulmonary:      Effort: Pulmonary effort is normal. No respiratory distress.      Breath sounds: Normal breath sounds. No wheezing.   Abdominal:      General: Bowel sounds are normal.      Palpations: Abdomen is soft.      Tenderness: There is no abdominal tenderness. There is no guarding or rebound.   Musculoskeletal:         General: Swelling and tenderness ( Left leg) present. Normal range of motion.      Cervical back: Normal range of motion.   Skin:     General: Skin is warm and dry.      Findings: Erythema ( Left lower leg and posterior thigh) present.    Neurological:      Mental Status: He is confused.      GCS: GCS eye subscore is 1. GCS verbal subscore is 4. GCS motor subscore is 6.      Cranial Nerves: No cranial nerve deficit.      Sensory: No sensory deficit.      Comments: Patient with frequent irregular, nonrhythmic, myoclonic jerks throughout limbs and torso   Psychiatric:         Mood and Affect: Affect normal.      LAB RESULTS   Lab Results (last 24 hours)     Procedure Component Value Units Date/Time    POC Glucose Once [[id number]]  (Normal) Collected: 10/07/22 1120    Specimen: Blood Updated: 10/07/22 1123     Glucose 103 mg/dL      Comment: Meter: [id number] : [id number] Reveles Vyteis CNA       POC Glucose Once [[id number]]  (Normal) Collected: 10/07/22 0551    Specimen: Blood Updated: 10/07/22 0552     Glucose 105 mg/dL      Comment: Meter: [id number] : [id number] Yari Mendoza NA       Folate [[id number]]  (Normal) Collected: 10/07/22 0418    Specimen: Blood Updated: 10/07/22 0515     Folate 12.20 ng/mL     Narrative:      Results may be falsely increased if patient taking Biotin.      Renal Function Panel [[id number]]  (Abnormal) Collected: 10/07/22 0418    Specimen: Blood Updated: 10/07/22 0459     Glucose 102 mg/dL      BUN 28 mg/dL      Creatinine 1.62 mg/dL      Sodium 142 mmol/L      Potassium 4.2 mmol/L      Chloride 100 mmol/L      CO2 30.9 mmol/L      Calcium 9.2 mg/dL      Albumin 3.30 g/dL      Phosphorus 3.4 mg/dL      Anion Gap 11.1 mmol/L      BUN/Creatinine Ratio 17.3     eGFR 43.2 mL/min/1.73      Comment: National Kidney Foundation and American Society of Nephrology (ASN) Task Force recommended calculation based on the Chronic Kidney Disease Epidemiology Collaboration (CKD-EPI) equation refit without adjustment for race.       Narrative:      GFR Normal >60  Chronic Kidney Disease <60  Kidney Failure <15      CBC & Differential [[id number]]  (Abnormal) Collected: 10/07/22 0418    Specimen: Blood Updated: 10/07/22 0439     Narrative:      The following orders were created for panel order CBC & Differential.  Procedure                               Abnormality         Status                     ---------                               -----------         ------                     CBC Auto Differential[[id number]]        Abnormal            Final result                 Please view results for these tests on the individual orders.    CBC Auto Differential [[id number]]  (Abnormal) Collected: 10/07/22 0418    Specimen: Blood Updated: 10/07/22 0439     WBC 7.57 10*3/mm3      RBC 3.55 10*6/mm3      Hemoglobin 10.8 g/dL      Hematocrit 31.8 %      MCV 89.6 fL      MCH 30.4 pg      MCHC 34.0 g/dL      RDW 13.2 %      RDW-SD 43.9 fl      MPV 9.2 fL      Platelets 282 10*3/mm3      Neutrophil % 69.4 %      Lymphocyte % 20.3 %      Monocyte % 7.0 %      Eosinophil % 2.1 %      Basophil % 0.7 %      Immature Grans % 0.5 %      Neutrophils, Absolute 5.25 10*3/mm3      Lymphocytes, Absolute 1.54 10*3/mm3      Monocytes, Absolute 0.53 10*3/mm3      Eosinophils, Absolute 0.16 10*3/mm3      Basophils, Absolute 0.05 10*3/mm3      Immature Grans, Absolute 0.04 10*3/mm3      nRBC 0.0 /100 WBC     Blood Culture - Blood, Arm, Left [[id number]]  (Normal) Collected: 10/03/22 1624    Specimen: Blood from Arm, Left Updated: 10/06/22 2215     Blood Culture No growth at 3 days    POC Glucose Once [[id number]]  (Normal) Collected: 10/06/22 2014    Specimen: Blood Updated: 10/06/22 2015     Glucose 99 mg/dL      Comment: Meter: [id number] : [id number] Yari SEYMOUR       Blood Culture - Blood, Arm, Left [[id number]]  (Normal) Collected: 10/03/22 1624    Specimen: Blood from Arm, Left Updated: 10/06/22 1816     Blood Culture No growth at 3 days    POC Glucose Once [[id number]]  (Normal) Collected: 10/06/22 1528    Specimen: Blood Updated: 10/06/22 1528     Glucose 117 mg/dL      Comment: Meter: [id number] : [id number] RevelesNetVision CNA           Imaging  Results (Last 24 Hours)     ** No results found for the last 24 hours. **          Current Facility-Administered Medications:   •  acetaminophen (TYLENOL) tablet 650 mg, 650 mg, Oral, Q6H PRN, Jean العراقي MD, 650 mg at 10/06/22 1759  •  amLODIPine (NORVASC) tablet 10 mg, 10 mg, Oral, Q24H, Jean العراقي MD, 10 mg at 10/07/22 0940  •  aspirin chewable tablet 81 mg, 81 mg, Oral, Daily, Jean العراقي MD, 81 mg at 10/07/22 0938  •  carvedilol (COREG) tablet 6.25 mg, 6.25 mg, Oral, BID With Meals, Lexie Rao MD, 6.25 mg at 10/07/22 0938  •  cefTRIAXone (ROCEPHIN) 2 g in sodium chloride 0.9 % 100 mL IVPB-VTB, 2 g, Intravenous, Q24H, Miguel Anderson MD, Last Rate: 200 mL/hr at 10/06/22 2147, 2 g at 10/06/22 2147  •  clotrimazole-betamethasone (LOTRISONE) 1-0.05 % cream 1 application, 1 application, Topical, Q12H, Miguel Anderson MD, 1 application at 10/07/22 0947  •  famotidine (PEPCID) tablet 20 mg, 20 mg, Oral, BID, Jean العراقي MD, 20 mg at 10/07/22 0938  •  insulin glargine (LANTUS, SEMGLEE) injection 10 Units, 10 Units, Subcutaneous, Nightly, Jean العراقي MD, 10 Units at 10/04/22 2216  •  insulin lispro (ADMELOG) injection 0-7 Units, 0-7 Units, Subcutaneous, 4x Daily With Meals & Nightly, Jean العراقي MD, 4 Units at 10/03/22 1248  •  ondansetron (ZOFRAN) injection 4 mg, 4 mg, Intravenous, Q4H PRN, Jean العراقي MD, 4 mg at 10/04/22 2011  •  polyethylene glycol (MIRALAX) packet 17 g, 17 g, Oral, Daily, Jean العراقي MD, 17 g at 10/07/22 0935  •  rosuvastatin (CRESTOR) tablet 5 mg, 5 mg, Oral, Nightly, Jean العراقي MD, 5 mg at 10/06/22 2147  •  senna (SENOKOT) tablet 1 tablet, 1 tablet, Oral, Daily, Jean العراقي MD, 1 tablet at 10/07/22 0940  •  sertraline (ZOLOFT) tablet 75 mg, 75 mg, Oral, Nightly, Jean العراقي MD, 75 mg at 10/06/22 2147  •  sodium chloride 0.9 % flush 10 mL, 10 mL, Intravenous, PRN, YonRachna MD, 10 mL at 10/01/22 2242  •  tamsulosin (FLOMAX) 24 hr capsule 0.4 mg, 0.4 mg, Oral,  Nightly, Tato العراقي MD, 0.4 mg at 10/06/22 8123     ASSESSMENT  E. coli bacteremia with sepsis  Acute E. coli UTI   Left lower extremity cellulitis   Acute kidney injury  Chronic kidney disease stage III  Diabetes mellitus  Hypertension  Hyperlipidemia  Chronic hypoxic hypercapnic respiratory failure  Obstructive sleep apnea  BPH  Immobility syndrome  Gastroesophageal reflux disease    PLAN  CPM  Continue IV antibiotics    Local wound care  Adjust nursing home medications  Stress ulcer DVT prophylaxis  Nephrology consult appreciated  Infectious disease to follow patient  Supportive care  DNR  PT OT  Discussed with nursing staff  Discharge planning    TATO العراقي MD

## 2022-10-07 NOTE — PLAN OF CARE
Goal Outcome Evaluation:   Diuretic in Use: lasix  Response to Diuretics (Output greater than intake): output greater  Daily Weight (up or down): down  O2 Requirements: room air  Functional Status (Activity level, tolerance and respiratory symptoms): per physical therapy, able to sit on edge of bed.  See PT note.  Discharge Plans:  to return to nursing home.

## 2022-10-07 NOTE — PLAN OF CARE
Goal Outcome Evaluation:      Pt alert x2, confused to time and situation. Pt refused hs lantus because he states he doesn't feel like eating. Brought pt some pudding and chocolate ice cream he only ate a couple bites out of each. Pt allowed me to do drsg w/o complaints. Spoke to pt's sister and updated her on pt's status.

## 2022-10-08 LAB
BACTERIA SPEC AEROBE CULT: NORMAL
BACTERIA SPEC AEROBE CULT: NORMAL
GLUCOSE BLDC GLUCOMTR-MCNC: 108 MG/DL (ref 70–130)
GLUCOSE BLDC GLUCOMTR-MCNC: 114 MG/DL (ref 70–130)
GLUCOSE BLDC GLUCOMTR-MCNC: 118 MG/DL (ref 70–130)
GLUCOSE BLDC GLUCOMTR-MCNC: 130 MG/DL (ref 70–130)

## 2022-10-08 PROCEDURE — 25010000002 CEFTRIAXONE PER 250 MG: Performed by: INTERNAL MEDICINE

## 2022-10-08 PROCEDURE — 82962 GLUCOSE BLOOD TEST: CPT

## 2022-10-08 RX ADMIN — FAMOTIDINE 20 MG: 20 TABLET ORAL at 20:01

## 2022-10-08 RX ADMIN — CARVEDILOL 6.25 MG: 6.25 TABLET, FILM COATED ORAL at 09:25

## 2022-10-08 RX ADMIN — CLOTRIMAZOLE AND BETAMETHASONE DIPROPIONATE 1 APPLICATION: 10; .5 CREAM TOPICAL at 09:25

## 2022-10-08 RX ADMIN — AMLODIPINE BESYLATE 10 MG: 10 TABLET ORAL at 09:25

## 2022-10-08 RX ADMIN — ROSUVASTATIN CALCIUM 5 MG: 5 TABLET, FILM COATED ORAL at 20:00

## 2022-10-08 RX ADMIN — TAMSULOSIN HYDROCHLORIDE 0.4 MG: 0.4 CAPSULE ORAL at 20:00

## 2022-10-08 RX ADMIN — CLOTRIMAZOLE AND BETAMETHASONE DIPROPIONATE 1 APPLICATION: 10; .5 CREAM TOPICAL at 20:00

## 2022-10-08 RX ADMIN — SERTRALINE 75 MG: 25 TABLET, FILM COATED ORAL at 20:00

## 2022-10-08 RX ADMIN — SENNOSIDES 1 TABLET: 8.6 TABLET, FILM COATED ORAL at 09:25

## 2022-10-08 RX ADMIN — CARVEDILOL 6.25 MG: 6.25 TABLET, FILM COATED ORAL at 18:12

## 2022-10-08 RX ADMIN — POLYETHYLENE GLYCOL 3350 17 G: 17 POWDER, FOR SOLUTION ORAL at 09:25

## 2022-10-08 RX ADMIN — FAMOTIDINE 20 MG: 20 TABLET ORAL at 09:25

## 2022-10-08 RX ADMIN — CEFTRIAXONE 2 G: 2 INJECTION, POWDER, FOR SOLUTION INTRAMUSCULAR; INTRAVENOUS at 20:00

## 2022-10-08 RX ADMIN — ACETAMINOPHEN 650 MG: 325 TABLET, FILM COATED ORAL at 20:00

## 2022-10-08 RX ADMIN — ASPIRIN 81 MG: 81 TABLET, CHEWABLE ORAL at 09:25

## 2022-10-08 NOTE — PLAN OF CARE
Goal Outcome Evaluation:              Outcome Evaluation: Pt alert, able to state name and  but declines to respond to any orientation questions. Pt does not appear to be in any pain. Dressing changed to RLE, redness/edema vastly improved. Poor PO intake this shift. Sleepy but arousable to voice. Pt was able to talk on the phone with sister with staff assistance. No attempts at unassisted self transfers. Accumax pump in place. Staff continues to turn/reposition to aid in the prevention of skin breakdown. Safety maintained.

## 2022-10-08 NOTE — PROGRESS NOTES
[facility] Progress Note      Patient Name: Yakov Dubon  : 1945  MRN: [id number]  Primary Care Physician:  Melissa Steven MD  Date of admission: 10/1/2022    Subjective     Interval History:   Follow up CARLOS on CKD III.   Confu no accurate ins and outs.  Laying comfortably in bed in no acute distress.  Poor oral intake.    Review of Systems:   As noted above    Objective     Vitals:   Temp:  [97.9 °F (36.6 °C)-99.3 °F (37.4 °C)] 99.3 °F (37.4 °C)  Heart Rate:  [60-66] 65  Resp:  [16-17] 16  BP: (140-159)/(71-83) 159/79    Intake/Output Summary (Last 24 hours) at 10/8/2022 0904  Last data filed at 10/7/2022 0940  Gross per 24 hour   Intake 110 ml   Output --   Net 110 ml       Physical Exam:    General Appearance: cooperative. alert, lying flat in bed. Slow to answer, but answers appropriately.  no acute distress   Skin: warm and dry  HEENT: oral mucosa dry. nonicteric sclera  Neck: supple, no JVD  Lungs: Clear to auscultation  Heart: RRR, normal S1 and S2  Abdomen: soft, nontender, nondistended. + bs  : no palpable bladder  Extremities: 1+ bilateral lower ext edema and erythema  Neuro: slow to answer .  Moves all 4 ext   .Scheduled Meds:     amLODIPine, 10 mg, Oral, Q24H  aspirin, 81 mg, Oral, Daily  carvedilol, 6.25 mg, Oral, BID With Meals  cefTRIAXone, 2 g, Intravenous, Q24H  clotrimazole-betamethasone, 1 application, Topical, Q12H  famotidine, 20 mg, Oral, BID  insulin glargine, 10 Units, Subcutaneous, Nightly  insulin lispro, 0-7 Units, Subcutaneous, 4x Daily With Meals & Nightly  polyethylene glycol, 17 g, Oral, Daily  rosuvastatin, 5 mg, Oral, Nightly  senna, 1 tablet, Oral, Daily  sertraline, 75 mg, Oral, Nightly  tamsulosin, 0.4 mg, Oral, Nightly      IV Meds:        Results Reviewed:   I have personally reviewed the results from the time of this admission to 10/8/2022 09:04 EDT     Results from last 7 days   Lab Units 10/07/22  0418 10/06/22  0429 10/05/22  0434  10/03/22  0352 10/02/22  0435 10/01/22  1201   SODIUM mmol/L 142 140 139   < > 139 143   POTASSIUM mmol/L 4.2 4.5 4.4   < > 4.0 4.5   CHLORIDE mmol/L 100 99 101   < > 103 100   CO2 mmol/L 30.9* 32.2* 31.0*   < > 30.0* 32.0*   BUN mg/dL 28* 26* 26*   < > 33* 37*   CREATININE mg/dL 1.62* 1.41* 1.37*   < > 1.94* 2.38*   CALCIUM mg/dL 9.2 9.2 9.1   < > 8.6 9.7   BILIRUBIN mg/dL  --  <0.2  --   --  0.4 0.3   ALK PHOS U/L  --  61  --   --  49 56   ALT (SGPT) U/L  --  9  --   --  13 6   AST (SGOT) U/L  --  10  --   --  21 13   GLUCOSE mg/dL 102* 91 83   < > 153* 115*    < > = values in this interval not displayed.       Estimated Creatinine Clearance: 47 mL/min (A) (by C-G formula based on SCr of 1.62 mg/dL (H)).    Results from last 7 days   Lab Units 10/07/22  0418 10/01/22  1201   MAGNESIUM mg/dL  --  2.1   PHOSPHORUS mg/dL 3.4  --              Results from last 7 days   Lab Units 10/07/22  0418 10/06/22  0429 10/05/22  0434 10/04/22  0423 10/03/22  0352   WBC 10*3/mm3 7.57 7.94 9.25 13.69* 12.06*   HEMOGLOBIN g/dL 10.8* 11.0* 10.1* 10.1* 8.7*   PLATELETS 10*3/mm3 282 313 322 286 234             Assessment / Plan     ASSESSMENT:  1. Uyen on CKD III, baseline 1.5.  His creatinine is back to baseline  2. HTN not controlled.  Blood pressures is acceptable   3. LLE cellulitis, UTI. E. Coli bacteremia and UTI.  . On ceftriaxone and Vanc .. WBC down further.  4. Anemia. Hg relatively stable.   5. Dementia  6.  Poor oral intake     PLAN:  1.  No labs from today will review once available  2.  Continue to hold diuretics for now  3.  Poor prognosis  Redd Padilla MD  10/08/22  09:04 EDT    [facility]  [phone]

## 2022-10-08 NOTE — PROGRESS NOTES
DAILY PROGRESS NOTE  [facility]    Patient Identification:  Name: Yakov Dubon  Age: 77 y.o.  Sex: male  :  1945  MRN: [id number]         Primary Care Physician: Melissa Steven MD    Subjective: patient is sitting up; nurse is at the bedside  Interval History: follow up for stone, cellulitis, obesity, chf, diabetes    Objective:    Scheduled Meds:amLODIPine, 10 mg, Oral, Q24H  aspirin, 81 mg, Oral, Daily  carvedilol, 6.25 mg, Oral, BID With Meals  cefTRIAXone, 2 g, Intravenous, Q24H  clotrimazole-betamethasone, 1 application, Topical, Q12H  famotidine, 20 mg, Oral, BID  insulin glargine, 10 Units, Subcutaneous, Nightly  insulin lispro, 0-7 Units, Subcutaneous, 4x Daily With Meals & Nightly  polyethylene glycol, 17 g, Oral, Daily  rosuvastatin, 5 mg, Oral, Nightly  senna, 1 tablet, Oral, Daily  sertraline, 75 mg, Oral, Nightly  tamsulosin, 0.4 mg, Oral, Nightly      Continuous Infusions:     Vital signs in last 24 hours:  Temp:  [97.9 °F (36.6 °C)-99.3 °F (37.4 °C)] 99.3 °F (37.4 °C)  Heart Rate:  [60-66] 65  Resp:  [16-17] 16  BP: (140-159)/(71-83) 159/79    Intake/Output:    Intake/Output Summary (Last 24 hours) at 10/8/2022 1259  Last data filed at 10/8/2022 0936  Gross per 24 hour   Intake 140 ml   Output --   Net 140 ml       Exam:  /79 (BP Location: Left arm, Patient Position: Lying)   Pulse 65   Temp 99.3 °F (37.4 °C) (Oral)   Resp 16   Wt 106 kg (233 lb 11.2 oz)   SpO2 94%   BMI 32.00 kg/m²     General Appearance:    Alert, cooperative, no distress,                            Head:    Normocephalic, without obvious abnormality, atraumatic                           Eyes:    PERRL, conjunctivae/corneas clear, EOM's intact, both eyes                         Throat:   Lips, tongue, gums normal; oral mucosa pink and moist                           Neck:   Supple, symmetrical, trachea midline, no JVD                         Lungs:    Clear to auscultation bilaterally, respirations  unlabored                 Chest Wall:    No tenderness or deformity                          Heart:    Regular rate and rhythm, S1 and S2 normal, no murmur,no  rub or gallop                  Abdomen:     Soft, nontender, bowel sounds active, no masses, no organomegaly                  Extremities:   Extremities normal, atraumatic, no cyanosis or edema; erythema left leg                        Pulses:   Pulses palpable in all extremities                            Skin:   Skin is warm and dry,  no rashes or palpable lesions                  Neurologic:   CNII-XII intact, motor strength grossly intact, sensation grossly intact to light touch, no focal deficits noted       Data Review:  Labs in chart were reviewed.  WBC   Date Value Ref Range Status   10/07/2022 7.57 3.40 - 10.80 10*3/mm3 Final     Hemoglobin   Date Value Ref Range Status   10/07/2022 10.8 (L) 13.0 - 17.7 g/dL Final     Hematocrit   Date Value Ref Range Status   10/07/2022 31.8 (L) 37.5 - 51.0 % Final     Platelets   Date Value Ref Range Status   10/07/2022 282 140 - 450 10*3/mm3 Final     Sodium   Date Value Ref Range Status   10/07/2022 142 136 - 145 mmol/L Final     Potassium   Date Value Ref Range Status   10/07/2022 4.2 3.5 - 5.2 mmol/L Final     Chloride   Date Value Ref Range Status   10/07/2022 100 98 - 107 mmol/L Final     CO2   Date Value Ref Range Status   10/07/2022 30.9 (H) 22.0 - 29.0 mmol/L Final     BUN   Date Value Ref Range Status   10/07/2022 28 (H) 8 - 23 mg/dL Final     Creatinine   Date Value Ref Range Status   10/07/2022 1.62 (H) 0.76 - 1.27 mg/dL Final     Glucose   Date Value Ref Range Status   10/07/2022 102 (H) 65 - 99 mg/dL Final     Calcium   Date Value Ref Range Status   10/07/2022 9.2 8.6 - 10.5 mg/dL Final     Phosphorus   Date Value Ref Range Status   10/07/2022 3.4 2.5 - 4.5 mg/dL Final     AST (SGOT)   Date Value Ref Range Status   10/06/2022 10 1 - 40 U/L Final     ALT (SGPT)   Date Value Ref Range Status    10/06/2022 9 1 - 41 U/L Final     Alkaline Phosphatase   Date Value Ref Range Status   10/06/2022 61 39 - 117 U/L Final         Assessment:  Active Hospital Problems    Diagnosis  POA   • Left leg cellulitis [L03.116]  Yes      Resolved Hospital Problems   No resolved problems to display.   acute kidney injury  Diabetes  Hypertension  Chronic diastolic chf  Obesity     Plan:  Will continue antibiotics  Cellulitis is improving  stone per nephrology  Remains weak but is more alert  Not answering questions  Notes and labs reviewed  Medium risk    Leonor Robbi De La Paz MD  10/8/2022  12:59 EDT

## 2022-10-09 LAB
ALBUMIN SERPL-MCNC: 3 G/DL (ref 3.5–5.2)
ANION GAP SERPL CALCULATED.3IONS-SCNC: 10.5 MMOL/L (ref 5–15)
BUN SERPL-MCNC: 27 MG/DL (ref 8–23)
BUN/CREAT SERPL: 17.3 (ref 7–25)
CALCIUM SPEC-SCNC: 9.4 MG/DL (ref 8.6–10.5)
CHLORIDE SERPL-SCNC: 103 MMOL/L (ref 98–107)
CO2 SERPL-SCNC: 28.5 MMOL/L (ref 22–29)
CREAT SERPL-MCNC: 1.56 MG/DL (ref 0.76–1.27)
EGFRCR SERPLBLD CKD-EPI 2021: 45.2 ML/MIN/1.73
GLUCOSE BLDC GLUCOMTR-MCNC: 112 MG/DL (ref 70–130)
GLUCOSE BLDC GLUCOMTR-MCNC: 115 MG/DL (ref 70–130)
GLUCOSE BLDC GLUCOMTR-MCNC: 118 MG/DL (ref 70–130)
GLUCOSE BLDC GLUCOMTR-MCNC: 119 MG/DL (ref 70–130)
GLUCOSE SERPL-MCNC: 106 MG/DL (ref 65–99)
PHOSPHATE SERPL-MCNC: 3.1 MG/DL (ref 2.5–4.5)
POTASSIUM SERPL-SCNC: 4.4 MMOL/L (ref 3.5–5.2)
SODIUM SERPL-SCNC: 142 MMOL/L (ref 136–145)

## 2022-10-09 PROCEDURE — 25010000002 CEFTRIAXONE PER 250 MG: Performed by: INTERNAL MEDICINE

## 2022-10-09 PROCEDURE — 80069 RENAL FUNCTION PANEL: CPT | Performed by: INTERNAL MEDICINE

## 2022-10-09 PROCEDURE — 82962 GLUCOSE BLOOD TEST: CPT

## 2022-10-09 RX ADMIN — POLYETHYLENE GLYCOL 3350 17 G: 17 POWDER, FOR SOLUTION ORAL at 09:28

## 2022-10-09 RX ADMIN — CLOTRIMAZOLE AND BETAMETHASONE DIPROPIONATE 1 APPLICATION: 10; .5 CREAM TOPICAL at 09:28

## 2022-10-09 RX ADMIN — FAMOTIDINE 20 MG: 20 TABLET ORAL at 20:00

## 2022-10-09 RX ADMIN — CEFTRIAXONE 2 G: 2 INJECTION, POWDER, FOR SOLUTION INTRAMUSCULAR; INTRAVENOUS at 19:57

## 2022-10-09 RX ADMIN — CLOTRIMAZOLE AND BETAMETHASONE DIPROPIONATE 1 APPLICATION: 10; .5 CREAM TOPICAL at 20:27

## 2022-10-09 RX ADMIN — AMLODIPINE BESYLATE 10 MG: 10 TABLET ORAL at 09:28

## 2022-10-09 RX ADMIN — SENNOSIDES 1 TABLET: 8.6 TABLET, FILM COATED ORAL at 09:28

## 2022-10-09 RX ADMIN — ASPIRIN 81 MG: 81 TABLET, CHEWABLE ORAL at 09:28

## 2022-10-09 RX ADMIN — ROSUVASTATIN CALCIUM 5 MG: 5 TABLET, FILM COATED ORAL at 20:00

## 2022-10-09 RX ADMIN — TAMSULOSIN HYDROCHLORIDE 0.4 MG: 0.4 CAPSULE ORAL at 20:00

## 2022-10-09 RX ADMIN — SERTRALINE 75 MG: 25 TABLET, FILM COATED ORAL at 20:00

## 2022-10-09 RX ADMIN — FAMOTIDINE 20 MG: 20 TABLET ORAL at 09:28

## 2022-10-09 NOTE — PROGRESS NOTES
[facility] Progress Note      Patient Name: Yakov Dubon  : 1945  MRN: [id number]  Primary Care Physician:  Melissa Steven MD  Date of admission: 10/1/2022    Subjective     Interval History:   Follow up CARLOS on CKD III.   Confused no accurate ins and outs.  Laying comfortably in bed in no acute distress.  Poor oral intake.    Review of Systems:   As noted above    Objective     Vitals:   Temp:  [97.3 °F (36.3 °C)-98.2 °F (36.8 °C)] 97.3 °F (36.3 °C)  Heart Rate:  [53-68] 53  Resp:  [17] 17  BP: (146-164)/(75-86) 164/76    Intake/Output Summary (Last 24 hours) at 10/9/2022 0924  Last data filed at 10/8/2022 1326  Gross per 24 hour   Intake 280 ml   Output --   Net 280 ml       Physical Exam:    General Appearance: cooperative. alert, lying flat in bed. Slow to answer, but answers appropriately.  no acute distress   Skin: warm and dry  HEENT: oral mucosa dry. nonicteric sclera  Neck: supple, no JVD  Lungs: Clear to auscultation  Heart: RRR, normal S1 and S2  Abdomen: soft, nontender, nondistended.   : no palpable bladder  Extremities: 1+ bilateral lower ext edema and erythema  Neuro: slow to answer .  Moves all 4 ext   .Scheduled Meds:     amLODIPine, 10 mg, Oral, Q24H  aspirin, 81 mg, Oral, Daily  carvedilol, 6.25 mg, Oral, BID With Meals  cefTRIAXone, 2 g, Intravenous, Q24H  clotrimazole-betamethasone, 1 application, Topical, Q12H  famotidine, 20 mg, Oral, BID  insulin glargine, 10 Units, Subcutaneous, Nightly  insulin lispro, 0-7 Units, Subcutaneous, 4x Daily With Meals & Nightly  polyethylene glycol, 17 g, Oral, Daily  rosuvastatin, 5 mg, Oral, Nightly  senna, 1 tablet, Oral, Daily  sertraline, 75 mg, Oral, Nightly  tamsulosin, 0.4 mg, Oral, Nightly      IV Meds:        Results Reviewed:   I have personally reviewed the results from the time of this admission to 10/9/2022 09:24 EDT     Results from last 7 days   Lab Units 10/09/22  0445 10/07/22  0418 10/06/22  0429    SODIUM mmol/L 142 142 140   POTASSIUM mmol/L 4.4 4.2 4.5   CHLORIDE mmol/L 103 100 99   CO2 mmol/L 28.5 30.9* 32.2*   BUN mg/dL 27* 28* 26*   CREATININE mg/dL 1.56* 1.62* 1.41*   CALCIUM mg/dL 9.4 9.2 9.2   BILIRUBIN mg/dL  --   --  <0.2   ALK PHOS U/L  --   --  61   ALT (SGPT) U/L  --   --  9   AST (SGOT) U/L  --   --  10   GLUCOSE mg/dL 106* 102* 91       Estimated Creatinine Clearance: 48 mL/min (A) (by C-G formula based on SCr of 1.56 mg/dL (H)).    Results from last 7 days   Lab Units 10/09/22  0445 10/07/22  0418   PHOSPHORUS mg/dL 3.1 3.4             Results from last 7 days   Lab Units 10/07/22  0418 10/06/22  0429 10/05/22  0434 10/04/22  0423 10/03/22  0352   WBC 10*3/mm3 7.57 7.94 9.25 13.69* 12.06*   HEMOGLOBIN g/dL 10.8* 11.0* 10.1* 10.1* 8.7*   PLATELETS 10*3/mm3 282 313 322 286 234             Assessment / Plan     ASSESSMENT:  1.  Uyen on CKD III, baseline 1.5.  His creatinine is back to baseline  2.  HTN not controlled.  Blood pressures is acceptable  3.  LLE cellulitis, UTI. E. Coli bacteremia and UTI. Improving   4.  Anemia. Hg relatively stable.   5.  Dementia  6.  Poor oral intake     PLAN:  1.  Renal function stable, continue to monitor  2.  Continue to hold diuretics for now  3.  Poor prognosis    Redd Padilla MD  10/09/22  09:24 EDT    [facility]  [phone]

## 2022-10-09 NOTE — PLAN OF CARE
Goal Outcome Evaluation:      Pt rested well. Pt allowed drsg to be changed and took hs meds. Pt still is not eating/drinking much and refuses hs lantus. Juice and bridgette pudding/ice cream offered.

## 2022-10-09 NOTE — PLAN OF CARE
Goal Outcome Evaluation:              Outcome Evaluation: Pt drowsy this shift but wakes to voice easily, sleeping between care. Poor PO intake, declines alternatives. Continues to only answer name and  , will not respond to any orientation questions. Dressing changed to RLE a/o. Turning/repositioning, heels floating and accumax pump in place. Moisture barrier cream applied for prevention. Pt was able to speak with sister via phone late this shift and was more talkative with staff during dinner. Pt does not appear to be in any pain and was able to hold up RLE during entire dressing change without difficulty. Bed alarm utilized. No attempts at unassisted self transfers. Safety maintained.

## 2022-10-09 NOTE — PROGRESS NOTES
Infectious Diseases Progress Note    Miguel Anderson MD     [facility]  Los: 8 days  Patient Identification:  Name: Yakov Dubon  Age: 77 y.o.  Sex: male  :  1945  MRN: [id number]         Primary Care Physician: Melissa Steven MD            Subjective: No new complaints..  Interval History: See consultation note.     Objective:    Scheduled Meds:amLODIPine, 10 mg, Oral, Q24H  aspirin, 81 mg, Oral, Daily  carvedilol, 6.25 mg, Oral, BID With Meals  cefTRIAXone, 2 g, Intravenous, Q24H  clotrimazole-betamethasone, 1 application, Topical, Q12H  famotidine, 20 mg, Oral, BID  insulin glargine, 10 Units, Subcutaneous, Nightly  insulin lispro, 0-7 Units, Subcutaneous, 4x Daily With Meals & Nightly  polyethylene glycol, 17 g, Oral, Daily  rosuvastatin, 5 mg, Oral, Nightly  senna, 1 tablet, Oral, Daily  sertraline, 75 mg, Oral, Nightly  tamsulosin, 0.4 mg, Oral, Nightly      Continuous Infusions:     Vital signs in last 24 hours:  Temp:  [97.6 °F (36.4 °C)-99.3 °F (37.4 °C)] 98.2 °F (36.8 °C)  Heart Rate:  [56-68] 56  Resp:  [16-17] 17  BP: (146-160)/(75-86) 146/75    Intake/Output:    Intake/Output Summary (Last 24 hours) at 10/9/2022 0625  Last data filed at 10/8/2022 1326  Gross per 24 hour   Intake 280 ml   Output --   Net 280 ml       Exam:  /75 (BP Location: Left arm, Patient Position: Lying)   Pulse 56   Temp 98.2 °F (36.8 °C) (Oral)   Resp 17   Wt 102 kg (224 lb 12.8 oz)   SpO2 94%   BMI 30.78 kg/m²   Patient is examined using the personal protective equipment as per guidelines from infection control for this particular patient as enacted.  Hand washing was performed before and after patient interaction.  General Appearance:    Alert, cooperative, no distress, AAOx3                          Head:    Normocephalic, without obvious abnormality, atraumatic                           Eyes:    PERRL, conjunctivae/corneas clear, EOM's intact, both eyes                         Throat:    Lips, tongue, gums normal; oral mucosa pink and moist                           Neck:   Supple, symmetrical, trachea midline, no JVD                         Lungs:    Clear to auscultation bilaterally, respirations unlabored                 Chest Wall:    No tenderness or deformity                          Heart:    Regular rate and rhythm, S1 and S2 normal, no murmur, no rub                                         or gallop                  Abdomen:     Soft, non-tender, bowel sounds active, no masses, no                                                        organomegaly                  Extremities:                                         Skin: Decreased erythema on the left leg.                  Neurologic: At times lethargic and confused       Data Review:    I reviewed the patient's new clinical results.  Results from last 7 days   Lab Units 10/07/22  0418 10/06/22  0429 10/05/22  0434 10/04/22  0423 10/03/22  0352   WBC 10*3/mm3 7.57 7.94 9.25 13.69* 12.06*   HEMOGLOBIN g/dL 10.8* 11.0* 10.1* 10.1* 8.7*   PLATELETS 10*3/mm3 282 313 322 286 234     Results from last 7 days   Lab Units 10/07/22  0418 10/06/22  0429 10/05/22  0434 10/04/22  0423 10/03/22  0352   SODIUM mmol/L 142 140 139 138 139   POTASSIUM mmol/L 4.2 4.5 4.4 4.3 4.3   CHLORIDE mmol/L 100 99 101 101 101   CO2 mmol/L 30.9* 32.2* 31.0* 28.0 31.3*   BUN mg/dL 28* 26* 26* 26* 29*   CREATININE mg/dL 1.62* 1.41* 1.37* 1.46* 1.81*   CALCIUM mg/dL 9.2 9.2 9.1 9.0 8.4*   GLUCOSE mg/dL 102* 91 83 117* 137*     Microbiology Results (last 10 days)     Procedure Component Value - Date/Time    Blood Culture - Blood, Arm, Left [[id number]]  (Normal) Collected: 10/03/22 1624    Lab Status: Final result Specimen: Blood from Arm, Left Updated: 10/08/22 1815     Blood Culture No growth at 5 days    Blood Culture - Blood, Arm, Left [[id number]]  (Normal) Collected: 10/03/22 1624    Lab Status: Final result Specimen: Blood from Arm, Left Updated: 10/08/22 2215      Blood Culture No growth at 5 days    Urine Culture - Urine, Urine, Clean Catch [[id number]]  (Abnormal)  (Susceptibility) Collected: 10/01/22 1317    Lab Status: Final result Specimen: Urine, Clean Catch Updated: 10/03/22 1047     Urine Culture >100,000 CFU/mL Escherichia coli    Narrative:      Colonization of the urinary tract without infection is common. Treatment is discouraged unless the patient is symptomatic, pregnant, or undergoing an invasive urologic procedure.    Susceptibility      Escherichia coli      COURTNEY      Ampicillin Resistant     Ampicillin + Sulbactam Intermediate      Cefazolin Susceptible      Cefepime Susceptible      Ceftazidime Susceptible      Ceftriaxone Susceptible      Gentamicin Susceptible      Levofloxacin Resistant     Nitrofurantoin Susceptible      Piperacillin + Tazobactam Susceptible      Trimethoprim + Sulfamethoxazole Susceptible                           Blood Culture - Blood, Arm, Left [[id number]]  (Abnormal)  (Susceptibility) Collected: 10/01/22 1216    Lab Status: Final result Specimen: Blood from Arm, Left Updated: 10/04/22 0613     Blood Culture Escherichia coli     Isolated from Anaerobic Bottle     Gram Stain Anaerobic Bottle Gram negative bacilli    Susceptibility      Escherichia coli      COURTNEY      Ampicillin Resistant     Ampicillin + Sulbactam Intermediate      Cefepime Susceptible      Ceftazidime Susceptible      Ceftriaxone Susceptible      Gentamicin Susceptible      Levofloxacin Resistant     Piperacillin + Tazobactam Susceptible      Trimethoprim + Sulfamethoxazole Susceptible                       Susceptibility Comments     Escherichia coli    Cefazolin sensitivity will not be reported for Enterobacteriaceae in non-urine isolates. If cefazolin is preferred, please call the microbiology lab to request an E-test.  With the exception of urinary-sourced infections, aminoglycosides should not be used as monotherapy.             Blood Culture ID, PCR - Blood, Arm,  Left [[id number]]  (Abnormal) Collected: 10/01/22 1216    Lab Status: Final result Specimen: Blood from Arm, Left Updated: 10/03/22 0815     BCID, PCR Eschericia coli. Identification by BCID2 PCR.    Blood Culture - Blood, Arm, Right [[id number]]  (Normal) Collected: 10/01/22 1201    Lab Status: Final result Specimen: Blood from Arm, Right Updated: 10/06/22 1218     Blood Culture No growth at 5 days        No radiology results for the last day        Assessment:    Left leg cellulitis  1-toxic metabolic encephalopathy secondary to  2-bacteremic urinary tract infection with superimposed cellulitis of left lower extremity with lymphangitis in the setting of  3-chronic venous stasis changes and edema of the lower extremity along with immobility  4-other diagnosis per primary team.     Recommendations/Discussions:  · Continue ceftriaxone based on the sensitivity of the E. coli in the urine and blood culture.  · Completed last dose of vancomycin on 10/5/2022.  · If he shows persistent renal dysfunction and worsening leukocytosis and he may require CT scan of the abdomen and pelvis without contrast to rule out  tract abnormalities to explain bacteremic UTI.  · Continue local care and apply Lotrisone cream to his lower extremities.  · Would recommend 2 weeks of IV ceftriaxone from last negative blood culture-10/3/2022.  Miguel Anderson MD  10/9/2022  06:25 EDT    Much of this encounter note is an electronic transcription/translation of spoken language to printed text. The electronic translation of spoken language may permit erroneous, or at times, nonsensical words or phrases to be inadvertently transcribed; Although I have reviewed the note for such errors, some may still exist

## 2022-10-10 LAB
ALBUMIN SERPL-MCNC: 3.1 G/DL (ref 3.5–5.2)
ANION GAP SERPL CALCULATED.3IONS-SCNC: 10.6 MMOL/L (ref 5–15)
BUN SERPL-MCNC: 30 MG/DL (ref 8–23)
BUN/CREAT SERPL: 20.7 (ref 7–25)
CALCIUM SPEC-SCNC: 9.4 MG/DL (ref 8.6–10.5)
CHLORIDE SERPL-SCNC: 102 MMOL/L (ref 98–107)
CO2 SERPL-SCNC: 28.4 MMOL/L (ref 22–29)
CREAT SERPL-MCNC: 1.45 MG/DL (ref 0.76–1.27)
EGFRCR SERPLBLD CKD-EPI 2021: 49.3 ML/MIN/1.73
GLUCOSE BLDC GLUCOMTR-MCNC: 111 MG/DL (ref 70–130)
GLUCOSE BLDC GLUCOMTR-MCNC: 121 MG/DL (ref 70–130)
GLUCOSE BLDC GLUCOMTR-MCNC: 128 MG/DL (ref 70–130)
GLUCOSE BLDC GLUCOMTR-MCNC: 153 MG/DL (ref 70–130)
GLUCOSE SERPL-MCNC: 112 MG/DL (ref 65–99)
PHOSPHATE SERPL-MCNC: 3.2 MG/DL (ref 2.5–4.5)
POTASSIUM SERPL-SCNC: 4 MMOL/L (ref 3.5–5.2)
SODIUM SERPL-SCNC: 141 MMOL/L (ref 136–145)

## 2022-10-10 PROCEDURE — 80069 RENAL FUNCTION PANEL: CPT | Performed by: INTERNAL MEDICINE

## 2022-10-10 PROCEDURE — 63710000001 INSULIN LISPRO (HUMAN) PER 5 UNITS: Performed by: HOSPITALIST

## 2022-10-10 PROCEDURE — 82962 GLUCOSE BLOOD TEST: CPT

## 2022-10-10 PROCEDURE — 25010000002 CEFTRIAXONE PER 250 MG: Performed by: HOSPITALIST

## 2022-10-10 RX ADMIN — FAMOTIDINE 20 MG: 20 TABLET ORAL at 22:24

## 2022-10-10 RX ADMIN — TAMSULOSIN HYDROCHLORIDE 0.4 MG: 0.4 CAPSULE ORAL at 22:24

## 2022-10-10 RX ADMIN — SERTRALINE 75 MG: 25 TABLET, FILM COATED ORAL at 22:24

## 2022-10-10 RX ADMIN — AMLODIPINE BESYLATE 10 MG: 10 TABLET ORAL at 09:29

## 2022-10-10 RX ADMIN — FAMOTIDINE 20 MG: 20 TABLET ORAL at 09:29

## 2022-10-10 RX ADMIN — CLOTRIMAZOLE AND BETAMETHASONE DIPROPIONATE 1 APPLICATION: 10; .5 CREAM TOPICAL at 09:29

## 2022-10-10 RX ADMIN — POLYETHYLENE GLYCOL 3350 17 G: 17 POWDER, FOR SOLUTION ORAL at 09:29

## 2022-10-10 RX ADMIN — CEFTRIAXONE 2 G: 2 INJECTION, POWDER, FOR SOLUTION INTRAMUSCULAR; INTRAVENOUS at 21:08

## 2022-10-10 RX ADMIN — SENNOSIDES 1 TABLET: 8.6 TABLET, FILM COATED ORAL at 09:29

## 2022-10-10 RX ADMIN — INSULIN LISPRO 2 UNITS: 100 INJECTION, SOLUTION INTRAVENOUS; SUBCUTANEOUS at 12:01

## 2022-10-10 RX ADMIN — CARVEDILOL 6.25 MG: 6.25 TABLET, FILM COATED ORAL at 09:29

## 2022-10-10 RX ADMIN — ROSUVASTATIN CALCIUM 5 MG: 5 TABLET, FILM COATED ORAL at 22:24

## 2022-10-10 RX ADMIN — CARVEDILOL 6.25 MG: 6.25 TABLET, FILM COATED ORAL at 17:57

## 2022-10-10 RX ADMIN — CLOTRIMAZOLE AND BETAMETHASONE DIPROPIONATE 1 APPLICATION: 10; .5 CREAM TOPICAL at 22:25

## 2022-10-10 RX ADMIN — ASPIRIN 81 MG: 81 TABLET, CHEWABLE ORAL at 09:29

## 2022-10-10 NOTE — PROGRESS NOTES
Daily progress note    Primary care physician      Chief complaint  Doing same with no new complaints.  Patient making slow progress    History of present illness  77-year-old white male with very complex past medical history including chronic hypoxic hypercapnic respiratory failure diabetes mellitus hypertension diastolic CHF obstructive sleep apnea and chronic kidney disease stage III who is a nursing home resident brought to the emergency room with altered mental status.  Patient in no respite distress but very weak and lethargic and answer questions slowly.  Patient work-up in ER revealed and found to have sepsis with cellulitis and UTI and also found to have acute kidney injury admit for management.  Patient is DNR per his wishes.     REVIEW OF SYSTEMS  Unremarkable except for weakness.    PHYSICAL EXAM  Blood pressure 143/77, pulse 57, temperature 98.2 °F (36.8 °C), temperature source Oral, resp. rate 16, weight 106 kg (234 lb 8 oz), SpO2 93 %.    Constitutional:       General: Awake and alert in no distress.  HENT:      Head: Normocephalic and atraumatic.   Eyes:      Pupils: Pupils are equal, round, and reactive to light.   Cardiovascular:      Rate and Rhythm: Normal rate and regular rhythm.      Pulses:           Posterior tibial pulses are 2+ on the right side and 2+ on the left side.      Heart sounds: Normal heart sounds. No murmur heard.  Pulmonary:      Effort: Pulmonary effort is normal. No respiratory distress.      Breath sounds: Normal breath sounds. No wheezing.   Abdominal:      General: Bowel sounds are normal.      Palpations: Abdomen is soft.      Tenderness: There is no abdominal tenderness. There is no guarding or rebound.   Musculoskeletal:         General: Swelling and tenderness ( Left leg) present. Normal range of motion.      Cervical back: Normal range of motion.   Skin:     General: Skin is warm and dry.      Findings: Erythema ( Left lower leg and posterior thigh) present.    Neurological:      Mental Status: He is confused.      GCS: GCS eye subscore is 1. GCS verbal subscore is 4. GCS motor subscore is 6.      Cranial Nerves: No cranial nerve deficit.      Sensory: No sensory deficit.      Comments: Patient with frequent irregular, nonrhythmic, myoclonic jerks throughout limbs and torso   Psychiatric:         Mood and Affect: Affect normal.      LAB RESULTS   Lab Results (last 24 hours)     Procedure Component Value Units Date/Time    POC Glucose Once [[id number]]  (Abnormal) Collected: 10/10/22 1109    Specimen: Blood Updated: 10/10/22 1110     Glucose 153 mg/dL      Comment: Meter: [id number] : [id number] Michael Agrawale NA       Renal Function Panel [[id number]]  (Abnormal) Collected: 10/10/22 0734    Specimen: Blood Updated: 10/10/22 0839     Glucose 112 mg/dL      BUN 30 mg/dL      Creatinine 1.45 mg/dL      Sodium 141 mmol/L      Potassium 4.0 mmol/L      Chloride 102 mmol/L      CO2 28.4 mmol/L      Calcium 9.4 mg/dL      Albumin 3.10 g/dL      Phosphorus 3.2 mg/dL      Anion Gap 10.6 mmol/L      BUN/Creatinine Ratio 20.7     eGFR 49.3 mL/min/1.73      Comment: National Kidney Foundation and American Society of Nephrology (ASN) Task Force recommended calculation based on the Chronic Kidney Disease Epidemiology Collaboration (CKD-EPI) equation refit without adjustment for race.       Narrative:      GFR Normal >60  Chronic Kidney Disease <60  Kidney Failure <15      POC Glucose Once [[id number]]  (Normal) Collected: 10/10/22 0824    Specimen: Blood Updated: 10/10/22 0825     Glucose 111 mg/dL      Comment: Meter: [id number] : [id number] Michael Benjaminnee NA       POC Glucose Once [[id number]]  (Normal) Collected: 10/09/22 2057    Specimen: Blood Updated: 10/09/22 2059     Glucose 112 mg/dL      Comment: Meter: [id number] : [id number] Felicitas Ariza NA       POC Glucose Once [[id number]]  (Normal) Collected: 10/09/22 1621    Specimen: Blood Updated: 10/09/22 1622     Glucose  119 mg/dL      Comment: Meter: [id number] : [id number] Siegrist Kaitlyn NA           Imaging Results (Last 24 Hours)     ** No results found for the last 24 hours. **          Current Facility-Administered Medications:   •  acetaminophen (TYLENOL) tablet 650 mg, 650 mg, Oral, Q6H PRN, Jean العراقي MD, 650 mg at 10/08/22 2000  •  amLODIPine (NORVASC) tablet 10 mg, 10 mg, Oral, Q24H, Jean العراقي MD, 10 mg at 10/10/22 0929  •  aspirin chewable tablet 81 mg, 81 mg, Oral, Daily, Jean العراقي MD, 81 mg at 10/10/22 0929  •  carvedilol (COREG) tablet 6.25 mg, 6.25 mg, Oral, BID With Meals, Jean العراقي MD, 6.25 mg at 10/10/22 0929  •  clotrimazole-betamethasone (LOTRISONE) 1-0.05 % cream 1 application, 1 application, Topical, Q12H, Miguel Anderson MD, 1 application at 10/10/22 0929  •  famotidine (PEPCID) tablet 20 mg, 20 mg, Oral, BID, Jean العراقي MD, 20 mg at 10/10/22 0929  •  insulin glargine (LANTUS, SEMGLEE) injection 10 Units, 10 Units, Subcutaneous, Nightly, Jean العراقي MD, 10 Units at 10/04/22 2216  •  insulin lispro (ADMELOG) injection 0-7 Units, 0-7 Units, Subcutaneous, 4x Daily With Meals & Nightly, Jean العراقي MD, 2 Units at 10/10/22 1201  •  ondansetron (ZOFRAN) injection 4 mg, 4 mg, Intravenous, Q4H PRN, Jean العراقي MD, 4 mg at 10/04/22 2011  •  polyethylene glycol (MIRALAX) packet 17 g, 17 g, Oral, Daily, Jean العراقي MD, 17 g at 10/10/22 0929  •  rosuvastatin (CRESTOR) tablet 5 mg, 5 mg, Oral, Nightly, Jean العراقي MD, 5 mg at 10/09/22 2000  •  senna (SENOKOT) tablet 1 tablet, 1 tablet, Oral, Daily, Jean العراقي MD, 1 tablet at 10/10/22 0929  •  sertraline (ZOLOFT) tablet 75 mg, 75 mg, Oral, Nightly, Jean العراقي MD, 75 mg at 10/09/22 2000  •  sodium chloride 0.9 % flush 10 mL, 10 mL, Intravenous, PRN, YonRachna MD, 10 mL at 10/07/22 2010  •  tamsulosin (FLOMAX) 24 hr capsule 0.4 mg, 0.4 mg, Oral, Nightly, Jean العراقي MD, 0.4 mg at 10/09/22 2000     ASSESSMENT  E. coli bacteremia  with sepsis  Acute E. coli UTI   Left lower extremity cellulitis   Acute kidney injury  Chronic kidney disease stage III  Diabetes mellitus  Hypertension  Hyperlipidemia  Chronic hypoxic hypercapnic respiratory failure  Obstructive sleep apnea  BPH  Immobility syndrome  Gastroesophageal reflux disease    PLAN  CPM  IV antibiotics for 2 weeks  PICC line placement  Local wound care  Adjust nursing home medications  Stress ulcer DVT prophylaxis  Nephrology consult appreciated  Infectious disease to follow patient  Supportive care  DNR  PT OT  Discussed with nursing staff  Discharge planning    TATO LOWERY MD

## 2022-10-10 NOTE — PROGRESS NOTES
LOS: 9 days    Patient Care Team:  Melissa Steven MD as PCP - General (Internal Medicine)  ReasorChris MD as Consulting Physician (Pain Medicine)    Chief Complaint:    Chief Complaint   Patient presents with   • Altered Mental Status     Follow UP CARLOS  Subjective     Interval History:   No new issues    Review of Systems:   As noted above    Objective     Vital Signs  Temp:  [97.9 °F (36.6 °C)-98.3 °F (36.8 °C)] 98 °F (36.7 °C)  Heart Rate:  [56-65] 60  Resp:  [16-17] 16  BP: (145-165)/(68-92) 165/83    Flowsheet Rows    Flowsheet Row First Filed Value   Admission Height --   Admission Weight 116 kg (255 lb 11.7 oz) Documented at 10/01/2022 1212          No intake/output data recorded.  I/O last 3 completed shifts:  In: 400 [P.O.:400]  Out: -     Intake/Output Summary (Last 24 hours) at 10/10/2022 1132  Last data filed at 10/9/2022 1843  Gross per 24 hour   Intake 300 ml   Output --   Net 300 ml       Physical Exam:  General Appearance: Confused, no acute distress,   Skin: warm and dry  HEENT: pupils round and reactive to light, oral mucosa normal, nonicteric sclera  Neck: supple, no JVD, trachea midline  Lungs: CTA, unlabored breathing effort  Heart: RRR, normal S1 and S2, no S3, no rub  Abdomen: soft, nontender, normoactive bowels  Extremities: no edema, cyanosis or clubbing       Results Review:    Results from last 7 days   Lab Units 10/10/22  0734 10/09/22  0445 10/07/22  0418 10/06/22  0429   SODIUM mmol/L 141 142 142 140   POTASSIUM mmol/L 4.0 4.4 4.2 4.5   CHLORIDE mmol/L 102 103 100 99   CO2 mmol/L 28.4 28.5 30.9* 32.2*   BUN mg/dL 30* 27* 28* 26*   CREATININE mg/dL 1.45* 1.56* 1.62* 1.41*   CALCIUM mg/dL 9.4 9.4 9.2 9.2   BILIRUBIN mg/dL  --   --   --  <0.2   ALK PHOS U/L  --   --   --  61   ALT (SGPT) U/L  --   --   --  9   AST (SGOT) U/L  --   --   --  10   GLUCOSE mg/dL 112* 106* 102* 91       Estimated Creatinine Clearance: 52.6 mL/min (A) (by C-G formula based on SCr of 1.45 mg/dL  (H)).    Results from last 7 days   Lab Units 10/10/22  0734 10/09/22  0445 10/07/22  0418   PHOSPHORUS mg/dL 3.2 3.1 3.4             Results from last 7 days   Lab Units 10/07/22  0418 10/06/22  0429 10/05/22  0434 10/04/22  0423   WBC 10*3/mm3 7.57 7.94 9.25 13.69*   HEMOGLOBIN g/dL 10.8* 11.0* 10.1* 10.1*   PLATELETS 10*3/mm3 282 313 322 286               Imaging Results (Last 24 Hours)     ** No results found for the last 24 hours. **        amLODIPine, 10 mg, Oral, Q24H  aspirin, 81 mg, Oral, Daily  carvedilol, 6.25 mg, Oral, BID With Meals  clotrimazole-betamethasone, 1 application, Topical, Q12H  famotidine, 20 mg, Oral, BID  insulin glargine, 10 Units, Subcutaneous, Nightly  insulin lispro, 0-7 Units, Subcutaneous, 4x Daily With Meals & Nightly  polyethylene glycol, 17 g, Oral, Daily  rosuvastatin, 5 mg, Oral, Nightly  senna, 1 tablet, Oral, Daily  sertraline, 75 mg, Oral, Nightly  tamsulosin, 0.4 mg, Oral, Nightly           Medication Review:   Current Facility-Administered Medications   Medication Dose Route Frequency Provider Last Rate Last Admin   • acetaminophen (TYLENOL) tablet 650 mg  650 mg Oral Q6H PRN Jean العراقي MD   650 mg at 10/08/22 2000   • amLODIPine (NORVASC) tablet 10 mg  10 mg Oral Q24H Jean العراقي MD   10 mg at 10/10/22 0929   • aspirin chewable tablet 81 mg  81 mg Oral Daily Jean العراقي MD   81 mg at 10/10/22 0929   • carvedilol (COREG) tablet 6.25 mg  6.25 mg Oral BID With Meals Jean العراقي MD   6.25 mg at 10/10/22 0929   • clotrimazole-betamethasone (LOTRISONE) 1-0.05 % cream 1 application  1 application Topical Q12H Miguel Anderson MD   1 application at 10/10/22 0929   • famotidine (PEPCID) tablet 20 mg  20 mg Oral BID Jean العراقي MD   20 mg at 10/10/22 9522   • insulin glargine (LANTUS, SEMGLEE) injection 10 Units  10 Units Subcutaneous Nightly Jean العراقي MD   10 Units at 10/04/22 1057   • insulin lispro (ADMELOG) injection 0-7 Units  0-7 Units Subcutaneous 4x Daily With  Meals & Nightly Jean العراقي MD   4 Units at 10/03/22 1248   • ondansetron (ZOFRAN) injection 4 mg  4 mg Intravenous Q4H PRN Jean العراقي MD   4 mg at 10/04/22 2011   • polyethylene glycol (MIRALAX) packet 17 g  17 g Oral Daily Jean العراقي MD   17 g at 10/10/22 0929   • rosuvastatin (CRESTOR) tablet 5 mg  5 mg Oral Nightly Jean العراقي MD   5 mg at 10/09/22 2000   • senna (SENOKOT) tablet 1 tablet  1 tablet Oral Daily Jean العراقي MD   1 tablet at 10/10/22 0929   • sertraline (ZOLOFT) tablet 75 mg  75 mg Oral Nightly Jean العراقي MD   75 mg at 10/09/22 2000   • sodium chloride 0.9 % flush 10 mL  10 mL Intravenous PRN Rachna Marvin MD   10 mL at 10/07/22 2010   • tamsulosin (FLOMAX) 24 hr capsule 0.4 mg  0.4 mg Oral Nightly Jean العراقي MD   0.4 mg at 10/09/22 2000       Assessment & Plan       Left leg cellulitis      1.  CARLOS on CKD III, baseline 1.5.  His creatinine is back to baseline  2.  HTN not controlled.  Blood pressures is acceptable  3.  LLE cellulitis, UTI. E. Coli bacteremia and UTI. Improving   4.  Anemia. Hg relatively stable.   5.  Dementia  6.  Poor oral intake      PLAN:  1.  Renal function stable, continue to monitor.  Ok for PICC line.  2.  Continue to hold diuretics for now  3.  Poor prognosis      Hellen Granda MD  10/10/22  11:32 EDT

## 2022-10-10 NOTE — PLAN OF CARE
Goal Outcome Evaluation:  Plan of Care Reviewed With: patient           Outcome Evaluation: pt alert and oriented to self and place. rested most of day, easily arousable. appetite very poor but has eaten about 25% of every meal. Dressing changed to RLE. no c/o pain or discomfort. Q2turn. PICC line ordered.

## 2022-10-10 NOTE — NURSING NOTE
Spoke with Dr. Granda, nephrology, who said it was ok from her standpoint to get the PICC line placed.

## 2022-10-10 NOTE — PROGRESS NOTES
Infectious Diseases Progress Note    Miguel Anderson MD     [facility]  Los: 9 days  Patient Identification:  Name: Yakov Dubon  Age: 77 y.o.  Sex: male  :  1945  MRN: [id number]         Primary Care Physician: Melissa Steven MD            Subjective: Feeling better denies any specific complaints.  Interval History: See consultation note.     Objective:    Scheduled Meds:amLODIPine, 10 mg, Oral, Q24H  aspirin, 81 mg, Oral, Daily  carvedilol, 6.25 mg, Oral, BID With Meals  clotrimazole-betamethasone, 1 application, Topical, Q12H  famotidine, 20 mg, Oral, BID  insulin glargine, 10 Units, Subcutaneous, Nightly  insulin lispro, 0-7 Units, Subcutaneous, 4x Daily With Meals & Nightly  polyethylene glycol, 17 g, Oral, Daily  rosuvastatin, 5 mg, Oral, Nightly  senna, 1 tablet, Oral, Daily  sertraline, 75 mg, Oral, Nightly  tamsulosin, 0.4 mg, Oral, Nightly      Continuous Infusions:     Vital signs in last 24 hours:  Temp:  [97.9 °F (36.6 °C)-98.3 °F (36.8 °C)] 98 °F (36.7 °C)  Heart Rate:  [56-65] 60  Resp:  [16-17] 16  BP: (145-165)/(68-92) 165/83    Intake/Output:    Intake/Output Summary (Last 24 hours) at 10/10/2022 1013  Last data filed at 10/9/2022 1843  Gross per 24 hour   Intake 300 ml   Output --   Net 300 ml       Exam:  /83 (BP Location: Left arm, Patient Position: Lying)   Pulse 60   Temp 98 °F (36.7 °C) (Oral)   Resp 16   Wt 106 kg (234 lb 8 oz)   SpO2 93%   BMI 32.11 kg/m²   Patient is examined using the personal protective equipment as per guidelines from infection control for this particular patient as enacted.  Hand washing was performed before and after patient interaction.  General Appearance:    Alert, cooperative, no distress, AAOx3                          Head:    Normocephalic, without obvious abnormality, atraumatic                           Eyes:    PERRL, conjunctivae/corneas clear, EOM's intact, both eyes                         Throat:   Lips, tongue, gums  normal; oral mucosa pink and moist                           Neck:   Supple, symmetrical, trachea midline, no JVD                         Lungs:    Clear to auscultation bilaterally, respirations unlabored                 Chest Wall:    No tenderness or deformity                          Heart:    Regular rate and rhythm, S1 and S2 normal, no murmur, no rub                                         or gallop                  Abdomen:     Soft, non-tender, bowel sounds active, no masses, no                                                        organomegaly                  Extremities:                                         Skin: Decreased erythema on the left leg compared to when he come into the hospital and appearance of the leg as noted above 7 days ago                  Neurologic: At times lethargic and confused       Data Review:    I reviewed the patient's new clinical results.  Results from last 7 days   Lab Units 10/07/22  0418 10/06/22  0429 10/05/22  0434 10/04/22  0423   WBC 10*3/mm3 7.57 7.94 9.25 13.69*   HEMOGLOBIN g/dL 10.8* 11.0* 10.1* 10.1*   PLATELETS 10*3/mm3 282 313 322 286     Results from last 7 days   Lab Units 10/10/22  0734 10/09/22  0445 10/07/22  0418 10/06/22  0429 10/05/22  0434 10/04/22  0423   SODIUM mmol/L 141 142 142 140 139 138   POTASSIUM mmol/L 4.0 4.4 4.2 4.5 4.4 4.3   CHLORIDE mmol/L 102 103 100 99 101 101   CO2 mmol/L 28.4 28.5 30.9* 32.2* 31.0* 28.0   BUN mg/dL 30* 27* 28* 26* 26* 26*   CREATININE mg/dL 1.45* 1.56* 1.62* 1.41* 1.37* 1.46*   CALCIUM mg/dL 9.4 9.4 9.2 9.2 9.1 9.0   GLUCOSE mg/dL 112* 106* 102* 91 83 117*     Microbiology Results (last 10 days)     Procedure Component Value - Date/Time    Blood Culture - Blood, Arm, Left [[id number]]  (Normal) Collected: 10/03/22 1624    Lab Status: Final result Specimen: Blood from Arm, Left Updated: 10/08/22 1815     Blood Culture No growth at 5 days    Blood Culture - Blood, Arm, Left [[id number]]  (Normal) Collected: 10/03/22  1624    Lab Status: Final result Specimen: Blood from Arm, Left Updated: 10/08/22 2215     Blood Culture No growth at 5 days    Urine Culture - Urine, Urine, Clean Catch [[id number]]  (Abnormal)  (Susceptibility) Collected: 10/01/22 1317    Lab Status: Final result Specimen: Urine, Clean Catch Updated: 10/03/22 1047     Urine Culture >100,000 CFU/mL Escherichia coli    Narrative:      Colonization of the urinary tract without infection is common. Treatment is discouraged unless the patient is symptomatic, pregnant, or undergoing an invasive urologic procedure.    Susceptibility      Escherichia coli      COURTNEY      Ampicillin Resistant     Ampicillin + Sulbactam Intermediate      Cefazolin Susceptible      Cefepime Susceptible      Ceftazidime Susceptible      Ceftriaxone Susceptible      Gentamicin Susceptible      Levofloxacin Resistant     Nitrofurantoin Susceptible      Piperacillin + Tazobactam Susceptible      Trimethoprim + Sulfamethoxazole Susceptible                           Blood Culture - Blood, Arm, Left [[id number]]  (Abnormal)  (Susceptibility) Collected: 10/01/22 1216    Lab Status: Final result Specimen: Blood from Arm, Left Updated: 10/04/22 0613     Blood Culture Escherichia coli     Isolated from Anaerobic Bottle     Gram Stain Anaerobic Bottle Gram negative bacilli    Susceptibility      Escherichia coli      COURTNEY      Ampicillin Resistant     Ampicillin + Sulbactam Intermediate      Cefepime Susceptible      Ceftazidime Susceptible      Ceftriaxone Susceptible      Gentamicin Susceptible      Levofloxacin Resistant     Piperacillin + Tazobactam Susceptible      Trimethoprim + Sulfamethoxazole Susceptible                       Susceptibility Comments     Escherichia coli    Cefazolin sensitivity will not be reported for Enterobacteriaceae in non-urine isolates. If cefazolin is preferred, please call the microbiology lab to request an E-test.  With the exception of urinary-sourced infections,  aminoglycosides should not be used as monotherapy.             Blood Culture ID, PCR - Blood, Arm, Left [[id number]]  (Abnormal) Collected: 10/01/22 1216    Lab Status: Final result Specimen: Blood from Arm, Left Updated: 10/03/22 0815     BCID, PCR Eschericia coli. Identification by BCID2 PCR.    Blood Culture - Blood, Arm, Right [[id number]]  (Normal) Collected: 10/01/22 1201    Lab Status: Final result Specimen: Blood from Arm, Right Updated: 10/06/22 1218     Blood Culture No growth at 5 days        No radiology results for the last day        Assessment:    Left leg cellulitis  1-toxic metabolic encephalopathy secondary to  2-bacteremic urinary tract infection with superimposed cellulitis of left lower extremity with lymphangitis in the setting of  3-chronic venous stasis changes and edema of the lower extremity along with immobility  4-other diagnosis per primary team.     Recommendations/Discussions:  · Continue ceftriaxone based on the sensitivity of the E. coli in the urine and blood culture.  · Completed last dose of vancomycin on 10/5/2022.  · If he shows persistent renal dysfunction and worsening leukocytosis and he may require CT scan of the abdomen and pelvis without contrast to rule out  tract abnormalities to explain bacteremic UTI.  · Continue local care and apply Lotrisone cream to his lower extremities.  · Would recommend 2 weeks of IV ceftriaxone from last negative blood culture-10/3/2022.  · Orders for PICC line written  · Following orders are written for case management on 10/10/2022:    · Please arrange for IV Rocephin at 2 g every 24 to finish on after taking the last dose on 10/17/2022.  Check weekly CBC and CMP and call abnormal results to Dr. Anderson at [phone].   Diagnosis bacteremic urinary tract infection with pathogen resistant to quinolones and cannot use oral Bactrim because of renal issues.  Miguel Anderson MD  10/10/2022  10:13 EDT    Much of this encounter note is an electronic  transcription/translation of spoken language to printed text. The electronic translation of spoken language may permit erroneous, or at times, nonsensical words or phrases to be inadvertently transcribed; Although I have reviewed the note for such errors, some may still exist

## 2022-10-11 VITALS
TEMPERATURE: 97.8 F | SYSTOLIC BLOOD PRESSURE: 168 MMHG | OXYGEN SATURATION: 95 % | DIASTOLIC BLOOD PRESSURE: 82 MMHG | HEART RATE: 60 BPM | WEIGHT: 230.16 LBS | RESPIRATION RATE: 16 BRPM | BODY MASS INDEX: 31.52 KG/M2

## 2022-10-11 LAB
ANION GAP SERPL CALCULATED.3IONS-SCNC: 7.9 MMOL/L (ref 5–15)
BUN SERPL-MCNC: 29 MG/DL (ref 8–23)
BUN/CREAT SERPL: 20.3 (ref 7–25)
CALCIUM SPEC-SCNC: 9.3 MG/DL (ref 8.6–10.5)
CHLORIDE SERPL-SCNC: 101 MMOL/L (ref 98–107)
CO2 SERPL-SCNC: 31.1 MMOL/L (ref 22–29)
CREAT SERPL-MCNC: 1.43 MG/DL (ref 0.76–1.27)
EGFRCR SERPLBLD CKD-EPI 2021: 50.2 ML/MIN/1.73
GLUCOSE BLDC GLUCOMTR-MCNC: 113 MG/DL (ref 70–130)
GLUCOSE BLDC GLUCOMTR-MCNC: 117 MG/DL (ref 70–130)
GLUCOSE BLDC GLUCOMTR-MCNC: 126 MG/DL (ref 70–130)
GLUCOSE BLDC GLUCOMTR-MCNC: 128 MG/DL (ref 70–130)
GLUCOSE SERPL-MCNC: 117 MG/DL (ref 65–99)
POTASSIUM SERPL-SCNC: 4.1 MMOL/L (ref 3.5–5.2)
SARS-COV-2 ORF1AB RESP QL NAA+PROBE: NOT DETECTED
SODIUM SERPL-SCNC: 140 MMOL/L (ref 136–145)

## 2022-10-11 PROCEDURE — U0005 INFEC AGEN DETEC AMPLI PROBE: HCPCS | Performed by: HOSPITALIST

## 2022-10-11 PROCEDURE — 05HY33Z INSERTION OF INFUSION DEVICE INTO UPPER VEIN, PERCUTANEOUS APPROACH: ICD-10-PCS | Performed by: HOSPITALIST

## 2022-10-11 PROCEDURE — U0004 COV-19 TEST NON-CDC HGH THRU: HCPCS | Performed by: HOSPITALIST

## 2022-10-11 PROCEDURE — 80048 BASIC METABOLIC PNL TOTAL CA: CPT | Performed by: INTERNAL MEDICINE

## 2022-10-11 PROCEDURE — 82962 GLUCOSE BLOOD TEST: CPT

## 2022-10-11 PROCEDURE — C1751 CATH, INF, PER/CENT/MIDLINE: HCPCS

## 2022-10-11 PROCEDURE — C9803 HOPD COVID-19 SPEC COLLECT: HCPCS | Performed by: HOSPITALIST

## 2022-10-11 RX ORDER — SODIUM CHLORIDE 0.9 % (FLUSH) 0.9 %
10 SYRINGE (ML) INJECTION AS NEEDED
Status: DISCONTINUED | OUTPATIENT
Start: 2022-10-11 | End: 2022-10-11

## 2022-10-11 RX ORDER — SODIUM CHLORIDE 0.9 % (FLUSH) 0.9 %
10 SYRINGE (ML) INJECTION EVERY 12 HOURS SCHEDULED
Status: DISCONTINUED | OUTPATIENT
Start: 2022-10-11 | End: 2022-10-11 | Stop reason: HOSPADM

## 2022-10-11 RX ORDER — CARVEDILOL 6.25 MG/1
6.25 TABLET ORAL 2 TIMES DAILY WITH MEALS
Qty: 60 TABLET | Refills: 0 | Status: SHIPPED | OUTPATIENT
Start: 2022-10-11 | End: 2022-11-10

## 2022-10-11 RX ORDER — SERTRALINE HYDROCHLORIDE 25 MG/1
75 TABLET, FILM COATED ORAL NIGHTLY
Qty: 90 TABLET | Refills: 0 | Status: SHIPPED | OUTPATIENT
Start: 2022-10-11 | End: 2022-11-10

## 2022-10-11 RX ORDER — CLOTRIMAZOLE AND BETAMETHASONE DIPROPIONATE 10; .64 MG/G; MG/G
1 CREAM TOPICAL EVERY 12 HOURS SCHEDULED
Qty: 56 G | Refills: 0 | Status: SHIPPED | OUTPATIENT
Start: 2022-10-11 | End: 2022-11-08

## 2022-10-11 RX ORDER — SODIUM CHLORIDE 0.9 % (FLUSH) 0.9 %
20 SYRINGE (ML) INJECTION AS NEEDED
Status: DISCONTINUED | OUTPATIENT
Start: 2022-10-11 | End: 2022-10-11

## 2022-10-11 RX ORDER — AMLODIPINE BESYLATE 10 MG/1
10 TABLET ORAL
Qty: 30 TABLET | Refills: 0 | Status: SHIPPED | OUTPATIENT
Start: 2022-10-12 | End: 2022-11-11

## 2022-10-11 RX ADMIN — SERTRALINE 75 MG: 25 TABLET, FILM COATED ORAL at 20:37

## 2022-10-11 RX ADMIN — SENNOSIDES 1 TABLET: 8.6 TABLET, FILM COATED ORAL at 08:24

## 2022-10-11 RX ADMIN — CLOTRIMAZOLE AND BETAMETHASONE DIPROPIONATE 1 APPLICATION: 10; .5 CREAM TOPICAL at 08:25

## 2022-10-11 RX ADMIN — CARVEDILOL 6.25 MG: 6.25 TABLET, FILM COATED ORAL at 18:11

## 2022-10-11 RX ADMIN — AMLODIPINE BESYLATE 10 MG: 10 TABLET ORAL at 08:24

## 2022-10-11 RX ADMIN — POLYETHYLENE GLYCOL 3350 17 G: 17 POWDER, FOR SOLUTION ORAL at 08:24

## 2022-10-11 RX ADMIN — FAMOTIDINE 20 MG: 20 TABLET ORAL at 20:38

## 2022-10-11 RX ADMIN — ROSUVASTATIN CALCIUM 5 MG: 5 TABLET, FILM COATED ORAL at 20:38

## 2022-10-11 RX ADMIN — CARVEDILOL 6.25 MG: 6.25 TABLET, FILM COATED ORAL at 08:24

## 2022-10-11 RX ADMIN — CLOTRIMAZOLE AND BETAMETHASONE DIPROPIONATE 1 APPLICATION: 10; .5 CREAM TOPICAL at 20:38

## 2022-10-11 RX ADMIN — TAMSULOSIN HYDROCHLORIDE 0.4 MG: 0.4 CAPSULE ORAL at 20:38

## 2022-10-11 RX ADMIN — FAMOTIDINE 20 MG: 20 TABLET ORAL at 08:24

## 2022-10-11 RX ADMIN — ASPIRIN 81 MG: 81 TABLET, CHEWABLE ORAL at 08:24

## 2022-10-11 RX ADMIN — Medication 10 ML: at 11:27

## 2022-10-11 NOTE — SIGNIFICANT NOTE
10/11/22 1050   PICC Single Lumen 10/11/22 Right Cephalic   Placement Date/Time: 10/11/22 1048   Hand Hygiene Completed: Yes  Size (Fr): 4  Description (optional): power picc, lot [id number], exp 09-  Length (cm): 45 cm  Orientation: Right  Location: Cephalic  Site Prep: Chlorhexidine isopropyl alcohol  A...   Site Assessment Clean;Dry;Intact   #1 Lumen Status Blood return noted;Capped;Flushed;Normal saline locked   Length sergey (cm) 45 cm   Line Care Connections checked and tightened   Extremity Circumference (cm) 36 cm   Dressing Type Border Dressing;Securing device;Antimicrobial dressing/disc   Dressing Status Clean;Dry;Intact   Dressing Change Due 10/18/22   Indication/Daily Review of Necessity intravenous medication therapy         3Needles, 2 Guidewires, and 1 scalpel accounted for.

## 2022-10-11 NOTE — PROGRESS NOTES
Daily progress note    Primary care physician      Chief complaint  Doing same with no new complaints and agreeable to go to subacute rehab    History of present illness  77-year-old white male with very complex past medical history including chronic hypoxic hypercapnic respiratory failure diabetes mellitus hypertension diastolic CHF obstructive sleep apnea and chronic kidney disease stage III who is a nursing home resident brought to the emergency room with altered mental status.  Patient in no respite distress but very weak and lethargic and answer questions slowly.  Patient work-up in ER revealed and found to have sepsis with cellulitis and UTI and also found to have acute kidney injury admit for management.  Patient is DNR per his wishes.     REVIEW OF SYSTEMS  Unremarkable     PHYSICAL EXAM  Blood pressure 168/82, pulse 60, temperature 97.8 °F (36.6 °C), temperature source Oral, resp. rate 16, weight 104 kg (230 lb 2.6 oz), SpO2 95 %.    Constitutional:       General: Awake and alert in no distress.  HENT:      Head: Normocephalic and atraumatic.   Eyes:      Pupils: Pupils are equal, round, and reactive to light.   Cardiovascular:      Rate and Rhythm: Normal rate and regular rhythm.      Pulses:           Posterior tibial pulses are 2+ on the right side and 2+ on the left side.      Heart sounds: Normal heart sounds. No murmur heard.  Pulmonary:      Effort: Pulmonary effort is normal. No respiratory distress.      Breath sounds: Normal breath sounds. No wheezing.   Abdominal:      General: Bowel sounds are normal.      Palpations: Abdomen is soft.      Tenderness: There is no abdominal tenderness. There is no guarding or rebound.   Musculoskeletal:         General: Swelling and tenderness ( Left leg) present. Normal range of motion.      Cervical back: Normal range of motion.   Skin:     General: Skin is warm and dry.      Findings: Erythema ( Left lower leg and posterior thigh) present.    Neurological:      Mental Status: He is confused.      GCS: GCS eye subscore is 1. GCS verbal subscore is 4. GCS motor subscore is 6.      Cranial Nerves: No cranial nerve deficit.      Sensory: No sensory deficit.      Comments: Patient with frequent irregular, nonrhythmic, myoclonic jerks throughout limbs and torso   Psychiatric:         Mood and Affect: Affect normal.      LAB RESULTS   Lab Results (last 24 hours)     Procedure Component Value Units Date/Time    POC Glucose Once [[id number]]  (Normal) Collected: 10/11/22 1128    Specimen: Blood Updated: 10/11/22 1132     Glucose 128 mg/dL      Comment: Meter: [id number] : [id number] Reveles Vyteis CNA       POC Glucose Once [[id number]]  (Normal) Collected: 10/11/22 0604    Specimen: Blood Updated: 10/11/22 0606     Glucose 113 mg/dL      Comment: Meter: [id number] : [id number] Yari Mendoza NA       Basic Metabolic Panel [[id number]]  (Abnormal) Collected: 10/11/22 0406    Specimen: Blood Updated: 10/11/22 0527     Glucose 117 mg/dL      BUN 29 mg/dL      Creatinine 1.43 mg/dL      Sodium 140 mmol/L      Potassium 4.1 mmol/L      Chloride 101 mmol/L      CO2 31.1 mmol/L      Calcium 9.3 mg/dL      BUN/Creatinine Ratio 20.3     Anion Gap 7.9 mmol/L      eGFR 50.2 mL/min/1.73      Comment: National Kidney Foundation and American Society of Nephrology (ASN) Task Force recommended calculation based on the Chronic Kidney Disease Epidemiology Collaboration (CKD-EPI) equation refit without adjustment for race.       Narrative:      GFR Normal >60  Chronic Kidney Disease <60  Kidney Failure <15      POC Glucose Once [[id number]]  (Normal) Collected: 10/10/22 2016    Specimen: Blood Updated: 10/10/22 2017     Glucose 128 mg/dL      Comment: Meter: [id number] : [id number] [place] Linzie NA       POC Glucose Once [[id number]]  (Normal) Collected: 10/10/22 1638    Specimen: Blood Updated: 10/10/22 1640     Glucose 121 mg/dL      Comment: Meter: [id number] :  [id number] Michael Kacie            Imaging Results (Last 24 Hours)     ** No results found for the last 24 hours. **          Current Facility-Administered Medications:   •  acetaminophen (TYLENOL) tablet 650 mg, 650 mg, Oral, Q6H PRN, Jean العراقي MD, 650 mg at 10/08/22 2000  •  amLODIPine (NORVASC) tablet 10 mg, 10 mg, Oral, Q24H, Jean العراقي MD, 10 mg at 10/11/22 0824  •  aspirin chewable tablet 81 mg, 81 mg, Oral, Daily, Jean العراقي MD, 81 mg at 10/11/22 0824  •  carvedilol (COREG) tablet 6.25 mg, 6.25 mg, Oral, BID With Meals, Jean العراقي MD, 6.25 mg at 10/11/22 0824  •  cefTRIAXone (ROCEPHIN) 2 g in sodium chloride 0.9 % 100 mL IVPB-VTB, 2 g, Intravenous, Q24H, Jean العراقي MD, Last Rate: 200 mL/hr at 10/10/22 2108, 2 g at 10/10/22 2108  •  clotrimazole-betamethasone (LOTRISONE) 1-0.05 % cream 1 application, 1 application, Topical, Q12H, Miguel Anderson MD, 1 application at 10/11/22 0825  •  famotidine (PEPCID) tablet 20 mg, 20 mg, Oral, BID, Jean العراقي MD, 20 mg at 10/11/22 0824  •  insulin glargine (LANTUS, SEMGLEE) injection 10 Units, 10 Units, Subcutaneous, Nightly, Jean العراقي MD, 10 Units at 10/04/22 2216  •  insulin lispro (ADMELOG) injection 0-7 Units, 0-7 Units, Subcutaneous, 4x Daily With Meals & Nightly, Jean العراقي MD, 2 Units at 10/10/22 1201  •  ondansetron (ZOFRAN) injection 4 mg, 4 mg, Intravenous, Q4H PRN, Jean العراقي MD, 4 mg at 10/04/22 2011  •  polyethylene glycol (MIRALAX) packet 17 g, 17 g, Oral, Daily, Jean العراقي MD, 17 g at 10/11/22 0824  •  rosuvastatin (CRESTOR) tablet 5 mg, 5 mg, Oral, Nightly, Jean العراقي MD, 5 mg at 10/10/22 2224  •  senna (SENOKOT) tablet 1 tablet, 1 tablet, Oral, Daily, Jean العراقي MD, 1 tablet at 10/11/22 0824  •  sertraline (ZOLOFT) tablet 75 mg, 75 mg, Oral, Nightly, Jean العراقي MD, 75 mg at 10/10/22 2224  •  sodium chloride 0.9 % flush 10 mL, 10 mL, Intravenous, PRN, Rachna Marvin MD, 10 mL at 10/07/22 2010  •  sodium chloride 0.9 %  flush 10 mL, 10 mL, Intravenous, Q12H, Miguel Anderson MD, 10 mL at 10/11/22 1127  •  sodium chloride 0.9 % flush 10 mL, 10 mL, Intravenous, PRN, Miguel Anderson MD  •  sodium chloride 0.9 % flush 20 mL, 20 mL, Intravenous, PRN, Miguel Anderson MD  •  tamsulosin (FLOMAX) 24 hr capsule 0.4 mg, 0.4 mg, Oral, Nightly, Tato العراقي MD, 0.4 mg at 10/10/22 2224     ASSESSMENT  E. coli bacteremia with sepsis  Acute E. coli UTI   Left lower extremity cellulitis   Acute kidney injury  Chronic kidney disease stage III  Diabetes mellitus  Hypertension  Hyperlipidemia  Chronic hypoxic hypercapnic respiratory failure  Obstructive sleep apnea  BPH  Immobility syndrome  Gastroesophageal reflux disease    PLAN  Discharge to subacute rehab  Discharge summary dictated    TATO العراقي MD

## 2022-10-11 NOTE — PLAN OF CARE
Goal Outcome Evaluation:      PICC line placed today for abx. Will return to [facility] at 2130 tonight via EMS. Report needs to be called prior to pickup. Packet in patient cubby.

## 2022-10-11 NOTE — NURSING NOTE
CWOCN- follow up to assess LLE, which has been wrapped with ACE and roll gauze for edema management. Patient refused to let me remove the wrap in spite of much encouragement to take a look at his leg. He asked me to leave him be and to be covered back up. Per RN notes, leg continued to improve throughout the admission.

## 2022-10-11 NOTE — DISCHARGE SUMMARY
Discharge summary    Date of admission 10/1/2022  Date of discharge 10/11/2022    Final diagnosis  E. coli bacteremia with sepsis  Acute E. coli UTI   Left lower extremity cellulitis   Acute kidney injury  Chronic kidney disease stage III  Diabetes mellitus  Hypertension  Hyperlipidemia  Chronic hypoxic hypercapnic respiratory failure  Obstructive sleep apnea  BPH  Immobility syndrome  Gastroesophageal reflux disease    Discharge medications    Current Facility-Administered Medications:   •  amLODIPine (NORVASC) tablet 10 mg, 10 mg, Oral, Q24H, Jean العراقي MD, 10 mg at 10/11/22 0824  •  aspirin chewable tablet 81 mg, 81 mg, Oral, Daily, Jean العراقي MD, 81 mg at 10/11/22 0824  •  carvedilol (COREG) tablet 6.25 mg, 6.25 mg, Oral, BID With Meals, Jean العراقي MD, 6.25 mg at 10/11/22 0824  •  cefTRIAXone (ROCEPHIN) 2 g in sodium chloride 0.9 % 100 mL IVPB-VTB, 2 g, Intravenous, Q24H, Jean العراقي MD, Last Rate: 200 mL/hr at 10/10/22 2108, 2 g at 10/10/22 2108  •  clotrimazole-betamethasone (LOTRISONE) 1-0.05 % cream 1 application, 1 application, Topical, Q12H, Miguel Anderson MD, 1 application at 10/11/22 0825  •  famotidine (PEPCID) tablet 20 mg, 20 mg, Oral, BID, Jean العراقي MD, 20 mg at 10/11/22 0824  •  insulin glargine (LANTUS, SEMGLEE) injection 10 Units, 10 Units, Subcutaneous, Nightly, Jean العراقي MD, 10 Units at 10/04/22 2216  •  insulin lispro (ADMELOG) injection 0-7 Units, 0-7 Units, Subcutaneous, 4x Daily With Meals & Nightly, Jean العراقي MD, 2 Units at 10/10/22 1201  •  polyethylene glycol (MIRALAX) packet 17 g, 17 g, Oral, Daily, Jean العراقي MD, 17 g at 10/11/22 0824  •  rosuvastatin (CRESTOR) tablet 5 mg, 5 mg, Oral, Nightly, Jean العراقي MD, 5 mg at 10/10/22 2224  •  senna (SENOKOT) tablet 1 tablet, 1 tablet, Oral, Daily, Jean العراقي MD, 1 tablet at 10/11/22 0824  •  sertraline (ZOLOFT) tablet 75 mg, 75 mg, Oral, Nightly, Jean العراقي MD, 75 mg at 10/10/22 2224  •  sodium chloride 0.9 %  flush 10 mL, 10 mL, Intravenous, Q12H, Miguel Anderson MD, 10 mL at 10/11/22 1127  •  tamsulosin (FLOMAX) 24 hr capsule 0.4 mg, 0.4 mg, Oral, Nightly, Tato العراقي MD, 0.4 mg at 10/10/22 2224     Consults obtained  Nephrology  Infectious disease    Procedures  PICC line placement    Hospital course  77-year-old white male with history of chronic hypoxic hypercapnic respiratory failure diabetes hypertension CHF obstructive apnea and chronic kidney disease stage III admitted through emergency room with altered mental status.  Patient work-up revealed cellulitis with sepsis and also found to have UTI with acute kidney injury.  Patient admitted treated with empiric antibiotics after obtaining the cultures and his cultures came back positive for E. coli in blood and urine.  Patient followed by infectious disease and recommend 2 weeks of IV antibiotics.  Patient repeated blood cultures are negative.  Patient cellulitis also improved.  Patient also followed by kidney and his acute kidney injury resolved and his renal function at baseline.  Patient will discharge to subacute rehab for PT OT nursing care and will finish IV antibiotics for total of 2 weeks.    • Please arrange for IV Rocephin at 2 g every 24 to finish on after taking the last dose on 10/17/2022.  Check weekly CBC and CMP and call abnormal results to Dr. Anderson at [phone].   Diagnosis bacteremic urinary tract infection with pathogen resistant to quinolones and cannot use oral Bactrim because of renal issues.  Miguel Anderson MD    Discharge diet regular    Activity per PT OT    Medication as above    Further care per accepting physician at nursing home and follow-up with nephrology and infectious disease per their instruction and take medication as directed.    TATO العراقي MD

## 2022-10-11 NOTE — CASE MANAGEMENT/SOCIAL WORK
Continued Stay Note  [facility]     Patient Name: Yakov Dubon  MRN: [id number]  Today's Date: 10/11/2022    Admit Date: 10/1/2022    Plan: Return to [facility] via [facility] at 2130   Discharge Plan     Row Name 10/11/22 1446       Plan    Plan Return to [facility] via [facility] at 2130    Patient/Family in Agreement with Plan yes    Plan Comments CCP notified by Dr. العراقي patient is ready for discharge to return to [facility] today. Confirmed with nursing IV team has been notified for PICC line placement. [facility] arranged for 2130. Outbound call to Mitzy Sheffield/[employer] POA to notify of discharge and EMS time. CCP requested Mitzy email copy of durable POA paperwork to place on file. Mitzy states she will notify [place] who will be at the hospital this evening to assist patient with transition to facility. MORA spoke with Tristin/RN at [facility] who confirms patient can return today at 2130 and is aware of PICC line placement and IV rocephin. Discussed Dr. Anderson requesting labs be faxed to his office as well. Packet in [place]. Arabella BARRERA RN/MORA               Discharge Codes    No documentation.               Expected Discharge Date and Time     Expected Discharge Date Expected Discharge Time    Oct 11, 2022             Karina Cintron

## 2022-10-11 NOTE — SIGNIFICANT NOTE
10/11/22 1515   OTHER   Discipline physical therapist   Rehab Time/Intention   Session Not Performed patient/family declined treatment  (Pt refused PT tx this PM despite encouragement and importance on mobility & strength training, will continue to follow)   Therapy Assessment/Plan (PT)   Criteria for Skilled Interventions Met (PT) yes   Recommendation   PT - Next Appointment 10/12/22

## 2022-10-11 NOTE — PROGRESS NOTES
Infectious Diseases Progress Note    Miguel Anderson MD     [facility]  [place]: 10 days  Patient Identification:  Name: Yakov Dubon  Age: 77 y.o.  Sex: male  :  1945  MRN: [id number]         Primary Care Physician: Melissa Steven MD            Subjective: Feeling better and wants to know when [place].  Interval History: See consultation note.     Objective:    Scheduled Meds:amLODIPine, 10 mg, Oral, Q24H  aspirin, 81 mg, Oral, Daily  carvedilol, 6.25 mg, Oral, BID With Meals  cefTRIAXone, 2 g, Intravenous, Q24H  clotrimazole-betamethasone, 1 application, Topical, Q12H  famotidine, 20 mg, Oral, BID  insulin glargine, 10 Units, Subcutaneous, Nightly  insulin lispro, 0-7 Units, Subcutaneous, 4x Daily With Meals & Nightly  polyethylene glycol, 17 g, Oral, Daily  rosuvastatin, 5 mg, Oral, Nightly  senna, 1 tablet, Oral, Daily  sertraline, 75 mg, Oral, Nightly  tamsulosin, 0.4 mg, Oral, Nightly      Continuous Infusions:     Vital signs in last 24 hours:  Temp:  [97.9 °F (36.6 °C)-98.2 °F (36.8 °C)] 98 °F (36.7 °C)  Heart Rate:  [50-57] 54  Resp:  [16] 16  BP: (135-165)/(77-87) 165/78    Intake/Output:    Intake/Output Summary (Last 24 hours) at 10/11/2022 0849  Last data filed at 10/10/2022 2224  Gross per 24 hour   Intake 220 ml   Output --   Net 220 ml       Exam:  /78 (BP Location: Right arm, Patient Position: Lying)   Pulse 54   Temp 98 °F (36.7 °C) (Oral)   Resp 16   Wt 104 kg (230 lb 2.6 oz)   SpO2 95%   BMI 31.52 kg/m²   Patient is examined using the personal protective equipment as per guidelines from infection control for this particular patient as enacted.  Hand washing was performed before and after patient interaction.  General Appearance:    Alert, cooperative, no distress, AAOx3                          Head:    Normocephalic, without obvious abnormality, atraumatic                           Eyes:    PERRL, conjunctivae/corneas clear, EOM's intact, both  eyes                         Throat:   Lips, tongue, gums normal; oral mucosa pink and moist                           Neck:   Supple, symmetrical, trachea midline, no JVD                         Lungs:    Clear to auscultation bilaterally, respirations unlabored                 Chest Wall:    No tenderness or deformity                          Heart:    Regular rate and rhythm, S1 and S2 normal, no murmur, no rub                                         or gallop                  Abdomen:     Soft, non-tender, bowel sounds active, no masses, no                                                        organomegaly                  Extremities:                                         Skin: Significant decrease erythema on the left leg compared to when he come into the hospital and appearance of the leg as noted above10 days ago                  Neurologic: At times lethargic and confused       Data Review:    I reviewed the patient's new clinical results.  Results from last 7 days   Lab Units 10/07/22  0418 10/06/22  0429 10/05/22  0434   WBC 10*3/mm3 7.57 7.94 9.25   HEMOGLOBIN g/dL 10.8* 11.0* 10.1*   PLATELETS 10*3/mm3 282 313 322     Results from last 7 days   Lab Units 10/11/22  0406 10/10/22  0734 10/09/22  0445 10/07/22  0418 10/06/22  0429 10/05/22  0434   SODIUM mmol/L 140 141 142 142 140 139   POTASSIUM mmol/L 4.1 4.0 4.4 4.2 4.5 4.4   CHLORIDE mmol/L 101 102 103 100 99 101   CO2 mmol/L 31.1* 28.4 28.5 30.9* 32.2* 31.0*   BUN mg/dL 29* 30* 27* 28* 26* 26*   CREATININE mg/dL 1.43* 1.45* 1.56* 1.62* 1.41* 1.37*   CALCIUM mg/dL 9.3 9.4 9.4 9.2 9.2 9.1   GLUCOSE mg/dL 117* 112* 106* 102* 91 83     Microbiology Results (last 10 days)     Procedure Component Value - Date/Time    Blood Culture - Blood, Arm, Left [[id number]]  (Normal) Collected: 10/03/22 1624    Lab Status: Final result Specimen: Blood from Arm, Left Updated: 10/08/22 1815     Blood Culture No growth at 5 days    Blood Culture - Blood, Arm, Left  [[id number]]  (Normal) Collected: 10/03/22 1624    Lab Status: Final result Specimen: Blood from Arm, Left Updated: 10/08/22 2215     Blood Culture No growth at 5 days    Urine Culture - Urine, Urine, Clean Catch [[id number]]  (Abnormal)  (Susceptibility) Collected: 10/01/22 1317    Lab Status: Final result Specimen: Urine, Clean Catch Updated: 10/03/22 1047     Urine Culture >100,000 CFU/mL Escherichia coli    Narrative:      Colonization of the urinary tract without infection is common. Treatment is discouraged unless the patient is symptomatic, pregnant, or undergoing an invasive urologic procedure.    Susceptibility      Escherichia coli      COURTNEY      Ampicillin Resistant     Ampicillin + Sulbactam Intermediate      Cefazolin Susceptible      Cefepime Susceptible      Ceftazidime Susceptible      Ceftriaxone Susceptible      Gentamicin Susceptible      Levofloxacin Resistant     Nitrofurantoin Susceptible      Piperacillin + Tazobactam Susceptible      Trimethoprim + Sulfamethoxazole Susceptible                           Blood Culture - Blood, Arm, Left [[id number]]  (Abnormal)  (Susceptibility) Collected: 10/01/22 1216    Lab Status: Final result Specimen: Blood from Arm, Left Updated: 10/04/22 0613     Blood Culture Escherichia coli     Isolated from Anaerobic Bottle     Gram Stain Anaerobic Bottle Gram negative bacilli    Susceptibility      Escherichia coli      COURTNEY      Ampicillin Resistant     Ampicillin + Sulbactam Intermediate      Cefepime Susceptible      Ceftazidime Susceptible      Ceftriaxone Susceptible      Gentamicin Susceptible      Levofloxacin Resistant     Piperacillin + Tazobactam Susceptible      Trimethoprim + Sulfamethoxazole Susceptible                       Susceptibility Comments     Escherichia coli    Cefazolin sensitivity will not be reported for Enterobacteriaceae in non-urine isolates. If cefazolin is preferred, please call the microbiology lab to request an E-test.  With the  exception of urinary-sourced infections, aminoglycosides should not be used as monotherapy.             Blood Culture ID, PCR - Blood, Arm, Left [[id number]]  (Abnormal) Collected: 10/01/22 1216    Lab Status: Final result Specimen: Blood from Arm, Left Updated: 10/03/22 0815     BCID, PCR Eschericia coli. Identification by BCID2 PCR.    Blood Culture - Blood, Arm, Right [[id number]]  (Normal) Collected: 10/01/22 1201    Lab Status: Final result Specimen: Blood from Arm, Right Updated: 10/06/22 1218     Blood Culture No growth at 5 days        No radiology results for the last day        Assessment:    Left leg cellulitis  1-toxic metabolic encephalopathy secondary to  2-bacteremic urinary tract infection with superimposed cellulitis of left lower extremity with lymphangitis in the setting of  3-chronic venous stasis changes and edema of the lower extremity along with immobility  4-other diagnosis per primary team.     Recommendations/Discussions:  · Continue ceftriaxone based on the sensitivity of the E. coli in the urine and blood culture.  · Completed last dose of vancomycin on 10/5/2022.  · If he shows persistent renal dysfunction and worsening leukocytosis and he may require CT scan of the abdomen and pelvis without contrast to rule out  tract abnormalities to explain bacteremic UTI.  · Continue local care and apply Lotrisone cream to his lower extremities.  · Would recommend 2 weeks of IV ceftriaxone from last negative blood culture-10/3/2022.  · Orders for PICC line written  · Following orders are written for case management on 10/10/2022:    · Please arrange for IV Rocephin at 2 g every 24 to finish on after taking the last dose on 10/17/2022.  Check weekly CBC and CMP and call abnormal results to Dr. Anderson at [phone].   Diagnosis bacteremic urinary tract infection with pathogen resistant to quinolones and cannot use oral Bactrim because of renal issues.  Miguel Anderson MD  10/11/2022  08:49 EDT    Much of  this encounter note is an electronic transcription/translation of spoken language to printed text. The electronic translation of spoken language may permit erroneous, or at times, nonsensical words or phrases to be inadvertently transcribed; Although I have reviewed the note for such errors, some may still exist

## 2022-10-11 NOTE — PLAN OF CARE
Goal Outcome Evaluation:              Outcome Evaluation: VSS. SINUS JONAH IN THE 50'S. DOESN'T LIKE TO BE BOTHERED. RESTED WELL IN [facility] CARE.

## 2022-10-11 NOTE — CASE MANAGEMENT/SOCIAL WORK
"Physicians Statement of Medical Necessity for  Ambulance Transportation    GENERAL INFORMATION     Name: Yakov Dubon  YOB: 1945  Medicare #: [id number]  Transport Date: 10/11/2022 (Valid for round trips this date, or for scheduled repetitive trips for 60 days from the date signed below.)  Origin: [facility] [address] [place] [address]  Destination: [address] [address]   Is the Patient's stay covered under Medicare Part A (PPS/DRG?)Yes  Closest appropriate facility? Yes  If this a hosp-hosp transfer? No  Is this a hospice patient? No    MEDICAL NECESSITY QUESTIONAIRE    Ambulance Transportation is medically necessary only if other means of transportation are contraindicated or would be potentially harmful to the patient.  To meet this requirement, the patient must be either \"bed confined\" or suffer from a condition such that transport by means other than an ambulance is contraindicated by the patient's condition.  The following questions must be answered by the healthcare professional signing below for this form to be valid:     1) Describe the MEDICAL CONDITION (physical and/or mental) of this patient AT THE TIME OF AMBULANCE TRANSPORT that requires the patient to be transported in an ambulance, and why transport by other means is contraindicated by the patient's condition: chronic hypoxic hypercapnic respiratory failure , diastolic CHF  Past Medical History:   Diagnosis Date   • Anxiety    • Back pain    • Depression    • Diabetes mellitus (HCC)     \"BORDERLINE\"   • High cholesterol    • Hypertension    • Kidney disease, chronic, stage III (GFR 30-59 ml/min) ([facility])    • Reflux esophagitis    • Sleep apnea       Past Surgical History:   Procedure Laterality Date   • APPENDECTOMY     • CARPAL TUNNEL RELEASE     • CATARACT EXTRACTION     • HAMMER TOE REPAIR     • LUMBAR SPINE SURGERY     • PAIN PUMP INSERTION/REVISION Right 4/18/2016    Procedure: RT PAIN " "PUMP REPLACEMENT;  Surgeon: Chris Messer MD;  Location: [facility];  Service:    • TOTAL KNEE ARTHROPLASTY Left       2) Is this patient \"bed confined\" as defined below?Yes   To be \"bed confined\" the patient must satisfy all three of the following criteria:  (1) unable to get up from bed without assistance; AND (2) unable to ambulate;  AND (3) unable to sit in a chair or wheelchair.  3) Can this patient safely be transported by car or wheelchair van (I.e., may safely sit during transport, without an attendant or monitoring?)No   4. In addition to completing questions 1-3 above, please check any of the following conditions that apply*:          *Note: supporting documentation for any boxes checked must be maintained in the patient's medical records Patient is confused and Other patient is 2 person assist for bed mobility, non ambulatory, falls risk      SIGNATURE OF PHYSICIAN OR OTHER AUTHORIZED HEALTHCARE PROFESSIONAL    I certify that the above information is true and correct based on my evaluation of this patient, and represent that the patient requires transport by ambulance and that other forms of transport are contraindicated.  I understand that this information will be used by the Centers for Medicare and Medicaid Services (CMS) to support the determiniation of medical necessity for ambulance services, and I represent that I have personal knowledge of the patient's condition at the time of transport.    x   If this box is checked, I also certify that the patient is physically or mentally incapable of signing the ambulance service's claim form and that the institution with which I am affiliated has furnished care, services or assistance to the patient.  My signature below is made on behalf of the patient pursuant to 42 .36(b)(4). In accordance with 42 .37, the specific reason(s) that the patient is physically or mentally incapable of signing the claim for is as follows: Patient is " confused     Signature of Physician or Healthcare Professional  Date/Time:   10/11/2022 09:39 EDT       (For Scheduled repetitive transport, this form is not valid for transports performed more than 60 days after this date).                                                                                                                                            --------------------------------------------------------------------------------------------  Printed Name and Credentials of Physician or Authorized Healthcare Professional     *Form must be signed by patient's attending physician for scheduled, repetitive transports,.  For non-repetitive ambulance transports, if unable to obtain the signature of the attending physician, any of the following may sign (please select below):     Physician  Clinical Nurse Specialist  Registered Nurse     Physician Assistant  Discharge Planner  Licensed Practical Nurse     Nurse Practitioner

## 2022-10-12 NOTE — PROGRESS NOTES
Enter Query Response Below      Query Response:   Acute kidney injury not due to sepsis           If applicable, please update the problem list.        Patient: Yakov Dubon        : 1945  Account: [id number]           Admit Date: 10/1/2022        How to Respond to this query:       a. Click New Note     b. Answer query within the yellow box.                c. Update the Problem List, if applicable.      If you have any questions about this query contact me at: [email or web address]     Dr. العراقي,    Patient presented with UTI, sepsis, and acute kidney injury.  Patient was treated with IV fluids, IV antibiotics, and nephrology consultation.    Can this be further specified as:    - acute kidney injury due to sepsis  - acute kidney injury not due to sepsis  - other ________________________  - unable to clinically determine    By submitting this query, we are merely seeking further clarification of documentation to accurately reflect all conditions that you are monitoring, evaluating, treating or that extend the hospitalization or utilize additional resources of care. Please utilize your independent clinical judgment when addressing the question(s) above.     This query and your response, once completed, will be entered into the legal medical record.    Sincerely,  Emilia Munoz RN CCDS [facility]  Clinical Documentation Integrity Program

## 2022-10-12 NOTE — PROGRESS NOTES
Cellulitis not due to diabetes enter Query Response Below      Query Response:   Cellulitis not due to diabetes Electronically signed by Jean العراقي MD, 10/12/22, 1:06 PM EDT.             If applicable, please update the problem list.        Patient: Yakov Dubon        : 1945  Account: [id number]           Admit Date: 10/1/2022        How to Respond to this query:       a. Click New Note     b. Answer query within the yellow box.                c. Update the Problem List, if applicable.      If you have any questions about this query contact me at: [email or web address]     Dr. العراقي,    Patient with diabetes presented with cellulitis of left lower extremity.  Patient with HgbA1C 5.80% during this encounter.  Patient was treated with IV antibiotics and insulin.    Can this be further specified as:    - cellulitis due to diabetes  - cellulitis not due to diabetes  - other ______________________  - unable to clinically determine    By submitting this query, we are merely seeking further clarification of documentation to accurately reflect all conditions that you are monitoring, evaluating, treating or that extend the hospitalization or utilize additional resources of care. Please utilize your independent clinical judgment when addressing the question(s) above.     This query and your response, once completed, will be entered into the legal medical record.    Sincerely,  Emilia Munoz RN CCDS [facility]  Clinical Documentation Integrity Program

## 2022-10-14 NOTE — CASE MANAGEMENT/SOCIAL WORK
Case Management Discharge Note      Final Note: Return to [facility] via EMS         Selected Continued Care - Discharged on 10/11/2022 Admission date: 10/1/2022 - Discharge disposition: Skilled Nursing Facility (DC - External)    Destination Coordination complete.    Service Provider Selected Services Address Phone Fax Patient Preferred    [facility] [place] [facility] [address] [place], [place] [id number] [phone] --          Durable Medical Equipment    No services have been selected for the patient.              Dialysis/Infusion    No services have been selected for the patient.              Home Medical Care    No services have been selected for the patient.              Therapy    No services have been selected for the patient.              Community Resources    No services have been selected for the patient.              Community & DME    No services have been selected for the patient.                Selected Continued Care - Prior Encounters Includes continued care and service providers with selected services from prior encounters from 7/3/2022 to 10/11/2022    Discharged on 8/19/2022 Admission date: 8/11/2022 - Discharge disposition: Intermediate Care     Destination     Service Provider Selected Services Address Phone Fax Patient Preferred    [address] [id number] [phone] --                    Transportation Services  Ambulance: [facility]    Final Discharge Disposition Code: 03 - skilled nursing facility (SNF)

## 2022-10-15 ENCOUNTER — HOSPITAL ENCOUNTER (EMERGENCY)
Facility: HOSPITAL | Age: 77
Discharge: NURSING, REHAB OR LONG-TERM CARE | End: 2022-10-16
Attending: EMERGENCY MEDICINE | Admitting: EMERGENCY MEDICINE

## 2022-10-15 VITALS
OXYGEN SATURATION: 98 % | RESPIRATION RATE: 16 BRPM | TEMPERATURE: 98.3 F | SYSTOLIC BLOOD PRESSURE: 119 MMHG | HEART RATE: 103 BPM | DIASTOLIC BLOOD PRESSURE: 61 MMHG

## 2022-10-15 DIAGNOSIS — S81.801A WOUND OF RIGHT LOWER EXTREMITY, INITIAL ENCOUNTER: Primary | ICD-10-CM

## 2022-10-15 PROCEDURE — 90471 IMMUNIZATION ADMIN: CPT | Performed by: EMERGENCY MEDICINE

## 2022-10-15 PROCEDURE — 99284 EMERGENCY DEPT VISIT MOD MDM: CPT

## 2022-10-15 PROCEDURE — 25010000002 TETANUS-DIPHTH-ACELL PERTUSSIS 5-2.5-18.5 LF-MCG/0.5 SUSPENSION PREFILLED SYRINGE: Performed by: EMERGENCY MEDICINE

## 2022-10-15 PROCEDURE — 90715 TDAP VACCINE 7 YRS/> IM: CPT | Performed by: EMERGENCY MEDICINE

## 2022-10-15 RX ORDER — CARVEDILOL 6.25 MG/1
6.25 TABLET ORAL ONCE
Status: COMPLETED | OUTPATIENT
Start: 2022-10-15 | End: 2022-10-15

## 2022-10-15 RX ORDER — TAMSULOSIN HYDROCHLORIDE 0.4 MG/1
0.4 CAPSULE ORAL ONCE
Status: COMPLETED | OUTPATIENT
Start: 2022-10-15 | End: 2022-10-15

## 2022-10-15 RX ORDER — ROSUVASTATIN CALCIUM 5 MG/1
5 TABLET, COATED ORAL ONCE
Status: COMPLETED | OUTPATIENT
Start: 2022-10-15 | End: 2022-10-15

## 2022-10-15 RX ADMIN — ROSUVASTATIN CALCIUM 5 MG: 5 TABLET, FILM COATED ORAL at 21:42

## 2022-10-15 RX ADMIN — TETANUS TOXOID, REDUCED DIPHTHERIA TOXOID AND ACELLULAR PERTUSSIS VACCINE, ADSORBED 0.5 ML: 5; 2.5; 8; 8; 2.5 SUSPENSION INTRAMUSCULAR at 17:42

## 2022-10-15 RX ADMIN — TAMSULOSIN HYDROCHLORIDE 0.4 MG: 0.4 CAPSULE ORAL at 21:42

## 2022-10-15 RX ADMIN — CARVEDILOL 6.25 MG: 6.25 TABLET, FILM COATED ORAL at 21:42

## 2022-10-15 NOTE — CASE MANAGEMENT/SOCIAL WORK
Per RN request, left message w/ [facility] to arrange transport back to [facility]; they will call back w/ [facility]. RN updated

## 2022-10-15 NOTE — ED PROVIDER NOTES
EMERGENCY DEPARTMENT ENCOUNTER  I wore full protective equipment throughout this patient encounter including a N95 mask, eye shield, gown and gloves. Hand hygiene was performed before donning protective equipment and after removal when leaving the room.    Room Number:  27/27  Date of encounter:  10/15/2022  PCP: Melissa Steven MD    HPI:  Context: Yakov Dubon is a 77 y.o. male who presents to the ED c/o chief complaint of right lower extremity wound.  Patient has history of dementia, unable to provide history.  Per report, unknown when injury occurred, earlier today.  Patient denies any complaints at present.    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/2017   • Hypersomnia due to medical condition 07/01/2018   • Periodic breathing 10/28/2019   • Cellulitis of right lower extremity 08/11/2022   • HTN (hypertension) 08/11/2022   • Type 2 diabetes mellitus with stage 3b chronic kidney disease ([facility]) 08/11/2022   • Elevated troponin 08/11/2022   • RBBB 08/11/2022   • Pulmonary vascular congestion 08/11/2022   • Diastolic CHF, acute ([facility]) 08/11/2022   • Elevated LFTs 08/12/2022   • Acute respiratory failure with hypoxia ([facility]) 08/12/2022   • Acute on chronic respiratory failure with hypercapnia ([facility]) 08/12/2022   • Anemia of chronic disease 08/14/2022   • Sepsis due to Gram negative bacteria ([facility]) 08/14/2022   • Left leg cellulitis 10/01/2022     Resolved Ambulatory Problems     Diagnosis Date Noted   • Mechanical complication of nervous system device, implant, and graft 04/18/2016     Past Medical History:   Diagnosis Date   • Anxiety    • Back pain    • Depression    • Diabetes mellitus ([facility])    • High cholesterol    • Hypertension    • Kidney disease, chronic, stage III (GFR 30-59 ml/min) ([facility])    • Reflux esophagitis     • Sleep apnea        PAST SURGICAL HISTORY  Past Surgical History:   Procedure Laterality Date   • APPENDECTOMY     • CARPAL TUNNEL RELEASE     • CATARACT EXTRACTION     • HAMMER TOE REPAIR     • LUMBAR SPINE SURGERY     • PAIN PUMP INSERTION/REVISION Right 4/18/2016    Procedure: RT PAIN PUMP REPLACEMENT;  Surgeon: Chris Messer MD;  Location: [facility] OR;  Service:    • TOTAL KNEE ARTHROPLASTY Left        FAMILY HISTORY  No family history on file.    SOCIAL HISTORY  Social History     Socioeconomic History   • Marital status:    Tobacco Use   • Smoking status: Never   • Smokeless tobacco: Never   Substance and Sexual Activity   • Alcohol use: No   • Drug use: No       ALLERGIES  Bacitracin, Gentamycin [gentamicin], Neosporin [neomycin-bacitracin zn-polymyx], Nsaids, and Tobramycin    The patient's allergies have been reviewed    REVIEW OF SYSTEMS  All systems reviewed and negative except for those discussed in HPI.     PHYSICAL EXAM  I have reviewed the triage vital signs and nursing notes.  ED Triage Vitals [10/15/22 1619]   Temp Heart Rate Resp BP SpO2   98.3 °F (36.8 °C) 103 16 158/75 98 %      Temp src Heart Rate Source Patient Position BP Location FiO2 (%)   Tympanic Monitor -- -- --       General: No acute distress.  HENT: NCAT, PERRL, Nares patent.  Eyes: no scleral icterus.  Neck: trachea midline, no ROM limitations.  CV: regular rhythm, regular rate.  Respiratory: normal effort, CTAB.  Abdomen: soft, nondistended, NTTP, no rebound tenderness, no guarding or rigidity.  Musculoskeletal: no deformity.  Neuro: alert, moves all extremities, follows commands.  Skin: warm, dry.  Large semicircular area of partial avulsion and right lateral calf with skin missing from the lateral edge where wound flap does not cover/become contiguous with wound edges.  No signs of infection.    LAB RESULTS  No results found for this or any previous visit (from the past 24 hour(s)).    I ordered the above labs and  reviewed the results.    RADIOLOGY  No Radiology Exams Resulted Within Past 24 Hours    I ordered the above noted radiological studies. I reviewed the images and results. I agree with the radiologist interpretation.    PROCEDURES  Procedures    MEDICATIONS GIVEN IN ER  Medications   Tetanus-Diphth-Acell Pertussis (BOOSTRIX) injection 0.5 mL (0.5 mL Intramuscular Given 10/15/22 1742)       PROGRESS, DATA ANALYSIS, CONSULTS, AND MEDICAL DECISION MAKING  A complete history and physical exam have been performed.  All available laboratory and imaging results have been reviewed by myself prior to disposition.    MDM  After the initial H&P, I discussed pertinent information from history and physical exam with patient/family.  Discussed differential diagnosis.  Discussed plan for ED evaluation/workup/treatment.  All questions answered.  Patient/family is agreeable with plan.  ED Course as of 10/15/22 1753   Sat Oct 15, 2022   1702 Patient presents with large wound to right lateral leg, unknown time of injury.  Patient has a partial avulsion of tissue, large area of tissue missing.  Even unknown time of injury, patient may be timed out from wound repair but also as patient is missing area of skin, not amendable to skin closure with sutures.  Will clean and irrigate wound extensively, plan for loose approximation with Steri-Strips.  Will refer to wound care for further treatment. [JG]   1750 Wound extensively irrigated and cleaned.  Wound loosely approximated with Steri-Strips, did attempt is close approximation is possible with missing tissue.  Wound covered and 4 x 4's and Tegaderm.  Wound remained covered for 48 hours and then began to wound care.  Patient will be referred to wound care center for further treatment. [JG]      ED Course User Index  [JG] Iker Gomez MD       AS OF 17:53 EDT VITALS:    BP - 158/75  HR - 103  TEMP - 98.3 °F (36.8 °C) (Tympanic)  O2 SATS - 98%    DIAGNOSIS  Final diagnoses:   Wound of  right lower extremity, initial encounter         DISPOSITION  DISCHARGE    Patient discharged in stable condition.    Reviewed implications of results, diagnosis, meds, responsibility to follow up, warning signs and symptoms of possible worsening, potential complications and reasons to return to ER.    Patient/Family voiced understanding of above instructions.    Discussed plan for discharge, as there is no emergent indication for admission. Patient referred to primary care provider for BP management due to today's BP. Pt/family is agreeable and understands need for follow up and repeat testing.  Pt is aware that discharge does not mean that nothing is wrong but it indicates no emergency is present that requires admission and they must continue care with follow-up as given below or physician of their choice.     FOLLOW-UP  Melissa Steven MD  [facility] [address] [phone]    Schedule an appointment as soon as possible for a visit in 2 days  For wound re-check    [facility]  [address] [place] [id number] [phone]             Medication List      No changes were made to your prescriptions during this visit.          Iker Gomez MD  10/15/22 7924

## 2022-10-15 NOTE — ED TRIAGE NOTES
Pt has an avulsion on outer right calf.  He has dementia and it is unknown how he injured his leg.  Pt has picc in right arm.  Was recently admitted and dx c sepsis.  He is on at home abx    Patient was placed in face mask during first look triage.  Patient was wearing a face mask throughout encounter.  I wore personal protective equipment throughout the encounter.  Hand hygiene was performed before and after patient encounter.

## 2022-10-16 NOTE — ED NOTES
This RN communicated w/ Patricia SHEPPARD about pt transport that won't arrive until midnight tonight and if pt could get bedtime medications of crestor, flomax, and coreg. MD gave okay to order meds.

## 2023-10-27 ENCOUNTER — OFFICE VISIT (OUTPATIENT)
Dept: CARDIOLOGY | Facility: CLINIC | Age: 78
End: 2023-10-27
Payer: MEDICARE

## 2023-10-27 VITALS
BODY MASS INDEX: 32.68 KG/M2 | HEIGHT: 71 IN | DIASTOLIC BLOOD PRESSURE: 64 MMHG | SYSTOLIC BLOOD PRESSURE: 135 MMHG | HEART RATE: 158 BPM | OXYGEN SATURATION: 97 % | WEIGHT: 233.4 LBS

## 2023-10-27 DIAGNOSIS — N18.32 TYPE 2 DIABETES MELLITUS WITH STAGE 3B CHRONIC KIDNEY DISEASE, UNSPECIFIED WHETHER LONG TERM INSULIN USE: ICD-10-CM

## 2023-10-27 DIAGNOSIS — R00.1 BRADYCARDIA, SINUS: ICD-10-CM

## 2023-10-27 DIAGNOSIS — I10 PRIMARY HYPERTENSION: ICD-10-CM

## 2023-10-27 DIAGNOSIS — E11.22 TYPE 2 DIABETES MELLITUS WITH STAGE 3B CHRONIC KIDNEY DISEASE, UNSPECIFIED WHETHER LONG TERM INSULIN USE: ICD-10-CM

## 2023-10-27 DIAGNOSIS — I50.32 CHRONIC HEART FAILURE WITH PRESERVED EJECTION FRACTION: ICD-10-CM

## 2023-10-27 DIAGNOSIS — Z01.810 PREOPERATIVE CARDIOVASCULAR EXAMINATION: Primary | ICD-10-CM

## 2023-10-27 DIAGNOSIS — G47.33 OSA (OBSTRUCTIVE SLEEP APNEA): ICD-10-CM

## 2023-10-27 DIAGNOSIS — R79.89 ELEVATED TROPONIN: ICD-10-CM

## 2023-10-27 PROBLEM — J96.01 ACUTE RESPIRATORY FAILURE WITH HYPOXIA: Status: RESOLVED | Noted: 2022-08-12 | Resolved: 2023-10-27

## 2023-10-27 PROBLEM — A41.50 SEPSIS DUE TO GRAM NEGATIVE BACTERIA: Status: RESOLVED | Noted: 2022-08-14 | Resolved: 2023-10-27

## 2023-10-27 RX ORDER — CARVEDILOL 3.12 MG/1
3.12 TABLET ORAL 2 TIMES DAILY WITH MEALS
Qty: 60 TABLET | Refills: 0 | Status: SHIPPED | OUTPATIENT
Start: 2023-10-27 | End: 2023-11-26

## 2023-10-27 RX ORDER — OXYCODONE HYDROCHLORIDE AND ACETAMINOPHEN 5; 325 MG/1; MG/1
1 TABLET ORAL EVERY 6 HOURS PRN
COMMUNITY

## 2023-10-27 RX ORDER — POLYETHYLENE GLYCOL 3350
POWDER (GRAM) MISCELLANEOUS
COMMUNITY

## 2023-10-27 RX ORDER — FUROSEMIDE 20 MG/1
20 TABLET ORAL 2 TIMES DAILY
COMMUNITY

## 2023-10-27 RX ORDER — GABAPENTIN 100 MG/1
100 CAPSULE ORAL 3 TIMES DAILY
COMMUNITY

## 2023-10-27 RX ORDER — AMLODIPINE BESYLATE 10 MG/1
10 TABLET ORAL DAILY
COMMUNITY

## 2023-10-27 NOTE — PATIENT INSTRUCTIONS
Keep Blood pressure log with goal of -140 and DBP  60-90, contact office if multiple readings out of range    Limit salt to < 3 mg daily per AHA guidelines     Contact office if multiple readings over 160 with blood pressure

## 2023-10-27 NOTE — PROGRESS NOTES
CARDIOLOGY    Date of Office Visit: 10/27/2023  Patient Name: Yakov Dubon  : 1945  Encounter Provider: Robin Toth PA-C  Primary Cardiologist: Sanjeev Tong MD    CHIEF COMPLAINT / REASON FOR OFFICE VISIT     Preoperative Evaluation       HISTORY OF PRESENT ILLNESS     This is a 78 y.o. year old male who presents to [facility] CARDIOLOGY for a  follow up after recent hospitalization and preop evaluation.    We had initially met the hospital inpatient in 2022 and were consulted to evaluate the patient after he had an elevated troponin in the setting of Enterobacter septicemia and right lower extremity cellulitis.  Elevated troponin was thought to be due to demand ischemia as his echocardiogram did not show any changes in ejection fraction and had no regional wall motion abnormalities.    He presented to the hospital on 10/1/2022 and was discharged on 10/11/2023 after he was found to have E. coli bacteremia with sepsis from a UTI.  He also had left lower extremity cellulitis as well.  He was discharged home on ceftriaxone IV for an additional week.  While inpatient he had an episode of CARLOS and did receive diuresis through nephrology.  He was taking all his nephrotoxic medications and On amlodipine 10 mg daily, aspirin 81 mg daily and carvedilol 6.25 mg twice daily.  He was discharged to subacute rehab.  He was encouraged to follow-up after this visit but was never seen in office by us as an outpatient.    He went back to the ER on 10/15/2023 for a right lower extremity wound that was closed with Steri-Strips.  He was given a tetanus shot and discharged home.    He is following up with ophthalmology and is being treated for a vitreous hemorrhage of his right eye.  They are recommending that his right eye needs surgery and I recommend he receive evaluation from cardiology preoperatively.    Today the patient reports he has been doing well. He has been improving his exercise  "tolerance and doing more over the past few months. He is now working on a machine for around 10 minutes with exercise and not having any chest pain or pressure. He was noted to have bradycardia today and we talked about symptoms of this. I am going to decrease his coreg to 3.125 mg twice daily. He denies any palpitations, dizziness or near syncope.     PMHx:  HTN, RBBB, HFpEF, anemia, HALIMA, central sleep apnea, DM type II with CKD stage IIIb, intermittent left bundle branch block    PHYSICAL EXAMINATION     Vital Signs:  /64 (BP Location: Left arm, Patient Position: Sitting, Cuff Size: Adult)   Pulse (!) 158   Ht 180.3 cm (71\")   Wt 106 kg (233 lb 6.4 oz)   SpO2 97%   BMI 32.55 kg/m²   Estimated body mass index is 32.55 kg/m² as calculated from the following:    Height as of this encounter: 180.3 cm (71\").    Weight as of this encounter: 106 kg (233 lb 6.4 oz).             Physical Exam  Constitutional:       Appearance: Normal appearance.   HENT:      Head: Normocephalic and atraumatic.   Cardiovascular:      Rate and Rhythm: Regular rhythm. Bradycardia present.      Pulses: Normal pulses.      Heart sounds: Normal heart sounds.   Pulmonary:      Effort: Pulmonary effort is normal.      Breath sounds: Normal breath sounds.   Musculoskeletal:      Right lower leg: No edema.      Left lower leg: No edema.   Skin:     General: Skin is warm and dry.   Neurological:      General: No focal deficit present.      Mental Status: He is alert and oriented to person, place, and time.          Cardiac Testing/Results     Cardiac Testing:   - Echo 8/11/2022: EF 58.3% with normal diastolic unction.  Mild LVH.  No significant valvular heart disease noted    Result Review :  The following data was reviewed by: Robin Toth PA-C on 10/27/2023:       Lab Results   Component Value Date     10/11/2022     10/10/2022    K 4.1 10/11/2022    K 4.0 10/10/2022     10/11/2022     10/10/2022    CO2 " 31.1 (H) 10/11/2022    CO2 28.4 10/10/2022    BUN 29 (H) 10/11/2022    BUN 30 (H) 10/10/2022    CREATININE 1.43 (H) 10/11/2022    CREATININE 1.45 (H) 10/10/2022    EGFRIFNONA 45 (L) 04/11/2016    GLUCOSE 117 (H) 10/11/2022    GLUCOSE 112 (H) 10/10/2022    CALCIUM 9.3 10/11/2022    CALCIUM 9.4 10/10/2022    ALBUMIN 3.10 (L) 10/10/2022    ALBUMIN 3.00 (L) 10/09/2022    AST 10 10/06/2022    AST 21 10/02/2022    ALT 9 10/06/2022    ALT 13 10/02/2022     Lab Results   Component Value Date    WBC 7.57 10/07/2022    WBC 7.94 10/06/2022    HGB 10.8 (L) 10/07/2022    HGB 11.0 (L) 10/06/2022    HCT 31.8 (L) 10/07/2022    HCT 33.6 (L) 10/06/2022    MCV 89.6 10/07/2022    MCV 92.1 10/06/2022     10/07/2022     10/06/2022     Lab Results   Component Value Date    PROBNP 3,474.0 (H) 10/01/2022    PROBNP 1,298.0 08/11/2022    BNP 1,120.0 (H) 12/29/2019    .0 (H) 10/09/2019     Lab Results   Component Value Date    CKTOTAL 497 (H) 08/11/2022    TROPONINI 0.033 12/29/2019    TROPONINT 0.084 (C) 08/13/2022     Lab Results   Component Value Date    TSH 1.260 10/02/2022    TSH 3.490 02/03/2019                 ECG 12 Lead    Date/Time: 10/27/2023 1:58 PM  Performed by: Robin Moon PA-C    Authorized by: Robin Moon PA-C  Comparison: compared with previous ECG from 8/12/2022  Rhythm: sinus bradycardia  Rate: bradycardic  BPM: 48    Clinical impression: abnormal EKG  Comments: Qtc 405, this appears inaccurate from my reivew.                 ASSESSMENT & PLAN       Diagnoses and all orders for this visit:    1. Preoperative cardiovascular examination (Primary)  Patient is seeking evaluation for a right vitreous humor rupture repair with ophthalmology  Low cardiac risk to proceed with proposed surgery. Will decrease betablocker due to bradycardia. New orders placed to nursing home.   METS > 6. EKG today with no new ischemic changes.   2. Primary hypertension  Keep blood pressure log at home,  echocardiogram completed last year showing mild left ventricular hypertrophy  Bradycardia noted on EKG, decrease coreg to 3.125 mg BID. Continue norvasc. BP to be monitored by facility, to contact us if readings over 160   3. Chronic heart failure with preserved ejection fraction  Noted to have exacerbation of heart failure in the setting of sepsis.  Clinically euvolemic today.  Not take diuretics on a daily basis.  Echocardiogram showing normal ejection fraction  4. Elevated troponin  History of prior elevated troponin in setting of severe sepsis.  Echocardiogram completed at the time showed normal EF with no wall motion abnormalities.  Elevation was thought to be due to demand ischemia  5. Type 2 diabetes mellitus with stage 3b chronic kidney disease, unspecified whether long term insulin use  Follows with PCP for diabetic management.  He is on insulin  6. HALIMA (obstructive sleep apnea)  Encourage compliance with CPAP      Follow Up:  Return in about 1 year (around 10/27/2024) for JA.  Patient was given instructions and counseling regarding his condition or for health maintenance advice. Please contact office if worsening symptoms or proceed to ER when appropriate.      Robin Toth PA-C  10/27/23  14:20 EDT    MEDICATIONS         Discharge Medications            Accurate as of October 27, 2023  2:20 PM. If you have any questions, ask your nurse or doctor.                Changes to Medications        Instructions Start Date   carvedilol 3.125 MG tablet  Commonly known as: COREG  What changed:   medication strength  how much to take  Changed by: Robin Toth PA-C   3.125 mg, Oral, 2 Times Daily With Meals             Continue These Medications        Instructions Start Date   amLODIPine 10 MG tablet  Commonly known as: NORVASC   10 mg, Oral, Daily      aspirin 81 MG chewable tablet   81 mg, Oral, Daily      famotidine 40 MG tablet  Commonly known as: PEPCID   40 mg, Oral, 2 Times Daily      furosemide  20 MG tablet  Commonly known as: LASIX   20 mg, Oral, 2 Times Daily      gabapentin 100 MG capsule  Commonly known as: NEURONTIN   100 mg, Oral, 3 Times Daily      insulin lispro 100 UNIT/ML injection  Commonly known as: HUMALOG/ADMELOG   0-14 Units, Subcutaneous, 3 Times Daily Before Meals      oxyCODONE-acetaminophen 5-325 MG per tablet  Commonly known as: PERCOCET   1 tablet, Oral, Every 6 Hours PRN      polyethylene glycol 17 g packet  Commonly known as: MIRALAX   17 g, Oral, Daily PRN      Polyethylene Glycol 3350 powder   Does not apply      rosuvastatin 10 MG tablet  Commonly known as: CRESTOR   5 mg, Oral, Nightly      senna 8.6 MG tablet  Commonly known as: SENOKOT   1 tablet, Oral, Daily      sertraline 25 MG tablet  Commonly known as: ZOLOFT   75 mg, Oral, Nightly      tamsulosin 0.4 MG capsule 24 hr capsule  Commonly known as: FLOMAX   1 capsule, Oral, Nightly      Tubersol 5 UNIT/0.1ML injection  Generic drug: tuberculin   Intradermal, Once                   **Deepakon Disclaimer: This note was dictated using an electronic transcription. The electronic translation of spoken language may permit erroneous, or at times, nonsensical words or phrases to be inadvertently transcribed. Although I have reviewed the note for such errors, some may still exist.

## 2023-11-28 ENCOUNTER — HOSPITAL ENCOUNTER (OUTPATIENT)
Dept: GENERAL RADIOLOGY | Facility: HOSPITAL | Age: 78
Discharge: HOME | End: 2023-11-28
Admitting: INTERNAL MEDICINE
Payer: MEDICARE

## 2023-11-28 DIAGNOSIS — R09.02 HYPOXIA: ICD-10-CM

## 2023-11-28 PROCEDURE — 71046 X-RAY EXAM CHEST 2 VIEWS: CPT

## 2024-04-22 ENCOUNTER — TELEPHONE (OUTPATIENT)
Dept: CARDIOLOGY | Facility: CLINIC | Age: 79
End: 2024-04-22
Payer: MEDICARE

## 2024-04-22 NOTE — TELEPHONE ENCOUNTER
We have received a fax for Andrea Dubon for a cardiac clearance.     Provider: Dr. Pavon  Surgery: Rt eye Vitrectomy   Date: 5-     Fax needs review and signature, left in inbox for completion. Please advise?

## 2025-04-10 NOTE — PLAN OF CARE
Goal Outcome Evaluation:  Plan of Care Reviewed With: patient           Outcome Evaluation: Pt seen for PT this afternoon. He reports pain in RLE but is unable to rate pain during session. HE was able to perform supine to sit w mod A x 2. Pt did initiate most movement of RLE. HE tolerated sitting for approx 6 minutes w SBA, but was not agreeable to attempt standing today. Pt assisted back to bed at end of session. He is slow to respond to questions and often would not verbalize anything at all. Will continue to progress activity as tolerated.   Status: He is alert and oriented to person, place, and time. Mental status is at baseline.   Psychiatric:         Mood and Affect: Mood normal.         Behavior: Behavior normal.         Thought Content: Thought content normal.         POC  No results found for this visit on 04/10/25.      Patients recent PSA values are as follows  Lab Results   Component Value Date    PSA 5.34 04/02/2025    PSA 5.43 04/03/2024    PSA 4.72 03/06/2023        Recent BUN/Creatinine:  Lab Results   Component Value Date/Time    BUN 16 09/08/2012 05:00 AM    CREATININE 1.0 09/08/2012 05:00 AM       Assessment:   BPH with LUTS  ED  Elevated psa    Plan:     PSA stable with baseline.  Repeat in one year.   KAVYA deferred to next visit.      Trial Cialis 5 mg daily for BPH and ED.  Provided with goodrx coupon to [facility].      OV in 2-3 months for symptom check